# Patient Record
Sex: MALE | Race: ASIAN | Employment: FULL TIME | URBAN - METROPOLITAN AREA
[De-identification: names, ages, dates, MRNs, and addresses within clinical notes are randomized per-mention and may not be internally consistent; named-entity substitution may affect disease eponyms.]

---

## 2017-01-13 ENCOUNTER — OFFICE VISIT (OUTPATIENT)
Dept: FAMILY MEDICINE CLINIC | Facility: CLINIC | Age: 47
End: 2017-01-13

## 2017-01-13 VITALS
BODY MASS INDEX: 31.8 KG/M2 | HEART RATE: 52 BPM | HEIGHT: 74 IN | SYSTOLIC BLOOD PRESSURE: 147 MMHG | WEIGHT: 247.8 LBS | TEMPERATURE: 97.7 F | RESPIRATION RATE: 14 BRPM | OXYGEN SATURATION: 94 % | DIASTOLIC BLOOD PRESSURE: 87 MMHG

## 2017-01-13 DIAGNOSIS — Z12.5 PROSTATE CANCER SCREENING: ICD-10-CM

## 2017-01-13 DIAGNOSIS — Z76.89 ENCOUNTER TO ESTABLISH CARE: ICD-10-CM

## 2017-01-13 DIAGNOSIS — M10.00 IDIOPATHIC GOUT, UNSPECIFIED CHRONICITY, UNSPECIFIED SITE: ICD-10-CM

## 2017-01-13 DIAGNOSIS — I10 HTN, GOAL BELOW 130/80: Primary | ICD-10-CM

## 2017-01-13 RX ORDER — ATENOLOL 100 MG/1
100 TABLET ORAL DAILY
COMMUNITY
End: 2017-02-10 | Stop reason: ALTCHOICE

## 2017-01-13 RX ORDER — ALLOPURINOL 100 MG/1
TABLET ORAL DAILY
COMMUNITY
End: 2017-03-20 | Stop reason: ALTCHOICE

## 2017-01-13 RX ORDER — PREDNISONE 5 MG/1
TABLET ORAL AS NEEDED
COMMUNITY
End: 2017-06-16 | Stop reason: SDUPTHER

## 2017-01-13 RX ORDER — LISINOPRIL AND HYDROCHLOROTHIAZIDE 20; 25 MG/1; MG/1
TABLET ORAL DAILY
COMMUNITY
End: 2017-01-13 | Stop reason: SDUPTHER

## 2017-01-13 RX ORDER — LISINOPRIL AND HYDROCHLOROTHIAZIDE 20; 25 MG/1; MG/1
1 TABLET ORAL DAILY
Qty: 90 TAB | Refills: 1 | Status: SHIPPED | OUTPATIENT
Start: 2017-01-13 | End: 2017-02-10 | Stop reason: ALTCHOICE

## 2017-01-13 NOTE — PROGRESS NOTES
HISTORY OF PRESENT ILLNESS  Cornel Jack is a 48 y.o. male. HPI Comments: Presents as a new patient to establish care. H/o HTN, kidney disease and gout. Reports medication compliance. Unsure the extent of his kidney disease. Had been going to the [facility] for his care. His gout is managed with allopurinol, denies any pain today. Has never been screened for prostate cancer, will like to do that today. Establish Care   The history is provided by the patient. This is a new problem. Hypertension    The history is provided by the patient. This is a chronic problem. The current episode started more than 1 week ago. The problem has not changed since onset. Risk factors include male gender and family history. Gout   The history is provided by the patient. This is a chronic problem. The current episode started more than 1 week ago. The problem has been gradually improving. Treatments tried: allopurinol. Other   The history is provided by the patient. Review of Systems   Respiratory: Negative. Cardiovascular: Negative. Musculoskeletal: Positive for gout. Psychiatric/Behavioral: Negative. Past Medical History   Diagnosis Date    Hypertension     Joint pain     Kidney disease      History reviewed. No pertinent past surgical history. No current outpatient prescriptions on file prior to visit. No current facility-administered medications on file prior to visit. Allergies and Intolerances:   No Known Allergies    Family History:   Family History   Problem Relation Age of Onset    Hypertension Mother     Heart Disease Father     Hypertension Father     Hypertension Child        Social History:   He  reports that he has never smoked. He has never used smokeless tobacco. He  reports that he does not drink alcohol.   Vitals:   Visit Vitals    /87 (BP 1 Location: Right arm, BP Patient Position: Sitting)    Pulse (!) 52    Temp 97.7 °F (36.5 °C) (Oral)    Resp 14    Ht 6' 2\" (1.88 m)    Wt 247 lb 12.8 oz (112.4 kg)    SpO2 94%    BMI 31.82 kg/m2     Body surface area is 2.42 meters squared. Physical Exam   Constitutional: He is oriented to person, place, and time. He appears well-developed and well-nourished. HENT:   Head: Atraumatic. Neck: No JVD present. Carotid bruit is not present. Cardiovascular: Normal rate. Musculoskeletal: Normal range of motion. Neurological: He is alert and oriented to person, place, and time. Skin: Skin is warm. Psychiatric: He has a normal mood and affect. His behavior is normal.   Nursing note and vitals reviewed. ASSESSMENT and PLAN    ICD-10-CM ICD-9-CM    1. HTN, goal below 130/80 I10 401.9 CBC WITH AUTOMATED DIFF      METABOLIC PANEL, COMPREHENSIVE      TSH 3RD GENERATION      lisinopril-hydroCHLOROthiazide (PRINZIDE, ZESTORETIC) 20-25 mg per tablet   2. Idiopathic gout, unspecified chronicity, unspecified site M10.00 274.9 URIC ACID   3. Prostate cancer screening Z12.5 V76.44 PSA DIAGNOSTIC (PROSTATIC SPECIFIC AG)   4. Encounter to establish care Z76.89 V65.8      Baseline labs today. Will request records from [facility].   Follow-up Disposition:  Return in about 4 weeks (around 2/10/2017), or if symptoms worsen or fail to improve, for HTN.  lab results and schedule of future lab studies reviewed with patient  reviewed medications and side effects in detail    - Alarm signals discussed. ER precautions  - Plan of care reviewed with patient. Understanding verbalized and they are in agreement with plan of care.

## 2017-01-13 NOTE — MR AVS SNAPSHOT
Visit Information Date & Time Provider Department Dept. Phone Encounter #  
 1/13/2017  9:15 AM Kyle Chin NP [address] [phone] [id number] Follow-up Instructions Return in about 4 weeks (around 2/10/2017), or if symptoms worsen or fail to improve, for HTN. Allergies as of 1/13/2017  Review Complete On: 1/13/2017 By: Luke Corona No Known Allergies Current Immunizations  Never Reviewed No immunizations on file. Not reviewed this visit You Were Diagnosed With   
  
 Codes Comments HTN, goal below 130/80    -  Primary ICD-10-CM: I10 
ICD-9-CM: 401.9 Idiopathic gout, unspecified chronicity, unspecified site     ICD-10-CM: M10.00 ICD-9-CM: 274.9 Prostate cancer screening     ICD-10-CM: Z12.5 ICD-9-CM: V76.44 Vitals BP Pulse Temp Resp Height(growth percentile) Weight(growth percentile) 147/87 (BP 1 Location: Right arm, BP Patient Position: Sitting) (!) 52 97.7 °F (36.5 °C) (Oral) 14 6' 2\" (1.88 m) 247 lb 12.8 oz (112.4 kg) SpO2 BMI Smoking Status 94% 31.82 kg/m2 Never Smoker Vitals History BMI and BSA Data Body Mass Index Body Surface Area  
 31.82 kg/m 2 2.42 m 2 Preferred Pharmacy Pharmacy Name Phone [id number]- [address] [phone] Your Updated Medication List  
  
   
This list is accurate as of: 1/13/17 10:17 AM.  Always use your most recent med list.  
  
  
  
  
 allopurinol 100 mg tablet Commonly known as:  Mery Casa Take  by mouth daily. atenolol 100 mg tablet Commonly known as:  TENORMIN Take 100 mg by mouth daily. lisinopril-hydroCHLOROthiazide 20-25 mg per tablet Commonly known as:  Lisset Edinger Take 1 Tab by mouth daily. predniSONE 5 mg tablet Commonly known as:  Destiny Cherry Take  by mouth. Prescriptions Sent to Pharmacy Refills lisinopril-hydroCHLOROthiazide (PRINZIDE, ZESTORETIC) 20-25 mg per tablet 1 Sig: Take 1 Tab by mouth daily. Class: Normal  
 Pharmacy: [address] [place] [address] #: [phone] Route: Oral  
  
Follow-up Instructions Return in about 4 weeks (around 2/10/2017), or if symptoms worsen or fail to improve, for HTN. To-Do List   
 01/13/2017 Lab:  CBC WITH AUTOMATED DIFF   
  
 01/13/2017 Lab:  METABOLIC PANEL, COMPREHENSIVE   
  
 01/13/2017 Lab:  PSA DIAGNOSTIC (PROSTATIC SPECIFIC AG)   
  
 01/13/2017 Lab:  TSH 3RD GENERATION   
  
 01/13/2017 Lab:  URIC ACID Patient Instructions Kidney Disease and High Blood Pressure: Care Instructions Your Care Instructions Long-term (chronic) kidney disease happens when the kidneys cannot remove waste and keep your body's fluids and chemicals in balance. Usually, the kidneys remove waste from the blood through the urine. When the kidneys are not working well, waste can build up so much that it poisons the body. Kidney disease can make you very tired. It also can cause swelling, or edema, in your legs or other areas of your body. High blood pressure is one of the major causes of chronic kidney disease. And kidney disease can also cause high blood pressure. No matter which came first, having high blood pressure damages the tiny blood vessels in the kidneys. If you have high blood pressure, it is important to lower it. There are many things you can do to lower your blood pressure, which may help slow or stop the damage to your kidneys. Follow-up care is a key part of your treatment and safety. Be sure to make and go to all appointments, and call your doctor if you are having problems. It's also a good idea to know your test results and keep a list of the medicines you take. How can you care for yourself at home? · Be safe with medicines. Take your medicines exactly as prescribed.  Call your doctor if you have any problems with your medicine. You will probably need more than one medicine to lower your blood pressure. You will get more details on the specific medicines your doctor prescribes. · Work with your doctor and a dietitian to plan meals that have the right amount of nutrients for you. You will probably have to limit salt, fluids, and protein. · Stay at a healthy weight. This is very important if you put on weight around the waist. Losing even 10 pounds can help you lower your blood pressure. · Manage other health problems such as diabetes and high cholesterol. You can help lower your risk for heart disease and blood vessel problems with a healthy lifestyle along with medicines. · Do not take ibuprofen (Advil, Motrin) or naproxen (Aleve), or similar medicines, unless your doctor tells you to. They may make chronic kidney disease worse. It is okay to take acetaminophen (Tylenol). · If your doctor recommends it, get more exercise. Walking is a good choice. Bit by bit, increase the amount you walk every day. Try for at least 30 minutes on most days of the week. You also may want to swim, bike, or do other activities. · Limit or avoid alcohol. Talk to your doctor about whether you can drink any alcohol. · Do not smoke or allow others to smoke around you. If you need help quitting, talk to your doctor about stop-smoking programs and medicines. These can increase your chances of quitting for good. When should you call for help? Call 911 anytime you think you may need emergency care. For example, call if: 
· You passed out (lost consciousness). · You have symptoms of a heart attack, such as: ¨ Chest pain or pressure. ¨ Sweating. ¨ Shortness of breath. ¨ Nausea or vomiting. ¨ Pain that spreads from the chest to the neck, jaw, or one or both shoulders or arms. ¨ Dizziness or lightheadedness. ¨ A fast or uneven pulse. After calling 911, chew 1 adult-strength aspirin. Wait for an ambulance. Do not try to drive yourself. Call your doctor now or seek immediate medical care if: 
· You are confused or are more tired or weak than usual. 
· You bleed or have bruises. Watch closely for changes in your health, and be sure to contact your doctor if: 
· You do not want to eat. · You have new swelling of your arms or feet, or swelling that you already have gets worse. · You do not get better as expected. Where can you learn more? Go to http://nichole-shahnaz.info/. Enter N471 in the search box to learn more about \"Kidney Disease and High Blood Pressure: Care Instructions. \" Current as of: November 20, 2015 Content Version: 11.1 © 0985-0747 [employer]. Care instructions adapted under license by [employer] (which disclaims liability or warranty for this information). If you have questions about a medical condition or this instruction, always ask your healthcare professional. [place] [address] any warranty or liability for your use of this information. High Blood Pressure: Care Instructions Your Care Instructions If your blood pressure is usually above 140/90, you have high blood pressure, or hypertension. That means the top number is 140 or higher or the bottom number is 90 or higher, or both. Despite what a lot of people think, high blood pressure usually doesn't cause headaches or make you feel dizzy or lightheaded. It usually has no symptoms. But it does increase your risk for heart attack, stroke, and kidney or eye damage. The higher your blood pressure, the more your risk increases. Your doctor will give you a goal for your blood pressure. Your goal will be based on your health and your age. An example of a goal is to keep your blood pressure below 140/90.  
Lifestyle changes, such as eating healthy and being active, are always important to help lower blood pressure. You might also take medicine to reach your blood pressure goal. 
Follow-up care is a key part of your treatment and safety. Be sure to make and go to all appointments, and call your doctor if you are having problems. It's also a good idea to know your test results and keep a list of the medicines you take. How can you care for yourself at home? Medical treatment · If you stop taking your medicine, your blood pressure will go back up. You may take one or more types of medicine to lower your blood pressure. Be safe with medicines. Take your medicine exactly as prescribed. Call your doctor if you think you are having a problem with your medicine. · Talk to your doctor before you start taking aspirin every day. Aspirin can help certain people lower their risk of a heart attack or stroke. But taking aspirin isn't right for everyone, because it can cause serious bleeding. · See your doctor regularly. You may need to see the doctor more often at first or until your blood pressure comes down. · If you are taking blood pressure medicine, talk to your doctor before you take decongestants or anti-inflammatory medicine, such as ibuprofen. Some of these medicines can raise blood pressure. · Learn how to check your blood pressure at home. Lifestyle changes · Stay at a healthy weight. This is especially important if you put on weight around the waist. Losing even 10 pounds can help you lower your blood pressure. · If your doctor recommends it, get more exercise. Walking is a good choice. Bit by bit, increase the amount you walk every day. Try for at least 30 minutes on most days of the week. You also may want to swim, bike, or do other activities. · Avoid or limit alcohol. Talk to your doctor about whether you can drink any alcohol. · Try to limit how much sodium you eat to less than 2,300 milligrams (mg) a day. Your doctor may ask you to try to eat less than 1,500 mg a day. · Eat plenty of fruits (such as bananas and oranges), vegetables, legumes, whole grains, and low-fat dairy products. · Lower the amount of saturated fat in your diet. Saturated fat is found in animal products such as milk, cheese, and meat. Limiting these foods may help you lose weight and also lower your risk for heart disease. · Do not smoke. Smoking increases your risk for heart attack and stroke. If you need help quitting, talk to your doctor about stop-smoking programs and medicines. These can increase your chances of quitting for good. When should you call for help? Call 911 anytime you think you may need emergency care. This may mean having symptoms that suggest that your blood pressure is causing a serious heart or blood vessel problem. Your blood pressure may be over 180/110. For example, call 911 if: 
· You have symptoms of a heart attack. These may include: ¨ Chest pain or pressure, or a strange feeling in the chest. 
¨ Sweating. ¨ Shortness of breath. ¨ Nausea or vomiting. ¨ Pain, pressure, or a strange feeling in the back, neck, jaw, or upper belly or in one or both shoulders or arms. ¨ Lightheadedness or sudden weakness. ¨ A fast or irregular heartbeat. · You have symptoms of a stroke. These may include: 
¨ Sudden numbness, tingling, weakness, or loss of movement in your face, arm, or leg, especially on only one side of your body. ¨ Sudden vision changes. ¨ Sudden trouble speaking. ¨ Sudden confusion or trouble understanding simple statements. ¨ Sudden problems with walking or balance. ¨ A sudden, severe headache that is different from past headaches. · You have severe back or belly pain. Do not wait until your blood pressure comes down on its own. Get help right away. Call your doctor now or seek immediate care if: 
· Your blood pressure is much higher than normal (such as 180/110 or higher), but you don't have symptoms. · You think high blood pressure is causing symptoms, such as: ¨ Severe headache. ¨ Blurry vision. Watch closely for changes in your health, and be sure to contact your doctor if: 
· Your blood pressure measures 140/90 or higher at least 2 times. That means the top number is 140 or higher or the bottom number is 90 or higher, or both. · You think you may be having side effects from your blood pressure medicine. · Your blood pressure is usually normal, but it goes above normal at least 2 times. Where can you learn more? Go to http://nichole-shahnaz.info/. Enter X925 in the search box to learn more about \"High Blood Pressure: Care Instructions. \" Current as of: August 8, 2016 Content Version: 11.1 © 0418-6521 [email or web address]. Care instructions adapted under license by [employer] (which disclaims liability or warranty for this information). If you have questions about a medical condition or this instruction, always ask your healthcare professional. [place] [address] any warranty or liability for your use of this information. Introducing [facility]! Jeff Theodore introduces [employer] patient portal. Now you can access parts of your medical record, email your doctor's office, and request medication refills online. 1. In your internet browser, go to https://The Mark [email or web address]. "[employer]/The Mark News 2. Click on the First Time User? Click Here link in the Sign In box. You will see the New Member Sign Up page. 3. Enter your [employer] Access Code exactly as it appears below. You will not need to use this code after youve completed the sign-up process. If you do not sign up before the expiration date, you must request a new code. · [employer] Access Code: [email or web address] Expires: 4/13/2017  9:35 AM 
 
4. Enter the last four digits of your Social Security Number (xxxx) and Date of Birth (mm/dd/yyyy) as indicated and click Submit.  You will be taken to the next sign-up page. 5. Create a EntraTympanic ID. This will be your EntraT[employer] login ID and cannot be changed, so think of one that is secure and easy to remember. 6. Create a EntraTympanic password. You can change your password at any time. 7. Enter your Password Reset Question and Answer. This can be used at a later time if you forget your password. 8. Enter your e-mail address. You will receive e-mail notification when new information is available in [address]. 9. Click Sign Up. You can now view and download portions of your medical record. 10. Click the Download Summary menu link to download a portable copy of your medical information. If you have questions, please visit the Frequently Asked Questions section of the EntraTympanic website. Remember, [employer] is NOT to be used for urgent needs. For medical emergencies, dial 911. Now available from your iPhone and Android! Please provide this summary of care documentation to your next provider. Your primary care clinician is listed as Anny Rodríguez. If you have any questions after today's visit, please call [phone].

## 2017-01-13 NOTE — PATIENT INSTRUCTIONS
Kidney Disease and High Blood Pressure: Care Instructions  Your Care Instructions  Long-term (chronic) kidney disease happens when the kidneys cannot remove waste and keep your body's fluids and chemicals in balance. Usually, the kidneys remove waste from the blood through the urine. When the kidneys are not working well, waste can build up so much that it poisons the body. Kidney disease can make you very tired. It also can cause swelling, or edema, in your legs or other areas of your body. High blood pressure is one of the major causes of chronic kidney disease. And kidney disease can also cause high blood pressure. No matter which came first, having high blood pressure damages the tiny blood vessels in the kidneys. If you have high blood pressure, it is important to lower it. There are many things you can do to lower your blood pressure, which may help slow or stop the damage to your kidneys. Follow-up care is a key part of your treatment and safety. Be sure to make and go to all appointments, and call your doctor if you are having problems. It's also a good idea to know your test results and keep a list of the medicines you take. How can you care for yourself at home? · Be safe with medicines. Take your medicines exactly as prescribed. Call your doctor if you have any problems with your medicine. You will probably need more than one medicine to lower your blood pressure. You will get more details on the specific medicines your doctor prescribes. · Work with your doctor and a dietitian to plan meals that have the right amount of nutrients for you. You will probably have to limit salt, fluids, and protein. · Stay at a healthy weight. This is very important if you put on weight around the waist. Losing even 10 pounds can help you lower your blood pressure. · Manage other health problems such as diabetes and high cholesterol.  You can help lower your risk for heart disease and blood vessel problems with a healthy lifestyle along with medicines. · Do not take ibuprofen (Advil, Motrin) or naproxen (Aleve), or similar medicines, unless your doctor tells you to. They may make chronic kidney disease worse. It is okay to take acetaminophen (Tylenol). · If your doctor recommends it, get more exercise. Walking is a good choice. Bit by bit, increase the amount you walk every day. Try for at least 30 minutes on most days of the week. You also may want to swim, bike, or do other activities. · Limit or avoid alcohol. Talk to your doctor about whether you can drink any alcohol. · Do not smoke or allow others to smoke around you. If you need help quitting, talk to your doctor about stop-smoking programs and medicines. These can increase your chances of quitting for good. When should you call for help? Call 911 anytime you think you may need emergency care. For example, call if:  · You passed out (lost consciousness). · You have symptoms of a heart attack, such as:  ¨ Chest pain or pressure. ¨ Sweating. ¨ Shortness of breath. ¨ Nausea or vomiting. ¨ Pain that spreads from the chest to the neck, jaw, or one or both shoulders or arms. ¨ Dizziness or lightheadedness. ¨ A fast or uneven pulse. After calling 911, chew 1 adult-strength aspirin. Wait for an ambulance. Do not try to drive yourself. Call your doctor now or seek immediate medical care if:  · You are confused or are more tired or weak than usual.  · You bleed or have bruises. Watch closely for changes in your health, and be sure to contact your doctor if:  · You do not want to eat. · You have new swelling of your arms or feet, or swelling that you already have gets worse. · You do not get better as expected. Where can you learn more? Go to http://nichole-shahnaz.info/. Enter P017 in the search box to learn more about \"Kidney Disease and High Blood Pressure: Care Instructions. \"  Current as of: November 20, 2015  Content Version: 11.1  © 2024-8341 [employer], [employer]. Care instructions adapted under license by [employer] (which disclaims liability or warranty for this information). If you have questions about a medical condition or this instruction, always ask your healthcare professional. [address] any warranty or liability for your use of this information. High Blood Pressure: Care Instructions  Your Care Instructions  If your blood pressure is usually above 140/90, you have high blood pressure, or hypertension. That means the top number is 140 or higher or the bottom number is 90 or higher, or both. Despite what a lot of people think, high blood pressure usually doesn't cause headaches or make you feel dizzy or lightheaded. It usually has no symptoms. But it does increase your risk for heart attack, stroke, and kidney or eye damage. The higher your blood pressure, the more your risk increases. Your doctor will give you a goal for your blood pressure. Your goal will be based on your health and your age. An example of a goal is to keep your blood pressure below 140/90. Lifestyle changes, such as eating healthy and being active, are always important to help lower blood pressure. You might also take medicine to reach your blood pressure goal.  Follow-up care is a key part of your treatment and safety. Be sure to make and go to all appointments, and call your doctor if you are having problems. It's also a good idea to know your test results and keep a list of the medicines you take. How can you care for yourself at home? Medical treatment  · If you stop taking your medicine, your blood pressure will go back up. You may take one or more types of medicine to lower your blood pressure. Be safe with medicines. Take your medicine exactly as prescribed. Call your doctor if you think you are having a problem with your medicine. · Talk to your doctor before you start taking aspirin every day.  Aspirin can help certain people lower their risk of a heart attack or stroke. But taking aspirin isn't right for everyone, because it can cause serious bleeding. · See your doctor regularly. You may need to see the doctor more often at first or until your blood pressure comes down. · If you are taking blood pressure medicine, talk to your doctor before you take decongestants or anti-inflammatory medicine, such as ibuprofen. Some of these medicines can raise blood pressure. · Learn how to check your blood pressure at home. Lifestyle changes  · Stay at a healthy weight. This is especially important if you put on weight around the waist. Losing even 10 pounds can help you lower your blood pressure. · If your doctor recommends it, get more exercise. Walking is a good choice. Bit by bit, increase the amount you walk every day. Try for at least 30 minutes on most days of the week. You also may want to swim, bike, or do other activities. · Avoid or limit alcohol. Talk to your doctor about whether you can drink any alcohol. · Try to limit how much sodium you eat to less than 2,300 milligrams (mg) a day. Your doctor may ask you to try to eat less than 1,500 mg a day. · Eat plenty of fruits (such as bananas and oranges), vegetables, legumes, whole grains, and low-fat dairy products. · Lower the amount of saturated fat in your diet. Saturated fat is found in animal products such as milk, cheese, and meat. Limiting these foods may help you lose weight and also lower your risk for heart disease. · Do not smoke. Smoking increases your risk for heart attack and stroke. If you need help quitting, talk to your doctor about stop-smoking programs and medicines. These can increase your chances of quitting for good. When should you call for help? Call 911 anytime you think you may need emergency care. This may mean having symptoms that suggest that your blood pressure is causing a serious heart or blood vessel problem.  Your blood pressure may be over 180/110. For example, call 911 if:  · You have symptoms of a heart attack. These may include:  ¨ Chest pain or pressure, or a strange feeling in the chest.  ¨ Sweating. ¨ Shortness of breath. ¨ Nausea or vomiting. ¨ Pain, pressure, or a strange feeling in the back, neck, jaw, or upper belly or in one or both shoulders or arms. ¨ Lightheadedness or sudden weakness. ¨ A fast or irregular heartbeat. · You have symptoms of a stroke. These may include:  ¨ Sudden numbness, tingling, weakness, or loss of movement in your face, arm, or leg, especially on only one side of your body. ¨ Sudden vision changes. ¨ Sudden trouble speaking. ¨ Sudden confusion or trouble understanding simple statements. ¨ Sudden problems with walking or balance. ¨ A sudden, severe headache that is different from past headaches. · You have severe back or belly pain. Do not wait until your blood pressure comes down on its own. Get help right away. Call your doctor now or seek immediate care if:  · Your blood pressure is much higher than normal (such as 180/110 or higher), but you don't have symptoms. · You think high blood pressure is causing symptoms, such as:  ¨ Severe headache. ¨ Blurry vision. Watch closely for changes in your health, and be sure to contact your doctor if:  · Your blood pressure measures 140/90 or higher at least 2 times. That means the top number is 140 or higher or the bottom number is 90 or higher, or both. · You think you may be having side effects from your blood pressure medicine. · Your blood pressure is usually normal, but it goes above normal at least 2 times. Where can you learn more? Go to http://nichole-shahnaz.info/. Enter W958 in the search box to learn more about \"High Blood Pressure: Care Instructions. \"  Current as of: August 8, 2016  Content Version: 11.1  © 7520-4175 [employer], [employer].  Care instructions adapted under license by [employer] (which disclaims liability or warranty for this information). If you have questions about a medical condition or this instruction, always ask your healthcare professional. [address] any warranty or liability for your use of this information.

## 2017-01-17 ENCOUNTER — TELEPHONE (OUTPATIENT)
Dept: FAMILY MEDICINE CLINIC | Facility: CLINIC | Age: 47
End: 2017-01-17

## 2017-01-17 DIAGNOSIS — N18.4 CKD (CHRONIC KIDNEY DISEASE) STAGE 4, GFR 15-29 ML/MIN (HCC): Primary | ICD-10-CM

## 2017-01-17 NOTE — TELEPHONE ENCOUNTER
Discussed results of recent labs with patient. Given his eGFR of 19 and creatinine of 4, I will be referring him to a Nephrologist at this time. I communicated this with the patient and he is in agreement with the plan. All questions were answered.

## 2017-01-19 DIAGNOSIS — Z12.5 PROSTATE CANCER SCREENING: ICD-10-CM

## 2017-01-19 DIAGNOSIS — I10 HTN, GOAL BELOW 130/80: ICD-10-CM

## 2017-01-19 DIAGNOSIS — M10.00 IDIOPATHIC GOUT, UNSPECIFIED CHRONICITY, UNSPECIFIED SITE: ICD-10-CM

## 2017-02-10 ENCOUNTER — OFFICE VISIT (OUTPATIENT)
Dept: FAMILY MEDICINE CLINIC | Facility: CLINIC | Age: 47
End: 2017-02-10

## 2017-02-10 VITALS
OXYGEN SATURATION: 98 % | SYSTOLIC BLOOD PRESSURE: 140 MMHG | HEIGHT: 74 IN | TEMPERATURE: 97.8 F | BODY MASS INDEX: 31.7 KG/M2 | DIASTOLIC BLOOD PRESSURE: 94 MMHG | HEART RATE: 49 BPM | WEIGHT: 247 LBS | RESPIRATION RATE: 16 BRPM

## 2017-02-10 DIAGNOSIS — R00.1 BRADYCARDIA: ICD-10-CM

## 2017-02-10 DIAGNOSIS — I10 HTN, GOAL BELOW 130/80: Primary | ICD-10-CM

## 2017-02-10 DIAGNOSIS — N18.4 CKD (CHRONIC KIDNEY DISEASE) STAGE 4, GFR 15-29 ML/MIN (HCC): ICD-10-CM

## 2017-02-10 RX ORDER — LISINOPRIL 40 MG/1
40 TABLET ORAL DAILY
Qty: 30 TAB | Refills: 3 | Status: SHIPPED | OUTPATIENT
Start: 2017-02-10 | End: 2017-03-17 | Stop reason: SDUPTHER

## 2017-02-10 RX ORDER — AMLODIPINE BESYLATE 10 MG/1
10 TABLET ORAL DAILY
Qty: 30 TAB | Refills: 3 | Status: SHIPPED | OUTPATIENT
Start: 2017-02-10 | End: 2017-03-17 | Stop reason: SDUPTHER

## 2017-02-10 NOTE — PROGRESS NOTES
HISTORY OF PRESENT ILLNESS  Ethan Gutierrez is a 48 y.o. male. HPI Comments: HTN: BP remains elevated. BP is not measured at home. Denies any leg swelling or palpitations. Bradycardia noted today  CKD: eGFR at 19, referred to nephrology, Renal US scheduled for 2/13/17. Hypertension    The history is provided by the patient. This is a chronic problem. The current episode started more than 1 week ago. The problem has not changed since onset. Risk factors include family history, male gender and renal failure. Review of Systems   Constitutional: Negative. Respiratory: Negative. Genitourinary: Negative. Musculoskeletal: Negative. Past Medical History   Diagnosis Date    Hypertension     Joint pain     Kidney disease      No past surgical history on file. Current Outpatient Prescriptions on File Prior to Visit   Medication Sig Dispense Refill    allopurinol (ZYLOPRIM) 100 mg tablet Take  by mouth daily.  predniSONE (DELTASONE) 5 mg tablet Take  by mouth as needed. No current facility-administered medications on file prior to visit. Allergies and Intolerances:   No Known Allergies    Family History:   Family History   Problem Relation Age of Onset    Hypertension Mother     Heart Disease Father     Hypertension Father     Hypertension Child        Social History:   He  reports that he has never smoked. He has never used smokeless tobacco. He  reports that he does not drink alcohol. Vitals:   Visit Vitals    BP (!) 140/94 (BP 1 Location: Left arm, BP Patient Position: Sitting)  Comment: manual    Pulse (!) 49    Temp 97.8 °F (36.6 °C)    Resp 16    Ht 6' 2\" (1.88 m)    Wt 247 lb (112 kg)    SpO2 98%    BMI 31.71 kg/m2     Body surface area is 2.42 meters squared. Physical Exam   Constitutional: He is oriented to person, place, and time. He appears well-developed and well-nourished. HENT:   Head: Atraumatic. Cardiovascular: Normal rate.     Pulmonary/Chest: Effort normal and breath sounds normal.   Neurological: He is alert and oriented to person, place, and time. Skin: Skin is warm. Psychiatric: He has a normal mood and affect. His behavior is normal.   Nursing note and vitals reviewed. ASSESSMENT and PLAN    ICD-10-CM ICD-9-CM    1. HTN, goal below 130/80 I10 401.9 lisinopril (PRINIVIL, ZESTRIL) 40 mg tablet      amLODIPine (NORVASC) 10 mg tablet    d/c atenolol, Lisinopril/HCTZ   2. CKD (chronic kidney disease) stage 4, GFR 15-29 ml/min ([facility]) N18.4 585.4     referred to nephrology, Renal US pending. 3. Bradycardia R00.1 427.89      Follow-up Disposition:  Return in about 3 months (around 5/10/2017), or if symptoms worsen or fail to improve, for HTN. the following changes in treatment are made: given bradycardia, will d/c atenolol and start amlodipine. Also d/c lisnopril/hctz and start Lisinopril 40 mg daily given eGFR of 19. reviewed medications and side effects in detail    - Alarm signals discussed. ER precautions  - Plan of care reviewed with patient. Understanding verbalized and they are in agreement with plan of care.

## 2017-02-10 NOTE — PATIENT INSTRUCTIONS

## 2017-02-10 NOTE — MR AVS SNAPSHOT
Visit Information Date & Time Provider Department Dept. Phone Encounter #  
 2/10/2017 12:45 PM Adan ARLETT[facility]  Follow-up Instructions Return in about 3 months (around 5/10/2017), or if symptoms worsen or fail to improve, for HTN. Upcoming Health Maintenance Date Due Pneumococcal 19-64 Highest Risk (1 of 3 - PCV13) 6/11/1987 DTaP/Tdap/Td series (1 - Tdap) 6/11/1989 Allergies as of 2/10/2017  Review Complete On: 2/10/2017 By: Grazyna Saldivar No Known Allergies Current Immunizations  Never Reviewed No immunizations on file. Not reviewed this visit You Were Diagnosed With   
  
 Codes Comments HTN, goal below 130/80    -  Primary ICD-10-CM: I10 
ICD-9-CM: 401.9 CKD (chronic kidney disease) stage 4, GFR 15-29 ml/min ([facility])     ICD-10-CM: N18.4 ICD-9-CM: 944. 4 Vitals BP Pulse Temp Resp Height(growth percentile) Weight(growth percentile) (!) 140/94 (BP 1 Location: Left arm, BP Patient Position: Sitting) (!) 49 97.8 °F (36.6 °C) 16 6' 2\" (1.88 m) 247 lb (112 kg) SpO2 BMI Smoking Status 98% 31.71 kg/m2 Never Smoker Vitals History BMI and BSA Data Body Mass Index Body Surface Area 31.71 kg/m 2 2.42 m 2 Preferred Pharmacy Pharmacy Name Phone [id number]- [place]  [phone] Your Updated Medication List  
  
   
This list is accurate as of: 2/10/17  1:31 PM.  Always use your most recent med list.  
  
  
  
  
 allopurinol 100 mg tablet Commonly known as:  Regina Dakins Take  by mouth daily. amLODIPine 10 mg tablet Commonly known as:  Colby Mullet Take 1 Tab by mouth daily. lisinopril 40 mg tablet Commonly known as:  Lacy Ripa Take 1 Tab by mouth daily. predniSONE 5 mg tablet Commonly known as:  Sharlotte Corner Take  by mouth as needed. Prescriptions Sent to Pharmacy Refills  
 lisinopril (PRINIVIL, ZESTRIL) 40 mg tablet 3 Sig: Take 1 Tab by mouth daily. Class: Normal  
 Pharmacy: [address] [place] [address] #: [phone] Route: Oral  
 amLODIPine (NORVASC) 10 mg tablet 3 Sig: Take 1 Tab by mouth daily. Class: Normal  
 Pharmacy: [address] [place] [address] #: [phone] Route: Oral  
  
Follow-up Instructions Return in about 3 months (around 5/10/2017), or if symptoms worsen or fail to improve, for HTN. To-Do List   
 02/13/2017 1:45 PM  
  Appointment with [facility] 1 at [address] [place] [address] ([phone]) OUTSIDE FILMS  - Any outside films related to the study being scheduled should be brought with you on the day of the exam.  If this cannot be done there may be a delay in the reading of the study. MEDICATIONS  - Patient must bring a complete list of all medications currently taking to include prescriptions, over-the-counter meds, herbals, vitamins & any dietary supplements  GENERAL -Patient must drink 32 ounces of water 1 hour prior to the exam. -Patient must arrive with a full bladder. Patient Instructions DASH Diet: Care Instructions Your Care Instructions The DASH diet is an eating plan that can help lower your blood pressure. DASH stands for Dietary Approaches to Stop Hypertension. Hypertension is high blood pressure. The DASH diet focuses on eating foods that are high in calcium, potassium, and magnesium. These nutrients can lower blood pressure. The foods that are highest in these nutrients are fruits, vegetables, low-fat dairy products, nuts, seeds, and legumes. But taking calcium, potassium, and magnesium supplements instead of eating foods that are high in those nutrients does not have the same effect. The DASH diet also includes whole grains, fish, and poultry. The DASH diet is one of several lifestyle changes your doctor may recommend to lower your high blood pressure. Your doctor may also want you to decrease the amount of sodium in your diet. Lowering sodium while following the DASH diet can lower blood pressure even further than just the DASH diet alone. Follow-up care is a key part of your treatment and safety. Be sure to make and go to all appointments, and call your doctor if you are having problems. It's also a good idea to know your test results and keep a list of the medicines you take. How can you care for yourself at home? Following the DASH diet · Eat 4 to 5 servings of fruit each day. A serving is 1 medium-sized piece of fruit, ½ cup chopped or canned fruit, 1/4 cup dried fruit, or 4 ounces (½ cup) of fruit juice. Choose fruit more often than fruit juice. · Eat 4 to 5 servings of vegetables each day. A serving is 1 cup of lettuce or raw leafy vegetables, ½ cup of chopped or cooked vegetables, or 4 ounces (½ cup) of vegetable juice. Choose vegetables more often than vegetable juice. · Get 2 to 3 servings of low-fat and fat-free dairy each day. A serving is 8 ounces of milk, 1 cup of yogurt, or 1 ½ ounces of cheese. · Eat 6 to 8 servings of grains each day. A serving is 1 slice of bread, 1 ounce of dry cereal, or ½ cup of cooked rice, pasta, or cooked cereal. Try to choose whole-grain products as much as possible. · Limit lean meat, poultry, and fish to 2 servings each day. A serving is 3 ounces, about the size of a deck of cards. · Eat 4 to 5 servings of nuts, seeds, and legumes (cooked dried beans, lentils, and split peas) each week. A serving is 1/3 cup of nuts, 2 tablespoons of seeds, or ½ cup of cooked beans or peas. · Limit fats and oils to 2 to 3 servings each day. A serving is 1 teaspoon of vegetable oil or 2 tablespoons of salad dressing. · Limit sweets and added sugars to 5 servings or less a week.  A serving is 1 tablespoon jelly or jam, ½ cup sorbet, or 1 cup of lemonade. · Eat less than 2,300 milligrams (mg) of sodium a day. If you limit your sodium to 1,500 mg a day, you can lower your blood pressure even more. Tips for success · Start small. Do not try to make dramatic changes to your diet all at once. You might feel that you are missing out on your favorite foods and then be more likely to not follow the plan. Make small changes, and stick with them. Once those changes become habit, add a few more changes. · Try some of the following: ¨ Make it a goal to eat a fruit or vegetable at every meal and at snacks. This will make it easy to get the recommended amount of fruits and vegetables each day. ¨ Try yogurt topped with fruit and nuts for a snack or healthy dessert. ¨ Add lettuce, tomato, cucumber, and onion to sandwiches. ¨ Combine a ready-made pizza crust with low-fat mozzarella cheese and lots of vegetable toppings. Try using tomatoes, squash, spinach, broccoli, carrots, cauliflower, and onions. ¨ Have a variety of cut-up vegetables with a low-fat dip as an appetizer instead of chips and dip. ¨ Sprinkle sunflower seeds or chopped almonds over salads. Or try adding chopped walnuts or almonds to cooked vegetables. ¨ Try some vegetarian meals using beans and peas. Add garbanzo or kidney beans to salads. Make burritos and tacos with mashed andrade beans or black beans. Where can you learn more? Go to http://nichole-shahnaz.info/. Enter X875 in the search box to learn more about \"DASH Diet: Care Instructions. \" Current as of: March 23, 2016 Content Version: 11.1 © 9987-6983 [employer]. Care instructions adapted under license by [employer] (which disclaims liability or warranty for this information).  If you have questions about a medical condition or this instruction, always ask your healthcare professional. [employer] disclaims any warranty or liability for your use of this information. Introducing [facility]! Arnold Hui introduces TrustCloud patient portal. Now you can access parts of your medical record, email your doctor's office, and request medication refills online. 1. In your internet browser, go to [email or web address] 2. Click on the First Time User? Click Here link in the Sign In box. You will see the New Member Sign Up page. 3. Enter your TrustCloud Access Code exactly as it appears below. You will not need to use this code after youve completed the sign-up process. If you do not sign up before the expiration date, you must request a new code. · TrustCloud Access Code: [email or web address] Expires: 4/13/2017  9:35 AM 
 
4. Enter the last four digits of your Social Security Number (xxxx) and Date of Birth (mm/dd/yyyy) as indicated and click Submit. You will be taken to the next sign-up page. 5. Create a TrustCloud ID. This will be your TrustCloud login ID and cannot be changed, so think of one that is secure and easy to remember. 6. Create a TrustCloud password. You can change your password at any time. 7. Enter your Password Reset Question and Answer. This can be used at a later time if you forget your password. 8. Enter your e-mail address. You will receive e-mail notification when new information is available in [address] 9. Click Sign Up. You can now view and download portions of your medical record. 10. Click the Download Summary menu link to download a portable copy of your medical information. If you have questions, please visit the Frequently Asked Questions section of the TrustCloud website. Remember, TrustCloud is NOT to be used for urgent needs. For medical emergencies, dial 911. Now available from your iPhone and Android! Please provide this summary of care documentation to your next provider. Your primary care clinician is listed as Monroe Olivia. If you have any questions after today's visit, please call [phone].

## 2017-02-13 ENCOUNTER — HOSPITAL ENCOUNTER (OUTPATIENT)
Dept: ULTRASOUND IMAGING | Age: 47
Discharge: HOME | End: 2017-02-13
Attending: INTERNAL MEDICINE
Payer: OTHER GOVERNMENT

## 2017-02-13 DIAGNOSIS — N18.4 CHRONIC KIDNEY DISEASE, STAGE IV (SEVERE) (HCC): ICD-10-CM

## 2017-02-13 PROCEDURE — 76770 US EXAM ABDO BACK WALL COMP: CPT

## 2017-02-25 ENCOUNTER — HOSPITAL ENCOUNTER (OUTPATIENT)
Dept: LAB | Age: 47
Discharge: HOME | End: 2017-02-25
Payer: OTHER GOVERNMENT

## 2017-02-25 LAB
25(OH)D3 SERPL-MCNC: 22.2 NG/ML (ref 30–100)
ALBUMIN SERPL BCP-MCNC: 3.8 G/DL (ref 3.4–5)
ANION GAP BLD CALC-SCNC: 6 MMOL/L (ref 3–18)
APPEARANCE UR: CLEAR
BACTERIA URNS QL MICRO: ABNORMAL /HPF
BILIRUB UR QL: NEGATIVE
BUN SERPL-MCNC: 30 MG/DL (ref 7–18)
BUN/CREAT SERPL: 7 (ref 12–20)
CALCIUM SERPL-MCNC: 9 MG/DL (ref 8.5–10.1)
CALCIUM SERPL-MCNC: 9.5 MG/DL (ref 8.5–10.1)
CHLORIDE SERPL-SCNC: 102 MMOL/L (ref 100–108)
CO2 SERPL-SCNC: 32 MMOL/L (ref 21–32)
COLOR UR: YELLOW
CREAT SERPL-MCNC: 4.19 MG/DL (ref 0.6–1.3)
CREAT UR-MCNC: 243 MG/DL (ref 30–125)
EPITH CASTS URNS QL MICRO: ABNORMAL /LPF (ref 0–5)
GLUCOSE SERPL-MCNC: 100 MG/DL (ref 74–99)
GLUCOSE UR STRIP.AUTO-MCNC: NEGATIVE MG/DL
HGB UR QL STRIP: ABNORMAL
HYALINE CASTS URNS QL MICRO: ABNORMAL /LPF (ref 0–2)
KETONES UR QL STRIP.AUTO: NEGATIVE MG/DL
LEUKOCYTE ESTERASE UR QL STRIP.AUTO: NEGATIVE
MICROALBUMIN UR-MCNC: 218 MG/DL (ref 0–3)
MICROALBUMIN/CREAT UR-RTO: 897 MG/G (ref 0–30)
NITRITE UR QL STRIP.AUTO: NEGATIVE
PH UR STRIP: 5 [PH] (ref 5–8)
PHOSPHATE SERPL-MCNC: 3.5 MG/DL (ref 2.5–4.9)
POTASSIUM SERPL-SCNC: 3.9 MMOL/L (ref 3.5–5.5)
PROT UR STRIP-MCNC: 300 MG/DL
PTH-INTACT SERPL-MCNC: 280.2 PG/ML (ref 14–72)
RBC #/AREA URNS HPF: ABNORMAL /HPF (ref 0–5)
SODIUM SERPL-SCNC: 140 MMOL/L (ref 136–145)
SP GR UR REFRACTOMETRY: 1.02 (ref 1–1.03)
URATE SERPL-MCNC: 7.3 MG/DL (ref 2.6–7.2)
UROBILINOGEN UR QL STRIP.AUTO: 0.2 EU/DL (ref 0.2–1)
WBC URNS QL MICRO: ABNORMAL /HPF (ref 0–4)

## 2017-02-25 PROCEDURE — 84550 ASSAY OF BLOOD/URIC ACID: CPT | Performed by: INTERNAL MEDICINE

## 2017-02-25 PROCEDURE — 80069 RENAL FUNCTION PANEL: CPT | Performed by: INTERNAL MEDICINE

## 2017-02-25 PROCEDURE — 83970 ASSAY OF PARATHORMONE: CPT | Performed by: INTERNAL MEDICINE

## 2017-02-25 PROCEDURE — 36415 COLL VENOUS BLD VENIPUNCTURE: CPT | Performed by: INTERNAL MEDICINE

## 2017-02-25 PROCEDURE — 82570 ASSAY OF URINE CREATININE: CPT | Performed by: INTERNAL MEDICINE

## 2017-02-25 PROCEDURE — 82306 VITAMIN D 25 HYDROXY: CPT | Performed by: INTERNAL MEDICINE

## 2017-02-25 PROCEDURE — 86225 DNA ANTIBODY NATIVE: CPT | Performed by: INTERNAL MEDICINE

## 2017-02-25 PROCEDURE — 86038 ANTINUCLEAR ANTIBODIES: CPT | Performed by: INTERNAL MEDICINE

## 2017-02-25 PROCEDURE — 81001 URINALYSIS AUTO W/SCOPE: CPT | Performed by: INTERNAL MEDICINE

## 2017-02-27 LAB
ANA TITR SER IF: NEGATIVE {TITER}
DSDNA AB SER-ACNC: 2 IU/ML (ref 0–9)

## 2017-03-17 DIAGNOSIS — I10 HTN, GOAL BELOW 130/80: ICD-10-CM

## 2017-03-20 RX ORDER — ALLOPURINOL 100 MG/1
200 TABLET ORAL DAILY
Qty: 60 TAB | Refills: 3 | Status: SHIPPED | OUTPATIENT
Start: 2017-03-20

## 2017-03-22 RX ORDER — LISINOPRIL 40 MG/1
40 TABLET ORAL DAILY
Qty: 30 TAB | Refills: 3 | Status: SHIPPED | OUTPATIENT
Start: 2017-03-22 | End: 2018-02-28

## 2017-03-22 RX ORDER — ALLOPURINOL 100 MG/1
TABLET ORAL DAILY
OUTPATIENT
Start: 2017-03-22

## 2017-03-22 RX ORDER — AMLODIPINE BESYLATE 10 MG/1
10 TABLET ORAL DAILY
Qty: 30 TAB | Refills: 3 | Status: SHIPPED | OUTPATIENT
Start: 2017-03-22

## 2017-06-16 RX ORDER — PREDNISONE 5 MG/1
5 TABLET ORAL AS NEEDED
Qty: 30 TAB | Refills: 0 | Status: SHIPPED | OUTPATIENT
Start: 2017-06-16 | End: 2018-02-28 | Stop reason: SDUPTHER

## 2017-06-16 NOTE — TELEPHONE ENCOUNTER
Patient's last office visit on 02/10/2017  Medication(s) last filled on New request  Next Appointment: No appt scheduled  Rx Class Normal

## 2018-02-28 ENCOUNTER — OFFICE VISIT (OUTPATIENT)
Dept: FAMILY MEDICINE CLINIC | Facility: CLINIC | Age: 48
End: 2018-02-28

## 2018-02-28 VITALS
DIASTOLIC BLOOD PRESSURE: 90 MMHG | BODY MASS INDEX: 31.44 KG/M2 | OXYGEN SATURATION: 99 % | RESPIRATION RATE: 16 BRPM | SYSTOLIC BLOOD PRESSURE: 133 MMHG | WEIGHT: 245 LBS | TEMPERATURE: 96.7 F | HEART RATE: 80 BPM | HEIGHT: 74 IN

## 2018-02-28 DIAGNOSIS — N52.9 ERECTILE DYSFUNCTION, UNSPECIFIED ERECTILE DYSFUNCTION TYPE: ICD-10-CM

## 2018-02-28 DIAGNOSIS — E55.9 VITAMIN D DEFICIENCY: ICD-10-CM

## 2018-02-28 DIAGNOSIS — N18.4 CKD (CHRONIC KIDNEY DISEASE), STAGE IV (HCC): ICD-10-CM

## 2018-02-28 DIAGNOSIS — I10 HTN, GOAL BELOW 130/80: Primary | ICD-10-CM

## 2018-02-28 RX ORDER — CLONIDINE HYDROCHLORIDE 0.1 MG/1
0.1 TABLET ORAL 2 TIMES DAILY
COMMUNITY
Start: 2017-10-30

## 2018-02-28 RX ORDER — SILDENAFIL 50 MG/1
50 TABLET, FILM COATED ORAL AS NEEDED
Qty: 30 TAB | Refills: 1 | Status: SHIPPED | OUTPATIENT
Start: 2018-02-28 | End: 2018-07-03

## 2018-02-28 RX ORDER — HYDRALAZINE HYDROCHLORIDE 100 MG/1
100 TABLET, FILM COATED ORAL 2 TIMES DAILY
COMMUNITY
Start: 2017-10-30 | End: 2020-02-29

## 2018-02-28 RX ORDER — PREDNISONE 5 MG/1
1 TABLET ORAL
COMMUNITY
End: 2018-02-28 | Stop reason: SDUPTHER

## 2018-02-28 RX ORDER — AMLODIPINE BESYLATE 10 MG/1
TABLET ORAL
COMMUNITY
Start: 2017-10-16 | End: 2018-02-28 | Stop reason: SDUPTHER

## 2018-02-28 RX ORDER — PREDNISONE 5 MG/1
5 TABLET ORAL AS NEEDED
Qty: 30 TAB | Refills: 0 | Status: SHIPPED | OUTPATIENT
Start: 2018-02-28 | End: 2018-04-10 | Stop reason: SDUPTHER

## 2018-02-28 NOTE — MR AVS SNAPSHOT
[address] 1 [facility] [address] 
[phone] Patient: Diomedes Kim MRN: [id number] Visit Information Date & Time Provider Department Dept. Phone Encounter #  
 2/28/2018 11:15 AM Artemio Gonzalez  [facility] [id number] Follow-up Instructions Return in about 3 months (around 5/28/2018), or if symptoms worsen or fail to improve, for HTN, CKD. Your Appointments 3/22/2018  2:15 PM  
New Patient with Vikas Jennings MD  
[facility] Katlyn Urvashi) Appt Note: PSA 6.9/Mailed paperwork [address] [place] [id number] [phone] [address] [employer] [phone]  
  
    
  
 4/3/2018 10:00 AM  
Any with Maria Teresa Pearson MD  
[facility] (Katlyn Landin) Appt Note: DX: Elevated PSA 3.4  Ref MD: AIDEE Sheriff [phone] notes requested. 709 [facility] 1097 [facility]  
  
   
 [address] [place] [address] [address] [address] Upcoming Health Maintenance Date Due DTaP/Tdap/Td series (1 - Tdap) 6/11/1989 Influenza Age 5 to Adult 8/1/2017 Allergies as of 2/28/2018  Review Complete On: 2/28/2018 By: Zettie Nissen No Known Allergies Current Immunizations  Never Reviewed No immunizations on file. Not reviewed this visit You Were Diagnosed With   
  
 Codes Comments HTN, goal below 130/80    -  Primary ICD-10-CM: I10 
ICD-9-CM: 401.9 CKD (chronic kidney disease), stage IV ([facility] [address]     ICD-10-CM: N18.4 ICD-9-CM: 706. 4 Vitamin D deficiency     ICD-10-CM: E55.9 ICD-9-CM: 268.9 Vitals BP Pulse Temp Resp Height(growth percentile) Weight(growth percentile) 133/90 80 96.7 °F (35.9 °C) 16 6' 2\" (1.88 m) 245 lb (111.1 kg) SpO2 BMI Smoking Status 99% 31.46 kg/m2 Never Smoker Vitals History BMI and BSA Data Body Mass Index Body Surface Area  
 31.46 kg/m 2 2.41 m 2 Preferred Pharmacy Pharmacy Name Phone [id number] [place]  [phone] Your Updated Medication List  
  
   
This list is accurate as of 2/28/18 11:55 AM.  Always use your most recent med list.  
  
  
  
  
 allopurinol 100 mg tablet Commonly known as:  Merlene Granada Take 2 Tabs by mouth daily. amLODIPine 10 mg tablet Commonly known as:  Allena Svitlana Take 1 Tab by mouth daily. cloNIDine HCl 0.1 mg tablet Commonly known as:  CATAPRES Take  by mouth. hydrALAZINE 100 mg tablet Commonly known as:  APRESOLINE Take  by mouth.  
  
 predniSONE 5 mg tablet Commonly known as:  Warren Lies Take 1 Tab by mouth as needed. Prescriptions Sent to Pharmacy Refills  
 predniSONE (DELTASONE) 5 mg tablet 0 Sig: Take 1 Tab by mouth as needed. Class: Normal  
 Pharmacy: [address] [place] [address] #: [phone] Route: Oral  
  
Follow-up Instructions Return in about 3 months (around 5/28/2018), or if symptoms worsen or fail to improve, for HTN, CKD. Patient Instructions DASH Diet: Care Instructions Your Care Instructions The DASH diet is an eating plan that can help lower your blood pressure. DASH stands for Dietary Approaches to Stop Hypertension. Hypertension is high blood pressure. The DASH diet focuses on eating foods that are high in calcium, potassium, and magnesium. These nutrients can lower blood pressure. The foods that are highest in these nutrients are fruits, vegetables, low-fat dairy products, nuts, seeds, and legumes. But taking calcium, potassium, and magnesium supplements instead of eating foods that are high in those nutrients does not have the same effect.  The DASH diet also includes whole grains, fish, and poultry. The DASH diet is one of several lifestyle changes your doctor may recommend to lower your high blood pressure. Your doctor may also want you to decrease the amount of sodium in your diet. Lowering sodium while following the DASH diet can lower blood pressure even further than just the DASH diet alone. Follow-up care is a key part of your treatment and safety. Be sure to make and go to all appointments, and call your doctor if you are having problems. It's also a good idea to know your test results and keep a list of the medicines you take. How can you care for yourself at home? Following the DASH diet · Eat 4 to 5 servings of fruit each day. A serving is 1 medium-sized piece of fruit, ½ cup chopped or canned fruit, 1/4 cup dried fruit, or 4 ounces (½ cup) of fruit juice. Choose fruit more often than fruit juice. · Eat 4 to 5 servings of vegetables each day. A serving is 1 cup of lettuce or raw leafy vegetables, ½ cup of chopped or cooked vegetables, or 4 ounces (½ cup) of vegetable juice. Choose vegetables more often than vegetable juice. · Get 2 to 3 servings of low-fat and fat-free dairy each day. A serving is 8 ounces of milk, 1 cup of yogurt, or 1 ½ ounces of cheese. · Eat 6 to 8 servings of grains each day. A serving is 1 slice of bread, 1 ounce of dry cereal, or ½ cup of cooked rice, pasta, or cooked cereal. Try to choose whole-grain products as much as possible. · Limit lean meat, poultry, and fish to 2 servings each day. A serving is 3 ounces, about the size of a deck of cards. · Eat 4 to 5 servings of nuts, seeds, and legumes (cooked dried beans, lentils, and split peas) each week. A serving is 1/3 cup of nuts, 2 tablespoons of seeds, or ½ cup of cooked beans or peas. · Limit fats and oils to 2 to 3 servings each day. A serving is 1 teaspoon of vegetable oil or 2 tablespoons of salad dressing. · Limit sweets and added sugars to 5 servings or less a week.  A serving is 1 tablespoon jelly or jam, ½ cup sorbet, or 1 cup of lemonade. · Eat less than 2,300 milligrams (mg) of sodium a day. If you limit your sodium to 1,500 mg a day, you can lower your blood pressure even more. Tips for success · Start small. Do not try to make dramatic changes to your diet all at once. You might feel that you are missing out on your favorite foods and then be more likely to not follow the plan. Make small changes, and stick with them. Once those changes become habit, add a few more changes. · Try some of the following: ¨ Make it a goal to eat a fruit or vegetable at every meal and at snacks. This will make it easy to get the recommended amount of fruits and vegetables each day. ¨ Try yogurt topped with fruit and nuts for a snack or healthy dessert. ¨ Add lettuce, tomato, cucumber, and onion to sandwiches. ¨ Combine a ready-made pizza crust with low-fat mozzarella cheese and lots of vegetable toppings. Try using tomatoes, squash, spinach, broccoli, carrots, cauliflower, and onions. ¨ Have a variety of cut-up vegetables with a low-fat dip as an appetizer instead of chips and dip. ¨ Sprinkle sunflower seeds or chopped almonds over salads. Or try adding chopped walnuts or almonds to cooked vegetables. ¨ Try some vegetarian meals using beans and peas. Add garbanzo or kidney beans to salads. Make burritos and tacos with mashed andrade beans or black beans. Where can you learn more? Go to http://nichole-shahnaz.info/. Enter L816 in the search box to learn more about \"DASH Diet: Care Instructions. \" Current as of: September 21, 2016 Content Version: 11.4 © 8784-3642 Neema. Care instructions adapted under license by [employer] (which disclaims liability or warranty for this information).  If you have questions about a medical condition or this instruction, always ask your healthcare professional. [employer] disclaims any warranty or liability for your use of this information. Introducing [facility] & HEALTH SERVICES! [employer] introduces Belanit patient portal. Now you can access parts of your medical record, email your doctor's office, and request medication refills online. 1. In your internet browser, go to [email or web address] 2. Click on the First Time User? Click Here link in the Sign In box. You will see the New Member Sign Up page. 3. Enter your Belanit Access Code exactly as it appears below. You will not need to use this code after youve completed the sign-up process. If you do not sign up before the expiration date, you must request a new code. · Belanit Access Code: [email or web address] Expires: 5/29/2018 11:55 AM 
 
4. Enter the last four digits of your Social Security Number (xxxx) and Date of Birth (mm/dd/yyyy) as indicated and click Submit. You will be taken to the next sign-up page. 5. Create a Belanit ID. This will be your Belanit login ID and cannot be changed, so think of one that is secure and easy to remember. 6. Create a Belanit password. You can change your password at any time. 7. Enter your Password Reset Question and Answer. This can be used at a later time if you forget your password. 8. Enter your e-mail address. You will receive e-mail notification when new information is available in [address] 9. Click Sign Up. You can now view and download portions of your medical record. 10. Click the Download Summary menu link to download a portable copy of your medical information. If you have questions, please visit the Frequently Asked Questions section of the Belanit website. Remember, Belanit is NOT to be used for urgent needs. For medical emergencies, dial 911. Now available from your iPhone and Android! Please provide this summary of care documentation to your next provider. Your primary care clinician is listed as Augustine Nissen. If you have any questions after today's visit, please call [phone].

## 2018-02-28 NOTE — PATIENT INSTRUCTIONS

## 2018-03-22 ENCOUNTER — OFFICE VISIT (OUTPATIENT)
Dept: UROLOGY | Age: 48
End: 2018-03-22

## 2018-03-22 VITALS
WEIGHT: 247 LBS | DIASTOLIC BLOOD PRESSURE: 115 MMHG | SYSTOLIC BLOOD PRESSURE: 198 MMHG | BODY MASS INDEX: 31.7 KG/M2 | OXYGEN SATURATION: 97 % | TEMPERATURE: 98 F | HEART RATE: 87 BPM | HEIGHT: 74 IN

## 2018-03-22 DIAGNOSIS — R97.20 ELEVATED PSA: Primary | ICD-10-CM

## 2018-03-22 RX ORDER — CIPROFLOXACIN 500 MG/1
500 TABLET ORAL 2 TIMES DAILY
Qty: 20 TAB | Refills: 0 | Status: SHIPPED | OUTPATIENT
Start: 2018-03-22 | End: 2018-04-01

## 2018-03-22 RX ORDER — ATORVASTATIN CALCIUM 20 MG/1
20 TABLET, FILM COATED ORAL DAILY
COMMUNITY
End: 2018-07-03

## 2018-03-22 NOTE — PROGRESS NOTES
Jen Dickerson 50 y.o. male     Mr. Enedelia Engel seen today for evaluation of elevated PSA (6.9) on routine testing  Has no irritative or obstructive voiding symptoms  No family history of prostate malignancy    Review of Systems:   CNS: No seizure syncope headaches dizziness or visual changes  Respiratory: No wheezing shortness of breath chest pain or coughing  Cardiovascular: No palpitations no angina/hypertension  Intestinal: No dyspepsia diarrhea or constipation  Urinary: No urgency frequency dysuria or hematuria  Skeletal: Large joint arthritis  Endocrine: No diabetes or thyroid disease  Other:                                                          [employer]    Allergies: No Known Allergies   Medications:    Current Outpatient Prescriptions   Medication Sig Dispense Refill    ciprofloxacin HCl (CIPRO) 500 mg tablet Take 1 Tab by mouth two (2) times a day for 10 days. 20 Tab 0    cloNIDine HCl (CATAPRES) 0.1 mg tablet Take  by mouth.  hydrALAZINE (APRESOLINE) 100 mg tablet Take  by mouth.  predniSONE (DELTASONE) 5 mg tablet Take 1 Tab by mouth as needed. 30 Tab 0    amLODIPine (NORVASC) 10 mg tablet Take 1 Tab by mouth daily. 30 Tab 3    allopurinol (ZYLOPRIM) 100 mg tablet Take 2 Tabs by mouth daily. 60 Tab 3    atorvastatin (LIPITOR) 20 mg tablet Take  by mouth daily.  sildenafil citrate (VIAGRA) 50 mg tablet Take 1 Tab by mouth as needed. 30 Tab 1       Past Medical History:   Diagnosis Date    Hypertension     Joint pain     Kidney disease       History reviewed. No pertinent surgical history.   Family History   Problem Relation Age of Onset    Hypertension Mother     Heart Disease Father     Hypertension Father     Hypertension Child         Physical Examination: Well-nourished mature male in no apparent distress    Abdomen is nontender no palpable masses no organomegaly  Back-no percussion CVA tenderness on either side  No inguinal hernia or adenopathy  Penis is normal  Testes are normal in size shape and consistency bilaterally-no epididymal induration or tenderness on either side  Spermatic cords are palpably normal bilaterally  Scrotum is normal  Prostate by JANE is large, rounded, smooth, benign in consistency and nontender-no induration no nodularity  No rectal masses tenderness or induration    Urinalysis: Negative dipstick/nitrite negative    Impression: Elevated PSA with normal prostate by JANE        Plan: Prostate biopsy is indicated and recommended    Counseled today regarding technique a transperineal prostate biopsy including risk of bleeding, infection, and urinary retention  Rx Cipro 500 mg twice daily ×3 days prep prophylaxis/attendant required    RTC 4 weeks for prostate biopsy        China Wong MD  -electronically signed-    PLEASE NOTE:  This document has been produced using voice recognition software. Unrecognized errors in transcription may be present.

## 2018-03-22 NOTE — PATIENT INSTRUCTIONS
Prostate-Specific Antigen (PSA) Test: About This Test  What is it? A prostate-specific antigen (PSA) test measures the amount of PSA in your blood. PSA is released by a man's prostate gland into his blood. A high PSA level may mean that you have an enlargement, infection, or cancer of the prostate. Why is this test done? You may have this test to:  · Check for prostate cancer. · Watch prostate cancer and see if treatment is working. How can you prepare for the test?  Do not ejaculate during the 2 days before your PSA blood test, either during sex or masturbation. What happens before the test?  Tell your doctor if you have had a:  · Test to look at your bladder (cystoscopy) in the past several weeks. · Prostate biopsy in the past several weeks. · Prostate infection or urinary tract infection that has not gone away. · Tube (catheter) inserted into your bladder to drain urine recently. What happens during the test?  A health professional takes a sample of your blood. What happens after the test?  You can go back to your usual activities right away. When should you call for help? Watch closely for changes in your health, and be sure to contact your doctor if you have any questions about this test.  Follow-up care is a key part of your treatment and safety. Be sure to make and go to all appointments, and call your doctor if you are having problems. It's also a good idea to keep a list of the medicines you take. Ask your doctor when you can expect to have your test results. Where can you learn more? Go to http://nichole-shahnaz.info/. Enter J137 in the search box to learn more about \"Prostate-Specific Antigen (PSA) Test: About This Test.\"  Current as of: May 12, 2017  Content Version: 11.4  © 8366-3762 Enjoyor. Care instructions adapted under license by [employer] (which disclaims liability or warranty for this information).  If you have questions about a medical condition or this instruction, always ask your healthcare professional. [place] [address] any warranty or liability for your use of this information.

## 2018-03-22 NOTE — MR AVS SNAPSHOT
615 [facility] A [address] [address] 
[phone] Patient: Zander Damon MRN: [id number] Visit Information Date & Time Provider Department Dept. Phone Encounter #  
 3/22/2018  2:15 PM Bienvenido Rehman, [address] [place] [address] [facility] [phone] Your Appointments 5/29/2018 11:15 AM  
ROUTINE CARE with Antonia Driscoll NP [facility] Main [facility] ([facility]) Appt Note: 3 month f/u appt  for HTN, CKD  
 14 [facility] [address] [facility] [address]  
[phone]  
  
   
 14 [facility] [address]  
  
    
  
 4/3/2018 10:00 AM  
Any with Andre Aguila MD  
[facility] ([address] [place] [address]) Appt Note: DX: Elevated PSA 3.4  Ref MD: AIDEE Sheriff [phone] notes requested. 709 [facility] 1097 [facility]  
  
   
 709 [facility] [address] Upcoming Health Maintenance Date Due DTaP/Tdap/Td series (1 - Tdap) 6/11/1989 Influenza Age 5 to Adult 8/1/2017 Allergies as of 3/22/2018  Review Complete On: 3/22/2018 By: Bienvenido Rehman MD  
 No Known Allergies Current Immunizations  Never Reviewed No immunizations on file. Not reviewed this visit You Were Diagnosed With   
  
 Codes Comments Elevated PSA    -  Primary ICD-10-CM: R97.20 ICD-9-CM: 790.93 Vitals BP Pulse Temp Height(growth percentile) Weight(growth percentile) SpO2  
 (!) 198/115 (BP 1 Location: Left arm, BP Patient Position: Sitting) 87 98 °F (36.7 °C) 6' 2\" (1.88 m) 247 lb (112 kg) 97% BMI Smoking Status 31.71 kg/m2 Never Smoker Vitals History BMI and BSA Data Body Mass Index Body Surface Area 31.71 kg/m 2 2.42 m 2 Preferred Pharmacy Pharmacy Name Phone [id number]- [place] [address] [phone] Your Updated Medication List  
  
   
This list is accurate as of 3/22/18  3:13 PM.  Always use your most recent med list.  
  
  
  
  
 allopurinol 100 mg tablet Commonly known as:  [place] Martine Take 2 Tabs by mouth daily. amLODIPine 10 mg tablet Commonly known as:  Roseline Pique Take 1 Tab by mouth daily. atorvastatin 20 mg tablet Commonly known as:  LIPITOR Take  by mouth daily. ciprofloxacin HCl 500 mg tablet Commonly known as:  CIPRO Take 1 Tab by mouth two (2) times a day for 10 days. cloNIDine HCl 0.1 mg tablet Commonly known as:  CATAPRES Take  by mouth. hydrALAZINE 100 mg tablet Commonly known as:  APRESOLINE Take  by mouth.  
  
 predniSONE 5 mg tablet Commonly known as:  Donneta Slava Take 1 Tab by mouth as needed. sildenafil citrate 50 mg tablet Commonly known as:  VIAGRA Take 1 Tab by mouth as needed. Prescriptions Sent to Pharmacy Refills  
 ciprofloxacin HCl (CIPRO) 500 mg tablet 0 Sig: Take 1 Tab by mouth two (2) times a day for 10 days. Class: Normal  
 Pharmacy: [address] [place] [address] #: [phone] Route: Oral  
  
Patient Instructions Prostate-Specific Antigen (PSA) Test: About This Test 
What is it? A prostate-specific antigen (PSA) test measures the amount of PSA in your blood. PSA is released by a man's prostate gland into his blood. A high PSA level may mean that you have an enlargement, infection, or cancer of the prostate. Why is this test done? You may have this test to: · Check for prostate cancer. · Watch prostate cancer and see if treatment is working. How can you prepare for the test? 
Do not ejaculate during the 2 days before your PSA blood test, either during sex or masturbation.  
What happens before the test? 
Tell your doctor if you have had a: 
 · Test to look at your bladder (cystoscopy) in the past several weeks. · Prostate biopsy in the past several weeks. · Prostate infection or urinary tract infection that has not gone away. · Tube (catheter) inserted into your bladder to drain urine recently. What happens during the test? 
A health professional takes a sample of your blood. What happens after the test? 
You can go back to your usual activities right away. When should you call for help? Watch closely for changes in your health, and be sure to contact your doctor if you have any questions about this test. 
Follow-up care is a key part of your treatment and safety. Be sure to make and go to all appointments, and call your doctor if you are having problems. It's also a good idea to keep a list of the medicines you take. Ask your doctor when you can expect to have your test results. Where can you learn more? Go to http://nichole-shahnaz.info/. Enter T019 in the search box to learn more about \"Prostate-Specific Antigen (PSA) Test: About This Test.\" Current as of: May 12, 2017 Content Version: 11.4 © 1147-7934 [employer]. Care instructions adapted under license by [employer] (which disclaims liability or warranty for this information). If you have questions about a medical condition or this instruction, always ask your healthcare professional. [facility] [address] any warranty or liability for your use of this information. Introducing [facility] & HEALTH SERVICES! Shane Madden introduces [employer] patient portal. Now you can access parts of your medical record, email your doctor's office, and request medication refills online. 1. In your internet browser, go to [email or web address] 2. Click on the First Time User? Click Here link in the Sign In box. You will see the New Member Sign Up page. 3. Enter your [employer] Access Code exactly as it appears below.  You will not need to use this code after youve completed the sign-up process. If you do not sign up before the expiration date, you must request a new code. · [employer]Here [employer] Access Code: [email or web address] Expires: 5/29/2018 12:55 PM 
 
4. Enter the last four digits of your Social Security Number (xxxx) and Date of Birth (mm/dd/yyyy) as indicated and click Submit. You will be taken to the next sign-up page. 5. Create a [employer]Here [employer] ID. This will be your [employer]Here [employer] login ID and cannot be changed, so think of one that is secure and easy to remember. 6. Create a [employer] password. You can change your password at any time. 7. Enter your Password Reset Question and Answer. This can be used at a later time if you forget your password. 8. Enter your e-mail address. You will receive e-mail notification when new information is available in [address] 9. Click Sign Up. You can now view and download portions of your medical record. 10. Click the Download Summary menu link to download a portable copy of your medical information. If you have questions, please visit the Frequently Asked Questions section of the [employer]Here [employer] website. Remember, [employer]Here [employer] is NOT to be used for urgent needs. For medical emergencies, dial 911. Now available from your iPhone and Android! Please provide this summary of care documentation to your next provider. Your primary care clinician is listed as Donzetta Landau. If you have any questions after today's visit, please call [phone].

## 2018-03-22 NOTE — PROGRESS NOTES
Lynda Tiarra Sexton has a reminder for a \"due or due soon\" health maintenance. I have asked that he contact his primary care provider for follow-up on this health maintenance.

## 2018-04-10 RX ORDER — PREDNISONE 5 MG/1
5 TABLET ORAL AS NEEDED
Qty: 30 TAB | Refills: 0 | Status: SHIPPED | OUTPATIENT
Start: 2018-04-10 | End: 2018-05-07 | Stop reason: SDUPTHER

## 2018-04-10 NOTE — TELEPHONE ENCOUNTER
Patient's last office visit on 2/28/2018  Medication(s) last filled on 2/28/2018  Next Appointment: 5/29/2018  Rx Class Print

## 2018-04-11 ENCOUNTER — OFFICE VISIT (OUTPATIENT)
Dept: UROLOGY | Age: 48
End: 2018-04-11

## 2018-04-11 VITALS
HEART RATE: 100 BPM | OXYGEN SATURATION: 97 % | BODY MASS INDEX: 31.7 KG/M2 | HEIGHT: 74 IN | WEIGHT: 247 LBS | SYSTOLIC BLOOD PRESSURE: 143 MMHG | DIASTOLIC BLOOD PRESSURE: 87 MMHG

## 2018-04-11 DIAGNOSIS — R97.20 ELEVATED PSA: Primary | ICD-10-CM

## 2018-04-11 LAB
BILIRUB UR QL STRIP: NEGATIVE
GLUCOSE UR-MCNC: NEGATIVE MG/DL
KETONES P FAST UR STRIP-MCNC: NEGATIVE MG/DL
PH UR STRIP: 5.5 [PH] (ref 4.6–8)
PROT UR QL STRIP: NORMAL
SP GR UR STRIP: 1.02 (ref 1–1.03)
UA UROBILINOGEN AMB POC: NORMAL (ref 0.2–1)
URINALYSIS CLARITY POC: CLEAR
URINALYSIS COLOR POC: YELLOW
URINE BLOOD POC: NORMAL
URINE LEUKOCYTES POC: NORMAL
URINE NITRITES POC: NEGATIVE

## 2018-04-11 RX ORDER — CIPROFLOXACIN 500 MG/1
500 TABLET ORAL 2 TIMES DAILY
COMMUNITY
End: 2018-04-29

## 2018-04-11 NOTE — PATIENT INSTRUCTIONS
Prostate Biopsy and Ultrasound: About This Test  What is it? A prostate biopsy is a type of test. Your doctor takes small tissue samples from your prostate gland. Then another doctor looks at the tissue under a microscope to see if there are cancer cells. This test is done by a doctor who specializes in men's genital and urinary problems (urologist). It can be done in your doctor's office, a day surgery clinic, or a hospital operating room. To get the tissue samples from the prostate, the doctor inserts a thin needle through the rectum, the urethra, or the area between the anus and scrotum (perineum). The most common method is through the rectum. Your doctor may use ultrasound to help guide the needle. Why is this test done? You may need a prostate biopsy if your doctor found something of concern during a digital rectal exam. Or you may need it if a blood test showed a high level of prostate-specific antigen (PSA). A biopsy can help find out if you have prostate cancer. How can you prepare for this test?  Before you have a prostate biopsy, tell your doctor if:  · You are taking any medicines or using any herbal supplements. · You are allergic to any medicines. · You have had bleeding problems, or you take aspirin or some other blood thinner. What happens before the test?  · You may need to have an enema before the test.  · You will need to take off all or most of your clothes. You will be given a cloth or paper gown to use during the test.  · You may be given a sedative through a vein (IV) in your arm. The sedative will help you relax and stay still. What happens during the test?  Through the rectum  · You may be asked to kneel, lie on your side, or lie on your back. · Your doctor may inject an anesthetic around the prostate to numb the area before the sample is taken. · Ultrasound is often used to guide the needle to the correct spot. · Your doctor may choose to use a needle guide for the biopsy. He or she will attach the guide to a finger. Your doctor will insert the finger into your rectum. · The needle will enter the prostate and take 6 to 12 samples. Through the urethra  · You will lie on your back. Your feet will rest in stirrups. · You will get anesthesia. The anesthesia may make you sleep. Or it may just numb the area being worked on.  · Your doctor will insert a lighted scope (cystoscope) into your urethra. The scope allows your doctor to look directly at the prostate. Your doctor will pass a cutting loop through the scope to remove samples of prostate tissue. Through the perineum  · You will lie on an exam table either on your side or on your back with your knees bent. You will get anesthesia. The anesthesia may make you sleep. Or it may just numb the area being worked on.  · Your doctor will make a small cut in your perineum. Then he or she will insert a finger into the rectum to hold the prostate. · Your doctor will then insert the needle through the cut and into the prostate. · The needle collects samples of tissue and is then pulled out. What else should you know about this test?  · A prostate biopsy has a slight risk of causing problems such as infection or bleeding. · If the biopsy went through your rectum, you may have a small amount of bleeding from your rectum for 2 to 3 days after the biopsy. · You may have a little pain in your pelvic area. You may also have a little blood in your urine for 1 to 5 days. · You may have some blood in your semen for a week or longer. How long will the test take? · This test will take about 15 to 45 minutes. What happens after the test?  Your doctor will tell you what to expect and watch for after your test. In general:  · If you have anesthesia that makes you sleep, you will be in a recovery room for a few hours. You will need someone to drive you home. You may feel tired for the rest of the day.   · You will probably be able to go home right away.  · If your doctor had you stop taking any medicine for the biopsy, ask him or her when you can start taking it again. If you were given antibiotics, take them as directed. · Do not do heavy work or exercise for 4 hours after the test.  · Your doctor will tell you how long it may take to get your results back. Follow-up care is a key part of your treatment and safety. Be sure to make and go to all appointments, and call your doctor if you are having problems. It's also a good idea to keep a list of the medicines you take. Ask your doctor when you can expect to have your test results. Where can you learn more? Go to http://nichole-shahnaz.info/. Enter Y504 in the search box to learn more about \"Prostate Biopsy and Ultrasound: About This Test.\"  Current as of: May 12, 2017  Content Version: 11.4  © 4762-7252 Healthwise, Incorporated. Care instructions adapted under license by [employer] (which disclaims liability or warranty for this information). If you have questions about a medical condition or this instruction, always ask your healthcare professional. [address] any warranty or liability for your use of this information.

## 2018-04-11 NOTE — PROGRESS NOTES
Mr. Roque Daigle has a reminder for a \"due or due soon\" health maintenance. I have asked that he contact his primary care provider for follow-up on this health maintenance.

## 2018-04-11 NOTE — PROGRESS NOTES
[facility]  OFFICE PROCEDURE PROGRESS NOTE        Chart reviewed for the following:   Jazmin COOK LPN, have reviewed the History, Physical and updated the Allergic reactions for Nate De Santiago     TIME OUT performed immediately prior to start of procedure:   Jazmin COOK LPN, have performed the following reviews on Jessica Blanton prior to the start of the procedure:            * Patient was identified by name and date of birth   * Agreement on procedure being performed was verified  * Risks and Benefits explained to the patient  * Procedure site verified and marked as necessary  * Patient was positioned for comfort  * Consent was signed and verified     Time: 10:42AM      Date of procedure: 4/11/2018    Procedure performed by:  Aminta Huff MD    Provider assisted by: Minh Vaca LPN    Patient assisted by: self    How tolerated by patient: tolerated the procedure well with no complications    Post Procedural Pain Scale: 0 - No Hurt    Comments: none

## 2018-04-11 NOTE — MR AVS SNAPSHOT
[address] [facility] [address] [address] 
[phone] Patient: Robinson Alves MRN: [id number] ZWO:[id number] Visit Information Date & Time Provider Department Dept. Phone Encounter #  
 4/11/2018  9:45 AM Kiersten Richards [facility] [phone] [id number] Your Appointments 4/18/2018  3:30 PM  
Office Visit with Lisa Means MD  
[facility] Appt Note: BX results [address] [facility] [address] [address]  
[phone] [address] [employer] [employer] [phone]  
  
    
 5/29/2018 11:15 AM  
ROUTINE CARE with Shelia May NP [facility] Main Office ([facility]) Appt Note: 3 month f/u appt  for HTN, CKD  
 [address] [facility] [address] [facility] [address]  
[phone]  
  
   
 [address] Upcoming Health Maintenance Date Due DTaP/Tdap/Td series (1 - Tdap) 6/11/1989 Influenza Age 5 to Adult 8/1/2017 Allergies as of 4/11/2018  Review Complete On: 4/11/2018 By: Lin Haywood LPN No Known Allergies Current Immunizations  Never Reviewed No immunizations on file. Not reviewed this visit You Were Diagnosed With   
  
 Codes Comments Elevated PSA    -  Primary ICD-10-CM: R97.20 ICD-9-CM: 790.93 Vitals BP Pulse Height(growth percentile) Weight(growth percentile) SpO2 BMI  
 143/87 (BP 1 Location: Left arm, BP Patient Position: Sitting) 100 6' 2\" (1.88 m) 247 lb (112 kg) 97% 31.71 kg/m2 Smoking Status Never Smoker Vitals History BMI and BSA Data Body Mass Index Body Surface Area 31.71 kg/m 2 2.42 m 2 Preferred Pharmacy Pharmacy Name Phone [id number] [address] [phone] Your Updated Medication List  
  
   
 This list is accurate as of 4/11/18 10:33 AM.  Always use your most recent med list.  
  
  
  
  
 allopurinol 100 mg tablet Commonly known as:  Bonna Annie Take 2 Tabs by mouth daily. amLODIPine 10 mg tablet Commonly known as:  [place] Bars Take 1 Tab by mouth daily. atorvastatin 20 mg tablet Commonly known as:  LIPITOR Take  by mouth daily. CIPRO 500 mg tablet Generic drug:  ciprofloxacin HCl Take 500 mg by mouth two (2) times a day. Start taking 1 hour prior to procedure  
  
 cloNIDine HCl 0.1 mg tablet Commonly known as:  CATAPRES Take  by mouth. hydrALAZINE 100 mg tablet Commonly known as:  APRESOLINE Take  by mouth.  
  
 predniSONE 5 mg tablet Commonly known as:  April Ada Take 1 Tab by mouth as needed. sildenafil citrate 50 mg tablet Commonly known as:  VIAGRA Take 1 Tab by mouth as needed. We Performed the Following AMB POC URINALYSIS DIP STICK AUTO W/O MICRO [75984 CPT(R)] Patient Instructions Prostate Biopsy and Ultrasound: About This Test 
What is it? A prostate biopsy is a type of test. Your doctor takes small tissue samples from your prostate gland. Then another doctor looks at the tissue under a microscope to see if there are cancer cells. This test is done by a doctor who specializes in men's genital and urinary problems (urologist). It can be done in your doctor's office, a day surgery clinic, or a hospital operating room. To get the tissue samples from the prostate, the doctor inserts a thin needle through the rectum, the urethra, or the area between the anus and scrotum (perineum). The most common method is through the rectum. Your doctor may use ultrasound to help guide the needle. Why is this test done?  
You may need a prostate biopsy if your doctor found something of concern during a digital rectal exam. Or you may need it if a blood test showed a high level of prostate-specific antigen (PSA). A biopsy can help find out if you have prostate cancer. How can you prepare for this test? 
Before you have a prostate biopsy, tell your doctor if: 
· You are taking any medicines or using any herbal supplements. · You are allergic to any medicines. · You have had bleeding problems, or you take aspirin or some other blood thinner. What happens before the test? 
· You may need to have an enema before the test. 
· You will need to take off all or most of your clothes. You will be given a cloth or paper gown to use during the test. 
· You may be given a sedative through a vein (IV) in your arm. The sedative will help you relax and stay still. What happens during the test? 
Through the rectum · You may be asked to kneel, lie on your side, or lie on your back. · Your doctor may inject an anesthetic around the prostate to numb the area before the sample is taken. · Ultrasound is often used to guide the needle to the correct spot. · Your doctor may choose to use a needle guide for the biopsy. He or she will attach the guide to a finger. Your doctor will insert the finger into your rectum. · The needle will enter the prostate and take 6 to 12 samples. Through the urethra · You will lie on your back. Your feet will rest in stirrups. · You will get anesthesia. The anesthesia may make you sleep. Or it may just numb the area being worked on. 
· Your doctor will insert a lighted scope (cystoscope) into your urethra. The scope allows your doctor to look directly at the prostate. Your doctor will pass a cutting loop through the scope to remove samples of prostate tissue. Through the perineum · You will lie on an exam table either on your side or on your back with your knees bent. You will get anesthesia. The anesthesia may make you sleep. Or it may just numb the area being worked on. 
· Your doctor will make a small cut in your perineum.  Then he or she will insert a finger into the rectum to hold the prostate. · Your doctor will then insert the needle through the cut and into the prostate. · The needle collects samples of tissue and is then pulled out. What else should you know about this test? 
· A prostate biopsy has a slight risk of causing problems such as infection or bleeding. · If the biopsy went through your rectum, you may have a small amount of bleeding from your rectum for 2 to 3 days after the biopsy. · You may have a little pain in your pelvic area. You may also have a little blood in your urine for 1 to 5 days. · You may have some blood in your semen for a week or longer. How long will the test take? · This test will take about 15 to 45 minutes. What happens after the test? 
Your doctor will tell you what to expect and watch for after your test. In general: · If you have anesthesia that makes you sleep, you will be in a recovery room for a few hours. You will need someone to drive you home. You may feel tired for the rest of the day. · You will probably be able to go home right away. · If your doctor had you stop taking any medicine for the biopsy, ask him or her when you can start taking it again. If you were given antibiotics, take them as directed. · Do not do heavy work or exercise for 4 hours after the test. 
· Your doctor will tell you how long it may take to get your results back. Follow-up care is a key part of your treatment and safety. Be sure to make and go to all appointments, and call your doctor if you are having problems. It's also a good idea to keep a list of the medicines you take. Ask your doctor when you can expect to have your test results. Where can you learn more? Go to http://nichole-shahnaz.info/. Enter Y504 in the search box to learn more about \"Prostate Biopsy and Ultrasound: About This Test.\" Current as of: May 12, 2017 Content Version: 11.4 © 7650-6604 Healthwise, Incorporated. Care instructions adapted under license by [employer] (which disclaims liability or warranty for this information). If you have questions about a medical condition or this instruction, always ask your healthcare professional. [address] any warranty or liability for your use of this information. Introducing [facility]! [facility] introduces RedLasso patient portal. Now you can access parts of your medical record, email your doctor's office, and request medication refills online. 1. In your internet browser, go to [email or web address] 2. Click on the First Time User? Click Here link in the Sign In box. You will see the New Member Sign Up page. 3. Enter your RedLasso Access Code exactly as it appears below. You will not need to use this code after youve completed the sign-up process. If you do not sign up before the expiration date, you must request a new code. · RedLasso Access Code: [email or web address] Expires: 5/29/2018 12:55 PM 
 
4. Enter the last four digits of your Social Security Number (xxxx) and Date of Birth (mm/dd/yyyy) as indicated and click Submit. You will be taken to the next sign-up page. 5. Create a RedLasso ID. This will be your RedLasso login ID and cannot be changed, so think of one that is secure and easy to remember. 6. Create a RedLasso password. You can change your password at any time. 7. Enter your Password Reset Question and Answer. This can be used at a later time if you forget your password. 8. Enter your e-mail address. You will receive e-mail notification when new information is available in [address] 9. Click Sign Up. You can now view and download portions of your medical record. 10. Click the Download Summary menu link to download a portable copy of your medical information.  
 
If you have questions, please visit the Frequently Asked Questions section of the Monae[employer]. Remember, RocketOnhart is NOT to be used for urgent needs. For medical emergencies, dial 911. Now available from your iPhone and Android! Please provide this summary of care documentation to your next provider. Your primary care clinician is listed as Dawna Childress. If you have any questions after today's visit, please call [phone].

## 2018-04-12 NOTE — PROGRESS NOTES
Bill Aguirre 50 y.o. male     Mr. Beth Arroyo seen today for prostate biopsy assessing elevated PSA-PSA 6.9    PSA (6.9) on routine testing  Has no irritative or obstructive voiding symptoms  No family history of prostate malignancy     Review of Systems:   CNS: No seizure syncope headaches dizziness or visual changes  Respiratory: No wheezing shortness of breath chest pain or coughing  Cardiovascular: No palpitations no angina/hypertension  Intestinal: No dyspepsia diarrhea or constipation  Urinary: No urgency frequency dysuria or hematuria  Skeletal: Large joint arthritis  Endocrine: No diabetes or thyroid disease  Other:                                                           [employer]    PROSTATE BIOPSY REPORT    Patient ID confirmed prior to procedure: Yes    Indications for prostate biopsy: PSA 6.9    Pre biopsy preparations:         ______x__ Cipro 500 mg PO       ________ Levaquin 500 mg PO                                                   ________ Ancef 1000mg PO      _________Valium 10 mg PO      TRANSRECTAL ULTRASOUND IMAGING OF THE PROSTATE:    Multiple transverse and longitudinal images of the prostate, seminal vesicles, and bladder revealed: Homogeneous parenchymal echoes bilaterally with normal prostate outlines no stone densities, cyst, or hypodense segments evident on either side-     Prostate Dimensions: 4.5 cm x 4.4 cm x 4.4 cm    Prostate Volume = 0.5(H)(L)(W) =    43.5 cc    PSA Density: (PSA)/Prostate Volume =    6.9/43.5  =  PSAD  =    0.16                    Biopsy Procedure    Local anesthesia of the perineum was obtained by injecting 1% Lidocaine into the skin of the perineum 1 cm anterior to the anal verge and then into the deep perineum in the direction of the prostate. Guided by a gloved finger in the rectum, a 14 gauge needle was then passed into the anesthetized region and advanced in the direction of the prostate.  An 18 gauge spinal needle was then passed through the 14 gauge needle and 10 cc's of 1% Lidocaine was infiltrated periprostatically with needle placement guided by ultrasound imaging obtained by transrectal positioning of the ultrasound transducer. An 18 gauge Biopty needle was then passed through the 14 gauge conduit needle multiple times obtaining  0   cores of tissue from the left lobe and  0   cores of tissue from the right lobe, biopsy needle placement guided by real time transrectal ultrasound imaging of the needle along its path through the perineum into the prostate. The conduit needle was then removed and pressure was applied to the perineum for one minute. The patient was then given oral and written instructions regarding post biopsy precautions as well as instructions regarding activity, medication, and follow up. Comments: Transperineal prostate biopsy procedure was aborted with patient experiencing intolerable discomfort on passage of 16-gauge biopsy needle on initial attempt at left apical needle                      puncture of prostate parenchyma-procedure was therefore abandoned and pressure was made for me prostate biopsy under general anesthesia in OR                         EBL: Minimal  Specimen removed: None      Allergies: No Known Allergies   Medications:    Current Outpatient Prescriptions   Medication Sig Dispense Refill    ciprofloxacin HCl (CIPRO) 500 mg tablet Take 500 mg by mouth two (2) times a day. Start taking 1 hour prior to procedure      cloNIDine HCl (CATAPRES) 0.1 mg tablet Take  by mouth.  hydrALAZINE (APRESOLINE) 100 mg tablet Take  by mouth.  amLODIPine (NORVASC) 10 mg tablet Take 1 Tab by mouth daily. 30 Tab 3    allopurinol (ZYLOPRIM) 100 mg tablet Take 2 Tabs by mouth daily. 60 Tab 3    predniSONE (DELTASONE) 5 mg tablet Take 1 Tab by mouth as needed. 30 Tab 0    atorvastatin (LIPITOR) 20 mg tablet Take  by mouth daily.  sildenafil citrate (VIAGRA) 50 mg tablet Take 1 Tab by mouth as needed.  30 Tab 1       Past Medical History:   Diagnosis Date    Hypertension     Joint pain     Kidney disease       History reviewed. No pertinent surgical history. Family History   Problem Relation Age of Onset    Hypertension Mother     Heart Disease Father     Hypertension Father     Hypertension Child         Physical Examination: Well-nourished mature male in no apparent distress  Neck: No masses no nodes no bruits  Lungs: Clear breath sounds bilaterally no wheezes no rales no rhonchi  Heart: Regular sinus rhythm no murmurs no gallops no rubs  Abdomen: Nontender no palpable masses no organomegaly no bruits  Back: No percussion CVA tenderness on either side  Skin warm and dry no rash no lesions:  Neurologic: No motor or sensory deficits in arms or legs  Genitalia: Penis is normal, testes are normal bilaterally, prostate by JANE is large rounded smooth benign consistency and nontender no induration no nodularity  No rectal masses induration or tenderness    Urinalysis: +++heme/negative nitrite    Impression: Elevated PSA with failed attempt at prostate biopsy in office under local anesthesia        Plan: Transperineal prostate biopsy under general anesthesia at [facility]     Counseling Note:    Indications for an technique of transperineal prostate biopsy have been described discussed-patient understands and accepts the risk of bleeding, infection, and urinary retention as well as rationale for cystoscopy following biopsy under general anesthesia    RTC 1 week prostate biopsy pathology report      More than 1/2 of this 25 minute visit was spent in counselling and coordination of care, as described above. Aminta Huff MD  -electronically signed-    PLEASE NOTE:  This document has been produced using voice recognition software. Unrecognized errors in transcription may be present.

## 2018-04-16 RX ORDER — SODIUM CHLORIDE 9 MG/ML
100 INJECTION, SOLUTION INTRAVENOUS CONTINUOUS
Status: CANCELLED | OUTPATIENT
Start: 2018-04-16

## 2018-04-19 ENCOUNTER — ANESTHESIA EVENT (OUTPATIENT)
Dept: SURGERY | Age: 48
End: 2018-04-19
Payer: COMMERCIAL

## 2018-04-20 ENCOUNTER — HOSPITAL ENCOUNTER (OUTPATIENT)
Age: 48
Setting detail: OUTPATIENT SURGERY
Discharge: HOME | End: 2018-04-20
Attending: UROLOGY | Admitting: UROLOGY
Payer: COMMERCIAL

## 2018-04-20 ENCOUNTER — ANESTHESIA (OUTPATIENT)
Dept: SURGERY | Age: 48
End: 2018-04-20
Payer: COMMERCIAL

## 2018-04-20 VITALS
WEIGHT: 231 LBS | HEIGHT: 74 IN | HEART RATE: 68 BPM | SYSTOLIC BLOOD PRESSURE: 145 MMHG | RESPIRATION RATE: 20 BRPM | BODY MASS INDEX: 29.65 KG/M2 | DIASTOLIC BLOOD PRESSURE: 87 MMHG | TEMPERATURE: 97.4 F | OXYGEN SATURATION: 100 %

## 2018-04-20 DIAGNOSIS — R97.20 ELEVATED PSA: Primary | ICD-10-CM

## 2018-04-20 PROCEDURE — 77030032490 HC SLV COMPR SCD KNE COVD -B: Performed by: UROLOGY

## 2018-04-20 PROCEDURE — 77030018836 HC SOL IRR NACL ICUM -A: Performed by: UROLOGY

## 2018-04-20 PROCEDURE — 74011250636 HC RX REV CODE- 250/636: Performed by: NURSE ANESTHETIST, CERTIFIED REGISTERED

## 2018-04-20 PROCEDURE — 76010000161 HC OR TIME 1 TO 1.5 HR INTENSV-TIER 1: Performed by: UROLOGY

## 2018-04-20 PROCEDURE — 76210000026 HC REC RM PH II 1 TO 1.5 HR: Performed by: UROLOGY

## 2018-04-20 PROCEDURE — 74011250636 HC RX REV CODE- 250/636

## 2018-04-20 PROCEDURE — 77030020782 HC GWN BAIR PAWS FLX 3M -B: Performed by: UROLOGY

## 2018-04-20 PROCEDURE — 74011000250 HC RX REV CODE- 250

## 2018-04-20 PROCEDURE — 76060000033 HC ANESTHESIA 1 TO 1.5 HR: Performed by: UROLOGY

## 2018-04-20 PROCEDURE — G0416 PROSTATE BIOPSY, ANY MTHD: HCPCS | Performed by: UROLOGY

## 2018-04-20 PROCEDURE — 74011250636 HC RX REV CODE- 250/636: Performed by: UROLOGY

## 2018-04-20 PROCEDURE — 76210000016 HC OR PH I REC 1 TO 1.5 HR: Performed by: UROLOGY

## 2018-04-20 PROCEDURE — 74011250637 HC RX REV CODE- 250/637: Performed by: NURSE ANESTHETIST, CERTIFIED REGISTERED

## 2018-04-20 PROCEDURE — 77030034696 HC CATH URETH FOL 2W BARD -A: Performed by: UROLOGY

## 2018-04-20 PROCEDURE — 77030003481 HC NDL BIOP GUN BARD -B: Performed by: UROLOGY

## 2018-04-20 RX ORDER — FAMOTIDINE 20 MG/1
20 TABLET, FILM COATED ORAL ONCE
Status: COMPLETED | OUTPATIENT
Start: 2018-04-20 | End: 2018-04-20

## 2018-04-20 RX ORDER — SODIUM CHLORIDE, SODIUM LACTATE, POTASSIUM CHLORIDE, CALCIUM CHLORIDE 600; 310; 30; 20 MG/100ML; MG/100ML; MG/100ML; MG/100ML
50 INJECTION, SOLUTION INTRAVENOUS CONTINUOUS
Status: DISCONTINUED | OUTPATIENT
Start: 2018-04-20 | End: 2018-04-21 | Stop reason: HOSPADM

## 2018-04-20 RX ORDER — MIDAZOLAM HYDROCHLORIDE 1 MG/ML
INJECTION, SOLUTION INTRAMUSCULAR; INTRAVENOUS AS NEEDED
Status: DISCONTINUED | OUTPATIENT
Start: 2018-04-20 | End: 2018-04-20 | Stop reason: HOSPADM

## 2018-04-20 RX ORDER — DIPHENHYDRAMINE HYDROCHLORIDE 50 MG/ML
12.5 INJECTION, SOLUTION INTRAMUSCULAR; INTRAVENOUS
Status: DISCONTINUED | OUTPATIENT
Start: 2018-04-20 | End: 2018-04-21 | Stop reason: HOSPADM

## 2018-04-20 RX ORDER — SODIUM CHLORIDE 9 MG/ML
100 INJECTION, SOLUTION INTRAVENOUS CONTINUOUS
Status: DISCONTINUED | OUTPATIENT
Start: 2018-04-20 | End: 2018-04-20 | Stop reason: HOSPADM

## 2018-04-20 RX ORDER — LIDOCAINE HYDROCHLORIDE 20 MG/ML
INJECTION, SOLUTION EPIDURAL; INFILTRATION; INTRACAUDAL; PERINEURAL AS NEEDED
Status: DISCONTINUED | OUTPATIENT
Start: 2018-04-20 | End: 2018-04-20 | Stop reason: HOSPADM

## 2018-04-20 RX ORDER — MORPHINE SULFATE 10 MG/ML
INJECTION, SOLUTION INTRAMUSCULAR; INTRAVENOUS AS NEEDED
Status: DISCONTINUED | OUTPATIENT
Start: 2018-04-20 | End: 2018-04-20 | Stop reason: HOSPADM

## 2018-04-20 RX ORDER — HYDROCODONE BITARTRATE AND ACETAMINOPHEN 5; 325 MG/1; MG/1
2 TABLET ORAL
Status: DISCONTINUED | OUTPATIENT
Start: 2018-04-20 | End: 2018-04-21 | Stop reason: HOSPADM

## 2018-04-20 RX ORDER — HYDROCODONE BITARTRATE AND ACETAMINOPHEN 5; 300 MG/1; MG/1
1 TABLET ORAL
Qty: 12 TAB | Refills: 0 | Status: ON HOLD | OUTPATIENT
Start: 2018-04-20 | End: 2018-04-28

## 2018-04-20 RX ORDER — ALBUTEROL SULFATE 0.83 MG/ML
2.5 SOLUTION RESPIRATORY (INHALATION) AS NEEDED
Status: DISCONTINUED | OUTPATIENT
Start: 2018-04-20 | End: 2018-04-21 | Stop reason: HOSPADM

## 2018-04-20 RX ORDER — SODIUM CHLORIDE, SODIUM LACTATE, POTASSIUM CHLORIDE, CALCIUM CHLORIDE 600; 310; 30; 20 MG/100ML; MG/100ML; MG/100ML; MG/100ML
50 INJECTION, SOLUTION INTRAVENOUS CONTINUOUS
Status: DISCONTINUED | OUTPATIENT
Start: 2018-04-20 | End: 2018-04-20 | Stop reason: HOSPADM

## 2018-04-20 RX ORDER — DEXAMETHASONE SODIUM PHOSPHATE 4 MG/ML
INJECTION, SOLUTION INTRA-ARTICULAR; INTRALESIONAL; INTRAMUSCULAR; INTRAVENOUS; SOFT TISSUE AS NEEDED
Status: DISCONTINUED | OUTPATIENT
Start: 2018-04-20 | End: 2018-04-20 | Stop reason: HOSPADM

## 2018-04-20 RX ORDER — ONDANSETRON 2 MG/ML
INJECTION INTRAMUSCULAR; INTRAVENOUS AS NEEDED
Status: DISCONTINUED | OUTPATIENT
Start: 2018-04-20 | End: 2018-04-20 | Stop reason: HOSPADM

## 2018-04-20 RX ORDER — LIDOCAINE HYDROCHLORIDE 10 MG/ML
0.1 INJECTION, SOLUTION EPIDURAL; INFILTRATION; INTRACAUDAL; PERINEURAL AS NEEDED
Status: DISCONTINUED | OUTPATIENT
Start: 2018-04-20 | End: 2018-04-20 | Stop reason: HOSPADM

## 2018-04-20 RX ORDER — PROPOFOL 10 MG/ML
INJECTION, EMULSION INTRAVENOUS AS NEEDED
Status: DISCONTINUED | OUTPATIENT
Start: 2018-04-20 | End: 2018-04-20 | Stop reason: HOSPADM

## 2018-04-20 RX ORDER — GLYCOPYRROLATE 0.2 MG/ML
INJECTION INTRAMUSCULAR; INTRAVENOUS AS NEEDED
Status: DISCONTINUED | OUTPATIENT
Start: 2018-04-20 | End: 2018-04-20 | Stop reason: HOSPADM

## 2018-04-20 RX ORDER — FENTANYL CITRATE 50 UG/ML
INJECTION, SOLUTION INTRAMUSCULAR; INTRAVENOUS AS NEEDED
Status: DISCONTINUED | OUTPATIENT
Start: 2018-04-20 | End: 2018-04-20 | Stop reason: HOSPADM

## 2018-04-20 RX ORDER — CEFAZOLIN SODIUM 2 G/50ML
2 SOLUTION INTRAVENOUS ONCE
Status: COMPLETED | OUTPATIENT
Start: 2018-04-20 | End: 2018-04-20

## 2018-04-20 RX ORDER — CIPROFLOXACIN 500 MG/1
500 TABLET ORAL 2 TIMES DAILY
Qty: 10 TAB | Refills: 0 | Status: SHIPPED | OUTPATIENT
Start: 2018-04-20 | End: 2018-04-29

## 2018-04-20 RX ORDER — DOCUSATE SODIUM 100 MG/1
100 CAPSULE, LIQUID FILLED ORAL 2 TIMES DAILY
Qty: 10 CAP | Refills: 2 | Status: ON HOLD | OUTPATIENT
Start: 2018-04-20 | End: 2018-04-26

## 2018-04-20 RX ORDER — MORPHINE SULFATE 2 MG/ML
2 INJECTION, SOLUTION INTRAMUSCULAR; INTRAVENOUS
Status: DISCONTINUED | OUTPATIENT
Start: 2018-04-20 | End: 2018-04-21 | Stop reason: HOSPADM

## 2018-04-20 RX ORDER — NALOXONE HYDROCHLORIDE 0.4 MG/ML
0.04 INJECTION, SOLUTION INTRAMUSCULAR; INTRAVENOUS; SUBCUTANEOUS AS NEEDED
Status: DISCONTINUED | OUTPATIENT
Start: 2018-04-20 | End: 2018-04-21 | Stop reason: HOSPADM

## 2018-04-20 RX ORDER — ONDANSETRON 2 MG/ML
4 INJECTION INTRAMUSCULAR; INTRAVENOUS ONCE
Status: DISCONTINUED | OUTPATIENT
Start: 2018-04-20 | End: 2018-04-21 | Stop reason: HOSPADM

## 2018-04-20 RX ORDER — SODIUM CHLORIDE 0.9 % (FLUSH) 0.9 %
5-10 SYRINGE (ML) INJECTION AS NEEDED
Status: DISCONTINUED | OUTPATIENT
Start: 2018-04-20 | End: 2018-04-21 | Stop reason: HOSPADM

## 2018-04-20 RX ADMIN — ONDANSETRON 4 MG: 2 INJECTION INTRAMUSCULAR; INTRAVENOUS at 14:28

## 2018-04-20 RX ADMIN — PROPOFOL 200 MG: 10 INJECTION, EMULSION INTRAVENOUS at 14:32

## 2018-04-20 RX ADMIN — GLYCOPYRROLATE 0.2 MG: 0.2 INJECTION INTRAMUSCULAR; INTRAVENOUS at 14:28

## 2018-04-20 RX ADMIN — FENTANYL CITRATE 50 MCG: 50 INJECTION, SOLUTION INTRAMUSCULAR; INTRAVENOUS at 15:20

## 2018-04-20 RX ADMIN — SODIUM CHLORIDE, SODIUM LACTATE, POTASSIUM CHLORIDE, AND CALCIUM CHLORIDE: 600; 310; 30; 20 INJECTION, SOLUTION INTRAVENOUS at 14:28

## 2018-04-20 RX ADMIN — MIDAZOLAM HYDROCHLORIDE 2 MG: 1 INJECTION, SOLUTION INTRAMUSCULAR; INTRAVENOUS at 14:28

## 2018-04-20 RX ADMIN — FENTANYL CITRATE 100 MCG: 50 INJECTION, SOLUTION INTRAMUSCULAR; INTRAVENOUS at 14:32

## 2018-04-20 RX ADMIN — DEXAMETHASONE SODIUM PHOSPHATE 8 MG: 4 INJECTION, SOLUTION INTRA-ARTICULAR; INTRALESIONAL; INTRAMUSCULAR; INTRAVENOUS; SOFT TISSUE at 14:32

## 2018-04-20 RX ADMIN — Medication 2 MG: at 16:42

## 2018-04-20 RX ADMIN — FAMOTIDINE 20 MG: 20 TABLET, FILM COATED ORAL at 13:36

## 2018-04-20 RX ADMIN — SODIUM CHLORIDE, SODIUM LACTATE, POTASSIUM CHLORIDE, AND CALCIUM CHLORIDE 50 ML/HR: 600; 310; 30; 20 INJECTION, SOLUTION INTRAVENOUS at 13:35

## 2018-04-20 RX ADMIN — CEFAZOLIN SODIUM 2 G: 2 SOLUTION INTRAVENOUS at 14:28

## 2018-04-20 RX ADMIN — FENTANYL CITRATE 50 MCG: 50 INJECTION, SOLUTION INTRAMUSCULAR; INTRAVENOUS at 15:25

## 2018-04-20 RX ADMIN — LIDOCAINE HYDROCHLORIDE 100 MG: 20 INJECTION, SOLUTION EPIDURAL; INFILTRATION; INTRACAUDAL; PERINEURAL at 14:32

## 2018-04-20 RX ADMIN — MORPHINE SULFATE 5 MG: 10 INJECTION, SOLUTION INTRAMUSCULAR; INTRAVENOUS at 15:36

## 2018-04-20 RX ADMIN — Medication 2 MG: at 16:28

## 2018-04-20 NOTE — DISCHARGE INSTRUCTIONS
Light activity  Reg diet  Rx Cipro, Colace, Vicodin    rtc 1 week      Wei Fuller MD    DISCHARGE SUMMARY from Nurse    PATIENT INSTRUCTIONS:    After general anesthesia or intravenous sedation, for 24 hours or while taking prescription Narcotics:  · Limit your activities  · Do not drive and operate hazardous machinery  · Do not make important personal or business decisions  · Do  not drink alcoholic beverages  · If you have not urinated within 8 hours after discharge, please contact your surgeon on call. Report the following to your surgeon:  · Excessive pain, swelling, redness or odor of or around the surgical area  · Temperature over 100.5  · Nausea and vomiting lasting longer than 4 hours or if unable to take medications  · Any signs of decreased circulation or nerve impairment to extremity: change in color, persistent  numbness, tingling, coldness or increase pain  · Any questions    What to do at Home:    *  Please give a list of your current medications to your Primary Care Provider. *  Please update this list whenever your medications are discontinued, doses are      changed, or new medications (including over-the-counter products) are added. *  Please carry medication information at all times in case of emergency situations. These are general instructions for a healthy lifestyle:    No smoking/ No tobacco products/ Avoid exposure to second hand smoke  Surgeon General's Warning:  Quitting smoking now greatly reduces serious risk to your health.     Obesity, smoking, and sedentary lifestyle greatly increases your risk for illness    A healthy diet, regular physical exercise & weight monitoring are important for maintaining a healthy lifestyle    You may be retaining fluid if you have a history of heart failure or if you experience any of the following symptoms:  Weight gain of 3 pounds or more overnight or 5 pounds in a week, increased swelling in our hands or feet or shortness of breath while lying flat in bed. Please call your doctor as soon as you notice any of these symptoms; do not wait until your next office visit. Recognize signs and symptoms of STROKE:    F-face looks uneven    A-arms unable to move or move unevenly    S-speech slurred or non-existent    T-time-call 911 as soon as signs and symptoms begin-DO NOT go       Back to bed or wait to see if you get better-TIME IS BRAIN. Warning Signs of HEART ATTACK     Call 911 if you have these symptoms:   Chest discomfort. Most heart attacks involve discomfort in the center of the chest that lasts more than a few minutes, or that goes away and comes back. It can feel like uncomfortable pressure, squeezing, fullness, or pain.  Discomfort in other areas of the upper body. Symptoms can include pain or discomfort in one or both arms, the back, neck, jaw, or stomach.  Shortness of breath with or without chest discomfort.  Other signs may include breaking out in a cold sweat, nausea, or lightheadedness. Don't wait more than five minutes to call 911 - MINUTES MATTER! Fast action can save your life. Calling 911 is almost always the fastest way to get lifesaving treatment. Emergency Medical Services staff can begin treatment when they arrive -- up to an hour sooner than if someone gets to the hospital by car. The discharge information has been reviewed with the patient and spouse. The patient and spouse verbalized understanding. Discharge medications reviewed with the patient and spouse and appropriate educational materials and side effects teaching were provided. Cystoscopy: Care Instructions  Your Care Instructions  Cystoscopy is a test. It uses a thin, lighted tube called a cystoscope to see the inside of the bladder and the urethra. The urethra is the tube that carries urine out of the body. This test is helpful because it lets your doctor see areas of your bladder and urethra that are hard to see on X-rays.  It can help your doctor find bladder stones, tumors, bleeding, and infection. During this test, your doctor also can take tissue and urine samples. And if your doctor finds small stones or growths, he or she can remove them. In most cases the scope is in the bladder for less than 10 minutes. But the entire test may take 45 minutes or longer. You will probably get local anesthesia. This numbs a small part of your body. Or you may get spinal anesthesia, which numbs more of your body. Once in a while, doctors use general anesthesia. It makes you sleep during surgery. If you get a local anesthetic, you may be able to get up right after the test. But if you had spinal or general anesthesia, you will stay in the recovery room until you are able to walk or you have feeling again in your lower body. This usually takes about an hour. Your doctor may be able to tell you some of the results right after the test. But the complete results may take several days. Follow-up care is a key part of your treatment and safety. Be sure to make and go to all appointments, and call your doctor if you are having problems. It's also a good idea to know your test results and keep a list of the medicines you take. How can you care for yourself at home? Before the test  · If you are having a local anesthetic, you can eat and drink before the test.  · If you are having a spinal or general anesthetic, do not eat or drink anything for at least 8 hours before the test. Tell your doctor what medicines you take. · If you are not staying overnight in the hospital, make sure you have someone who can drive you home after the test.  After the test  · If your doctor prescribed antibiotics, take them as directed. Do not stop taking them just because you feel better. You need to take the full course of antibiotics. · You may have some burning when you urinate for a day or two after the test. You may feel better if you drink more fluids.  This may also help prevent an infection. · Your urine may have a pinkish color for a few days after the test.  When should you call for help? Call your doctor now or seek immediate medical care if:  ? · Your urine is still red or you see blood clots after you have urinated several times. ? · You cannot pass urine 8 hours after the test.   ? · You get a fever or chills. ? · You have pain in your belly or your back just below your rib cage. This is also called flank pain. ? Watch closely for changes in your health, and be sure to contact your doctor if:  ? · You have pain or burning when you urinate. A burning sensation is normal for a day or two after the test. But call if it does not get better. ? · You have a frequent urge to urinate but can pass only small amounts of urine. ? · Your urine is pink, red, or cloudy or smells bad. It is normal for the urine to have a pinkish color for a few days after the test. But call if it does not get better. ? · You do not get better as expected. Where can you learn more? Go to http://nichole-shahnaz.info/. Enter P063 in the search box to learn more about \"Cystoscopy: Care Instructions. \"  Current as of: May 12, 2017  Content Version: 11.4  © 8417-9718 [employer]. Care instructions adapted under license by [employer] (which disclaims liability or warranty for this information). If you have questions about a medical condition or this instruction, always ask your healthcare professional. [place] [address] any warranty or liability for your use of this inform     Prostate Biopsy and Ultrasound: About This Test  What is it? A prostate biopsy is a type of test. Your doctor takes small tissue samples from your prostate gland. Then another doctor looks at the tissue under a microscope to see if there are cancer cells. This test is done by a doctor who specializes in men's genital and urinary problems (urologist).  It can be done in your doctor's office, a day surgery clinic, or a hospital operating room. To get the tissue samples from the prostate, the doctor inserts a thin needle through the rectum, the urethra, or the area between the anus and scrotum (perineum). The most common method is through the rectum. Your doctor may use ultrasound to help guide the needle. Why is this test done? You may need a prostate biopsy if your doctor found something of concern during a digital rectal exam. Or you may need it if a blood test showed a high level of prostate-specific antigen (PSA). A biopsy can help find out if you have prostate cancer. How can you prepare for this test?  Before you have a prostate biopsy, tell your doctor if:  · You are taking any medicines or using any herbal supplements. · You are allergic to any medicines. · You have had bleeding problems, or you take aspirin or some other blood thinner. What happens before the test?  · You may need to have an enema before the test.  · You will need to take off all or most of your clothes. You will be given a cloth or paper gown to use during the test.  · You may be given a sedative through a vein (IV) in your arm. The sedative will help you relax and stay still. What happens during the test?  Through the rectum  · You may be asked to kneel, lie on your side, or lie on your back. · Your doctor may inject an anesthetic around the prostate to numb the area before the sample is taken. · Ultrasound is often used to guide the needle to the correct spot. · Your doctor may choose to use a needle guide for the biopsy. He or she will attach the guide to a finger. Your doctor will insert the finger into your rectum. · The needle will enter the prostate and take 6 to 12 samples. Through the urethra  · You will lie on your back. Your feet will rest in stirrups. · You will get anesthesia. The anesthesia may make you sleep.  Or it may just numb the area being worked on.  · Your doctor will insert a lighted scope (cystoscope) into your urethra. The scope allows your doctor to look directly at the prostate. Your doctor will pass a cutting loop through the scope to remove samples of prostate tissue. Through the perineum  · You will lie on an exam table either on your side or on your back with your knees bent. You will get anesthesia. The anesthesia may make you sleep. Or it may just numb the area being worked on.  · Your doctor will make a small cut in your perineum. Then he or she will insert a finger into the rectum to hold the prostate. · Your doctor will then insert the needle through the cut and into the prostate. · The needle collects samples of tissue and is then pulled out. What else should you know about this test?  · A prostate biopsy has a slight risk of causing problems such as infection or bleeding. · If the biopsy went through your rectum, you may have a small amount of bleeding from your rectum for 2 to 3 days after the biopsy. · You may have a little pain in your pelvic area. You may also have a little blood in your urine for 1 to 5 days. · You may have some blood in your semen for a week or longer. How long will the test take? · This test will take about 15 to 45 minutes. What happens after the test?  Your doctor will tell you what to expect and watch for after your test. In general:  · If you have anesthesia that makes you sleep, you will be in a recovery room for a few hours. You will need someone to drive you home. You may feel tired for the rest of the day. · You will probably be able to go home right away. · If your doctor had you stop taking any medicine for the biopsy, ask him or her when you can start taking it again. If you were given antibiotics, take them as directed. · Do not do heavy work or exercise for 4 hours after the test.  · Your doctor will tell you how long it may take to get your results back. Follow-up care is a key part of your treatment and safety.  Be sure to make and go to all appointments, and call your doctor if you are having problems. It's also a good idea to keep a list of the medicines you take. Ask your doctor when you can expect to have your test results. Where can you learn more? Go to http://nichole-shahnaz.info/. Enter Y504 in the search box to learn more about \"Prostate Biopsy and Ultrasound: About This Test.\"  Current as of: May 12, 2017  Content Version: 11.4  © 4033-6641 [employer]. Care instructions adapted under license by [employer] (which disclaims liability or warranty for this information). If you have questions about a medical condition or this instruction, always ask your healthcare professional. [address] any warranty or liability for your use of this information. ation.   ___________________________________________________________________________________________________________________________________

## 2018-04-20 NOTE — ANESTHESIA POSTPROCEDURE EVALUATION
Post-Anesthesia Evaluation and Assessment    Patient: Pablo Aragon MRN: [id number]  SSN: [id number]    YOB: 1970  Age: 50 y.o. Sex: male      Data from PACU flowsheet    Cardiovascular Function/Vital Signs  Visit Vitals    BP (!) 139/95    Pulse 73    Temp 36.4 °C (97.5 °F)    Resp 22    Ht 6' 2\" (1.88 m)    Wt 104.8 kg (231 lb)    SpO2 99%    BMI 29.66 kg/m2       Patient is status post general anesthesia for Procedure(s):  CYSTOSCOPY WITH PROSTATE BIOPSY/ULTRASOUND/FORTEC. Nausea/Vomiting: controlled    Postoperative hydration reviewed and adequate. Pain:  Pain Scale 1: Numeric (0 - 10) (04/20/18 4553)  Pain Intensity 1: 6 (04/20/18 1643)   Managed      Mental Status and Level of Consciousness: Alert and oriented     Pulmonary Status:   O2 Device: Oxygen mask (04/20/18 8836)   Adequate oxygenation and airway patent    Complications related to anesthesia: None    Post-anesthesia assessment completed.  No concerns    Signed By: Kimberlyn Cruz MD     April 20, 2018

## 2018-04-20 NOTE — H&P
Mr. Tonny Leyden seen today for prostate biopsy assessing elevated PSA-PSA 6.9     PSA (6.9) on routine testing  Has no irritative or obstructive voiding symptoms  No family history of prostate malignancy      Review of Systems:   CNS: No seizure syncope headaches dizziness or visual changes  Respiratory: No wheezing shortness of breath chest pain or coughing  Cardiovascular: No palpitations no angina/hypertension  Intestinal: No dyspepsia diarrhea or constipation  Urinary: No urgency frequency dysuria or hematuria  Skeletal: Large joint arthritis  Endocrine: No diabetes or thyroid disease  Other:                                                          [employer]     PROSTATE BIOPSY REPORT     Patient ID confirmed prior to procedure: Yes     Indications for prostate biopsy: PSA 6.9     Pre biopsy preparations:         ______x__ Cipro 500 mg PO                                                                                           ________ Levaquin 500 mg PO                                                   ________ Ancef 1000mg PO                                                                                          _________Valium 10 mg PO        TRANSRECTAL ULTRASOUND IMAGING OF THE PROSTATE:     Multiple transverse and longitudinal images of the prostate, seminal vesicles, and bladder revealed: Homogeneous parenchymal echoes bilaterally with normal prostate outlines no stone densities, cyst, or hypodense segments evident on either side-      Prostate Dimensions: 4.5 cm x 4.4 cm x 4.4 cm     Prostate Volume = 0.5(H)(L)(W) =    43.5 cc     PSA Density: (PSA)/Prostate Volume =    6.9/43.5  =  PSAD  =    0.16         Biopsy Procedure     Local anesthesia of the perineum was obtained by injecting 1% Lidocaine into the skin of the perineum 1 cm anterior to the anal verge and then into the deep perineum in the direction of the prostate.  Guided by a gloved finger in the rectum, a 14 gauge needle was then passed into the anesthetized region and advanced in the direction of the prostate. An 18 gauge spinal needle was then passed through the 14 gauge needle and 10 cc's of 1% Lidocaine was infiltrated periprostatically with needle placement guided by ultrasound imaging obtained by transrectal positioning of the ultrasound transducer.      An 18 gauge Biopty needle was then passed through the 14 gauge conduit needle multiple times obtaining  0   cores of tissue from the left lobe and  0   cores of tissue from the right lobe, biopsy needle placement guided by real time transrectal ultrasound imaging of the needle along its path through the perineum into the prostate. The conduit needle was then removed and pressure was applied to the perineum for one minute. The patient was then given oral and written instructions regarding post biopsy precautions as well as instructions regarding activity, medication, and follow up.     Comments: Transperineal prostate biopsy procedure was aborted with patient experiencing intolerable discomfort on passage of 16-gauge biopsy needle on initial attempt at left apical needle                      puncture of prostate parenchyma-procedure was therefore abandoned and pressure was made for me prostate biopsy under general anesthesia in OR      EBL: Minimal  Specimen removed: None        Allergies: No Known Allergies   Medications:           Current Outpatient Prescriptions   Medication Sig Dispense Refill    ciprofloxacin HCl (CIPRO) 500 mg tablet Take 500 mg by mouth two (2) times a day. Start taking 1 hour prior to procedure        cloNIDine HCl (CATAPRES) 0.1 mg tablet Take  by mouth.        hydrALAZINE (APRESOLINE) 100 mg tablet Take  by mouth.        amLODIPine (NORVASC) 10 mg tablet Take 1 Tab by mouth daily. 30 Tab 3    allopurinol (ZYLOPRIM) 100 mg tablet Take 2 Tabs by mouth daily. 60 Tab 3    predniSONE (DELTASONE) 5 mg tablet Take 1 Tab by mouth as needed.  30 Tab 0    atorvastatin (LIPITOR) 20 mg tablet Take  by mouth daily.        sildenafil citrate (VIAGRA) 50 mg tablet Take 1 Tab by mouth as needed. 30 Tab 1              Past Medical History:   Diagnosis Date    Hypertension      Joint pain      Kidney disease        History reviewed. No pertinent surgical history.         Family History   Problem Relation Age of Onset    Hypertension Mother      Heart Disease Father      Hypertension Father      Hypertension Child           Physical Examination: Well-nourished mature male in no apparent distress  Neck: No masses no nodes no bruits  Lungs: Clear breath sounds bilaterally no wheezes no rales no rhonchi  Heart: Regular sinus rhythm no murmurs no gallops no rubs  Abdomen: Nontender no palpable masses no organomegaly no bruits  Back: No percussion CVA tenderness on either side  Skin warm and dry no rash no lesions:  Neurologic: No motor or sensory deficits in arms or legs  Genitalia: Penis is normal, testes are normal bilaterally, prostate by JANE is large rounded smooth benign consistency and nontender no induration no nodularity  No rectal masses induration or tenderness     Urinalysis: +++heme/negative nitrite     Impression: Elevated PSA with failed attempt at prostate biopsy in office under local anesthesia           Plan: Transperineal prostate biopsy under general anesthesia at [facility]      Counseling Note:     Indications for an technique of transperineal prostate biopsy have been described discussed-patient understands and accepts the risk of bleeding, infection, and urinary retention as well as rationale for cystoscopy following biopsy under general anesthesia     RTC 1 week prostate biopsy pathology report      Isabel Frazier MD

## 2018-04-20 NOTE — PERIOP NOTES
Dr. Pablo Mcdonald informed of BP. Although elevated it is at baseline pre-op reading. No new orders. OK to move to phase 2 when ready.

## 2018-04-20 NOTE — ANESTHESIA PREPROCEDURE EVALUATION
Anesthetic History   No history of anesthetic complications            Review of Systems / Medical History  Patient summary reviewed and pertinent labs reviewed    Pulmonary        Sleep apnea: CPAP           Neuro/Psych   Within defined limits           Cardiovascular    Hypertension              Exercise tolerance: >4 METS     GI/Hepatic/Renal  Within defined limits              Endo/Other        Obesity     Other Findings   Comments: Documentation of current medication  Current medications obtained, documented and obtained? YES      Risk Factors for Postoperative nausea/vomiting:       History of postoperative nausea/vomiting? NO       Female? NO       Motion sickness? NO       Intended opioid administration for postoperative analgesia? YES      Smoking Abstinence:  Current Smoker? NO  Elective Surgery? YES  Seen preoperatively by anesthesiologist or proxy prior to day of surgery? YES  Pt abstained from smoking 24 hours prior to anesthesia?  N/A    Preventive care/screening for High Blood Pressure:  Aged 18 years and older: YES  Screened for high blood pressure: YES  Patients with high blood pressure referred to primary care provider   for BP management: YES                 Physical Exam    Airway  Mallampati: II  TM Distance: 4 - 6 cm  Neck ROM: normal range of motion   Mouth opening: Normal     Cardiovascular  Regular rate and rhythm,  S1 and S2 normal,  no murmur, click, rub, or gallop  Rhythm: regular  Rate: normal         Dental  No notable dental hx       Pulmonary  Breath sounds clear to auscultation               Abdominal  GI exam deferred       Other Findings            Anesthetic Plan    ASA: 2  Anesthesia type: general          Induction: Intravenous  Anesthetic plan and risks discussed with: Patient

## 2018-04-20 NOTE — IP AVS SNAPSHOT
[address] [address] 
[phone] Patient: Popeye Agosto MRN: [id number] About your hospitalization You were admitted on:  April 20, 2018 You last received care in the:  [facility] PACU You were discharged on:  April 20, 2018 Why you were hospitalized Your primary diagnosis was:  Not on File Follow-up Information Follow up With Details Comments Contact Info In 1 week Valery Brooke NP   [address] [place] [id number] 
[phone] Your Scheduled Appointments Wednesday April 25, 2018  2:45 PM EDT Office Visit with Blas Ramírez MD  
[facility] [address] [place] [address]) [address] [place] [id number]  
[phone] Tuesday May 29, 2018 11:15 AM EDT ROUTINE CARE with Valery Brooke NP [facility] Main Office ([facility]) [address] [address]  
[phone] Discharge Orders None A check gonzalez indicates which time of day the medication should be taken. My Medications START taking these medications Instructions Each Dose to Equal  
 Morning Noon Evening Bedtime  
 docusate sodium 100 mg capsule Commonly known as:  Raine Patricia Your last dose was: Your next dose is: Take 1 Cap by mouth two (2) times a day for 5 days. 100 mg HYDROcodone-acetaminophen 5-300 mg tablet Commonly known as:  Wayna Caba Your last dose was: Your next dose is: Take 1 Tab by mouth every six (6) hours as needed. Max Daily Amount: 4 Tabs. 1 Tab CHANGE how you take these medications Instructions Each Dose to Equal  
 Morning Noon Evening Bedtime * CIPRO 500 mg tablet Generic drug:  ciprofloxacin HCl What changed:  Another medication with the same name was added. Make sure you understand how and when to take each. Your last dose was: Your next dose is: Take 500 mg by mouth two (2) times a day. Start taking 1 hour prior to procedure 500 mg  
    
   
   
   
  
 * ciprofloxacin HCl 500 mg tablet Commonly known as:  CIPRO What changed: You were already taking a medication with the same name, and this prescription was added. Make sure you understand how and when to take each. Your last dose was: Your next dose is: Take 1 Tab by mouth two (2) times a day for 5 days. 500 mg * Notice: This list has 2 medication(s) that are the same as other medications prescribed for you. Read the directions carefully, and ask your doctor or other care provider to review them with you. CONTINUE taking these medications Instructions Each Dose to Equal  
 Morning Noon Evening Bedtime  
 allopurinol 100 mg tablet Commonly known as:  Versa [place] Your last dose was: Your next dose is: Take 2 Tabs by mouth daily. 200 mg  
    
   
   
   
  
 amLODIPine 10 mg tablet Commonly known as:  Minesh Ape Your last dose was: Your next dose is: Take 1 Tab by mouth daily. 10 mg  
    
   
   
   
  
 atorvastatin 20 mg tablet Commonly known as:  LIPITOR Your last dose was: Your next dose is: Take  by mouth daily. cloNIDine HCl 0.1 mg tablet Commonly known as:  CATAPRES Your last dose was: Your next dose is: Take  by mouth. hydrALAZINE 100 mg tablet Commonly known as:  APRESOLINE Your last dose was: Your next dose is: Take  by mouth.  
     
   
   
   
  
 predniSONE 5 mg tablet Commonly known as:  Faustino [place] Your last dose was: Your next dose is: Take 1 Tab by mouth as needed. 5 mg  
    
   
   
   
  
 sildenafil citrate 50 mg tablet Commonly known as:  VIAGRA Your last dose was: Your next dose is: Take 1 Tab by mouth as needed. 50 mg Where to Get Your Medications Information on where to get these meds will be given to you by the nurse or doctor. ! Ask your nurse or doctor about these medications  
  ciprofloxacin HCl 500 mg tablet  
 docusate sodium 100 mg capsule HYDROcodone-acetaminophen 5-300 mg tablet Opioid Education Prescription Opioids: What You Need to Know: 
 
 
rtc 1 week Karey Fry MD 
 
DISCHARGE SUMMARY from Nurse PATIENT INSTRUCTIONS: 
 
After general anesthesia or intravenous sedation, for 24 hours or while taking prescription Narcotics: · Limit your activities · Do not drive and operate hazardous machinery · Do not make important personal or business decisions · Do  not drink alcoholic beverages · If you have not urinated within 8 hours after discharge, please contact your surgeon on call. Report the following to your surgeon: 
· Excessive pain, swelling, redness or odor of or around the surgical area · Temperature over 100.5 · Nausea and vomiting lasting longer than 4 hours or if unable to take medications · Any signs of decreased circulation or nerve impairment to extremity: change in color, persistent  numbness, tingling, coldness or increase pain · Any questions What to do at Home: *  Please give a list of your current medications to your Primary Care Provider.  
 
*  Please update this list whenever your medications are discontinued, doses are 
    changed, or new medications (including over-the-counter products) are added. *  Please carry medication information at all times in case of emergency situations. These are general instructions for a healthy lifestyle: No smoking/ No tobacco products/ Avoid exposure to second hand smoke Surgeon General's Warning:  Quitting smoking now greatly reduces serious risk to your health. Obesity, smoking, and sedentary lifestyle greatly increases your risk for illness A healthy diet, regular physical exercise & weight monitoring are important for maintaining a healthy lifestyle You may be retaining fluid if you have a history of heart failure or if you experience any of the following symptoms:  Weight gain of 3 pounds or more overnight or 5 pounds in a week, increased swelling in our hands or feet or shortness of breath while lying flat in bed. Please call your doctor as soon as you notice any of these symptoms; do not wait until your next office visit. Recognize signs and symptoms of STROKE: 
 
F-face looks uneven A-arms unable to move or move unevenly S-speech slurred or non-existent T-time-call 911 as soon as signs and symptoms begin-DO NOT go Back to bed or wait to see if you get better-TIME IS BRAIN. Warning Signs of HEART ATTACK Call 911 if you have these symptoms: 
? Chest discomfort. Most heart attacks involve discomfort in the center of the chest that lasts more than a few minutes, or that goes away and comes back. It can feel like uncomfortable pressure, squeezing, fullness, or pain. ? Discomfort in other areas of the upper body. Symptoms can include pain or discomfort in one or both arms, the back, neck, jaw, or stomach. ? Shortness of breath with or without chest discomfort. ? Other signs may include breaking out in a cold sweat, nausea, or lightheadedness. Don't wait more than five minutes to call [address]!  Fast action can save your life. Calling 911 is almost always the fastest way to get lifesaving treatment. Emergency Medical Services staff can begin treatment when they arrive  up to an hour sooner than if someone gets to the hospital by car. The discharge information has been reviewed with the patient and spouse. The patient and spouse verbalized understanding. Discharge medications reviewed with the patient and spouse and appropriate educational materials and side effects teaching were provided. Cystoscopy: Care Instructions Your Care Instructions Cystoscopy is a test. It uses a thin, lighted tube called a cystoscope to see the inside of the bladder and the urethra. The urethra is the tube that carries urine out of the body. This test is helpful because it lets your doctor see areas of your bladder and urethra that are hard to see on X-rays. It can help your doctor find bladder stones, tumors, bleeding, and infection. During this test, your doctor also can take tissue and urine samples. And if your doctor finds small stones or growths, he or she can remove them. In most cases the scope is in the bladder for less than 10 minutes. But the entire test may take 45 minutes or longer. You will probably get local anesthesia. This numbs a small part of your body. Or you may get spinal anesthesia, which numbs more of your body. Once in a while, doctors use general anesthesia. It makes you sleep during surgery. If you get a local anesthetic, you may be able to get up right after the test. But if you had spinal or general anesthesia, you will stay in the recovery room until you are able to walk or you have feeling again in your lower body. This usually takes about an hour. Your doctor may be able to tell you some of the results right after the test. But the complete results may take several days. Follow-up care is a key part of your treatment and safety.  Be sure to make and go to all appointments, and call your doctor if you are having problems. It's also a good idea to know your test results and keep a list of the medicines you take. How can you care for yourself at home? Before the test 
· If you are having a local anesthetic, you can eat and drink before the test. 
· If you are having a spinal or general anesthetic, do not eat or drink anything for at least 8 hours before the test. Tell your doctor what medicines you take. · If you are not staying overnight in the hospital, make sure you have someone who can drive you home after the test. 
After the test 
· If your doctor prescribed antibiotics, take them as directed. Do not stop taking them just because you feel better. You need to take the full course of antibiotics. · You may have some burning when you urinate for a day or two after the test. You may feel better if you drink more fluids. This may also help prevent an infection. · Your urine may have a pinkish color for a few days after the test. 
When should you call for help? Call your doctor now or seek immediate medical care if: 
? · Your urine is still red or you see blood clots after you have urinated several times. ? · You cannot pass urine 8 hours after the test.  
? · You get a fever or chills. ? · You have pain in your belly or your back just below your rib cage. This is also called flank pain. ? Watch closely for changes in your health, and be sure to contact your doctor if: 
? · You have pain or burning when you urinate. A burning sensation is normal for a day or two after the test. But call if it does not get better. ? · You have a frequent urge to urinate but can pass only small amounts of urine. ? · Your urine is pink, red, or cloudy or smells bad. It is normal for the urine to have a pinkish color for a few days after the test. But call if it does not get better. ? · You do not get better as expected. Where can you learn more? Go to http://nichole-shahnaz.info/. Enter S920 in the search box to learn more about \"Cystoscopy: Care Instructions. \" Current as of: May 12, 2017 Content Version: 11.4 © 5691-9337 [employer]. Care instructions adapted under license by from[employer]toB (which disclaims liability or warranty for this information). If you have questions about a medical condition or this instruction, always ask your healthcare professional. [address] any warranty or liability for your use of this inform Prostate Biopsy and Ultrasound: About This Test 
What is it? A prostate biopsy is a type of test. Your doctor takes small tissue samples from your prostate gland. Then another doctor looks at the tissue under a microscope to see if there are cancer cells. This test is done by a doctor who specializes in men's genital and urinary problems (urologist). It can be done in your doctor's office, a day surgery clinic, or a hospital operating room. To get the tissue samples from the prostate, the doctor inserts a thin needle through the rectum, the urethra, or the area between the anus and scrotum (perineum). The most common method is through the rectum. Your doctor may use ultrasound to help guide the needle. Why is this test done? You may need a prostate biopsy if your doctor found something of concern during a digital rectal exam. Or you may need it if a blood test showed a high level of prostate-specific antigen (PSA). A biopsy can help find out if you have prostate cancer. How can you prepare for this test? 
Before you have a prostate biopsy, tell your doctor if: 
· You are taking any medicines or using any herbal supplements. · You are allergic to any medicines. · You have had bleeding problems, or you take aspirin or some other blood thinner.  
What happens before the test? 
· You may need to have an enema before the test. 
 · You will need to take off all or most of your clothes. You will be given a cloth or paper gown to use during the test. 
· You may be given a sedative through a vein (IV) in your arm. The sedative will help you relax and stay still. What happens during the test? 
Through the rectum · You may be asked to kneel, lie on your side, or lie on your back. · Your doctor may inject an anesthetic around the prostate to numb the area before the sample is taken. · Ultrasound is often used to guide the needle to the correct spot. · Your doctor may choose to use a needle guide for the biopsy. He or she will attach the guide to a finger. Your doctor will insert the finger into your rectum. · The needle will enter the prostate and take 6 to 12 samples. Through the urethra · You will lie on your back. Your feet will rest in stirrups. · You will get anesthesia. The anesthesia may make you sleep. Or it may just numb the area being worked on. 
· Your doctor will insert a lighted scope (cystoscope) into your urethra. The scope allows your doctor to look directly at the prostate. Your doctor will pass a cutting loop through the scope to remove samples of prostate tissue. Through the perineum · You will lie on an exam table either on your side or on your back with your knees bent. You will get anesthesia. The anesthesia may make you sleep. Or it may just numb the area being worked on. 
· Your doctor will make a small cut in your perineum. Then he or she will insert a finger into the rectum to hold the prostate. · Your doctor will then insert the needle through the cut and into the prostate. · The needle collects samples of tissue and is then pulled out. What else should you know about this test? 
· A prostate biopsy has a slight risk of causing problems such as infection or bleeding. · If the biopsy went through your rectum, you may have a small amount of bleeding from your rectum for 2 to 3 days after the biopsy. · You may have a little pain in your pelvic area. You may also have a little blood in your urine for 1 to 5 days. · You may have some blood in your semen for a week or longer. How long will the test take? · This test will take about 15 to 45 minutes. What happens after the test? 
Your doctor will tell you what to expect and watch for after your test. In general: · If you have anesthesia that makes you sleep, you will be in a recovery room for a few hours. You will need someone to drive you home. You may feel tired for the rest of the day. · You will probably be able to go home right away. · If your doctor had you stop taking any medicine for the biopsy, ask him or her when you can start taking it again. If you were given antibiotics, take them as directed. · Do not do heavy work or exercise for 4 hours after the test. 
· Your doctor will tell you how long it may take to get your results back. Follow-up care is a key part of your treatment and safety. Be sure to make and go to all appointments, and call your doctor if you are having problems. It's also a good idea to keep a list of the medicines you take. Ask your doctor when you can expect to have your test results. Where can you learn more? Go to http://nichole-shahnaz.info/. Enter Y504 in the search box to learn more about \"Prostate Biopsy and Ultrasound: About This Test.\" Current as of: May 12, 2017 Content Version: 11.4 © 3228-5532 Healthwise, Incorporated. Care instructions adapted under license by [employer] (which disclaims liability or warranty for this information). If you have questions about a medical condition or this instruction, always ask your healthcare professional. [address] any warranty or liability for your use of this information. ation. ___________________________________________________________________________________________________________________________________ Advaliant Announcement We are excited to announce that we are making your provider's discharge notes available to you in Advaliant. You will see these notes when they are completed and signed by the physician that discharged you from your recent hospital stay. If you have any questions or concerns about any information you see in Advaliant, please call the Health Information Department where you were seen or reach out to your Primary Care Provider for more information about your plan of care. Introducing [facility]! [employer] introduces Advaliant patient portal. Now you can access parts of your medical record, email your doctor's office, and request medication refills online. 1. In your internet browser, go to [email or web address] 2. Click on the First Time User? Click Here link in the Sign In box. You will see the New Member Sign Up page. 3. Enter your Advaliant Access Code exactly as it appears below. You will not need to use this code after youve completed the sign-up process. If you do not sign up before the expiration date, you must request a new code. · Advaliant Access Code: [email or web address] Expires: 5/29/2018 12:55 PM 
 
4. Enter the last four digits of your Social Security Number (xxxx) and Date of Birth (mm/dd/yyyy) as indicated and click Submit. You will be taken to the next sign-up page. 5. Create a Advaliant ID. This will be your Advaliant login ID and cannot be changed, so think of one that is secure and easy to remember. 6. Create a Advaliant password. You can change your password at any time. 7. Enter your Password Reset Question and Answer. This can be used at a later time if you forget your password. 8. Enter your e-mail address. You will receive e-mail notification when new information is available in [address] 9. Click Sign Up. You can now view and download portions of your medical record. 10. Click the Download Summary menu link to download a portable copy of your medical information. If you have questions, please visit the Frequently Asked Questions section of the [employer] website. Remember, Vir2us is NOT to be used for urgent needs. For medical emergencies, dial 911. Now available from your iPhone and Android! Introducing Sean Frey As a [facility] patient, I wanted to make you aware of our electronic visit tool called Sean Frey. [employer]/DocuTAP allows you to connect within minutes with a medical provider 24 hours a day, seven days a week via a mobile device or tablet or logging into a secure website from your computer. You can access Sean Frey from anywhere in the [place]. A virtual visit might be right for you when you have a simple condition and feel like you just dont want to get out of bed, or cant get away from work for an appointment, when your regular [facility] provider is not available (evenings, weekends or holidays), or when youre out of town and need minor care. Electronic visits cost only $49 and if the Ramsey Rony [employer]/DocuTAP provider determines a prescription is needed to treat your condition, one can be electronically transmitted to a nearby pharmacy*. Please take a moment to enroll today if you have not already done so. The enrollment process is free and takes just a few minutes. To enroll, please download the [employer]/DocuTAP vaishnavi to your tablet or phone, or visit [email or web address] to enroll on your computer. And, as an [address] [place] [address] patient with a [employer] account, the results of your visits will be scanned into your electronic medical record and your primary care provider will be able to view the scanned results. We urge you to continue to see your regular [facility] provider for your ongoing medical care.   And while your primary care provider may not be the one available when you seek a [facility] virtual visit, the peace of mind you get from getting a real diagnosis real time can be priceless. For more information on [facility], view our Frequently Asked Questions (FAQs) at [email or web address]. Sincerely, 
 
Steffany Banks MD 
Chief Medical Officer 50Joel Awan *:  certain medications cannot be prescribed via [facility] Providers Seen During Your Hospitalization Provider Specialty Primary office phone Alyssa Ramsey MD Urology [phone] Your Primary Care Physician (PCP) Primary Care Physician Office Phone Office Fax Marialuisa John [phone] You are allergic to the following No active allergies Recent Documentation Height Weight BMI Smoking Status 1.88 m 104.8 kg 29.66 kg/m2 Never Smoker Emergency Contacts Name Discharge Info Relation Home Work Mobile Audra Culp DISCHARGE CAREGIVER [3] Spouse [3]   [phone] RobbiJamieAudra  Spouse [3] [phone] Patient Belongings The following personal items are in your possession at time of discharge: 
  Dental Appliances: None  Visual Aid: None      Home Medications: None   Jewelry: None  Clothing: Undergarments, Socks, Footwear, Jacket/Coat    Other Valuables: Eyeglasses, [employer], Simon Sachs Discharge Instructions Attachments/References HYDROCODONE/ACETAMINOPHEN (BY MOUTH) (ENGLISH) LAXATIVE, STOOL SOFTENERS (BY MOUTH) (ENGLISH) CIPROFLOXACIN (BY MOUTH) (ENGLISH) Patient Handouts Hydrocodone/Acetaminophen (By mouth) Acetaminophen (k-jzmy-j-MIN-oh-fen), Hydrocodone Bitartrate (cny-jaop-IMM-done bye-TAR-trate) Treats pain. This medicine contains a narcotic pain reliever.   
Brand Name(s): Hycet, Lorcet, Lorcet HD, Lorcet Plus, Lortab 10/325, Lortab 5/325, Lortab 7.5/325, Lortab Elixir, Norco, Verdrocet, Vicodin, Vicodin ES, Vicodin HP, Tammy Mode, Xodol 5/300 There may be other brand names for this medicine. When This Medicine Should Not Be Used: This medicine is not right for everyone. Do not use it if you had an allergic reaction to acetaminophen, hydrocodone, or other narcotic medicines, or stomach or bowel blockage (including paralytic ileus). How to Use This Medicine:  
Capsule, Liquid, Tablet · Your doctor will tell you how much medicine to use. Do not use more than directed. · An overdose can be dangerous. Follow directions carefully so you do not get too much medicine at one time. · Oral liquid: Measure the oral liquid medicine with a marked measuring spoon, oral syringe, or medicine cup. · Drink plenty of liquids to help avoid constipation. · This medicine should come with a Medication Guide. Ask your pharmacist for a copy if you do not have one. · Missed dose: Take a dose as soon as you remember. If it is almost time for your next dose, wait until then and take a regular dose. Do not take extra medicine to make up for a missed dose. · Store the medicine in a closed container at room temperature, away from heat, moisture, and direct light. Flush any unused Norco® tablets down the toilet. Drugs and Foods to Avoid: Ask your doctor or pharmacist before using any other medicine, including over-the-counter medicines, vitamins, and herbal products. · Do not use this medicine if you are using or have used an MAO inhibitor within the past 14 days. · Some medicines can affect how hydrocodone/acetaminophen works. Tell your doctor if you are using any of the following: ¨ Carbamazepine, erythromycin, ketoconazole, mirtazapine, phenytoin, rifampin, ritonavir, tramadol, trazodone ¨ Diuretic (water pill) ¨ Medicine to treat depression or mental health problems ¨ Medicine to treat migraine headaches ¨ Phenothiazine medicine · Tell your doctor if you use anything else that makes you sleepy.  Some examples are allergy medicine, narcotic pain medicine, and alcohol. Tell your doctor if you are using buprenorphine, butorphanol, nalbuphine, pentazocine, or a muscle relaxer. · Do not drink alcohol while you are using this medicine. Acetaminophen can damage your liver, and your risk is higher if you also drink alcohol. Warnings While Using This Medicine: · Tell your doctor if you are pregnant or breastfeeding, or if you have kidney disease, liver disease, lung or breathing problems, gallbladder or pancreas problems, an underactive thyroid, [place] disease, prostate problems, trouble urinating, stomach problems, or a history of head injury or brain tumor, seizures, alcohol or drug addiction. · This medicine may cause the following problems:  
¨ High risk of overdose, which can lead to death ¨ Respiratory depression (serious breathing problem that can be life-threatening) ¨ Liver problems ¨ Serious skin reactions ¨ Serotonin syndrome (when used with certain medicines) · This medicine can be habit-forming. Do not use more than your prescribed dose. Call your doctor if you think your medicine is not working. · This medicine may make you dizzy or drowsy. Do not drive or doing anything else that could be dangerous until you know how this medicine affects you. · This medicine contains acetaminophen. Read the labels of all other medicines you are using to see if they also contain acetaminophen, or ask your doctor or pharmacist. Gia John not use more than 4 grams (4,000 milligrams) total of acetaminophen in one day. · Tell any doctor or dentist who treats you that you are using this medicine. This medicine may affect certain medical test results. · This medicine may cause constipation, especially with long-term use. Ask your doctor if you should use a laxative to prevent and treat constipation. · This medicine could cause infertility. Talk with your doctor before using this medicine if you plan to have children. · Keep all medicine out of the reach of children. Never share your medicine with anyone. Possible Side Effects While Using This Medicine:  
Call your doctor right away if you notice any of these side effects: · Allergic reaction: Itching or hives, swelling in your face or hands, swelling or tingling in your mouth or throat, chest tightness, trouble breathing · Anxiety, restlessness, fast heartbeat, fever, sweating, muscle spasms, twitching, diarrhea, seeing or hearing things that are not there · Blistering, peeling, red skin rash · Blue lips, fingernails, or skin · Dark urine or pale stools, loss of appetite, nausea or vomiting, stomach pain, yellow skin or eyes · Extreme weakness, shallow breathing, slow heartbeat, sweating, seizures, cold or clammy skin · Lightheadedness, dizziness, fainting If you notice these less serious side effects, talk with your doctor: · Constipation, nausea, vomiting · Tiredness or sleepiness If you notice other side effects that you think are caused by this medicine, tell your doctor. Call your doctor for medical advice about side effects. You may report side effects to FDA at 5-845-FDA-0915 © 2017 [address] Information is for End User's use only and may not be sold, redistributed or otherwise used for commercial purposes. The above information is an  only. It is not intended as medical advice for individual conditions or treatments. Talk to your doctor, nurse or pharmacist before following any medical regimen to see if it is safe and effective for you. Laxative, Stool Softeners (By mouth) Treats constipation by helping you have a bowel movement. Brand Name(s): Col-Rite, Colace, Colace Clear, DSS, Diocto, Diocto Liquid, Doc-Q-Lace, Docuprene, Docusil, Dok, Dulcolax, Fleet Sof-Lax, [facility] Docusate Calcium, [facility] Stool Softener, [facility] Stool Softener Extra Strength There may be other brand names for this medicine. When This Medicine Should Not Be Used: You should not use this medicine if you have severe stomach pain, nausea, or vomiting. Stool softeners should not be used if you have severe stomach pain and do not know the cause. How to Use This Medicine:  
Capsule, Tablet, Liquid, Liquid Filled Capsule · Your doctor will tell you how much medicine to use. Do not use more than directed. · Follow the instructions on the medicine label if you are using this medicine without a prescription. · Drink 6 to 8 glasses of water daily while using any laxative. · To make the oral liquid taste better, you may mix it with one-half glass of milk or fruit juice. · Measure the oral liquid medicine with a marked measuring spoon, oral syringe, medicine cup, or medicine dropper. If a dose is missed: · Use the missed dose as soon as possible. · If you do not remember the missed dose until the next day, skip the missed dose and go back to your regular dosing schedule. · You should not use two doses at the same time. How to Store and Dispose of This Medicine: · Store the medicine in a tightly closed container at room temperature, away from heat and moisture. Do not store liquid-filled capsules in the refrigerator. · Keep all medicine out of the reach of children. Drugs and Foods to Avoid: Ask your doctor or pharmacist before using any other medicine, including over-the-counter medicines, vitamins, and herbal products. · You should not use mineral oil while you are using a stool softener. · You should not use a stool softener within 2 hours before or after taking any other medicines. Laxatives can keep other medicines from working correctly. Warnings While Using This Medicine: · If you are pregnant or breastfeeding, talk to your doctor before taking this medicine. · Do not give laxatives to children under 10years old unless you talk to your doctor. · You should not use this laxative for longer than 1 week unless approved by your doctor. Laxatives may be habit-forming and can harm your bowels if you use them too long. · Stool softeners usually work in 1 to 2 days, but for some people, results can take as long as 3 to 5 days. Possible Side Effects While Using This Medicine: If you notice these less serious side effects, talk with your doctor: · Nausea · Sore throat · Skin rash If you notice other side effects that you think are caused by this medicine, tell your doctor. Call your doctor for medical advice about side effects. You may report side effects to FDA at 8-879-FDA-1833 © 2017 [address] Information is for End User's use only and may not be sold, redistributed or otherwise used for commercial purposes. The above information is an  only. It is not intended as medical advice for individual conditions or treatments. Talk to your doctor, nurse or pharmacist before following any medical regimen to see if it is safe and effective for you. Ciprofloxacin (By mouth) Ciprofloxacin ([id number]-kup-SNDH-a-sin) Treats infections and plague. This medicine is a quinolone antibiotic. Brand Name(s): Cipro There may be other brand names for this medicine. When This Medicine Should Not Be Used: This medicine is not right for everyone. Do not use it if you had an allergic reaction to ciprofloxacin or to similar medicines. How to Use This Medicine:  
Liquid, Tablet, Long Acting Tablet · Your doctor will tell you how much medicine to use. Do not use more than directed. Take this medicine at the same time each day. · You may take this medicine with or without food. Do not take this medicine with only a source of calcium, including milk, yogurt, or juice that contains added calcium.  You may have foods or drinks that contain calcium as part of a larger meal. 
 · Swallow the extended-release tablet whole. Do not crush, break, or chew it. · Oral liquid: Shake for at least 15 seconds just before each use. The liquid has small beads floating in it. Do not chew the beads when you drink the liquid. Measure the oral liquid medicine with a marked measuring spoon, oral syringe, or medicine cup. · Tablet: Swallow whole. Do not break, crush, or chew it. · Drink extra fluids so you will urinate more often and help prevent kidney problems. · Take all of the medicine in your prescription to clear up your infection, even if you feel better after the first few doses. · This medicine should come with a Medication Guide. Ask your pharmacist for a copy if you do not have one. · Missed dose: Take a dose as soon as you remember. If it is almost time for your next dose, wait until then and take a regular dose. Do not take extra medicine to make up for a missed dose. · Store the medicine in a closed container at room temperature, away from heat, moisture, and direct light. Throw away any leftover liquid medicine after 14 days. Drugs and Foods to Avoid: Ask your doctor or pharmacist before using any other medicine, including over-the-counter medicines, vitamins, and herbal products. · Do not use this medicine together with tizanidine. · Some foods and medicines can affect how ciprofloxacin works. Tell your doctor if you are using any of the following: ¨ Clozapine, cyclosporine, duloxetine, lidocaine, methotrexate, olanzapine, pentoxifylline, phenytoin, probenecid, ropinirole, sildenafil, theophylline, zolpidem ¨ Antibiotic (including azithromycin, clarithromycin, erythromycin) ¨ Blood thinner (including warfarin) ¨ Diabetes medicine (including glimepiride, glyburide) ¨ Medicine for depression or mental illness ¨ Medicine for heart rhythm problems (including amiodarone, procainamide, quinidine, sotalol) ¨ NSAID pain medicine (including aspirin, celecoxib, diclofenac, ibuprofen, naproxen) ¨ Steroid medicine (including hydrocortisone, methylprednisolone, prednisone) · Take ciprofloxacin at least 2 hours before or 6 hours after you take didanosine buffered tablets for oral suspension or the pediatric powder for oral suspension, sucralfate, or antacids, multivitamins, or other products containing aluminum, magnesium, lanthanum, sevelamer, iron, or zinc. 
· This medicine slows the digestion of caffeine, so it might affect you for longer than normal. 
Warnings While Using This Medicine: · Tell your doctor if you are pregnant or breastfeeding, or if you have kidney disease, liver disease, diabetes, heart disease, myasthenia gravis, or a history of heart rhythm problems (including prolonged QT interval), nerve problems, or seizures. Tell your doctor if you have ever had tendon or joint problems, including rheumatoid arthritis, or if you have received a transplant. · This medicine may cause the following problems: 
¨ Tendinitis and tendon rupture (which may happen after treatment ends) ¨ Liver damage ¨ Nerve damage in the arms or legs ¨ Heart rhythm changes ¨ Changes in blood sugar levels · This medicine may make you dizzy, drowsy, or lightheaded. Do not drive or do anything else that could be dangerous until you know how this medicine affects you. · This medicine can cause diarrhea. Call your doctor if the diarrhea becomes severe, does not stop, or is bloody. Do not take any medicine to stop diarrhea until you have talked to your doctor. Diarrhea can occur 2 months or more after you stop taking this medicine. · This medicine may make your skin more sensitive to sunlight. Wear sunscreen. Do not use sunlamps or tanning beds. · Call your doctor if your symptoms do not improve or if they get worse. · Keep all medicine out of the reach of children. Never share your medicine with anyone. Possible Side Effects While Using This Medicine: Call your doctor right away if you notice any of these side effects: · Allergic reaction: Itching or hives, swelling in your face or hands, swelling or tingling in your mouth or throat, chest tightness, trouble breathing · Blistering, peeling, red skin rash · Dark urine, pale stools, nausea, vomiting, loss of appetite, stomach pain, yellow skin or eyes · Diarrhea which may contain blood · Fainting, dizziness, or lightheadedness · Fast, slow, or uneven heartbeat · Numbness, tingling, weakness, or burning pain in your hands, arms, legs, or feet · Pain, stiffness, swelling, or bruises around your ankle, leg, shoulder, or other joints · Seizures, severe headache, unusual thoughts or behaviors, trouble sleeping, feeling anxious, confused, or depressed, seeing, hearing, or feeling things that are not there · Unusual bleeding, bruising, or weakness If you notice other side effects that you think are caused by this medicine, tell your doctor. Call your doctor for medical advice about side effects. You may report side effects to FDA at 2-911-FDA-8668 © 2017 [facility] Information is for End User's use only and may not be sold, redistributed or otherwise used for commercial purposes. The above information is an  only. It is not intended as medical advice for individual conditions or treatments. Talk to your doctor, nurse or pharmacist before following any medical regimen to see if it is safe and effective for you. Please provide this summary of care documentation to your next provider. Signatures-by signing, you are acknowledging that this After Visit Summary has been reviewed with you and you have received a copy. Patient Signature:  ____________________________________________________________ Date:  ____________________________________________________________  
  
Claudene Born  Provider Signature: ____________________________________________________________ Date:  ____________________________________________________________

## 2018-04-20 NOTE — BRIEF OP NOTE
BRIEF OPERATIVE NOTE    Date of Procedure: 4/20/2018   Preoperative Diagnosis: Elevated PSA [R97.20] PSA 6.9  Postoperative Diagnosis: teddy  Procedure(s):  Transperineal  PROSTATE BIOPSY/ULTRASOUND/FORTEC  Surgeon(s) and Role:      Mary Pittman MD - Primary         Surgical Assistant: none    Surgical Staff:  Circ-1: Haley Gaines RN  Scrub Tech-Relief: Selvin Hatch  Scrub RN-1: Yarely Hodgson  Event Time In   Incision Start 1515   Incision Close 1536     Anesthesia: General   Estimated Blood Loss: 25 cc  Specimens:   ID Type Source Tests Collected by Time Destination   1 : left prostate biospy Preservative Prostate  Mary Pittman MD 4/20/2018 1517 Pathology   2 : right prostate biopsy Preservative Prostate  Mary Pittman MD 4/20/2018 1518 Pathology      Findings: inhomogeneous parenchyma echoes with cystic spaces and hyperdense segments  Complications: none  Implants: none    15 cores left lobe  15 cores right lobe    Mary Pittman MD

## 2018-04-21 ENCOUNTER — APPOINTMENT (OUTPATIENT)
Dept: CT IMAGING | Age: 48
DRG: 907 | End: 2018-04-21
Attending: EMERGENCY MEDICINE
Payer: COMMERCIAL

## 2018-04-21 ENCOUNTER — HOSPITAL ENCOUNTER (INPATIENT)
Age: 48
LOS: 8 days | Discharge: HOME HEALTH | DRG: 907 | End: 2018-04-29
Attending: EMERGENCY MEDICINE | Admitting: HOSPITALIST
Payer: COMMERCIAL

## 2018-04-21 DIAGNOSIS — N17.9 ACUTE RENAL FAILURE SUPERIMPOSED ON CHRONIC KIDNEY DISEASE, UNSPECIFIED CKD STAGE, UNSPECIFIED ACUTE RENAL FAILURE TYPE (HCC): Primary | ICD-10-CM

## 2018-04-21 DIAGNOSIS — R33.9 URINARY RETENTION: ICD-10-CM

## 2018-04-21 DIAGNOSIS — R30.1 PAINFUL BLADDER SPASM: ICD-10-CM

## 2018-04-21 DIAGNOSIS — N18.9 ACUTE RENAL FAILURE SUPERIMPOSED ON CHRONIC KIDNEY DISEASE, UNSPECIFIED CKD STAGE, UNSPECIFIED ACUTE RENAL FAILURE TYPE (HCC): Primary | ICD-10-CM

## 2018-04-21 DIAGNOSIS — R97.20 ELEVATED PSA: ICD-10-CM

## 2018-04-21 DIAGNOSIS — N30.01 ACUTE CYSTITIS WITH HEMATURIA: ICD-10-CM

## 2018-04-21 DIAGNOSIS — N25.89 UREMIC ACIDOSIS: ICD-10-CM

## 2018-04-21 PROBLEM — R31.9 HEMATURIA: Status: ACTIVE | Noted: 2018-04-21

## 2018-04-21 PROBLEM — N39.0 UTI (URINARY TRACT INFECTION): Status: ACTIVE | Noted: 2018-04-21

## 2018-04-21 LAB
25(OH)D3 SERPL-MCNC: 13.5 NG/ML (ref 30–100)
ANION GAP SERPL CALC-SCNC: 15 MMOL/L (ref 3–18)
APPEARANCE UR: ABNORMAL
BACTERIA URNS QL MICRO: ABNORMAL /HPF
BASOPHILS # BLD: 0 K/UL (ref 0–0.1)
BASOPHILS NFR BLD: 0 % (ref 0–2)
BILIRUB UR QL: ABNORMAL
BUN SERPL-MCNC: 103 MG/DL (ref 7–18)
BUN/CREAT SERPL: 7 (ref 12–20)
CALCIUM SERPL-MCNC: 8.1 MG/DL (ref 8.5–10.1)
CALCIUM SERPL-MCNC: 8.1 MG/DL (ref 8.5–10.1)
CHLORIDE SERPL-SCNC: 105 MMOL/L (ref 100–108)
CO2 SERPL-SCNC: 19 MMOL/L (ref 21–32)
COLOR UR: ABNORMAL
CREAT SERPL-MCNC: 14 MG/DL (ref 0.6–1.3)
DIFFERENTIAL METHOD BLD: ABNORMAL
EOSINOPHIL # BLD: 0 K/UL (ref 0–0.4)
EOSINOPHIL NFR BLD: 0 % (ref 0–5)
EPITH CASTS URNS QL MICRO: NEGATIVE /LPF (ref 0–5)
ERYTHROCYTE [DISTWIDTH] IN BLOOD BY AUTOMATED COUNT: 14.5 % (ref 11.6–14.5)
FERRITIN SERPL-MCNC: 512 NG/ML (ref 8–388)
GLUCOSE SERPL-MCNC: 115 MG/DL (ref 74–99)
GLUCOSE UR STRIP.AUTO-MCNC: 100 MG/DL
HBV SURFACE AG SER QL: <0.1 INDEX
HBV SURFACE AG SER QL: NEGATIVE
HCT VFR BLD AUTO: 26.1 % (ref 36–48)
HCT VFR BLD AUTO: 26.8 % (ref 36–48)
HGB BLD-MCNC: 8.3 G/DL (ref 13–16)
HGB BLD-MCNC: 8.4 G/DL (ref 13–16)
HGB UR QL STRIP: ABNORMAL
KETONES UR QL STRIP.AUTO: NEGATIVE MG/DL
LEUKOCYTE ESTERASE UR QL STRIP.AUTO: NEGATIVE
LYMPHOCYTES # BLD: 0.5 K/UL (ref 0.9–3.6)
LYMPHOCYTES NFR BLD: 4 % (ref 21–52)
MCH RBC QN AUTO: 25.8 PG (ref 24–34)
MCHC RBC AUTO-ENTMCNC: 31.3 G/DL (ref 31–37)
MCV RBC AUTO: 82.2 FL (ref 74–97)
MONOCYTES # BLD: 1.2 K/UL (ref 0.05–1.2)
MONOCYTES NFR BLD: 10 % (ref 3–10)
NEUTS SEG # BLD: 9.8 K/UL (ref 1.8–8)
NEUTS SEG NFR BLD: 86 % (ref 40–73)
NITRITE UR QL STRIP.AUTO: POSITIVE
PH UR STRIP: 6 [PH] (ref 5–8)
PHOSPHATE SERPL-MCNC: 8.2 MG/DL (ref 2.5–4.9)
PLATELET # BLD AUTO: 202 K/UL (ref 135–420)
PMV BLD AUTO: 9.8 FL (ref 9.2–11.8)
POTASSIUM SERPL-SCNC: 4.4 MMOL/L (ref 3.5–5.5)
PROT UR STRIP-MCNC: >300 MG/DL
PTH-INTACT SERPL-MCNC: 1726.5 PG/ML (ref 18.4–88)
RBC # BLD AUTO: 3.26 M/UL (ref 4.7–5.5)
RBC #/AREA URNS HPF: ABNORMAL /HPF (ref 0–5)
SODIUM SERPL-SCNC: 139 MMOL/L (ref 136–145)
SP GR UR REFRACTOMETRY: 1.02 (ref 1–1.03)
URATE SERPL-MCNC: 10.9 MG/DL (ref 2.6–7.2)
UROBILINOGEN UR QL STRIP.AUTO: 0.2 EU/DL (ref 0.2–1)
WBC # BLD AUTO: 11.4 K/UL (ref 4.6–13.2)
WBC URNS QL MICRO: ABNORMAL /HPF (ref 0–4)

## 2018-04-21 PROCEDURE — 85025 COMPLETE CBC W/AUTO DIFF WBC: CPT | Performed by: EMERGENCY MEDICINE

## 2018-04-21 PROCEDURE — 81001 URINALYSIS AUTO W/SCOPE: CPT | Performed by: EMERGENCY MEDICINE

## 2018-04-21 PROCEDURE — 74011250636 HC RX REV CODE- 250/636: Performed by: EMERGENCY MEDICINE

## 2018-04-21 PROCEDURE — 77030018836 HC SOL IRR NACL ICUM -A

## 2018-04-21 PROCEDURE — 51798 US URINE CAPACITY MEASURE: CPT

## 2018-04-21 PROCEDURE — 74011000250 HC RX REV CODE- 250: Performed by: EMERGENCY MEDICINE

## 2018-04-21 PROCEDURE — 83970 ASSAY OF PARATHORMONE: CPT | Performed by: INTERNAL MEDICINE

## 2018-04-21 PROCEDURE — 83540 ASSAY OF IRON: CPT | Performed by: INTERNAL MEDICINE

## 2018-04-21 PROCEDURE — 82728 ASSAY OF FERRITIN: CPT | Performed by: INTERNAL MEDICINE

## 2018-04-21 PROCEDURE — 87340 HEPATITIS B SURFACE AG IA: CPT | Performed by: INTERNAL MEDICINE

## 2018-04-21 PROCEDURE — 80048 BASIC METABOLIC PNL TOTAL CA: CPT | Performed by: EMERGENCY MEDICINE

## 2018-04-21 PROCEDURE — 87086 URINE CULTURE/COLONY COUNT: CPT | Performed by: EMERGENCY MEDICINE

## 2018-04-21 PROCEDURE — 51702 INSERT TEMP BLADDER CATH: CPT

## 2018-04-21 PROCEDURE — 74011250637 HC RX REV CODE- 250/637: Performed by: HOSPITALIST

## 2018-04-21 PROCEDURE — 82306 VITAMIN D 25 HYDROXY: CPT | Performed by: INTERNAL MEDICINE

## 2018-04-21 PROCEDURE — 84100 ASSAY OF PHOSPHORUS: CPT | Performed by: INTERNAL MEDICINE

## 2018-04-21 PROCEDURE — 77030005538 HC CATH URETH FOL44 BARD -B

## 2018-04-21 PROCEDURE — 74176 CT ABD & PELVIS W/O CONTRAST: CPT

## 2018-04-21 PROCEDURE — 96374 THER/PROPH/DIAG INJ IV PUSH: CPT

## 2018-04-21 PROCEDURE — 74011250636 HC RX REV CODE- 250/636: Performed by: HOSPITALIST

## 2018-04-21 PROCEDURE — 99284 EMERGENCY DEPT VISIT MOD MDM: CPT

## 2018-04-21 PROCEDURE — 74011000250 HC RX REV CODE- 250: Performed by: HOSPITALIST

## 2018-04-21 PROCEDURE — 36415 COLL VENOUS BLD VENIPUNCTURE: CPT | Performed by: STUDENT IN AN ORGANIZED HEALTH CARE EDUCATION/TRAINING PROGRAM

## 2018-04-21 PROCEDURE — 65270000029 HC RM PRIVATE

## 2018-04-21 PROCEDURE — 84550 ASSAY OF BLOOD/URIC ACID: CPT | Performed by: INTERNAL MEDICINE

## 2018-04-21 PROCEDURE — 85018 HEMOGLOBIN: CPT | Performed by: STUDENT IN AN ORGANIZED HEALTH CARE EDUCATION/TRAINING PROGRAM

## 2018-04-21 RX ORDER — SODIUM CHLORIDE 9 MG/ML
150 INJECTION, SOLUTION INTRAVENOUS CONTINUOUS
Status: DISCONTINUED | OUTPATIENT
Start: 2018-04-21 | End: 2018-04-21

## 2018-04-21 RX ORDER — MORPHINE SULFATE 2 MG/ML
2 INJECTION, SOLUTION INTRAMUSCULAR; INTRAVENOUS
Status: DISCONTINUED | OUTPATIENT
Start: 2018-04-21 | End: 2018-04-24 | Stop reason: SDUPTHER

## 2018-04-21 RX ORDER — HYDROCODONE BITARTRATE AND ACETAMINOPHEN 5; 325 MG/1; MG/1
1 TABLET ORAL
Status: DISCONTINUED | OUTPATIENT
Start: 2018-04-21 | End: 2018-04-24 | Stop reason: SDUPTHER

## 2018-04-21 RX ORDER — SODIUM CHLORIDE 9 MG/ML
150 INJECTION, SOLUTION INTRAVENOUS CONTINUOUS
Status: DISPENSED | OUTPATIENT
Start: 2018-04-21 | End: 2018-04-22

## 2018-04-21 RX ORDER — PHENAZOPYRIDINE HYDROCHLORIDE 100 MG/1
100 TABLET, FILM COATED ORAL 3 TIMES DAILY
Status: DISPENSED | OUTPATIENT
Start: 2018-04-22 | End: 2018-04-24

## 2018-04-21 RX ORDER — NALOXONE HYDROCHLORIDE 0.4 MG/ML
0.4 INJECTION, SOLUTION INTRAMUSCULAR; INTRAVENOUS; SUBCUTANEOUS
Status: DISCONTINUED | OUTPATIENT
Start: 2018-04-21 | End: 2018-04-24 | Stop reason: SDUPTHER

## 2018-04-21 RX ORDER — HYDRALAZINE HYDROCHLORIDE 50 MG/1
100 TABLET, FILM COATED ORAL EVERY 8 HOURS
Status: DISCONTINUED | OUTPATIENT
Start: 2018-04-21 | End: 2018-04-22

## 2018-04-21 RX ORDER — DOCUSATE SODIUM 100 MG/1
100 CAPSULE, LIQUID FILLED ORAL 2 TIMES DAILY
Status: DISCONTINUED | OUTPATIENT
Start: 2018-04-21 | End: 2018-04-29 | Stop reason: HOSPADM

## 2018-04-21 RX ORDER — CYCLOBENZAPRINE HCL 10 MG
10 TABLET ORAL
Status: DISCONTINUED | OUTPATIENT
Start: 2018-04-21 | End: 2018-04-29 | Stop reason: HOSPADM

## 2018-04-21 RX ORDER — ATORVASTATIN CALCIUM 20 MG/1
20 TABLET, FILM COATED ORAL DAILY
Status: DISCONTINUED | OUTPATIENT
Start: 2018-04-22 | End: 2018-04-29 | Stop reason: HOSPADM

## 2018-04-21 RX ORDER — MORPHINE SULFATE 2 MG/ML
2 INJECTION, SOLUTION INTRAMUSCULAR; INTRAVENOUS
Status: DISCONTINUED | OUTPATIENT
Start: 2018-04-21 | End: 2018-04-21

## 2018-04-21 RX ORDER — ALLOPURINOL 100 MG/1
200 TABLET ORAL DAILY
Status: DISCONTINUED | OUTPATIENT
Start: 2018-04-22 | End: 2018-04-22

## 2018-04-21 RX ORDER — AMLODIPINE BESYLATE 10 MG/1
10 TABLET ORAL DAILY
Status: DISCONTINUED | OUTPATIENT
Start: 2018-04-22 | End: 2018-04-22

## 2018-04-21 RX ORDER — CLONIDINE HYDROCHLORIDE 0.1 MG/1
0.1 TABLET ORAL EVERY 8 HOURS
Status: DISCONTINUED | OUTPATIENT
Start: 2018-04-21 | End: 2018-04-29 | Stop reason: HOSPADM

## 2018-04-21 RX ADMIN — HYDRALAZINE HYDROCHLORIDE 100 MG: 50 TABLET, FILM COATED ORAL at 17:30

## 2018-04-21 RX ADMIN — Medication 2 MG: at 22:19

## 2018-04-21 RX ADMIN — SODIUM CHLORIDE 250 ML: 9 INJECTION, SOLUTION INTRAVENOUS at 14:24

## 2018-04-21 RX ADMIN — SODIUM CHLORIDE 150 ML/HR: 900 INJECTION, SOLUTION INTRAVENOUS at 21:57

## 2018-04-21 RX ADMIN — CLONIDINE HYDROCHLORIDE 0.1 MG: 0.1 TABLET ORAL at 17:14

## 2018-04-21 RX ADMIN — CYCLOBENZAPRINE HYDROCHLORIDE 10 MG: 10 TABLET, FILM COATED ORAL at 21:51

## 2018-04-21 RX ADMIN — SODIUM CHLORIDE 1000 ML: 900 INJECTION, SOLUTION INTRAVENOUS at 14:23

## 2018-04-21 RX ADMIN — SODIUM CHLORIDE 150 ML/HR: 900 INJECTION, SOLUTION INTRAVENOUS at 14:28

## 2018-04-21 RX ADMIN — DOCUSATE SODIUM 100 MG: 100 CAPSULE, LIQUID FILLED ORAL at 21:51

## 2018-04-21 RX ADMIN — HYDRALAZINE HYDROCHLORIDE 100 MG: 50 TABLET, FILM COATED ORAL at 21:51

## 2018-04-21 RX ADMIN — CLONIDINE HYDROCHLORIDE 0.1 MG: 0.1 TABLET ORAL at 21:51

## 2018-04-21 RX ADMIN — Medication 1 G: at 23:55

## 2018-04-21 RX ADMIN — WATER 1 G: 1 INJECTION INTRAMUSCULAR; INTRAVENOUS; SUBCUTANEOUS at 14:28

## 2018-04-21 NOTE — Clinical Note
Status[de-identified] Inpatient [101] Type of Bed: Medical [8] Inpatient Hospitalization Certified Necessary for the Following Reasons: 3. Patient receiving treatment that can only be provided in an inpatient setting (further clarification in H&P documentation) Admitting Diagnosis: Acute on chronic renal failure ([facility] [address] [[id number]] Admitting Diagnosis: Urinary retention [952236] Admitting Diagnosis: Hematuria [[id number]] Admitting Diagnosis: UTI (urinary tract infection) [788090] Admitting Physician: Johan Brown [[id number]] Attending Physician: Johan Brown [[id number]] Estimated Length of Stay: 2 Midnights Discharge Plan[de-identified] Home with Office Follow-up

## 2018-04-21 NOTE — ROUTINE PROCESS
Received report from [facility]. Pt AAOx3, NAD, breathing non labored, on room air, HOB up. IV site clean, dry and intact w/ IVF going per order. Briones cath in place w/ bloody urine noted. Bed at the lowest level on lock position, call bell w/i reach. Pt c/o unable to void and it hurts. Flushed briones w/ 5 10cc NS flushes; w/ no relief. Gave pt Flexeril for spasm; no relief either. Bladder scan done w/ 275cc result. Pt screaming for pain and anxious at this time. Called RRT: briones flushed again, some blood clots noted on return. Morphin given. After a few minutes, pt c/o of pain again. Another Morphin given, Dr Nate Trevino flushed briones again, no return noted. Briones worked when Dr Nate Trevino repositioned briones, noted w/ bloody urine and pt got relief as claimed. CBI was ordered and Dr Nate Trevino talked to the pt about it. Pt declined it for now claiming that he'll wait if he would have a hard time urinating or same problem comes back. Pt had  another episode of pain on the epigastric area. Bladder scan don w/ 375ml. Notified Dr Nate Trevino who changed briones to 3way briones cath and CBI started. Emptied briones bag 3 times since CBI started. Bedside and Verbal shift change report given to BOOGIE Montejo (oncoming nurse) by me (offgoing nurse). Report included the following information SBAR, Kardex, Procedure Summary, Intake/Output, MAR and Recent Results.

## 2018-04-21 NOTE — H&P
History and Physical    Subjective: Dari Gutierrez is a 50 y.o. AA male with hx of Stage 5 CKD due top uncontrolled  Hypertension. He has been trying to avoid dialysis. His last creatinine was 8 in November 2017 and his nephrologist is Dr. Reinier Fry. He underwent biopsy of his prostate yesterday bu urology and developed acute urinary retention overnight and came to the ED because he was unable to pass his urine. In the ED he was noted to have significantly creatinine, urinary obstruction with greater than 700ml in the bladder on presentation and gross blood with clots in his urine after catheterization. He has complaints of moderate to severe suprapubic pain, denies fevers or chills. Urology and renal has been consulted. I am admitting him for further management of these issues. Past Medical History:   Diagnosis Date    Hypertension     Joint pain     Kidney disease     Sleep apnea     on cpap      Past Surgical History:   Procedure Laterality Date    HX ORTHOPAEDIC Left     left knee sx     HX WISDOM TEETH EXTRACTION       Family History   Problem Relation Age of Onset    Hypertension Mother     Heart Disease Father     Hypertension Father     Hypertension Child       Social History   Substance Use Topics    Smoking status: Never Smoker    Smokeless tobacco: Never Used    Alcohol use No       Prior to Admission medications    Medication Sig Start Date End Date Taking? Authorizing Provider   docusate sodium (COLACE) 100 mg capsule Take 1 Cap by mouth two (2) times a day for 5 days. 4/20/18 4/25/18  Debbie Wise MD   ciprofloxacin HCl (CIPRO) 500 mg tablet Take 1 Tab by mouth two (2) times a day for 5 days. 4/20/18 4/25/18  Debbie Wise MD   HYDROcodone-acetaminophen (XODOL) 5-300 mg tablet Take 1 Tab by mouth every six (6) hours as needed. Max Daily Amount: 4 Tabs.  4/20/18   Debbie Wise MD   ciprofloxacin HCl (CIPRO) 500 mg tablet Take 500 mg by mouth two (2) times a day. Start taking 1 hour prior to procedure    Historical Provider   predniSONE (DELTASONE) 5 mg tablet Take 1 Tab by mouth as needed. 4/10/18   Pricilla Sevilla NP   atorvastatin (LIPITOR) 20 mg tablet Take  by mouth daily. Historical Provider   cloNIDine HCl (CATAPRES) 0.1 mg tablet Take  by mouth. 10/30/17   Historical Provider   hydrALAZINE (APRESOLINE) 100 mg tablet Take  by mouth. 10/30/17   Historical Provider   sildenafil citrate (VIAGRA) 50 mg tablet Take 1 Tab by mouth as needed. 2/28/18   Pricilla Sevilla NP   amLODIPine (NORVASC) 10 mg tablet Take 1 Tab by mouth daily. 3/22/17   Pricilla Sevilla NP   allopurinol (ZYLOPRIM) 100 mg tablet Take 2 Tabs by mouth daily. 3/20/17   Pricilla Sevilla NP     No Known Allergies     Review of Systems:  A comprehensive review of systems was negative except for that written in the History of Present Illness. Objective:        Physical Exam:   General appearance: alert, cooperative, no distress, appears stated age  Neck: supple, symmetrical, trachea midline, no adenopathy, thyroid: not enlarged, symmetric, no tenderness/mass/nodules, no carotid bruit and no JVD  Lungs: clear to auscultation bilaterally  Chest wall: no tenderness  Heart: regular rate and rhythm, S1, S2 normal, no murmur, click, rub or gallop  Abdomen: soft, non-tender. Bowel sounds normal. No masses,  no organomegaly  Male genitalia: normal, briones catheter present.    Skin: Skin color, texture, turgor normal. No rashes or lesions  Neurologic: Grossly normal    ECG:  normal EKG, normal sinus rhythm, unchanged from previous tracings     Data Review:   Recent Results (from the past 24 hour(s))   URINALYSIS W/ RFLX MICROSCOPIC    Collection Time: 04/21/18 11:00 AM   Result Value Ref Range    Color RED      Appearance BLOODY      Specific gravity 1.020 1.005 - 1.030      pH (UA) 6.0 5.0 - 8.0      Protein >300 (A) NEG mg/dL    Glucose 100 (A) NEG mg/dL    Ketone NEGATIVE  NEG mg/dL    Bilirubin SMALL (A) NEG      Blood LARGE (A) NEG      Urobilinogen 0.2 0.2 - 1.0 EU/dL    Nitrites POSITIVE (A) NEG      Leukocyte Esterase NEGATIVE  NEG     METABOLIC PANEL, BASIC    Collection Time: 04/21/18 11:00 AM   Result Value Ref Range    Sodium 139 136 - 145 mmol/L    Potassium 4.4 3.5 - 5.5 mmol/L    Chloride 105 100 - 108 mmol/L    CO2 19 (L) 21 - 32 mmol/L    Anion gap 15 3.0 - 18 mmol/L    Glucose 115 (H) 74 - 99 mg/dL     (H) 7.0 - 18 MG/DL    Creatinine 14.00 (H) 0.6 - 1.3 MG/DL    BUN/Creatinine ratio 7 (L) 12 - 20      GFR est AA 5 (L) >60 ml/min/1.73m2    GFR est non-AA 4 (L) >60 ml/min/1.73m2    Calcium 8.1 (L) 8.5 - 10.1 MG/DL   URINE MICROSCOPIC ONLY    Collection Time: 04/21/18 11:00 AM   Result Value Ref Range    WBC 4 to 5 0 - 4 /hpf    RBC TOO NUMEROUS TO COUNT 0 - 5 /hpf    Epithelial cells NEGATIVE  0 - 5 /lpf    Bacteria FEW (A) NEG /hpf   CBC WITH AUTOMATED DIFF    Collection Time: 04/21/18 11:00 AM   Result Value Ref Range    WBC 11.4 4.6 - 13.2 K/uL    RBC 3.26 (L) 4.70 - 5.50 M/uL    HGB 8.4 (L) 13.0 - 16.0 g/dL    HCT 26.8 (L) 36.0 - 48.0 %    MCV 82.2 74.0 - 97.0 FL    MCH 25.8 24.0 - 34.0 PG    MCHC 31.3 31.0 - 37.0 g/dL    RDW 14.5 11.6 - 14.5 %    PLATELET 498 219 - 798 K/uL    MPV 9.8 9.2 - 11.8 FL    NEUTROPHILS 86 (H) 40 - 73 %    LYMPHOCYTES 4 (L) 21 - 52 %    MONOCYTES 10 3 - 10 %    EOSINOPHILS 0 0 - 5 %    BASOPHILS 0 0 - 2 %    ABS. NEUTROPHILS 9.8 (H) 1.8 - 8.0 K/UL    ABS. LYMPHOCYTES 0.5 (L) 0.9 - 3.6 K/UL    ABS. MONOCYTES 1.2 0.05 - 1.2 K/UL    ABS. EOSINOPHILS 0.0 0.0 - 0.4 K/UL    ABS. BASOPHILS 0.0 0.0 - 0.1 K/UL    DF AUTOMATED         Chest x-ray was negative for infiltrate, effusion, pneumothorax, or wide mediastinum. Assessment:     Urinary retention  -Indwelling briones catheter with periodic irrigation to break up clots. -consult Urology       HTN, goal below 130/80  -continue Amlodipine, clonidine, hydralazine.      UTI (urinary tract infection)  -Rocephin q 24 hrs, follow urine cultures. Hematuria  -Related to yesterdays prostate biopsy, will follow for resolution of bleeding over next 1-2 days. Acute kidney injury ([facility] [address]  -Has baseline creatinine of 8 with chronic stage V renal insufficiency.  -Consult nephrology  -I anticipate improvement toward baseline with briones and resolution of obstruction. Will follow creatinine. Bilateral SCDs for DVT prophylaxis, patient is a full code.      Signed By: Sanju Hutson MD     April 21, 2018

## 2018-04-21 NOTE — IP AVS SNAPSHOT
[address] [address] [place] [facility] Patient: Robinson Alves MRN: [id number] A check gonzalez indicates which time of day the medication should be taken. My Medications START taking these medications Instructions Each Dose to Equal  
 Morning Noon Evening Bedtime  
 calcitRIOL 0.25 mcg capsule Commonly known as:  ROCALTROL Your next dose is:  4/30 Take 1 Cap by mouth daily. 0.25 mcg  
    
  
   
   
   
  
 calcium acetate 667 mg Cap Commonly known as:  PHOSLO Your next dose is:  4/29 with dinner Take 2 Caps by mouth three (3) times daily (with meals). 2 Cap  
    
  
   
  
   
  
   
  
 opium-belladonna 16.2-30 mg suppository Commonly known as:  B&O 15-A Your last dose was:  10 am 4/29 Notes to Patient:  Every 8 hours per rectum as needed for bladder spasms Insert 1 Suppository into rectum every eight (8) hours as needed. Max Daily Amount: 3 Suppositories. 1 Suppository OTHER Notes to Patient:  LAB WORK. CBC in 1 week Dx: Hematuria  Fax results to Dr. Edis Engel OTHER This is to certify that Robinson Alves was admitted to [facility] from 04/21/2018 to 04/29/2018. He may may return to work on 05/07/2018. Please feel free to contact my office if you have any questions or concerns. Thank you. Saccharomyces boulardii 250 mg capsule Commonly known as:  Martin Controls Your next dose is:  4/29 at bedtime Take 2 Caps by mouth two (2) times a day for 7 days. 500 mg  
    
  
   
   
   
  
  
 sodium bicarbonate 650 mg tablet Your next dose is:  4/29 at bedtime Take 1 Tab by mouth two (2) times a day. 650 mg  
    
  
   
   
   
  
  
 tamsulosin 0.4 mg capsule Commonly known as:  FLOMAX Your next dose is:  4/30 Take 1 Cap by mouth daily.   
 0.4 mg  
 CHANGE how you take these medications Instructions Each Dose to Equal  
 Morning Noon Evening Bedtime  
 docusate sodium 100 mg capsule Commonly known as:  Naga Montejo What changed:   
- when to take this 
- reasons to take this Notes to Patient:  Take as needed for constipation Take 1 Cap by mouth two (2) times daily as needed for Constipation for up to 5 days. 100 mg CONTINUE taking these medications Instructions Each Dose to Equal  
 Morning Noon Evening Bedtime  
 allopurinol 100 mg tablet Commonly known as:  Kenny Merles Your next dose is:  4/30 Take 2 Tabs by mouth daily. 200 mg  
    
   
   
   
  
 amLODIPine 10 mg tablet Commonly known as:  Leward Hernandez Your next dose is:  4/30 Take 1 Tab by mouth daily. 10 mg  
    
  
   
   
   
  
 atorvastatin 20 mg tablet Commonly known as:  LIPITOR Your next dose is:  4/30 Take  by mouth daily. cloNIDine HCl 0.1 mg tablet Commonly known as:  CATAPRES Notes to Patient:  Take as directed Take  by mouth. hydrALAZINE 100 mg tablet Commonly known as:  APRESOLINE Notes to Patient:  Take as directed Take  by mouth. HYDROcodone-acetaminophen 5-300 mg tablet Commonly known as:  Darwin Jacobo Notes to Patient:  As needed for pain q 6 hrs. Take 1 Tab by mouth every six (6) hours as needed. Max Daily Amount: 4 Tabs. 1 Tab  
    
   
   
   
  
 predniSONE 5 mg tablet Commonly known as:  Natalie Bell Notes to Patient:  Take as directed Take 1 Tab by mouth as needed. 5 mg  
    
   
   
   
  
 sildenafil citrate 50 mg tablet Commonly known as:  VIAGRA Notes to Patient:  Take as needed Take 1 Tab by mouth as needed. 50 mg  
    
   
   
   
  
  
STOP taking these medications CIPRO 500 mg tablet Generic drug:  ciprofloxacin HCl ciprofloxacin HCl 500 mg tablet Commonly known as:  CIPRO Where to Get Your Medications These medications were sent to [facility]/pharmacy #[id number]- [facility], [address] ([address] [facility] [address]), [address] [address] Phone:  [phone]  
  calcitRIOL 0.25 mcg capsule  
 calcium acetate 667 mg Cap  
 docusate sodium 100 mg capsule Saccharomyces boulardii 250 mg capsule  
 sodium bicarbonate 650 mg tablet  
 tamsulosin 0.4 mg capsule Information on where to get these meds will be given to you by the nurse or doctor. ! Ask your nurse or doctor about these medications HYDROcodone-acetaminophen 5-300 mg tablet  
 opium-belladonna 16.2-30 mg suppository  OTHER  
 OTHER

## 2018-04-21 NOTE — H&P
[address] [place] [address]   HISTORY AND PHYSICAL      Kary Smallwood.  MR#: [id number]  : 1970  ACCOUNT #: [de-identified]   ADMIT DATE: 2018    CHIEF COMPLAINT:  Abdominal pain. HISTORY OF PRESENT ILLNESS:  The patient is a pleasant 49-year-old  gentleman with a known history of chronic alcohol abuse and diabetes mellitus, insulin dependent, presenting to [facility] Department secondary to worsening abdominal pain and abdominal bloating with distention. The patient indicates that he has had ongoing abdominal pain for approximately a year. Over the last 24 hours, he indicates this pain has become intractable. It is associated with some nausea, but no vomiting. He also endorses some fevers and chills over the last 24 hours subjectively. He states that he has noted that his abdomen has become more distended over the last week; however, over the last 24 hours, he notes that the abdomen is very tight. He comes to the emergency department for further evaluation. In the ER, he was seen by Dr. Yaneth Amador, who ordered a CT scan. This shows fairly significant ascites of the abdomen as well as a possible abscess within the abdomen. I am asked to admit the patient for further evaluation of these findings. PAST MEDICAL HISTORY:  Diabetes mellitus type 2, insulin dependent. SURGICAL HISTORY:  None. FAMILY HISTORY:  Mother, father, sister and brother are all . Mother  secondary to leukemia. Father with a history of liver cancer. His sister also had cancer; however, he does not know what type. He indicates that his brother was murdered back in the 76s. SOCIAL HISTORY:  The patient is . He does admit to chronic alcohol use and admits to drinking approximately 6 beers daily. He also is a heavy smoker. He smokes a pack of cigarettes a day and has done so for approximately 42 years. He works as a .     ALLERGIES:  NO KNOWN DRUG ALLERGIES. HOME MEDICATIONS:  Insulin subcu daily. REVIEW OF SYSTEMS:  Complete 12-point review of systems is negative other than that noted in the HPI above. PHYSICAL EXAMINATION:  VITAL SIGNS:  Temperature 98.6, pulse 103, respiration 18, blood pressure 140/66, pulse oximetry 94% on room air. GENERAL:  Patient is awake, alert and oriented. No acute distress. HEENT:  Eyes:  PERRLA. Ears clear. Nose is clear, no rhinorrhea or sinusitis. Throat is clear, with no pharyngitis or oral lesions. Head normocephalic, atraumatic. NECK:  Supple. No masses or tenderness. No thyromegaly or lymphadenopathy. LUNGS:  Clear bilaterally, with no wheezes, rales or rhonchi. HEART:  Regular. He is mildly tachycardic. No murmurs or gallops are appreciated. ABDOMEN:  Tender to palpation globally. He has abdominal distention with marked ascites and fluid wave present. No suprapubic tenderness is present. SKIN:  No lesions or rashes. NEUROLOGIC:  Cranial nerves II-XII are grossly intact. LABORATORY DATA:  Lactic acid is 3.2. Urinalysis is negative other than large amount of glucose. He has a fairly high specific gravity at 1.030, likely related to dehydration. WBC is 11.7, hemoglobin 15.5, hematocrit 41.5, platelets 92. Sodium 134, potassium 4.0, chloride 98, bicarbonate 25, BUN 9, creatinine 0.57, , , alkaline phosphatase 283, lipase 127. CT scan of the abdomen is reviewed. This shows ascites within the abdomen and pelvis. There appears to be a possible phlegmonous change or possible developing abscess along the right psoas muscle. Small amount of fluid adjacent to the thoracic spine also appears to be organizing. He also has noted cirrhosis and varices with splenomegaly. ASSESSMENT:  1. Abdominal ascites with possible phlegmon or psoas abscess present on CT scan. 2.  Transaminitis. 3.  Chronic alcohol abuse. 4.  Diabetes mellitus.     5.  Sepsis present on admission. PLAN:  The patient is admitted to the telemetry unit. With regard to his sepsis, he has received vancomycin and Zosyn in the emergency department and this will be continued upon admission. Dr. Norbert Torres has been consulted with general surgery for assistance in management of this case, and he has requested a tap of the abdominal ascites for further evaluation for possible SBP. With regard to his transaminitis, I anticipate that these will improve with IV hydration and alcohol cessation. For his diabetes mellitus, I will continue the patient on sliding scale insulin as well as long-acting Lantus for glycemic control. The patient is a FULL CODE. For DVT prophylaxis, I have ordered bilateral SCDs.       MD ROSA Manley/RUSLAN  D: 04/21/2018 16:11     T: 04/21/2018 17:14  JOB #: [id number]

## 2018-04-21 NOTE — ED PROVIDER NOTES
EMERGENCY DEPARTMENT HISTORY AND PHYSICAL EXAM    10:30 AM      Date: 4/21/2018  Patient Name: Irma Hinds    History of Presenting Illness     Chief Complaint   Patient presents with    Blood in Urine    Urinary Frequency    Urinary Pain         History Provided By: Patient and Patient's Wife    Chief Complaint: Hematuria, urinary frequency  Duration: 1 Days  Timing:  Acute  Location:   Quality: Aching  Severity: 10 out of 10  Modifying Factors: Nothing improves or exacerbates  Associated Symptoms: Urinary retention, lower abdominal pain, dysuria      Additional History (Context): Irma Hinds is a 50 y.o. male with hx of Kidney Disease and HTN who presents for evaluation of urinary frequency and hematuria with acute onset post prostate biopsy yesterday. Per patient's wife, he was constantly trying to urinate throughout the night. He notes that sometimes urine comes out, but it is mostly blood. He rates the pain a 10/10. He notes urinary retention, lower abdominal pain, and dysuria. PCP: Pablo Barrera NP      Past History     Past Medical History:  Past Medical History:   Diagnosis Date    Hypertension     Joint pain     Kidney disease     Sleep apnea     on cpap       Past Surgical History:  Past Surgical History:   Procedure Laterality Date    HX ORTHOPAEDIC Left     left knee sx     HX WISDOM TEETH EXTRACTION         Family History:  Family History   Problem Relation Age of Onset    Hypertension Mother     Heart Disease Father     Hypertension Father     Hypertension Child        Social History:  Social History   Substance Use Topics    Smoking status: Never Smoker    Smokeless tobacco: Never Used    Alcohol use No       Allergies:  No Known Allergies      Review of Systems       Review of Systems   Constitutional: Negative for fever. HENT: Negative for sore throat. Eyes: Negative for redness and visual disturbance. Respiratory: Negative for shortness of breath and wheezing. Cardiovascular: Negative for chest pain. Gastrointestinal: Positive for abdominal pain (Lower). Negative for nausea. Endocrine: Negative for polyuria. Genitourinary: Positive for dysuria, frequency and urgency. Musculoskeletal: Negative for arthralgias and neck stiffness. Skin: Negative for rash. Neurological: Negative for headaches. All other systems reviewed and are negative. Physical Exam     Visit Vitals    /90    Pulse (!) 101    Temp 98.2 °F (36.8 °C)    Resp 20    Ht 6' 2\" (1.88 m)    Wt 104.3 kg (230 lb)    SpO2 95%    BMI 29.53 kg/m2         Physical Exam   Constitutional: He is oriented to person, place, and time. He appears well-developed and well-nourished. No distress. Appears obviously in pain   HENT:   Head: Normocephalic and atraumatic. Mouth/Throat: Oropharynx is clear and moist.   Eyes: Conjunctivae are normal. Pupils are equal, round, and reactive to light. No scleral icterus. Neck: Normal range of motion. Neck supple. Cardiovascular: Intact distal pulses. Capillary refill < 3 seconds   Pulmonary/Chest: Effort normal and breath sounds normal. No respiratory distress. He has no wheezes. Abdominal: Soft. Bowel sounds are normal. He exhibits no distension. There is tenderness (Suprapubic). Otherwise, soft and non tender   Genitourinary:   Genitourinary Comments: Bright blood urinal at bedside but no urine     Musculoskeletal: Normal range of motion. He exhibits no edema. Lymphadenopathy:     He has no cervical adenopathy. Neurological: He is alert and oriented to person, place, and time. No cranial nerve deficit. Skin: Skin is warm and dry. He is not diaphoretic. Nursing note and vitals reviewed.         Diagnostic Study Results     Labs -  Recent Results (from the past 12 hour(s))   URINALYSIS W/ RFLX MICROSCOPIC    Collection Time: 04/21/18 11:00 AM   Result Value Ref Range    Color RED      Appearance BLOODY      Specific gravity 1.020 1.005 - 1.030      pH (UA) 6.0 5.0 - 8.0      Protein >300 (A) NEG mg/dL    Glucose 100 (A) NEG mg/dL    Ketone NEGATIVE  NEG mg/dL    Bilirubin SMALL (A) NEG      Blood LARGE (A) NEG      Urobilinogen 0.2 0.2 - 1.0 EU/dL    Nitrites POSITIVE (A) NEG      Leukocyte Esterase NEGATIVE  NEG     METABOLIC PANEL, BASIC    Collection Time: 04/21/18 11:00 AM   Result Value Ref Range    Sodium 139 136 - 145 mmol/L    Potassium 4.4 3.5 - 5.5 mmol/L    Chloride 105 100 - 108 mmol/L    CO2 19 (L) 21 - 32 mmol/L    Anion gap 15 3.0 - 18 mmol/L    Glucose 115 (H) 74 - 99 mg/dL     (H) 7.0 - 18 MG/DL    Creatinine 14.00 (H) 0.6 - 1.3 MG/DL    BUN/Creatinine ratio 7 (L) 12 - 20      GFR est AA 5 (L) >60 ml/min/1.73m2    GFR est non-AA 4 (L) >60 ml/min/1.73m2    Calcium 8.1 (L) 8.5 - 10.1 MG/DL   URINE MICROSCOPIC ONLY    Collection Time: 04/21/18 11:00 AM   Result Value Ref Range    WBC 4 to 5 0 - 4 /hpf    RBC TOO NUMEROUS TO COUNT 0 - 5 /hpf    Epithelial cells NEGATIVE  0 - 5 /lpf    Bacteria FEW (A) NEG /hpf   CBC WITH AUTOMATED DIFF    Collection Time: 04/21/18 11:00 AM   Result Value Ref Range    WBC 11.4 4.6 - 13.2 K/uL    RBC 3.26 (L) 4.70 - 5.50 M/uL    HGB 8.4 (L) 13.0 - 16.0 g/dL    HCT 26.8 (L) 36.0 - 48.0 %    MCV 82.2 74.0 - 97.0 FL    MCH 25.8 24.0 - 34.0 PG    MCHC 31.3 31.0 - 37.0 g/dL    RDW 14.5 11.6 - 14.5 %    PLATELET 430 615 - 051 K/uL    MPV 9.8 9.2 - 11.8 FL    NEUTROPHILS 86 (H) 40 - 73 %    LYMPHOCYTES 4 (L) 21 - 52 %    MONOCYTES 10 3 - 10 %    EOSINOPHILS 0 0 - 5 %    BASOPHILS 0 0 - 2 %    ABS. NEUTROPHILS 9.8 (H) 1.8 - 8.0 K/UL    ABS. LYMPHOCYTES 0.5 (L) 0.9 - 3.6 K/UL    ABS. MONOCYTES 1.2 0.05 - 1.2 K/UL    ABS. EOSINOPHILS 0.0 0.0 - 0.4 K/UL    ABS.  BASOPHILS 0.0 0.0 - 0.1 K/UL    DF AUTOMATED         Radiologic Studies -   CT ABD PELV WO CONT      IMPRESSION  IMPRESSION:      1. Urinary bladder is essentially filled with amorphous dense material, likely  blood products.  -Murillo catheter present. Small amount of air within the bladder may be from  Briones instrumentation.  -Enlarged prostate gland.     2. Mild periureteral stranding at the distal left ureter     3. Left renal cyst. Subcentimeter hypodense lesion in the right kidney is too  small to characterize, statistically likely a cyst.     4. Gallbladder sludge.     5. Colonic diverticulosis.     6. Nonspecific mildly prominent distal periesophageal lymph node, of uncertain  significance or etiology. Medical Decision Making   I am the first provider for this patient. I reviewed the vital signs, available nursing notes, past medical history, past surgical history, family history and social history. Vital Signs-Reviewed the patient's vital signs. Records Reviewed: Nursing Notes (Time of Review: 10:30 AM)    Provider Notes (Medical Decision Making): acute urinary retention s/p cystoscopy and prostate biopsy    Afebrile, RR 20    Pt in pain at suprapubic area    Stat bladder scan and briones    >700 in bladder    Pt obtained immediate relief after briones, pain free    I will check bmp and ua   MDM     Acute URI retention post urology biopsy. Will check urine BNP, place briones, consult Dr. Bessy Daly. Medications   sodium chloride 0.9 % bolus infusion 250 mL (250 mL IntraVENous New Bag 4/21/18 1424)   0.9% sodium chloride infusion (150 mL/hr IntraVENous New Bag 4/21/18 1428)   sodium chloride 0.9 % bolus infusion 1,000 mL (0 mL IntraVENous Stopped 4/21/18 1424)   cefTRIAXone (ROCEPHIN) 1 g in sterile water (preservative free) 10 mL IV syringe (1 g IntraVENous Given 4/21/18 1428)       ED Course: Progress Notes, Reevaluation, and Consults:    Bedside bladder scan shows over 700 ml. Will place emergency briones. 11:07 AM After briones placed, a little over 800 ml out. Patient pain is resolved. Awaiting Dr. Cindy Sanders call. Dr. Cristhian Gold (Urology) agrees with current plan to call Dr. Cindy Sanders office on Monday morning for follow-up.     After review of labs, will admit, consult nephrology, will call Dr Jayce Goldsmith back for consult. Bun 103, Cr 14  CO2 19  UA: +nitrite, +rbcs, few bacteria  Started rocephin, urine cultured    I discussed results and plan to admit with pt and wife, they agree    2:24 PM Discussed case and results with Dr. Regina Aviles who agrees that patient needs to be admitted to hospitalist. She accepts consult. Dr. Regina Aviles okay with 250 bolus but will give 150 CC's an hour IV fluid, no other bolus fluid. She states pt's Cr in office 1/2018 was 8. She is consulted. Consult:  Discussed care with Dr. Jaya Mccauley, Specialty: hospitalist  Standard discussion; including history of patients chief complaint, available diagnostic results, and treatment course. He accepts admit  2:37 PM, 4/21/2018     Consult:  Discussed care with Dr Jayce Goldsmith, Specialty: Urologist  Standard discussion; including history of patients chief complaint, available diagnostic results, and treatment course. He accepts consult, requests CT abd pelvis and states will see pt in am.         For Hospitalized Patients:    1. Hospitalization Decision Time: 2:37PM  The decision to hospitalize the patient was made by Dr. Lopez Staff on 4/21/2018    2. Aspirin: Aspirin was not given because the patient did not present with a stroke at the time of their Emergency Department evaluation    Diagnosis     Clinical Impression:   1. Acute renal failure superimposed on chronic kidney disease, unspecified CKD stage, unspecified acute renal failure type ([address]    2. Urinary retention    3. Uremic acidosis    4.  Acute cystitis with hematuria        Disposition: Admit        Attestations:  Heidi Segura [address] acting as a scribe for and in the presence of Chantelle SullivanDO      April 21, 2018 at 10:52 AM       Provider Attestation:      I personally performed the services described in the documentation, reviewed the documentation, as recorded by the scribe in my presence, and it accurately and completely records my words and actions.  April 21, 2018 at 10:52 AYESHA - Lola Pérez DO      _______________________________

## 2018-04-21 NOTE — IP AVS SNAPSHOT
[id number] [address] [place] Patient: Honey Gilford MRN: [id number] WPC:[id number] About your hospitalization You were admitted on:  April 21, 2018 You last received care in the:  [address] [facility] You were discharged on:  April 29, 2018 Why you were hospitalized Your primary diagnosis was:  Acute Kidney Injury (Hcc) Your diagnoses also included:  Urinary Retention, Uti (Urinary Tract Infection), Hematuria, Acute On Chronic Renal Failure (Hcc), Htn, Goal Below 130/80 Follow-up Information Follow up With Details Comments Contact Info Racquel Villagomez MD On 5/16/2018 at 3:30pm [facility] [address] [address] [phone] Tsering Rios NP On 4/30/2018 at 8:30am [address] [place] [address] [address] [facility] [address] 
[phone] Lucie Stephens MD On 5/9/2018 at 2:20PM [facility] [address] [place] [address] 
[phone] Your Scheduled Appointments Monday April 30, 2018 To Be Determined START OF CARE with BOOGIE Chappell [facility] HOME CARE SCHEDULING/INTAKE ([facility]) [address] CARE SCHEDULING/INTAKE ([facility]) Monday April 30, 2018 To Be Determined PT EVALUATION with Faustino Eubanks, ENMA GOYAL [facility] HOME CARE SCHEDULING/INTAKE ([facility]) [address] CARE SCHEDULING/INTAKE ([facility]) Monday April 30, 2018  8:30 AM EDT TRANSITIONAL CARE MANAGEMENT with Tsering Rios NP [facility] Main Office ([address]) [address] [facility] [address] [facility] [address]  
[phone] Wednesday May 16, 2018  3:30 PM EDT Office Visit with Racquel Villagomez MD  
[facility] [address] [place] [address]) 420 [facility] [address] [address] [phone] Tuesday May 29, 2018 11:15 AM EDT ROUTINE CARE with Morales Steward NP [facility] Main Office ([place] ) [address] [address]  
[phone] Discharge Orders None A check gonzalez indicates which time of day the medication should be taken. My Medications START taking these medications Instructions Each Dose to Equal  
 Morning Noon Evening Bedtime  
 calcitRIOL 0.25 mcg capsule Commonly known as:  ROCALTROL Your next dose is:  4/30 Take 1 Cap by mouth daily. 0.25 mcg  
    
  
   
   
   
  
 calcium acetate 667 mg Cap Commonly known as:  PHOSLO Your next dose is:  4/29 with dinner Take 2 Caps by mouth three (3) times daily (with meals). 2 Cap  
    
  
   
  
   
  
   
  
 opium-belladonna 16.2-30 mg suppository Commonly known as:  B&O 15-A Your last dose was:  10 am 4/29 Notes to Patient:  Every 8 hours per rectum as needed for bladder spasms Insert 1 Suppository into rectum every eight (8) hours as needed. Max Daily Amount: 3 Suppositories. 1 Suppository OTHER Notes to Patient:  LAB WORK. CBC in 1 week Dx: Hematuria  Fax results to Dr. Joel Neal OTHER This is to certify that Mi Butler was admitted to [facility] from 04/21/2018 to 04/29/2018. He may may return to work on 05/07/2018. Please feel free to contact my office if you have any questions or concerns. Thank you. Saccharomyces boulardii 250 mg capsule Commonly known as:  Martin Controls Your next dose is:  4/29 at bedtime Take 2 Caps by mouth two (2) times a day for 7 days. 500 mg  
    
  
   
   
   
  
  
 sodium bicarbonate 650 mg tablet Your next dose is:  4/29 at bedtime Take 1 Tab by mouth two (2) times a day.   
 650 mg  
    
  
   
   
   
  
  
 tamsulosin 0.4 mg capsule Commonly known as:  FLOMAX Your next dose is:  4/30 Take 1 Cap by mouth daily. 0.4 mg  
    
  
   
   
   
  
  
CHANGE how you take these medications Instructions Each Dose to Equal  
 Morning Noon Evening Bedtime  
 docusate sodium 100 mg capsule Commonly known as:  Raine Patricia What changed:   
- when to take this 
- reasons to take this Notes to Patient:  Take as needed for constipation Take 1 Cap by mouth two (2) times daily as needed for Constipation for up to 5 days. 100 mg CONTINUE taking these medications Instructions Each Dose to Equal  
 Morning Noon Evening Bedtime  
 allopurinol 100 mg tablet Commonly known as:  Sheryl Fulling Your next dose is:  4/30 Take 2 Tabs by mouth daily. 200 mg  
    
   
   
   
  
 amLODIPine 10 mg tablet Commonly known as:  Brenna Pall Your next dose is:  4/30 Take 1 Tab by mouth daily. 10 mg  
    
  
   
   
   
  
 atorvastatin 20 mg tablet Commonly known as:  LIPITOR Your next dose is:  4/30 Take  by mouth daily. cloNIDine HCl 0.1 mg tablet Commonly known as:  CATAPRES Notes to Patient:  Take as directed Take  by mouth. hydrALAZINE 100 mg tablet Commonly known as:  APRESOLINE Notes to Patient:  Take as directed Take  by mouth. HYDROcodone-acetaminophen 5-300 mg tablet Commonly known as:  Wayna Caba Notes to Patient:  As needed for pain q 6 hrs. Take 1 Tab by mouth every six (6) hours as needed. Max Daily Amount: 4 Tabs. 1 Tab  
    
   
   
   
  
 predniSONE 5 mg tablet Commonly known as:  Enrike Johnson Notes to Patient:  Take as directed Take 1 Tab by mouth as needed. 5 mg  
    
   
   
   
  
 sildenafil citrate 50 mg tablet Commonly known as:  VIAGRA Notes to Patient:  Take as needed Take 1 Tab by mouth as needed. 50 mg STOP taking these medications CIPRO 500 mg tablet Generic drug:  ciprofloxacin HCl  
   
  
 ciprofloxacin HCl 500 mg tablet Commonly known as:  CIPRO Where to Get Your Medications These medications were sent to [facility]/pharmacy #[id number] ([address]), [address]  [address] Phone:  [phone]  
  calcitRIOL 0.25 mcg capsule  
 calcium acetate 667 mg Cap  
 docusate sodium 100 mg capsule Saccharomyces boulardii 250 mg capsule  
 sodium bicarbonate 650 mg tablet  
 tamsulosin 0.4 mg capsule Information on where to get these meds will be given to you by the nurse or doctor. ! Ask your nurse or doctor about these medications HYDROcodone-acetaminophen 5-300 mg tablet  
 opium-belladonna 16.2-30 mg suppository OTHER  
 OTHER Opioid Education Prescription Opioids: What You Need to Know: 
 
 
After general anesthesia or intravenous sedation, for 24 hours or while taking prescription Narcotics: · Limit your activities · Do not drive and operate hazardous machinery · Do not make important personal or business decisions · Do  not drink alcoholic beverages · If you have not urinated within 8 hours after discharge, please contact your surgeon on call. Report the following to your surgeon: 
· Excessive pain, swelling, redness or odor of or around the surgical area · Temperature over 100.5 · Nausea and vomiting lasting longer than 4 hours or if unable to take medications · Any signs of decreased circulation or nerve impairment to extremity: change in color, persistent  numbness, tingling, coldness or increase pain · Any questions What to do at Home: 
Recommended activity: Activity as tolerated, ambulation, chair  and light activity until seen by MD 
 
If you experience any of the following symptoms unrelieved pain, shortness of breath, increased pain in bladder, bloody urine, decreased urine output,fevers, please follow up with MD or call 911 if an emergency. *  Please give a list of your current medications to your Primary Care Provider. *  Please update this list whenever your medications are discontinued, doses are 
    changed, or new medications (including over-the-counter products) are added. *  Please carry medication information at all times in case of emergency situations. These are general instructions for a healthy lifestyle: No smoking/ No tobacco products/ Avoid exposure to second hand smoke Surgeon General's Warning:  Quitting smoking now greatly reduces serious risk to your health. Obesity, smoking, and sedentary lifestyle greatly increases your risk for illness A healthy diet, regular physical exercise & weight monitoring are important for maintaining a healthy lifestyle You may be retaining fluid if you have a history of heart failure or if you experience any of the following symptoms:  Weight gain of 3 pounds or more overnight or 5 pounds in a week, increased swelling in our hands or feet or shortness of breath while lying flat in bed. Please call your doctor as soon as you notice any of these symptoms; do not wait until your next office visit. Recognize signs and symptoms of STROKE: 
 
F-face looks uneven A-arms unable to move or move unevenly S-speech slurred or non-existent T-time-call 911 as soon as signs and symptoms begin-DO NOT go Back to bed or wait to see if you get better-TIME IS BRAIN. Warning Signs of HEART ATTACK Call 911 if you have these symptoms: ? Chest discomfort. Most heart attacks involve discomfort in the center of the chest that lasts more than a few minutes, or that goes away and comes back. It can feel like uncomfortable pressure, squeezing, fullness, or pain. ? Discomfort in other areas of the upper body. Symptoms can include pain or discomfort in one or both arms, the back, neck, jaw, or stomach. ? Shortness of breath with or without chest discomfort. ? Other signs may include breaking out in a cold sweat, nausea, or lightheadedness. Don't wait more than five minutes to call [address]! Fast action can save your life. Calling 911 is almost always the fastest way to get lifesaving treatment. Emergency Medical Services staff can begin treatment when they arrive  up to an hour sooner than if someone gets to the hospital by car. The discharge information has been reviewed with the patient. The patient verbalized understanding. Discharge medications reviewed with the patient and appropriate educational materials and side effects teaching were provided. ___________________________________________________________________________________________________________________________________Discharge Instructions Patient: Verner Distad MRN: [id number]  [facility]: [id number] YOB: 1970  Age: 50 y.o. Sex: male DOA: 4/21/2018 LOS:  LOS: 5 days   Discharge Date: DIET:  Cardiac Diet ACTIVITY: Activity as tolerated Home health care for Skilled care for Hypertension and medication management MyChart Activation Thank you for requesting access to [employer]. Please follow the instructions below to securely access and download your online medical record. Sh[employer]umi allows you to send messages to your doctor, view your test results, renew your prescriptions, schedule appointments, and more. How Do I Sign Up? 1. In your internet browser, go to [email or web address]. 2. Click on the First Time User? Click Here link in the Sign In box. You will see the New Member Sign Up page. 3. Enter your [employer] Access Code exactly as it appears below. You will not need to use this code after youve completed the sign-up process. If you do not sign up before the expiration date, you must request a new code. [employer] Access Code: [email or web address] Expires: 2018 12:55 PM (This is the date your [employer] access code will ) 4. Enter the last four digits of your Social Security Number (xxxx) and Date of Birth (mm/dd/yyyy) as indicated and click Submit. You will be taken to the next sign-up page. 5. Create a [employer] ID. This will be your [employer] login ID and cannot be changed, so think of one that is secure and easy to remember. 6. Create a [employer] password. You can change your password at any time. 7. Enter your Password Reset Question and Answer. This can be used at a later time if you forget your password. 8. Enter your e-mail address. You will receive e-mail notification when new information is available in [address] 9. Click Sign Up. You can now view and download portions of your medical record. 10. Click the Download Summary menu link to download a portable copy of your medical information. Additional Information If you have questions, please visit the Frequently Asked Questions section of the [employer] website at [email or web address] Remember, [employer] is NOT to be used for urgent needs. For medical emergencies, dial 911. Patient armband removed and shredded 
 
 
·    PT/OT consult ·             [facility] care ADDITIONAL INFORMATION: If you experience any of the following symptoms but not limited to Fever, chills, nausea, vomiting, diarrhea, change in mentation, falling, bleeding, shortness of breath, chest pain, please call your primary care physician or return to the emergency room if you cannot get hold of your doctor:  
 
FOLLOW UP CARE: 
 Dr. Karla Borjas, NP on 4/30/18. Dr. Fraser Bowels in 1 week Dr. James Juárez in 2 weeks Bailey Biggs MD 
4/26/2018 10:55 AM 
 
 
 
 
 
  
  
  
[employer] Announcement We are excited to announce that we are making your provider's discharge notes available to you in [employer]. You will see these notes when they are completed and signed by the physician that discharged you from your recent hospital stay. If you have any questions or concerns about any information you see in [employer], please call the Health Information Department where you were seen or reach out to your Primary Care Provider for more information about your plan of care. Introducing [facility] HE[facility]! [place] introduces [employer] patient portal. Now you can access parts of your medical record, email your doctor's office, and request medication refills online. 1. In your internet browser, go to [email or web address] 2. Click on the First Time User? Click Here link in the Sign In box. You will see the New Member Sign Up page. 3. Enter your [employer] Access Code exactly as it appears below. You will not need to use this code after youve completed the sign-up process. If you do not sign up before the expiration date, you must request a new code. · [employer] Access Code: [email or web address] Expires: 5/29/2018 12:55 PM 
 
4. Enter the last four digits of your Social Security Number (xxxx) and Date of Birth (mm/dd/yyyy) as indicated and click Submit. You will be taken to the next sign-up page. 5. Create a [employer] ID. This will be your [employer] login ID and cannot be changed, so think of one that is secure and easy to remember. 6. Create a [employer] password. You can change your password at any time. 7. Enter your Password Reset Question and Answer. This can be used at a later time if you forget your password. 8. Enter your e-mail address.  You will receive e-mail notification when new information is available in [employer]. 9. Click Sign Up. You can now view and download portions of your medical record. 10. Click the Download Summary menu link to download a portable copy of your medical information. If you have questions, please visit the Frequently Asked Questions section of the [employer] website. Remember, [employer] is NOT to be used for urgent needs. For medical emergencies, dial 911. Now available from your iPhone and Android! Introducing Sean Frey As a [facility] [facility] patient, I wanted to make you aware of our electronic visit tool called Sean Frey. [employer] [facility] 24/7 allows you to connect within minutes with a medical provider 24 hours a day, seven days a week via a mobile device or tablet or logging into a secure website from your computer. You can access Sean Frey from anywhere in the [place]. A virtual visit might be right for you when you have a simple condition and feel like you just dont want to get out of bed, or cant get away from work for an appointment, when your regular [employer] [facility] provider is not available (evenings, weekends or holidays), or when youre out of town and need minor care. Electronic visits cost only $49 and if the [employer] [facility] 24/7 provider determines a prescription is needed to treat your condition, one can be electronically transmitted to a nearby pharmacy*. Please take a moment to enroll today if you have not already done so. The enrollment process is free and takes just a few minutes. To enroll, please download the [employer] 24/7 vaishnavi to your tablet or phone, or visit [email or web address] to enroll on your computer. And, as an [address] [place] [address] patient with a Maktoob account, the results of your visits will be scanned into your electronic medical record and your primary care provider will be able to view the scanned results. We urge you to continue to see your regular [facility] provider for your ongoing medical care. And while your primary care provider may not be the one available when you seek a Endomedixquinfin virtual visit, the peace of mind you get from getting a real diagnosis real time can be priceless. For more information on fg microtec, view our Frequently Asked Questions (FAQs) at [email or web address]. Sincerely, 
 
Isha Renae MD 
Chief Medical Officer [address] *:  certain medications cannot be prescribed via fg microtec Providers Seen During Your Hospitalization Provider Specialty Primary office phone Ozzie Levy DO Emergency Medicine [phone] Agustín Novoa MD Family Practice [phone] Noe Kapadia MD [facility] [phone] Manjit Beaver MD Internal Medicine [phone] Your Primary Care Physician (PCP) Primary Care Physician Office Phone Office Fax Jeanmarie Rings [phone] You are allergic to the following No active allergies Recent Documentation Height Weight BMI Smoking Status 1.88 m 106.6 kg 30.17 kg/m2 Never Smoker Emergency Contacts Name Discharge Info Relation Home Work Mobile Audra Culp DISCHARGE CAREGIVER [3] Spouse [3]   [phone] RobbiAudra lyons  Spouse [3] [phone] Patient Belongings The following personal items are in your possession at time of discharge: 
  Dental Appliances: None  Visual Aid: None      Home Medications: None   Jewelry: None  Clothing: None    Other Valuables: None Please provide this summary of care documentation to your next provider. Signatures-by signing, you are acknowledging that this After Visit Summary has been reviewed with you and you have received a copy.   
  
 
  
    
    
 Patient Signature: ____________________________________________________________ Date:  ____________________________________________________________  
  
Bernardo Dunn Provider Signature:  ____________________________________________________________ Date:  ____________________________________________________________

## 2018-04-21 NOTE — ROUTINE PROCESS
TRANSFER - OUT REPORT:    Verbal report given to Eri Gonzales RN(name) on [employer]  being transferred to [address] [place] [address](unit) for routine progression of care       Report consisted of patients Situation, Background, Assessment and   Recommendations(SBAR). Information from the following report(s) SBAR, ED Summary, [facility] and Recent Results was reviewed with the receiving nurse. Lines:   Peripheral IV 04/21/18 Right Hand (Active)   Site Assessment Clean, dry, & intact 4/21/2018  2:29 PM   Phlebitis Assessment 0 4/21/2018  2:29 PM   Infiltration Assessment 0 4/21/2018  2:29 PM   Dressing Status Clean, dry, & intact 4/21/2018  2:29 PM   Dressing Type Transparent 4/21/2018  2:29 PM   Hub Color/Line Status Patent; Flushed 4/21/2018  2:29 PM        Opportunity for questions and clarification was provided.       Patient transported with:   Monitor  Registered Nurse  Tech   Patient belongings

## 2018-04-21 NOTE — OP NOTES
[facility]  OPERATIVE REPORT    Marco Rico  MR#: [id number]  : 1970  ACCOUNT #: [de-identified]   DATE OF SERVICE: 2018    PREOPERATIVE DIAGNOSIS:  Elevated PSA. POSTOPERATIVE DIAGNOSIS:  Elevated PSA. PROCEDURE PERFORMED:  Transperineal saturation prostate biopsy. SURGEON:  Markus Garcia MD     ANESTHESIA:  General by LMA. ESTIMATED BLOOD LOSS:  25 mL. SPECIMENS REMOVED:  Fifteen cores of tissue from the left lobe of the prostate, 15 cores of tissue from the right lobe of the prostate. COMPLICATIONS:  None. TUBES AND DRAINS:  None. IMPLANTS:  none    INDICATIONS FOR OPERATION:  A 77-year-old gentleman found to have a PSA level of 6.9 with normal prostate by digital examination. Attempt was made to perform prostate biopsy in the office under local anesthesia, but the patient was intolerant of discomfort caused by the initiation of that procedure, prompting cancellation of the attempt at office biopsy. He is therefore admitted to the operating room for a transperineal prostate biopsy under general anesthesia. DESCRIPTION OF THE OPERATION:  After establishing adequate anesthesia by LMA, the patient was placed supine on the fluoroscopy table, legs suspended in Ramses stirrups, cushioned with soft foam rubber padding. The external genitalia and perineum were then prepped with antibiotic scrub and solution and draped in a sterile manner. The scrotum was drawn away from the perineum by suspending it in a sling composed of a rolled surgical towel. A Murillo catheter was passed in the bladder, balloon inflated to 5 mL volume to serve as an anatomic marker for the location of the urethra and bladder neck. A gloved finger was inserted into the rectum and a 14-gauge needle was passed into the perineum 1 cm anterior to the anal verge directed towards the prostate.   A transrectal ultrasound probe was then placed into the rectum and the prostate was assessed ultrasonically finding inhomogeneous echo pattern bilaterally with multiple cystic and hyperdense segments evident on both sides. Transrectal Ultrasound Imaging of Prostate:      Prostate dimensions are 4.9 x 3.4 x 5.3 cm. The calculated prostatic volume is 47.1 mL. The calculated prostatic specific antigen density (PSAD) is 0.14. Prostate Biopsy:      Prostate biopsy was then performed passing a 16 gauge Biopty spring loaded needle device through the 14 gauge conduit needle in the perineum with real time ultrasound imaging directing placement of the needle into the apex, the mid and the basal portion of the gland medially and laterally. Fifteen cores of tissue were obtained in this fashion. These were put into a container labeled left lobe of the prostate and submitted for histologic study. Fifteen cores of tissue were then removed from the right lobe passing the needle through the 14 gauge conduit access needle and sampling the apex, the base, the middle, medial and lateral portions, sampling the entire right lobe of the prostate. These were placed into a single container labeled left lobe and submitted for histologic study. The bladder was then flushed through the Murillo catheter obtaining clear efflux indicating absence of bladder or urethral perforation by the Biopty device. Overall, the procedure was uncomplicated, well tolerated. The patient was brought from the operating room to the recovery room in good condition.       Cordell Claude, MD DEB / MN  D: 04/20/2018 15:49     T: 04/20/2018 23:06  JOB #: [id number]

## 2018-04-21 NOTE — PROGRESS NOTES
Patient arrived to unit at 1845 via stretcher. Patient now on bed, vs obtained and documented.  Patient alert and oriented x4

## 2018-04-21 NOTE — ASSESSMENT & PLAN NOTE
-Has baseline creatinine of 8 with chronic stage V renal insufficiency.  -Consult nephrology  -I anticipate improvement toward baseline with briones and resolution of obstruction. Will follow creatinine.

## 2018-04-21 NOTE — ED TRIAGE NOTES
Pt had a prostate biopsy yesterday and has had urinary frequency with hematuria since. Pt also c/o urinary pain.

## 2018-04-22 ENCOUNTER — ANESTHESIA EVENT (OUTPATIENT)
Dept: SURGERY | Age: 48
DRG: 907 | End: 2018-04-22
Payer: COMMERCIAL

## 2018-04-22 ENCOUNTER — ANESTHESIA (OUTPATIENT)
Dept: SURGERY | Age: 48
DRG: 907 | End: 2018-04-22
Payer: COMMERCIAL

## 2018-04-22 ENCOUNTER — APPOINTMENT (OUTPATIENT)
Dept: GENERAL RADIOLOGY | Age: 48
DRG: 907 | End: 2018-04-22
Attending: FAMILY MEDICINE
Payer: COMMERCIAL

## 2018-04-22 LAB
ALBUMIN SERPL-MCNC: 3 G/DL (ref 3.4–5)
ALBUMIN/GLOB SERPL: 0.9 {RATIO} (ref 0.8–1.7)
ALP SERPL-CCNC: 60 U/L (ref 45–117)
ALT SERPL-CCNC: 12 U/L (ref 16–61)
ANION GAP SERPL CALC-SCNC: 13 MMOL/L (ref 3–18)
AST SERPL-CCNC: 14 U/L (ref 15–37)
BILIRUB SERPL-MCNC: 0.3 MG/DL (ref 0.2–1)
BUN SERPL-MCNC: 107 MG/DL (ref 7–18)
BUN/CREAT SERPL: 8 (ref 12–20)
CALCIUM SERPL-MCNC: 7.7 MG/DL (ref 8.5–10.1)
CHLORIDE SERPL-SCNC: 110 MMOL/L (ref 100–108)
CO2 SERPL-SCNC: 20 MMOL/L (ref 21–32)
CREAT SERPL-MCNC: 14.1 MG/DL (ref 0.6–1.3)
ERYTHROCYTE [DISTWIDTH] IN BLOOD BY AUTOMATED COUNT: 14.6 % (ref 11.6–14.5)
GLOBULIN SER CALC-MCNC: 3.2 G/DL (ref 2–4)
GLUCOSE BLD STRIP.AUTO-MCNC: 114 MG/DL (ref 70–110)
GLUCOSE SERPL-MCNC: 96 MG/DL (ref 74–99)
HCT VFR BLD AUTO: 23.3 % (ref 36–48)
HCT VFR BLD AUTO: 23.9 % (ref 36–48)
HGB BLD-MCNC: 7.4 G/DL (ref 13–16)
HGB BLD-MCNC: 7.4 G/DL (ref 13–16)
MAGNESIUM SERPL-MCNC: 2.4 MG/DL (ref 1.6–2.6)
MCH RBC QN AUTO: 25.8 PG (ref 24–34)
MCHC RBC AUTO-ENTMCNC: 31 G/DL (ref 31–37)
MCV RBC AUTO: 83.3 FL (ref 74–97)
PHOSPHATE SERPL-MCNC: 8.2 MG/DL (ref 2.5–4.9)
PLATELET # BLD AUTO: 152 K/UL (ref 135–420)
PMV BLD AUTO: 10 FL (ref 9.2–11.8)
POTASSIUM SERPL-SCNC: 5.1 MMOL/L (ref 3.5–5.5)
PROT SERPL-MCNC: 6.2 G/DL (ref 6.4–8.2)
RBC # BLD AUTO: 2.87 M/UL (ref 4.7–5.5)
SODIUM SERPL-SCNC: 143 MMOL/L (ref 136–145)
WBC # BLD AUTO: 7.6 K/UL (ref 4.6–13.2)

## 2018-04-22 PROCEDURE — 74011250636 HC RX REV CODE- 250/636: Performed by: UROLOGY

## 2018-04-22 PROCEDURE — 74011250637 HC RX REV CODE- 250/637: Performed by: INTERNAL MEDICINE

## 2018-04-22 PROCEDURE — 76060000032 HC ANESTHESIA 0.5 TO 1 HR: Performed by: UROLOGY

## 2018-04-22 PROCEDURE — 82962 GLUCOSE BLOOD TEST: CPT

## 2018-04-22 PROCEDURE — 86920 COMPATIBILITY TEST SPIN: CPT | Performed by: INTERNAL MEDICINE

## 2018-04-22 PROCEDURE — 86900 BLOOD TYPING SEROLOGIC ABO: CPT | Performed by: INTERNAL MEDICINE

## 2018-04-22 PROCEDURE — 80053 COMPREHEN METABOLIC PANEL: CPT | Performed by: HOSPITALIST

## 2018-04-22 PROCEDURE — 0TCB8ZZ EXTIRPATION OF MATTER FROM BLADDER, VIA NATURAL OR ARTIFICIAL OPENING ENDOSCOPIC: ICD-10-PCS | Performed by: UROLOGY

## 2018-04-22 PROCEDURE — 74011250637 HC RX REV CODE- 250/637: Performed by: HOSPITALIST

## 2018-04-22 PROCEDURE — 76210000006 HC OR PH I REC 0.5 TO 1 HR: Performed by: UROLOGY

## 2018-04-22 PROCEDURE — P9016 RBC LEUKOCYTES REDUCED: HCPCS | Performed by: INTERNAL MEDICINE

## 2018-04-22 PROCEDURE — 0W3R8ZZ CONTROL BLEEDING IN GENITOURINARY TRACT, VIA NATURAL OR ARTIFICIAL OPENING ENDOSCOPIC: ICD-10-PCS | Performed by: UROLOGY

## 2018-04-22 PROCEDURE — 77030018836 HC SOL IRR NACL ICUM -A: Performed by: UROLOGY

## 2018-04-22 PROCEDURE — 76010000138 HC OR TIME 0.5 TO 1 HR: Performed by: UROLOGY

## 2018-04-22 PROCEDURE — 74011000250 HC RX REV CODE- 250: Performed by: HOSPITALIST

## 2018-04-22 PROCEDURE — 84100 ASSAY OF PHOSPHORUS: CPT | Performed by: HOSPITALIST

## 2018-04-22 PROCEDURE — 74011250637 HC RX REV CODE- 250/637: Performed by: STUDENT IN AN ORGANIZED HEALTH CARE EDUCATION/TRAINING PROGRAM

## 2018-04-22 PROCEDURE — 30233N1 TRANSFUSION OF NONAUTOLOGOUS RED BLOOD CELLS INTO PERIPHERAL VEIN, PERCUTANEOUS APPROACH: ICD-10-PCS | Performed by: INTERNAL MEDICINE

## 2018-04-22 PROCEDURE — 77030020782 HC GWN BAIR PAWS FLX 3M -B: Performed by: UROLOGY

## 2018-04-22 PROCEDURE — 36415 COLL VENOUS BLD VENIPUNCTURE: CPT | Performed by: HOSPITALIST

## 2018-04-22 PROCEDURE — 83735 ASSAY OF MAGNESIUM: CPT | Performed by: HOSPITALIST

## 2018-04-22 PROCEDURE — 77030010509 HC AIRWY LMA MSK TELE -A: Performed by: ANESTHESIOLOGY

## 2018-04-22 PROCEDURE — 85027 COMPLETE CBC AUTOMATED: CPT | Performed by: HOSPITALIST

## 2018-04-22 PROCEDURE — 74011250636 HC RX REV CODE- 250/636

## 2018-04-22 PROCEDURE — 85018 HEMOGLOBIN: CPT | Performed by: NURSE PRACTITIONER

## 2018-04-22 PROCEDURE — 74011250636 HC RX REV CODE- 250/636: Performed by: HOSPITALIST

## 2018-04-22 PROCEDURE — 77030012961 HC IRR KT CYSTO/TUR ICUM -A: Performed by: UROLOGY

## 2018-04-22 PROCEDURE — 65270000029 HC RM PRIVATE

## 2018-04-22 RX ORDER — NALBUPHINE HYDROCHLORIDE 10 MG/ML
5 INJECTION, SOLUTION INTRAMUSCULAR; INTRAVENOUS; SUBCUTANEOUS
Status: DISCONTINUED | OUTPATIENT
Start: 2018-04-22 | End: 2018-04-22 | Stop reason: HOSPADM

## 2018-04-22 RX ORDER — CALCIUM ACETATE 667 MG/1
2 CAPSULE ORAL
Status: DISCONTINUED | OUTPATIENT
Start: 2018-04-22 | End: 2018-04-29 | Stop reason: HOSPADM

## 2018-04-22 RX ORDER — ATROPA BELLADONNA AND OPIUM 16.2; 3 MG/1; MG/1
1 SUPPOSITORY RECTAL
Status: DISCONTINUED | OUTPATIENT
Start: 2018-04-22 | End: 2018-04-29 | Stop reason: HOSPADM

## 2018-04-22 RX ORDER — MORPHINE SULFATE 2 MG/ML
2 INJECTION, SOLUTION INTRAMUSCULAR; INTRAVENOUS ONCE
Status: COMPLETED | OUTPATIENT
Start: 2018-04-22 | End: 2018-04-22

## 2018-04-22 RX ORDER — CALCITRIOL 0.25 UG/1
0.25 CAPSULE ORAL DAILY
Status: DISCONTINUED | OUTPATIENT
Start: 2018-04-23 | End: 2018-04-29 | Stop reason: HOSPADM

## 2018-04-22 RX ORDER — SODIUM CHLORIDE 9 MG/ML
75 INJECTION, SOLUTION INTRAVENOUS CONTINUOUS
Status: DISCONTINUED | OUTPATIENT
Start: 2018-04-22 | End: 2018-04-22 | Stop reason: HOSPADM

## 2018-04-22 RX ORDER — ALLOPURINOL 100 MG/1
100 TABLET ORAL
Status: DISCONTINUED | OUTPATIENT
Start: 2018-04-23 | End: 2018-04-29 | Stop reason: HOSPADM

## 2018-04-22 RX ORDER — ONDANSETRON 2 MG/ML
4 INJECTION INTRAMUSCULAR; INTRAVENOUS ONCE
Status: DISCONTINUED | OUTPATIENT
Start: 2018-04-22 | End: 2018-04-22 | Stop reason: HOSPADM

## 2018-04-22 RX ORDER — SODIUM CHLORIDE 0.9 % (FLUSH) 0.9 %
5-10 SYRINGE (ML) INJECTION AS NEEDED
Status: DISCONTINUED | OUTPATIENT
Start: 2018-04-22 | End: 2018-04-22 | Stop reason: HOSPADM

## 2018-04-22 RX ORDER — SODIUM BICARBONATE 650 MG/1
650 TABLET ORAL 2 TIMES DAILY
Status: DISCONTINUED | OUTPATIENT
Start: 2018-04-22 | End: 2018-04-29 | Stop reason: HOSPADM

## 2018-04-22 RX ORDER — FENTANYL CITRATE 50 UG/ML
INJECTION, SOLUTION INTRAMUSCULAR; INTRAVENOUS AS NEEDED
Status: DISCONTINUED | OUTPATIENT
Start: 2018-04-22 | End: 2018-04-22 | Stop reason: HOSPADM

## 2018-04-22 RX ORDER — SODIUM CHLORIDE 9 MG/ML
250 INJECTION, SOLUTION INTRAVENOUS AS NEEDED
Status: DISCONTINUED | OUTPATIENT
Start: 2018-04-22 | End: 2018-04-25 | Stop reason: SDUPTHER

## 2018-04-22 RX ORDER — FENTANYL CITRATE 50 UG/ML
50 INJECTION, SOLUTION INTRAMUSCULAR; INTRAVENOUS AS NEEDED
Status: DISCONTINUED | OUTPATIENT
Start: 2018-04-22 | End: 2018-04-22 | Stop reason: HOSPADM

## 2018-04-22 RX ORDER — PHENAZOPYRIDINE HYDROCHLORIDE 100 MG/1
200 TABLET, FILM COATED ORAL ONCE
Status: COMPLETED | OUTPATIENT
Start: 2018-04-22 | End: 2018-04-22

## 2018-04-22 RX ORDER — DIPHENHYDRAMINE HYDROCHLORIDE 50 MG/ML
12.5 INJECTION, SOLUTION INTRAMUSCULAR; INTRAVENOUS
Status: DISCONTINUED | OUTPATIENT
Start: 2018-04-22 | End: 2018-04-22 | Stop reason: HOSPADM

## 2018-04-22 RX ORDER — HYDRALAZINE HYDROCHLORIDE 20 MG/ML
10 INJECTION INTRAMUSCULAR; INTRAVENOUS
Status: DISCONTINUED | OUTPATIENT
Start: 2018-04-22 | End: 2018-04-29 | Stop reason: HOSPADM

## 2018-04-22 RX ORDER — PROPOFOL 10 MG/ML
INJECTION, EMULSION INTRAVENOUS AS NEEDED
Status: DISCONTINUED | OUTPATIENT
Start: 2018-04-22 | End: 2018-04-22 | Stop reason: HOSPADM

## 2018-04-22 RX ADMIN — CALCIUM ACETATE 1334 MG: 667 CAPSULE ORAL at 16:31

## 2018-04-22 RX ADMIN — FENTANYL CITRATE 50 MCG: 50 INJECTION, SOLUTION INTRAMUSCULAR; INTRAVENOUS at 10:29

## 2018-04-22 RX ADMIN — CLONIDINE HYDROCHLORIDE 0.1 MG: 0.1 TABLET ORAL at 14:32

## 2018-04-22 RX ADMIN — Medication 2 MG: at 08:19

## 2018-04-22 RX ADMIN — Medication 1 G: at 22:14

## 2018-04-22 RX ADMIN — PROPOFOL 150 MG: 10 INJECTION, EMULSION INTRAVENOUS at 10:20

## 2018-04-22 RX ADMIN — PHENAZOPYRIDINE HYDROCHLORIDE 100 MG: 100 TABLET ORAL at 22:12

## 2018-04-22 RX ADMIN — CLONIDINE HYDROCHLORIDE 0.1 MG: 0.1 TABLET ORAL at 05:36

## 2018-04-22 RX ADMIN — SODIUM BICARBONATE 650 MG TABLET 650 MG: at 17:16

## 2018-04-22 RX ADMIN — FENTANYL CITRATE 50 MCG: 50 INJECTION, SOLUTION INTRAMUSCULAR; INTRAVENOUS at 10:24

## 2018-04-22 RX ADMIN — PHENAZOPYRIDINE HYDROCHLORIDE 100 MG: 100 TABLET ORAL at 16:30

## 2018-04-22 RX ADMIN — HYDRALAZINE HYDROCHLORIDE 100 MG: 50 TABLET, FILM COATED ORAL at 05:35

## 2018-04-22 RX ADMIN — Medication 1 MG: at 03:44

## 2018-04-22 RX ADMIN — Medication 2 MG: at 09:26

## 2018-04-22 RX ADMIN — SODIUM CHLORIDE 150 ML/HR: 900 INJECTION, SOLUTION INTRAVENOUS at 04:59

## 2018-04-22 RX ADMIN — PHENAZOPYRIDINE HYDROCHLORIDE 200 MG: 100 TABLET ORAL at 04:42

## 2018-04-22 RX ADMIN — ATORVASTATIN CALCIUM 20 MG: 20 TABLET, FILM COATED ORAL at 14:31

## 2018-04-22 RX ADMIN — DOCUSATE SODIUM 100 MG: 100 CAPSULE, LIQUID FILLED ORAL at 17:17

## 2018-04-22 NOTE — PROGRESS NOTES
Bedside shift change report given to this RN (oncoming nurse) by Isidro Lala (offgoing nurse). Report included the following information SBAR and Kardex.

## 2018-04-22 NOTE — ANESTHESIA PREPROCEDURE EVALUATION
Anesthetic History   No history of anesthetic complications            Review of Systems / Medical History  Patient summary reviewed and pertinent labs reviewed    Pulmonary        Sleep apnea: CPAP           Neuro/Psych   Within defined limits           Cardiovascular    Hypertension              Exercise tolerance: >4 METS     GI/Hepatic/Renal  Within defined limits              Endo/Other        Obesity and anemia (2 units PRBC ordered)     Other Findings   Comments: Documentation of current medication  Current medications obtained, documented and obtained? YES      Risk Factors for Postoperative nausea/vomiting:       History of postoperative nausea/vomiting? NO       Female? NO       Motion sickness? NO       Intended opioid administration for postoperative analgesia? YES      Smoking Abstinence:  Current Smoker? NO  Elective Surgery? YES  Seen preoperatively by anesthesiologist or proxy prior to day of surgery? YES  Pt abstained from smoking 24 hours prior to anesthesia? N/A    Preventive care/screening for High Blood Pressure:  Aged 18 years and older: YES  Screened for high blood pressure: YES  Patients with high blood pressure referred to primary care provider   for BP management: YES           Anesthetic History   No history of anesthetic complications            Review of Systems / Medical History  Patient summary reviewed and pertinent labs reviewed    Pulmonary        Sleep apnea: CPAP           Neuro/Psych   Within defined limits           Cardiovascular    Hypertension              Exercise tolerance: >4 METS     GI/Hepatic/Renal  Within defined limits              Endo/Other        Obesity     Other Findings   Comments: Documentation of current medication  Current medications obtained, documented and obtained? YES      Risk Factors for Postoperative nausea/vomiting:       History of postoperative nausea/vomiting? NO       Female? NO       Motion sickness?   NO       Intended opioid administration for postoperative analgesia? YES      Smoking Abstinence:  Current Smoker? NO  Elective Surgery? YES  Seen preoperatively by anesthesiologist or proxy prior to day of surgery? YES  Pt abstained from smoking 24 hours prior to anesthesia?  N/A    Preventive care/screening for High Blood Pressure:  Aged 18 years and older: YES  Screened for high blood pressure: YES  Patients with high blood pressure referred to primary care provider   for BP management: YES                 Physical Exam    Airway  Mallampati: II  TM Distance: 4 - 6 cm  Neck ROM: normal range of motion   Mouth opening: Normal     Cardiovascular  Regular rate and rhythm,  S1 and S2 normal,  no murmur, click, rub, or gallop  Rhythm: regular  Rate: normal         Dental  No notable dental hx       Pulmonary  Breath sounds clear to auscultation               Abdominal  GI exam deferred       Other Findings            Anesthetic Plan    ASA: 2  Anesthesia type: general          Induction: Intravenous  Anesthetic plan and risks discussed with: Patient              Physical Exam    Airway  Mallampati: III  TM Distance: 4 - 6 cm  Neck ROM: normal range of motion   Mouth opening: Normal     Cardiovascular  Regular rate and rhythm,  S1 and S2 normal,  no murmur, click, rub, or gallop  Rhythm: regular  Rate: normal         Dental  No notable dental hx       Pulmonary  Breath sounds clear to auscultation               Abdominal  GI exam deferred       Other Findings            Anesthetic Plan    ASA: 3  Anesthesia type: general          Induction: Intravenous  Anesthetic plan and risks discussed with: Patient

## 2018-04-22 NOTE — PROGRESS NOTES
Rapid Response Note  [facility]    Patient: Jamilah Kingsley 50 y.o. male  [id number]  1970      Admit Date: 4/21/2018   Admission Diagnosis: Acute on chronic renal failure [facility])  Urinary retention  Hematuria  UTI (urinary tract infection)  Acute kidney injury (HCC)    RAPID RESPONSE     Rapid response called for severe pain. Pt is a 51 yo with prostate biopsy yesterday due to elevated PSA. Pt reported to ED today for pain and gross hematuria. Briones 16 [place] placed. Pt with sudden onset crampy suprapubic pain. Pt reports similar to pain he had a presentation which was relieved in Ed when ED physician placed briones and drained blood clots. Per nursing bladder scan 275. Medications Reviewed    OBJECTIVE     Visit Vitals    BP (!) 204/117    Pulse (!) 102    Temp 98.4 °F (36.9 °C)    Resp 18    Ht 6' 2\" (1.88 m)    Wt 104.3 kg (230 lb)    SpO2 97%    BMI 29.53 kg/m2       PHYSICAL:  General:  Alert and Responsive and in acute distress. CV:  RRR, no murmurs, rubs, or gallops. PMI not displaced. No visible pulsations or thrills. No carotid bruits. RESP:  Unlabored breathing. Lungs clear to auscultation. no wheeze, rales, or rhonchi. Equal expansion bilaterally. ABD:  Soft, nontender, nondistended. Normoactive bowel sounds. No hepatosplenomegaly. : gross blood and clots in briones line and on meatus    Medications administered: morphine 1mg x3    Labs: stat H and H    ASSESSMENT, PLAN & DISPOSITION   Jamilah Kingsley is a 50y.o. year old male admitted for Acute on chronic renal failure (HCC)  Urinary retention  Hematuria  UTI (urinary tract infection)  Acute kidney injury ([facility] [address] Rapid response called for severe suprapubic pain. Initial attempt to flush briones line resulted in saline only going into bag. Examined briones more closely briones not secured. Advanced briones slightly which resulted in sauginouse drainage with clots coming from line.  Pt now with relief from pain and cramping. Line resecured in a better position pt given 1mg morphine 3 during rapid. Order stat H and H due to what appears to be on going bleeding, pt is not on blood thinners. Post rapid bladder scan with 83 ml left in bladder. Discussed with primary team with additional plan below. Discusssed with Urology Dr Donnie Duckworth who agrees with plan. Considering 3-way briones is larger than current [facility] and likely on going prostate inflammation we may be unable to insert 3-way. As current 2-way briones is free flowing now and pt is pain free will attempt exchange if/when pt briones becomes obstructed again considering the alternative is that pt may end up with no briones overnight if exchange is not successful. Nursing is instructed to page me Dr Shanell Tello directly if pt develps pain and we will attempt exchange. Patient condition currently: stable    Plan  --continue current 2 way briones  --insert 3 way briones catheter and continous bladder irrigation if current briones becomes clogged again  --urology is consulted and will see pt in the morning  --monitor H and H  --pyridium 100 mg TID for 2 days    Disposition: returned to primary team    Attending Dr. Tom Ramirez notified of rapid response. In agreement with plan. Primary team resuming care.        Felice Pichardo MD

## 2018-04-22 NOTE — BRIEF OP NOTE
BRIEF OPERATIVE NOTE    Date of Procedure: 4/22/2018   Preoperative Diagnosis: dx  Postoperative Diagnosis: * No post-op diagnosis entered *    Procedure(s):  CYSTOSCOPY and clot removal  Surgeon(s) and Role:     * Nathalie Lipscomb MD - Primary         Surgical Assistant: none    Surgical Staff:  Circ-1: Venkatesh Rose RN  Scrub Tech-1: Mendy Clemens  Event Time In   Incision Start 1035   Incision Close 1051     Anesthesia: General   Estimated Blood Loss: 200  Specimens: * No specimens in log *   Findings: clot retention  Complications: none  Implants: * No implants in log *

## 2018-04-22 NOTE — ROUTINE PROCESS
1205: TRANSFER - IN REPORT:    Verbal report received from OR(name) on Berkley Riddle  being received from OR(unit) for routine post - op      Report consisted of patients Situation, Background, Assessment and   Recommendations(SBAR). Information from the following report(s) OR Summary was reviewed with the receiving nurse. Opportunity for questions and clarification was provided. Assessment completed upon patients arrival to unit and care assumed. 1 unit of PRBCs hanging at this time. 1255: I unit of PRBCs completed. No record in flow sheet to complete transfusion. Clear yellow urine noted in briones bag.     1400:  in room to draw ordered labs.

## 2018-04-22 NOTE — OP NOTES
[address] [place] [address]   OPERATIVE REPORT    Arturo Loo  MR#: [id number]  : 1970  ACCOUNT #: [de-identified]   DATE OF SERVICE: 2018    CONSULTATION NOTE:    I am called in to see this 49-year-old -American male who was admitted to the emergency room with urinary retention and clots. PERTINENT HISTORY:  Reveals that on the , the patient underwent a transperineal ultrasound-guided prostate biopsy for a PSA of 6.9. This was done under anesthesia. The patient went home and developed difficulty with voiding and by the time I received a telephone call, the patient had gone over 10 hours without voiding and was having some clots. The patient was brought to the Emergency Room where a catheter was placed and over 1000 mL was removed. At that time, the patient's CT scan showed amorphous debris in the bladder consistent with clot, but no upper tract ectasia. The patient has a history of chronic renal insufficiency and has been somewhat spotty in his followup. The most recent outpatient creatinine that I could identify in the chart was elsewhere and it was 8.0. On admission, the patient's creatinine was over 14 and the patient had a hemoglobin level of about, which has subsequently come down. PAST MEDICAL HISTORY:  Hypertension, arthritis, stage V chronic renal insufficiency and sleep apnea. PAST SURGICAL HISTORY:  [place] tooth extraction and left knee arthroscopy. FAMILY HISTORY:  Positive for hypertension and heart disease. SOCIAL HISTORY:  The patient is a never smoker and a current non-consumer of alcohol. MEDICATIONS:  On admission include Lipitor, clonidine, Cipro, hydralazine, Viagra, Norvasc and allopurinol. REVIEW OF SYSTEMS:  Refer to the form in the chart. PHYSICAL EXAMINATION:  Reveals sinus tachycardia and clear lungs. The abdomen is normal except for suspected bladder distention.   External genitalia revealed a circumcised phallus with normal scrotal contents and a Murillo catheter in place. Extremities generally normal.      After the physical exam, I noticed that there is a 3-way Murillo catheter in place, but the irrigation has been cut off and there appears to be nothing coming out of the bladder and the catheter bag tubing has got clots in it. A gentle irrigation with 30 mL of saline does not produce any result and the patient developed bladder spasms. I administered 2 mg of morphine and after a period of time, I administer 2 more mg of morphine, but it is apparent that the patient will not tolerate removal of this catheter and placement of a hematuria catheter with the attendant irrigation unless I do this under anesthesia. The patient has signed a consent prior to the administration of the morphine and I had discussed with him my plans and the preparation and technique. The patient will be brought down to the operating room where under anesthesia, we will remove that catheter, do a cystoscopy, irrigate clots, cauterize any visible bleeding and then place a hematuria catheter. The patient wishes to proceed. I have discussed the case with the patient and with the hospitalist and I have reviewed all available information in the chart, both his current information and the information from outside facilities. This consultation including preoperative discussion exceeded 45 minutes and greater than 50% was counseling.       MD JENN Valles / Indira Green  D: 04/22/2018 11:25     T: 04/22/2018 15:31  JOB #: [id number]

## 2018-04-22 NOTE — PROGRESS NOTES
Problem: Falls - Risk of  Goal: *Absence of Falls  Document Jaspal Fall Risk and appropriate interventions in the flowsheet.    Outcome: Progressing Towards Goal  Fall Risk Interventions:            Medication Interventions: Assess postural VS orthostatic hypotension, Evaluate medications/consider consulting pharmacy, Patient to call before getting OOB, Teach patient to arise slowly

## 2018-04-22 NOTE — PROGRESS NOTES
[facility] Hospitalist Group  Progress Note    Patient: Siri Butler Age: 50 y.o. : 1970 MR#: [id number] SSN: [id number]  Date: 2018     Subjective:    I have examined the patient at bedside. Pt is alert, NAD. Wife at bedside. Has no complaints, denies chest pain, abd david, n/v/d,     Assessment/Plan:   1. SIRS: present on admission, likely non infectious as no fevers, leukocytosis and urine culture with no growth. Improved. Will continue to trend CBC and vitals. 2. Urinary retention: patient underwent prostate biopsy 2018 outpatient and developed urinary retentiont. In ED found with +700ml on bladder scan, gross blood with clots in urine after catheterization. Urology consulted. Patient underwent cystoscopy and clot removal this morning. Indwelling cath without hematuria. Will continue continuous bladder irrigation. Pain management. 3. Acute on Chronic Anemia: likely exacerbated by prostate biopsy, gross hematuria present on admission. Pt received 1 unit PRBC. Repeat Hgb 7.4. Nephrology following. Patient to receive Epogen with dialysis. Will continue to trend H/H.     4. Acute kidney injury superimposed on CKD stage 5. Nephrology consulting. Per nephrology note, will tentatively plan for HD cath placement tomorrow or Tuesday. Initiated on Bicarb, calcitriol, phos binders. Renal/ Low purine diet. Will trend CBC, BMP, mg,     5. HTN. Chronic. Uncontrolled. Continue current regimen. 6. UTI. Present on admission. Continue Rocephin. Preliminary urine culture with no growth after 24 hours. Awaiting final urine culture.      Additional Notes:       Case discussed with:  [x]Patient  [x]Family  []Nursing  []Case Management  DVT Prophylaxis:  []Lovenox  []Hep SQ  []SCDs  []Coumadin   []On Heparin gtt    Objective:   VS:   Visit Vitals    BP (!) 143/91 (BP 1 Location: Right arm, BP Patient Position: At rest)    Pulse 91    Temp 98 °F (36.7 °C)    Resp 20    Ht 6' 2\" (1.88 m)    Wt 109.9 kg (242 lb 4.8 oz)    SpO2 98%    BMI 31.11 kg/m2      Tmax/24hrs: Temp (24hrs), Av.1 °F (36.7 °C), Min:97.8 °F (36.6 °C), Max:98.5 °F (36.9 °C)    Intake/Output Summary (Last 24 hours) at 18 1412  Last data filed at 18 1311   Gross per 24 hour   Intake           4062.5 ml   Output             4100 ml   Net            -37.5 ml       General: alert, NAD. Cardiovascular:  RRR  Pulmonary:  CTA bilateral. No work of breathing. GI:  Soft, non tender. +BS  Extremities: no edema. +2 pedal pulses. Labs:    Recent Results (from the past 24 hour(s))   HGB & HCT    Collection Time: 18 10:34 PM   Result Value Ref Range    HGB 8.3 (L) 13.0 - 16.0 g/dL    HCT 26.1 (L) 36.0 - 48.0 %   CBC W/O DIFF    Collection Time: 18  2:33 AM   Result Value Ref Range    WBC 7.6 4.6 - 13.2 K/uL    RBC 2.87 (L) 4.70 - 5.50 M/uL    HGB 7.4 (L) 13.0 - 16.0 g/dL    HCT 23.9 (L) 36.0 - 48.0 %    MCV 83.3 74.0 - 97.0 FL    MCH 25.8 24.0 - 34.0 PG    MCHC 31.0 31.0 - 37.0 g/dL    RDW 14.6 (H) 11.6 - 14.5 %    PLATELET 871 087 - 078 K/uL    MPV 10.0 9.2 - 54.2 FL   METABOLIC PANEL, COMPREHENSIVE    Collection Time: 18  2:33 AM   Result Value Ref Range    Sodium 143 136 - 145 mmol/L    Potassium 5.1 3.5 - 5.5 mmol/L    Chloride 110 (H) 100 - 108 mmol/L    CO2 20 (L) 21 - 32 mmol/L    Anion gap 13 3.0 - 18 mmol/L    Glucose 96 74 - 99 mg/dL     (H) 7.0 - 18 MG/DL    Creatinine 14.10 (H) 0.6 - 1.3 MG/DL    BUN/Creatinine ratio 8 (L) 12 - 20      GFR est AA 5 (L) >60 ml/min/1.73m2    GFR est non-AA 4 (L) >60 ml/min/1.73m2    Calcium 7.7 (L) 8.5 - 10.1 MG/DL    Bilirubin, total 0.3 0.2 - 1.0 MG/DL    ALT (SGPT) 12 (L) 16 - 61 U/L    AST (SGOT) 14 (L) 15 - 37 U/L    Alk.  phosphatase 60 45 - 117 U/L    Protein, total 6.2 (L) 6.4 - 8.2 g/dL    Albumin 3.0 (L) 3.4 - 5.0 g/dL    Globulin 3.2 2.0 - 4.0 g/dL    A-G Ratio 0.9 0.8 - 1.7     PHOSPHORUS    Collection Time: 18  2:33 AM Result Value Ref Range    Phosphorus 8.2 (H) 2.5 - 4.9 MG/DL   MAGNESIUM    Collection Time: 04/22/18  2:33 AM   Result Value Ref Range    Magnesium 2.4 1.6 - 2.6 mg/dL   TYPE + CROSSMATCH    Collection Time: 04/22/18  8:57 AM   Result Value Ref Range    Crossmatch Expiration 04/25/2018     ABO/Rh(D) B POSITIVE     Antibody screen NEG     Unit number [id number]     Blood component type [facility]     Unit division 00     Status of unit ISSUED     Crossmatch result Compatible    GLUCOSE, POC    Collection Time: 04/22/18  9:06 AM   Result Value Ref Range    Glucose (POC) 114 (H) 70 - 110 mg/dL       Signed By: Farzana Cook NP     April 22, 2018 2:12 PM

## 2018-04-22 NOTE — ANESTHESIA POSTPROCEDURE EVALUATION
Post-Anesthesia Evaluation and Assessment    Patient: Merlene Funez MRN: [id number]  SSN: [id number]    YOB: 1970  Age: 50 y.o. Sex: male      Data from PACU flowsheet    Cardiovascular Function/Vital Signs  Visit Vitals    BP (!) 143/91 (BP 1 Location: Right arm, BP Patient Position: At rest)    Pulse 91    Temp 36.7 °C (98 °F)    Resp 20    Ht 6' 2\" (1.88 m)    Wt 109.9 kg (242 lb 4.8 oz)    SpO2 98%    BMI 31.11 kg/m2       Patient is status post general anesthesia for Procedure(s):  CYSTOSCOPY and clot removal.    Nausea/Vomiting: controlled    Postoperative hydration reviewed and adequate. Pain:  Pain Scale 1: Numeric (0 - 10) (04/22/18 1251)  Pain Intensity 1: 0 (04/22/18 1251)   Managed      Mental Status and Level of Consciousness: Alert and oriented     Pulmonary Status:   O2 Device: Nasal cannula (04/22/18 1110)   Adequate oxygenation and airway patent    Complications related to anesthesia: None    Post-anesthesia assessment completed.  No concerns    Signed By: Jose Bravo MD     April 22, 2018

## 2018-04-22 NOTE — PROGRESS NOTES
Pt is having a drop in H/H   Holding BP meds except clonidine   Will send for type and screen  Hydralazine PRN    Worsening Renal function   Will call Nephrology and transfuse 2 units prbcs   D/W Urology

## 2018-04-22 NOTE — PROGRESS NOTES
TRANSFER - OUT REPORT:    Verbal report given to [facility]) on [employer]  being transferred to OR(unit) for ordered procedure       Report consisted of patients Situation, Background, Assessment and   Recommendations(SBAR). Information from the following report(s) SBAR and Kardex was reviewed with the receiving nurse. Lines:   Peripheral IV 04/21/18 Right Hand (Active)   Site Assessment Clean, dry, & intact 4/21/2018  2:29 PM   Phlebitis Assessment 0 4/21/2018  2:29 PM   Infiltration Assessment 0 4/21/2018  2:29 PM   Dressing Status Clean, dry, & intact 4/21/2018  2:29 PM   Dressing Type Transparent 4/21/2018  2:29 PM   Hub Color/Line Status Patent; Flushed 4/21/2018  2:29 PM        Opportunity for questions and clarification was provided.       Patient transported with:   Registered Nurse  Tech

## 2018-04-22 NOTE — PROGRESS NOTES
04/22/18 0902   Vital Signs   Temp 97.9 °F (36.6 °C)   Temp Source Axillary   Pulse (Heart Rate) (!) 121   Heart Rate Source Monitor   Resp Rate 20   O2 Sat (%) 98 %   Level of Consciousness Alert   BP (!) 193/111   MAP (Calculated) 138   BP 1 Method Automatic   BP 1 Location Left leg   BP Patient Position At rest   MEWS Score 3   Pain 1   Pain Scale 1 Numeric (0 - 10)   Pain Intensity 1 10   Patient Stated Pain Goal 0   Pain Location 1 Abdomen   Pain Orientation 1 Lower  (bladder area)   Pain Intervention(s) 1 Medication (see MAR);MD notified (comment)   MD notified

## 2018-04-22 NOTE — ROUTINE PROCESS
TRANSFER - OUT REPORT:    Verbal report given to Anaya Feldman on Lucía Rossi  being transferred to room 366 for routine progression of care       Report consisted of patients Situation, Background, Assessment and   Recommendations(SBAR). Information from the following report(s) SBAR, OR Summary, Intake/Output and MAR was reviewed with the receiving nurse. Opportunity for questions and clarification was provided.       Patient transported with:   O2 @ 2 liters  Tech

## 2018-04-22 NOTE — PROGRESS NOTES
RENAL DAILY PROGRESS NOTE    Subjective:   Admitted for urinary retention seen for BLAINE/CKD stage 5    Complaint: post cysto and clot removal sleepy, wife at bedside    Current Facility-Administered Medications   Medication Dose Route Frequency    0.9% sodium chloride infusion 250 mL  250 mL IntraVENous PRN    hydrALAZINE (APRESOLINE) 20 mg/mL injection 10 mg  10 mg IntraVENous Q6H PRN    opium-belladonna (B&O 15-A) 16.2-30 mg suppository 1 Suppository  1 Suppository Rectal Q8H PRN    cefTRIAXone (ROCEPHIN) 1 g in sterile water (preservative free) 10 mL IV syringe  1 g IntraVENous Q24H    cloNIDine HCl (CATAPRES) tablet 0.1 mg  0.1 mg Oral Q8H    docusate sodium (COLACE) capsule 100 mg  100 mg Oral BID    allopurinol (ZYLOPRIM) tablet 200 mg  200 mg Oral DAILY    atorvastatin (LIPITOR) tablet 20 mg  20 mg Oral DAILY    HYDROcodone-acetaminophen (NORCO) 5-325 mg per tablet 1 Tab  1 Tab Oral Q6H PRN    cyclobenzaprine (FLEXERIL) tablet 10 mg  10 mg Oral TID PRN    morphine injection 2 mg  2 mg IntraVENous Q3H PRN    naloxone (NARCAN) injection 0.4 mg  0.4 mg IntraVENous EVERY 2 MINUTES AS NEEDED    phenazopyridine (PYRIDIUM) tablet 100 mg  100 mg Oral TID           Objective:   Patient Vitals for the past 24 hrs:   Temp Pulse Resp BP SpO2   04/22/18 1251 98 °F (36.7 °C) 91 20 (!) 143/91 98 %   04/22/18 1150 98.5 °F (36.9 °C) 91 23 116/74 96 %   04/22/18 1140 - 96 26 128/77 95 %   04/22/18 1130 - 100 28 142/78 98 %   04/22/18 1120 - (!) 110 25 156/81 97 %   04/22/18 1110 - (!) 117 (!) 40 136/71 100 %   04/22/18 1105 97.8 °F (36.6 °C) (!) 111 19 142/75 99 %   04/22/18 1100 97.8 °F (36.6 °C) (!) 118 27 142/65 95 %   04/22/18 1000 - 100 - 140/76 -   04/22/18 0902 97.9 °F (36.6 °C) (!) 121 20 (!) 193/111 98 %   04/22/18 0730 - - - - 98 %   04/22/18 0435 97.8 °F (36.6 °C) 85 20 142/83 97 %   04/22/18 0058 98.2 °F (36.8 °C) 84 18 147/85 98 %   04/21/18 2258 - - - (!) 165/99 -   04/21/18 2234 - (!) 102 - (!) 204/117 97 %   04/21/18 2117 98.4 °F (36.9 °C) 81 18 146/87 98 %   04/21/18 1859 98.3 °F (36.8 °C) 90 20 (!) 160/98 97 %   04/21/18 1714 - - - (!) 162/106 -   04/21/18 1515 - - 20 (!) 181/109 93 %        Weight change:      04/20 1901 - 04/22 0700  In: 1702.5 [P.O.:350; I.V.:1352.5]  Out: 1800 [Urine:1800]    Intake/Output Summary (Last 24 hours) at 04/22/18 1341  Last data filed at 04/22/18 1311   Gross per 24 hour   Intake           4062.5 ml   Output             4100 ml   Net            -37.5 ml     Physical Exam: sleepy, not in any resp distress    HEENT: non icteric  Neck: No JVD  Cardiovascular: regular no rub  C/L: clear ant/lat  Abdomen: soft + BS  Ext: No LE edema    Data Review:     LABS:   Hematology: Recent Labs      04/22/18   0233  04/21/18   2234  04/21/18   1100   WBC  7.6   --   11.4   HGB  7.4*  8.3*  8.4*   HCT  23.9*  26.1*  26.8*   Pl 152K  Chemistry: Recent Labs      04/22/18   0233  04/21/18   1100   BUN  107*  103*   CREA  14.10*  14.00*   CA  7.7*  8.1*  8.1*   ALB  3.0*   --    K  5.1  4.4   NA  143  139   CL  110*  105   CO2  20*  19*   PHOS  8.2*  8.2*   GLU  96  115*   IPTH 1726 Vit D 13.5  UA 10.9  Fe 32  Sat 50% sandra 512    IMAGES: CT abd pelvis noted    CULTURE: urine c/s prelim neg    IMPRESSION AND PLAN:   BLAINE/CKD5 discussed with wife and pt. No evidence of hydronephrosis and he is now post decompression. No further improvement in renal function. This is most likely continued worsening of renal function ( crea was already 8 in 11/2017). He has been non compliant with office follow up and has not wanted to prepare for dialysis but wife says they had talk about it last night and she thinks he is willing to do it this time (pt still to sleepy to confirm). Will tentatively plan for HD cath placement tomorrow or Tuesday. For now trend lytes, start bicarb, repeat labs  Anemia decent fe stores, post 1 unit PRBC. Will start epo with dialysis if he agrees to start.   SHPT from CKD and Vit D def, start calcitriol po or IV hectorol if he starts dialysis.  Start phos binder  HTN cont meds   Hyperuricemia dec allopurinol dose to adjust to renal function, adjust to  Low purine diet           Gwen Renner MD  4/22/2018

## 2018-04-22 NOTE — PROGRESS NOTES
Brief Progress Notes    Successfully placed 20fr 3-way briones catheter and started continuous bladder irrigation. Nursing instructed to increase irrigation rate until fluid from briones is running clear. 1mg morphine given prior to start of catheter exchange. Pyridium for mucosa irritation ordered now (200mg) and TID (100mg).       Ryan Mckee MD-PGY2  [facility]

## 2018-04-23 ENCOUNTER — ANESTHESIA (OUTPATIENT)
Dept: SURGERY | Age: 48
DRG: 907 | End: 2018-04-23
Payer: COMMERCIAL

## 2018-04-23 ENCOUNTER — ANESTHESIA EVENT (OUTPATIENT)
Dept: SURGERY | Age: 48
DRG: 907 | End: 2018-04-23
Payer: COMMERCIAL

## 2018-04-23 LAB
ANION GAP SERPL CALC-SCNC: 10 MMOL/L (ref 3–18)
ANION GAP SERPL CALC-SCNC: 9 MMOL/L (ref 3–18)
BACTERIA SPEC CULT: NORMAL
BASOPHILS # BLD: 0 K/UL (ref 0–0.1)
BASOPHILS NFR BLD: 0 % (ref 0–2)
BUN SERPL-MCNC: 108 MG/DL (ref 7–18)
BUN SERPL-MCNC: 111 MG/DL (ref 7–18)
BUN/CREAT SERPL: 8 (ref 12–20)
BUN/CREAT SERPL: 8 (ref 12–20)
CALCIUM SERPL-MCNC: 7.7 MG/DL (ref 8.5–10.1)
CALCIUM SERPL-MCNC: 8 MG/DL (ref 8.5–10.1)
CHLORIDE SERPL-SCNC: 114 MMOL/L (ref 100–108)
CHLORIDE SERPL-SCNC: 116 MMOL/L (ref 100–108)
CO2 SERPL-SCNC: 20 MMOL/L (ref 21–32)
CO2 SERPL-SCNC: 20 MMOL/L (ref 21–32)
CREAT SERPL-MCNC: 14.3 MG/DL (ref 0.6–1.3)
CREAT SERPL-MCNC: 14.3 MG/DL (ref 0.6–1.3)
DIFFERENTIAL METHOD BLD: ABNORMAL
EOSINOPHIL # BLD: 0.1 K/UL (ref 0–0.4)
EOSINOPHIL NFR BLD: 1 % (ref 0–5)
ERYTHROCYTE [DISTWIDTH] IN BLOOD BY AUTOMATED COUNT: 14.9 % (ref 11.6–14.5)
ERYTHROCYTE [DISTWIDTH] IN BLOOD BY AUTOMATED COUNT: 15.2 % (ref 11.6–14.5)
GLUCOSE SERPL-MCNC: 116 MG/DL (ref 74–99)
GLUCOSE SERPL-MCNC: 93 MG/DL (ref 74–99)
HCT VFR BLD AUTO: 22.3 % (ref 36–48)
HCT VFR BLD AUTO: 22.5 % (ref 36–48)
HGB BLD-MCNC: 7 G/DL (ref 13–16)
HGB BLD-MCNC: 7.3 G/DL (ref 13–16)
LYMPHOCYTES # BLD: 0.6 K/UL (ref 0.9–3.6)
LYMPHOCYTES NFR BLD: 10 % (ref 21–52)
MCH RBC QN AUTO: 26.4 PG (ref 24–34)
MCH RBC QN AUTO: 27.4 PG (ref 24–34)
MCHC RBC AUTO-ENTMCNC: 31.1 G/DL (ref 31–37)
MCHC RBC AUTO-ENTMCNC: 32.7 G/DL (ref 31–37)
MCV RBC AUTO: 83.8 FL (ref 74–97)
MCV RBC AUTO: 84.9 FL (ref 74–97)
MONOCYTES # BLD: 0.7 K/UL (ref 0.05–1.2)
MONOCYTES NFR BLD: 11 % (ref 3–10)
NEUTS SEG # BLD: 5.1 K/UL (ref 1.8–8)
NEUTS SEG NFR BLD: 78 % (ref 40–73)
PLATELET # BLD AUTO: 136 K/UL (ref 135–420)
PLATELET # BLD AUTO: 141 K/UL (ref 135–420)
PMV BLD AUTO: 9.8 FL (ref 9.2–11.8)
PMV BLD AUTO: 9.8 FL (ref 9.2–11.8)
POTASSIUM SERPL-SCNC: 4.9 MMOL/L (ref 3.5–5.5)
POTASSIUM SERPL-SCNC: 5.2 MMOL/L (ref 3.5–5.5)
RBC # BLD AUTO: 2.65 M/UL (ref 4.7–5.5)
RBC # BLD AUTO: 2.66 M/UL (ref 4.7–5.5)
SERVICE CMNT-IMP: NORMAL
SODIUM SERPL-SCNC: 144 MMOL/L (ref 136–145)
SODIUM SERPL-SCNC: 145 MMOL/L (ref 136–145)
WBC # BLD AUTO: 6.5 K/UL (ref 4.6–13.2)
WBC # BLD AUTO: 8.9 K/UL (ref 4.6–13.2)

## 2018-04-23 PROCEDURE — 77030018836 HC SOL IRR NACL ICUM -A: Performed by: UROLOGY

## 2018-04-23 PROCEDURE — 77030011256 HC DRSG MEPILEX <16IN NO BORD MOLN -A

## 2018-04-23 PROCEDURE — 76210000016 HC OR PH I REC 1 TO 1.5 HR: Performed by: UROLOGY

## 2018-04-23 PROCEDURE — 80048 BASIC METABOLIC PNL TOTAL CA: CPT | Performed by: UROLOGY

## 2018-04-23 PROCEDURE — 0V508ZZ DESTRUCTION OF PROSTATE, VIA NATURAL OR ARTIFICIAL OPENING ENDOSCOPIC: ICD-10-PCS | Performed by: UROLOGY

## 2018-04-23 PROCEDURE — 85027 COMPLETE CBC AUTOMATED: CPT | Performed by: UROLOGY

## 2018-04-23 PROCEDURE — 77030010545: Performed by: UROLOGY

## 2018-04-23 PROCEDURE — 74011250637 HC RX REV CODE- 250/637: Performed by: INTERNAL MEDICINE

## 2018-04-23 PROCEDURE — 74011250637 HC RX REV CODE- 250/637: Performed by: STUDENT IN AN ORGANIZED HEALTH CARE EDUCATION/TRAINING PROGRAM

## 2018-04-23 PROCEDURE — 74011250636 HC RX REV CODE- 250/636

## 2018-04-23 PROCEDURE — 74011000250 HC RX REV CODE- 250

## 2018-04-23 PROCEDURE — 65270000029 HC RM PRIVATE

## 2018-04-23 PROCEDURE — 80048 BASIC METABOLIC PNL TOTAL CA: CPT | Performed by: NURSE PRACTITIONER

## 2018-04-23 PROCEDURE — 74011250636 HC RX REV CODE- 250/636: Performed by: HOSPITALIST

## 2018-04-23 PROCEDURE — 74011250637 HC RX REV CODE- 250/637: Performed by: HOSPITALIST

## 2018-04-23 PROCEDURE — 85025 COMPLETE CBC W/AUTO DIFF WBC: CPT | Performed by: NURSE PRACTITIONER

## 2018-04-23 PROCEDURE — 77030020268 HC MISC GENERAL SUPPLY: Performed by: UROLOGY

## 2018-04-23 PROCEDURE — 77030010509 HC AIRWY LMA MSK TELE -A: Performed by: ANESTHESIOLOGY

## 2018-04-23 PROCEDURE — 77030012961 HC IRR KT CYSTO/TUR ICUM -A: Performed by: UROLOGY

## 2018-04-23 PROCEDURE — 74011250637 HC RX REV CODE- 250/637: Performed by: UROLOGY

## 2018-04-23 PROCEDURE — 77030020782 HC GWN BAIR PAWS FLX 3M -B: Performed by: UROLOGY

## 2018-04-23 PROCEDURE — 76010000138 HC OR TIME 0.5 TO 1 HR: Performed by: UROLOGY

## 2018-04-23 PROCEDURE — 76060000032 HC ANESTHESIA 0.5 TO 1 HR: Performed by: UROLOGY

## 2018-04-23 PROCEDURE — 0W3R8ZZ CONTROL BLEEDING IN GENITOURINARY TRACT, VIA NATURAL OR ARTIFICIAL OPENING ENDOSCOPIC: ICD-10-PCS | Performed by: UROLOGY

## 2018-04-23 PROCEDURE — 0TCB8ZZ EXTIRPATION OF MATTER FROM BLADDER, VIA NATURAL OR ARTIFICIAL OPENING ENDOSCOPIC: ICD-10-PCS | Performed by: UROLOGY

## 2018-04-23 PROCEDURE — 74011250636 HC RX REV CODE- 250/636: Performed by: FAMILY MEDICINE

## 2018-04-23 PROCEDURE — 77030010545

## 2018-04-23 PROCEDURE — 74011000250 HC RX REV CODE- 250: Performed by: HOSPITALIST

## 2018-04-23 PROCEDURE — 36415 COLL VENOUS BLD VENIPUNCTURE: CPT | Performed by: NURSE PRACTITIONER

## 2018-04-23 RX ORDER — OXYCODONE AND ACETAMINOPHEN 5; 325 MG/1; MG/1
1 TABLET ORAL AS NEEDED
Status: DISCONTINUED | OUTPATIENT
Start: 2018-04-23 | End: 2018-04-23 | Stop reason: HOSPADM

## 2018-04-23 RX ORDER — MORPHINE SULFATE 4 MG/ML
2 INJECTION, SOLUTION INTRAMUSCULAR; INTRAVENOUS ONCE
Status: COMPLETED | OUTPATIENT
Start: 2018-04-23 | End: 2018-04-23

## 2018-04-23 RX ORDER — MORPHINE SULFATE 10 MG/ML
5 INJECTION, SOLUTION INTRAMUSCULAR; INTRAVENOUS
Status: DISCONTINUED | OUTPATIENT
Start: 2018-04-23 | End: 2018-04-26

## 2018-04-23 RX ORDER — ONDANSETRON 2 MG/ML
INJECTION INTRAMUSCULAR; INTRAVENOUS AS NEEDED
Status: DISCONTINUED | OUTPATIENT
Start: 2018-04-23 | End: 2018-04-23 | Stop reason: HOSPADM

## 2018-04-23 RX ORDER — LIDOCAINE HYDROCHLORIDE 20 MG/ML
INJECTION, SOLUTION EPIDURAL; INFILTRATION; INTRACAUDAL; PERINEURAL AS NEEDED
Status: DISCONTINUED | OUTPATIENT
Start: 2018-04-23 | End: 2018-04-23 | Stop reason: HOSPADM

## 2018-04-23 RX ORDER — ONDANSETRON 2 MG/ML
4 INJECTION INTRAMUSCULAR; INTRAVENOUS
Status: DISCONTINUED | OUTPATIENT
Start: 2018-04-23 | End: 2018-04-29 | Stop reason: HOSPADM

## 2018-04-23 RX ORDER — MORPHINE SULFATE 2 MG/ML
2 INJECTION, SOLUTION INTRAMUSCULAR; INTRAVENOUS ONCE
Status: DISCONTINUED | OUTPATIENT
Start: 2018-04-23 | End: 2018-04-23

## 2018-04-23 RX ORDER — SODIUM CHLORIDE, SODIUM LACTATE, POTASSIUM CHLORIDE, CALCIUM CHLORIDE 600; 310; 30; 20 MG/100ML; MG/100ML; MG/100ML; MG/100ML
INJECTION, SOLUTION INTRAVENOUS
Status: DISCONTINUED | OUTPATIENT
Start: 2018-04-23 | End: 2018-04-23 | Stop reason: HOSPADM

## 2018-04-23 RX ORDER — NALOXONE HYDROCHLORIDE 0.4 MG/ML
0.4 INJECTION, SOLUTION INTRAMUSCULAR; INTRAVENOUS; SUBCUTANEOUS AS NEEDED
Status: DISCONTINUED | OUTPATIENT
Start: 2018-04-23 | End: 2018-04-29 | Stop reason: HOSPADM

## 2018-04-23 RX ORDER — ACETAMINOPHEN 325 MG/1
650 TABLET ORAL
Status: DISCONTINUED | OUTPATIENT
Start: 2018-04-23 | End: 2018-04-29 | Stop reason: HOSPADM

## 2018-04-23 RX ORDER — CIPROFLOXACIN 500 MG/1
500 TABLET ORAL EVERY 24 HOURS
Status: DISCONTINUED | OUTPATIENT
Start: 2018-04-23 | End: 2018-04-24

## 2018-04-23 RX ORDER — SODIUM CHLORIDE 0.9 % (FLUSH) 0.9 %
5-10 SYRINGE (ML) INJECTION AS NEEDED
Status: DISCONTINUED | OUTPATIENT
Start: 2018-04-23 | End: 2018-04-29 | Stop reason: HOSPADM

## 2018-04-23 RX ORDER — SODIUM CHLORIDE 0.9 % (FLUSH) 0.9 %
5-10 SYRINGE (ML) INJECTION EVERY 8 HOURS
Status: DISCONTINUED | OUTPATIENT
Start: 2018-04-23 | End: 2018-04-29 | Stop reason: HOSPADM

## 2018-04-23 RX ORDER — SODIUM CHLORIDE 0.9 % (FLUSH) 0.9 %
5-10 SYRINGE (ML) INJECTION AS NEEDED
Status: DISCONTINUED | OUTPATIENT
Start: 2018-04-23 | End: 2018-04-23 | Stop reason: HOSPADM

## 2018-04-23 RX ORDER — FENTANYL CITRATE 50 UG/ML
25 INJECTION, SOLUTION INTRAMUSCULAR; INTRAVENOUS AS NEEDED
Status: DISCONTINUED | OUTPATIENT
Start: 2018-04-23 | End: 2018-04-23 | Stop reason: HOSPADM

## 2018-04-23 RX ORDER — HYDROMORPHONE HCL IN 0.9% NACL 50 MG/50ML
1 PLASTIC BAG, INJECTION (ML) INJECTION ONCE
Status: DISCONTINUED | OUTPATIENT
Start: 2018-04-23 | End: 2018-04-23

## 2018-04-23 RX ORDER — PROPOFOL 10 MG/ML
INJECTION, EMULSION INTRAVENOUS AS NEEDED
Status: DISCONTINUED | OUTPATIENT
Start: 2018-04-23 | End: 2018-04-23 | Stop reason: HOSPADM

## 2018-04-23 RX ORDER — ZOLPIDEM TARTRATE 5 MG/1
5 TABLET ORAL
Status: DISCONTINUED | OUTPATIENT
Start: 2018-04-23 | End: 2018-04-29 | Stop reason: HOSPADM

## 2018-04-23 RX ORDER — MORPHINE SULFATE 2 MG/ML
2 INJECTION, SOLUTION INTRAMUSCULAR; INTRAVENOUS
Status: DISCONTINUED | OUTPATIENT
Start: 2018-04-23 | End: 2018-04-23 | Stop reason: HOSPADM

## 2018-04-23 RX ORDER — LABETALOL HCL 20 MG/4 ML
SYRINGE (ML) INTRAVENOUS
Status: COMPLETED
Start: 2018-04-23 | End: 2018-04-23

## 2018-04-23 RX ORDER — CLONIDINE HYDROCHLORIDE 0.1 MG/1
0.1 TABLET ORAL 3 TIMES DAILY
Status: DISCONTINUED | OUTPATIENT
Start: 2018-04-23 | End: 2018-04-23 | Stop reason: SDUPTHER

## 2018-04-23 RX ORDER — DEXAMETHASONE SODIUM PHOSPHATE 4 MG/ML
INJECTION, SOLUTION INTRA-ARTICULAR; INTRALESIONAL; INTRAMUSCULAR; INTRAVENOUS; SOFT TISSUE AS NEEDED
Status: DISCONTINUED | OUTPATIENT
Start: 2018-04-23 | End: 2018-04-23 | Stop reason: HOSPADM

## 2018-04-23 RX ORDER — HYDROCODONE BITARTRATE AND ACETAMINOPHEN 5; 325 MG/1; MG/1
1 TABLET ORAL
Status: DISCONTINUED | OUTPATIENT
Start: 2018-04-23 | End: 2018-04-29 | Stop reason: HOSPADM

## 2018-04-23 RX ORDER — MIDAZOLAM HYDROCHLORIDE 1 MG/ML
INJECTION, SOLUTION INTRAMUSCULAR; INTRAVENOUS AS NEEDED
Status: DISCONTINUED | OUTPATIENT
Start: 2018-04-23 | End: 2018-04-23 | Stop reason: HOSPADM

## 2018-04-23 RX ORDER — ATROPA BELLADONNA AND OPIUM 16.2; 3 MG/1; MG/1
1 SUPPOSITORY RECTAL
Status: DISCONTINUED | OUTPATIENT
Start: 2018-04-23 | End: 2018-04-23 | Stop reason: SDUPTHER

## 2018-04-23 RX ORDER — FENTANYL CITRATE 50 UG/ML
INJECTION, SOLUTION INTRAMUSCULAR; INTRAVENOUS AS NEEDED
Status: DISCONTINUED | OUTPATIENT
Start: 2018-04-23 | End: 2018-04-23 | Stop reason: HOSPADM

## 2018-04-23 RX ORDER — LABETALOL HCL 20 MG/4 ML
5 SYRINGE (ML) INTRAVENOUS ONCE
Status: COMPLETED | OUTPATIENT
Start: 2018-04-23 | End: 2018-04-23

## 2018-04-23 RX ORDER — DOCUSATE SODIUM 100 MG/1
100 CAPSULE, LIQUID FILLED ORAL 2 TIMES DAILY
Status: DISCONTINUED | OUTPATIENT
Start: 2018-04-23 | End: 2018-04-23 | Stop reason: SDUPTHER

## 2018-04-23 RX ADMIN — PROPOFOL 200 MG: 10 INJECTION, EMULSION INTRAVENOUS at 17:32

## 2018-04-23 RX ADMIN — PHENAZOPYRIDINE HYDROCHLORIDE 100 MG: 100 TABLET ORAL at 10:10

## 2018-04-23 RX ADMIN — Medication 2 MG: at 05:15

## 2018-04-23 RX ADMIN — LIDOCAINE HYDROCHLORIDE 40 MG: 20 INJECTION, SOLUTION EPIDURAL; INFILTRATION; INTRACAUDAL; PERINEURAL at 17:32

## 2018-04-23 RX ADMIN — ATORVASTATIN CALCIUM 20 MG: 20 TABLET, FILM COATED ORAL at 10:11

## 2018-04-23 RX ADMIN — PHENAZOPYRIDINE HYDROCHLORIDE 100 MG: 100 TABLET ORAL at 21:58

## 2018-04-23 RX ADMIN — CALCIUM ACETATE 1334 MG: 667 CAPSULE ORAL at 09:30

## 2018-04-23 RX ADMIN — LABETALOL 20 MG/4 ML (5 MG/ML) INTRAVENOUS SYRINGE 5 MG: at 18:37

## 2018-04-23 RX ADMIN — DEXAMETHASONE SODIUM PHOSPHATE 8 MG: 4 INJECTION, SOLUTION INTRA-ARTICULAR; INTRALESIONAL; INTRAMUSCULAR; INTRAVENOUS; SOFT TISSUE at 17:41

## 2018-04-23 RX ADMIN — FENTANYL CITRATE 50 MCG: 50 INJECTION, SOLUTION INTRAMUSCULAR; INTRAVENOUS at 17:42

## 2018-04-23 RX ADMIN — SODIUM CHLORIDE, SODIUM LACTATE, POTASSIUM CHLORIDE, CALCIUM CHLORIDE: 600; 310; 30; 20 INJECTION, SOLUTION INTRAVENOUS at 17:23

## 2018-04-23 RX ADMIN — Medication 5 MG: at 18:37

## 2018-04-23 RX ADMIN — Medication 10 ML: at 22:00

## 2018-04-23 RX ADMIN — CIPROFLOXACIN HYDROCHLORIDE 500 MG: 500 TABLET, FILM COATED ORAL at 21:58

## 2018-04-23 RX ADMIN — Medication 2 MG: at 10:16

## 2018-04-23 RX ADMIN — MORPHINE SULFATE 2 MG: 4 INJECTION, SOLUTION INTRAMUSCULAR; INTRAVENOUS at 13:37

## 2018-04-23 RX ADMIN — ONDANSETRON 4 MG: 2 INJECTION INTRAMUSCULAR; INTRAVENOUS at 18:01

## 2018-04-23 RX ADMIN — Medication 1 G: at 21:58

## 2018-04-23 RX ADMIN — CLONIDINE HYDROCHLORIDE 0.1 MG: 0.1 TABLET ORAL at 21:58

## 2018-04-23 RX ADMIN — CLONIDINE HYDROCHLORIDE 0.1 MG: 0.1 TABLET ORAL at 13:44

## 2018-04-23 RX ADMIN — MIDAZOLAM HYDROCHLORIDE 2 MG: 1 INJECTION, SOLUTION INTRAMUSCULAR; INTRAVENOUS at 17:42

## 2018-04-23 RX ADMIN — ATROPA BELLADONNA AND OPIUM 1 SUPPOSITORY: 16.2; 3 SUPPOSITORY RECTAL at 12:21

## 2018-04-23 RX ADMIN — MORPHINE SULFATE 2 MG: 4 INJECTION, SOLUTION INTRAMUSCULAR; INTRAVENOUS at 14:04

## 2018-04-23 RX ADMIN — FENTANYL CITRATE 50 MCG: 50 INJECTION, SOLUTION INTRAMUSCULAR; INTRAVENOUS at 17:58

## 2018-04-23 RX ADMIN — CALCITRIOL 0.25 MCG: 0.25 CAPSULE, LIQUID FILLED ORAL at 10:11

## 2018-04-23 RX ADMIN — MORPHINE SULFATE 2 MG: 4 INJECTION, SOLUTION INTRAMUSCULAR; INTRAVENOUS at 14:53

## 2018-04-23 RX ADMIN — DOCUSATE SODIUM 100 MG: 100 CAPSULE, LIQUID FILLED ORAL at 10:10

## 2018-04-23 RX ADMIN — SODIUM BICARBONATE 650 MG TABLET 650 MG: at 10:11

## 2018-04-23 RX ADMIN — CLONIDINE HYDROCHLORIDE 0.1 MG: 0.1 TABLET ORAL at 06:21

## 2018-04-23 NOTE — PERIOP NOTES
TRANSFER - OUT REPORT:    Verbal report given to Davonte Ward RN on [employer]  being transferred to [facility] for routine post - op       Report consisted of patients Situation, Background, Assessment and   Recommendations(SBAR). Information from the following report(s) SBAR, OR Summary, Procedure Summary, Intake/Output, MAR, Recent Results and Cardiac Rhythm NSR was reviewed with the receiving nurse. Lines:   Peripheral IV 04/21/18 Right Hand (Active)   Site Assessment Clean, dry, & intact 4/23/2018  6:41 PM   Phlebitis Assessment 0 4/23/2018  6:41 PM   Infiltration Assessment 0 4/23/2018  6:41 PM   Dressing Status Clean, dry, & intact 4/23/2018  6:41 PM   Dressing Type Transparent 4/23/2018  6:41 PM   Hub Color/Line Status Pink; Infusing;Patent 4/23/2018  6:41 PM        Opportunity for questions and clarification was provided.       Patient transported with:   O2 @ 2 liters  Registered Nurse  [facility]

## 2018-04-23 NOTE — ANESTHESIA PREPROCEDURE EVALUATION
Anesthetic History   No history of anesthetic complications            Review of Systems / Medical History  Patient summary reviewed and pertinent labs reviewed    Pulmonary        Sleep apnea: CPAP           Neuro/Psych   Within defined limits           Cardiovascular    Hypertension: well controlled              Exercise tolerance: >4 METS     GI/Hepatic/Renal  Within defined limits              Endo/Other        Obesity and anemia (2 units PRBC ordered)     Other Findings   Comments: Documentation of current medication  Current medications obtained, documented and obtained? YES      Risk Factors for Postoperative nausea/vomiting:       History of postoperative nausea/vomiting? NO       Female? NO       Motion sickness? NO       Intended opioid administration for postoperative analgesia? YES      Smoking Abstinence:  Current Smoker? NO  Elective Surgery? YES  Seen preoperatively by anesthesiologist or proxy prior to day of surgery? YES  Pt abstained from smoking 24 hours prior to anesthesia? N/A    Preventive care/screening for High Blood Pressure:  Aged 18 years and older: YES  Screened for high blood pressure: YES  Patients with high blood pressure referred to primary care provider   for BP management: YES             Anesthetic History   No history of anesthetic complications            Review of Systems / Medical History  Patient summary reviewed and pertinent labs reviewed    Pulmonary        Sleep apnea: CPAP           Neuro/Psych   Within defined limits           Cardiovascular    Hypertension              Exercise tolerance: >4 METS     GI/Hepatic/Renal  Within defined limits              Endo/Other        Obesity     Other Findings   Comments: Documentation of current medication  Current medications obtained, documented and obtained? YES      Risk Factors for Postoperative nausea/vomiting:       History of postoperative nausea/vomiting? NO       Female? NO       Motion sickness?   NO Intended opioid administration for postoperative analgesia? YES      Smoking Abstinence:  Current Smoker? NO  Elective Surgery? YES  Seen preoperatively by anesthesiologist or proxy prior to day of surgery? YES  Pt abstained from smoking 24 hours prior to anesthesia?  N/A    Preventive care/screening for High Blood Pressure:  Aged 18 years and older: YES  Screened for high blood pressure: YES  Patients with high blood pressure referred to primary care provider   for BP management: YES                 Physical Exam    Airway  Mallampati: II  TM Distance: 4 - 6 cm  Neck ROM: normal range of motion   Mouth opening: Normal     Cardiovascular  Regular rate and rhythm,  S1 and S2 normal,  no murmur, click, rub, or gallop  Rhythm: regular  Rate: normal         Dental  No notable dental hx       Pulmonary  Breath sounds clear to auscultation               Abdominal  GI exam deferred       Other Findings            Anesthetic Plan    ASA: 2  Anesthesia type: general          Induction: Intravenous  Anesthetic plan and risks discussed with: Patient              Physical Exam    Airway  Mallampati: III  TM Distance: 4 - 6 cm  Neck ROM: normal range of motion   Mouth opening: Normal     Cardiovascular  Regular rate and rhythm,  S1 and S2 normal,  no murmur, click, rub, or gallop  Rhythm: regular  Rate: normal         Dental  No notable dental hx       Pulmonary  Breath sounds clear to auscultation               Abdominal  GI exam deferred       Other Findings            Anesthetic Plan    ASA: 3  Anesthesia type: general          Induction: Intravenous  Anesthetic plan and risks discussed with: Patient

## 2018-04-23 NOTE — PROGRESS NOTES
Reason for Admission:   Pain and bleeding following biopsy                    RRAT Score:     5                Plan for utilizing home health:      Not at this time. They have never used HH services                    Likelihood of Readmission:  Low/green                         Transition of Care Plan: This writer has confirmed that patient is still working and driving. Wife is Carey Pop and can be reached at [phone]  The plan is for patient to return to OR per the wife    Care Management Interventions  PCP Verified by CM:  Yes  Palliative Care Criteria Met (RRAT>21 & CHF Dx)?: No  Mode of Transport at Discharge:  (wife)  Transition of Care Consult (CM Consult): Discharge Planning  Current Support Network: Lives with Spouse  Confirm Follow Up Transport: Family  Plan discussed with Pt/Family/Caregiver: Yes  Discharge Location  Discharge Placement: Home

## 2018-04-23 NOTE — BRIEF OP NOTE
BRIEF OPERATIVE NOTE    Date of Procedure: 4/23/2018   Preoperative Diagnosis: clot retention/ elevated PSA  Postoperative Diagnosis:     Procedure(s):  CYSTOSCOPY/WITH button electrode  FULGURATION  Surgeon(s) and Role:      Isabel Frazier MD - Primary         Surgical Assistant: none    Surgical Staff:  Circ-1: Asad Garcia RN  Scrub Tech-1: Germain Gustafson  Event Time In   Incision Start 1741   Incision Close 1802     Anesthesia: General   Estimated Blood Loss: none  Specimens: none  Findings:50 cc old clot in bladder/ active bleeding from friable BV's in prostatic uret  Complications: none  Implants: none    24 Fr Hematuria catheter/ NSS CBI    Isabel Frazier MD

## 2018-04-23 NOTE — ROUTINE PROCESS
Received report from [facility]. Pt AAOx3, NAD, breathing non labored, on O2 NC at 2L, HOB up. IV site clean, dry and intact. CBI going w/ clear urine w/ some small clots. Bed at the lowest level on lock position, call bell w/i reach. CBI on; noted w/ on and off clear and red urine. Pt w/ episode of bladder spasm; gave Morphin per PRN order. Had to manipulate briones tubing at one time to clear the flow. A small clot was also removed from the opening of the catheter. Briones bag was emptied frequently and CBI bag monitored for follow up. .    Bedside and Verbal shift change report given to Laura Bailey (oncoming nurse) by me (offgoing nurse).  Report included the following information SBAR, Kardex, Procedure Summary, Intake/Output, MAR, Recent Results and Cardiac Rhythm SR.

## 2018-04-23 NOTE — ROUTINE PROCESS
Received report on pt.from off going RN. Resting in bed on rounds. CBI continuous with urine. Urine cherry colored with very small clots noted in tubing. NS irrigation with continuous open flow. Will cont to monitor. 0830 briones catheter Being emptied every 15 mins with continuous irrigation. Urine fluctuates between rosea to cherry colored. 0930 decreased urine flow . Small clots noted in tubing. Irrigated gently with Normal saline and few small clots removed. Urine flow rosea colored resumed well with bladder irrigation. Cont to empty briones every 15 to 20 mind from continuously open irrigation flow. 1016 pt c/o increased pain with some bladder spasms. Morphine 2 mgm given. 1030 urine cont to fluctuate from  light  Rosea to cherry red with small clots noted. 1145 urine cherry red with small clots noted and diminished flow. CBI clamped. Briones irrigated with normal saline. saline instilled with no resistance but unable to establish a urine flow. Pt having increased pain and bladder spasms. Med red clot dripped from briones when disconnected from tubing to irrigate but irrigation still unsuccessful. Dr Jeni Castañeda called and notified of above. Order received to d/c briones and make pt NPO for OR this evening. 1200 briones catheter d/c'd. Small blood clot expelled from meatus when briones d/c'd. Pt voided 25 cc of bloody urine. 1220 pt having increased bladder spasms and pain. B&O suppository given. Noted to have old bloody drainage on sheets but pt refused to allow linens be changed at this time. [address] notified of increasing pain and bladder spasms and of briones being d/c'd and Dr Cole Falling plans to take pt to OR this evening. morphine 2 mgm given IV for pain R/T severe spasms. [address] with severe spasms and pain. DR Dickson Sarkar notified. Morphine 2 mgm given IV for continued severe spasms with pain at a 10.        1425 DR Dickson Sarkar in to see pt. And spoke with patient and his wife. 1430 Dilaudid 1 mgm ordered for pain. Received call from pharmacy that no dilaudid available due to nationwide shortage. DR Wil Jefferson paged. 1039 [facility]  Dr Wil Jefferson  returned call and changed order to 2 mgm Morphine IV .      1450 Morphine 2 mgm given IV.     1500 Dr Wil Jefferson called to check on pt and notified that pt was still having severe pain and spasms. sts he will call DR Mane Mejia and talk with him. 1635 To OR per stretcher. Wife with pt. Still having intermittent severe pain associated with frequent bladder spasm     1939 Report given to Tanisha Thomson RN. Pt remains in OR at this time.

## 2018-04-23 NOTE — ROUTINE PROCESS
TRANSFER - OUT REPORT:    Verbal report given to Faby Mckeon RN(name) on [employer]  being transferred to OR for procedure.(unit) for  OR procedure. Report consisted of patients Situation, Background, Assessment and   Recommendations(SBAR). Information from the following report(s) SBAR and Kardex was reviewed with the receiving nurse. Lines:   Peripheral IV 04/21/18 Right Hand (Active)   Site Assessment Clean, dry, & intact 4/21/2018  2:29 PM   Phlebitis Assessment 0 4/21/2018  2:29 PM   Infiltration Assessment 0 4/21/2018  2:29 PM   Dressing Status Clean, dry, & intact 4/21/2018  2:29 PM   Dressing Type Transparent 4/21/2018  2:29 PM   Hub Color/Line Status Patent; Flushed 4/21/2018  2:29 PM        Opportunity for questions and clarification was provided.       Patient transported with:   [email or web address]

## 2018-04-23 NOTE — ROUTINE PROCESS
Recieved off floor. 2009  TRANSFER - IN REPORT:    Verbal report received from BOOGIE Manuel(name) on Read   being received from PACU(unit) for routine post - op      Report consisted of patients Situation, Background, Assessment and   Recommendations(SBAR). Information from the following report(s) SBAR, Kardex, Procedure Summary, Intake/Output, MAR, Recent Results and Cardiac Rhythm SR was reviewed with the receiving nurse. Opportunity for questions and clarification was provided. Assessment completed upon patients arrival to unit and care assumed. Pt drowsy w/ CBI going. Noted briones tubing w/ clear yellow urine going. Pt's drowsy w/ his wife at the bedside. Pt ate saved, warmed-up dinner tray. Urine in the briones remained yellow w/ tiny sand like clots. CBI bag changed 3 times. Bedside and Verbal shift change report given to BOOGIE Carpio (oncoming nurse) by me (offgoing nurse).  Report included the following information SBAR, Kardex, Procedure Summary, Intake/Output, MAR, Recent Results and Cardiac Rhythm SR.

## 2018-04-23 NOTE — ROUTINE PROCESS
TRANSFER - IN REPORT:    Verbal report received from [facility] RN(name) on [employer]  being received from [facility](unit) for ordered procedure      Report consisted of patients Situation, Background, Assessment and   Recommendations(SBAR). Information from the following report(s) SBAR, Kardex, [facility] and Recent Results was reviewed with the receiving nurse. Opportunity for questions and clarification was provided. Assessment completed upon patients arrival to unit and care assumed.

## 2018-04-23 NOTE — PROGRESS NOTES
RENAL DAILY PROGRESS NOTE    Subjective:   Admitted for urinary retention, seen for BLAINE/CKD Stage 5    Complaint: cont with UB irrigation.  Pt denies any CP or SOB    Current Facility-Administered Medications   Medication Dose Route Frequency    0.9% sodium chloride infusion 250 mL  250 mL IntraVENous PRN    hydrALAZINE (APRESOLINE) 20 mg/mL injection 10 mg  10 mg IntraVENous Q6H PRN    opium-belladonna (B&O 15-A) 16.2-30 mg suppository 1 Suppository  1 Suppository Rectal Q8H PRN    calcitRIOL (ROCALTROL) capsule 0.25 mcg  0.25 mcg Oral DAILY    allopurinol (ZYLOPRIM) tablet 100 mg  100 mg Oral Once per day on Mon Wed Fri    calcium acetate (PHOSLO) capsule 1,334 mg  2 Cap Oral TID WITH MEALS    sodium bicarbonate tablet 650 mg  650 mg Oral BID    cefTRIAXone (ROCEPHIN) 1 g in sterile water (preservative free) 10 mL IV syringe  1 g IntraVENous Q24H    cloNIDine HCl (CATAPRES) tablet 0.1 mg  0.1 mg Oral Q8H    docusate sodium (COLACE) capsule 100 mg  100 mg Oral BID    atorvastatin (LIPITOR) tablet 20 mg  20 mg Oral DAILY    HYDROcodone-acetaminophen (NORCO) 5-325 mg per tablet 1 Tab  1 Tab Oral Q6H PRN    cyclobenzaprine (FLEXERIL) tablet 10 mg  10 mg Oral TID PRN    morphine injection 2 mg  2 mg IntraVENous Q3H PRN    naloxone (NARCAN) injection 0.4 mg  0.4 mg IntraVENous EVERY 2 MINUTES AS NEEDED    phenazopyridine (PYRIDIUM) tablet 100 mg  100 mg Oral TID           Objective:   Patient Vitals for the past 24 hrs:   Temp Pulse Resp BP SpO2   04/23/18 0806 98.3 °F (36.8 °C) 88 22 (!) 147/92 100 %   04/23/18 0406 98 °F (36.7 °C) 72 18 144/84 98 %   04/22/18 2326 98.4 °F (36.9 °C) 88 18 139/87 98 %   04/22/18 1929 98.5 °F (36.9 °C) 86 18 134/86 99 %   04/22/18 1616 97.5 °F (36.4 °C) 87 18 (!) 152/92 100 %   04/22/18 1251 98 °F (36.7 °C) 91 20 (!) 143/91 98 %   04/22/18 1150 98.5 °F (36.9 °C) 91 23 116/74 96 %   04/22/18 1140 - 96 26 128/77 95 %   04/22/18 1130 - 100 28 142/78 98 %   04/22/18 1120 - (!) 110 25 156/81 97 %   04/22/18 1110 - (!) 117 (!) 40 136/71 100 %   04/22/18 1105 97.8 °F (36.6 °C) (!) 111 19 142/75 99 %   04/22/18 1100 97.8 °F (36.6 °C) (!) 118 27 142/65 95 %        Weight change: 2.359 kg (5 lb 3.2 oz)     04/21 1901 - 04/23 0700  In: 32792.5 [P.O.:890; I.V.:1852.5]  Out: 03947 [Urine:68196]    Intake/Output Summary (Last 24 hours) at 04/23/18 1042  Last data filed at 04/23/18 0921   Gross per 24 hour   Intake            61535 ml   Output            76789 ml   Net            -6850 ml     Physical Exam: alert, oriented x 3 afebrile    HEENT: non icteric  Neck: No JVD  Cardiovascular: regular no rub  C/L: clear ant/lat  : Murillo in place, pink tinge urine/fuid  Ext: no LE edema    Data Review:     LABS:   Hematology: Recent Labs      04/23/18   0332  04/22/18   1401  04/22/18   0233  04/21/18   2234  04/21/18   1100   WBC  6.5   --   7.6   --   11.4   HGB  7.3*  7.4*  7.4*  8.3*  8.4*   HCT  22.3*  23.3*  23.9*  26.1*  26.8*     Chemistry: Recent Labs      04/23/18   0332  04/22/18   0233  04/21/18   1100   BUN  111*  107*  103*   CREA  14.30*  14.10*  14.00*   CA  7.7*  7.7*  8.1*  8.1*   ALB   --   3.0*   --    K  4.9  5.1  4.4   NA  144  143  139   CL  114*  110*  105   CO2  20*  20*  19*   PHOS   --   8.2*  8.2*   GLU  93  96  115*   Hep Bs Ag neg    CULTURE: Urine neg      IMPRESSION AND PLAN:   CKD stage 5. Discuss with pt, no improvement in renal function even after decompression. Suspect this is just continued worsening of his CKD stage 5 and will benefit from starting HD but he is not ready to make any decision. Cont with current conservative management. Refer to IR for HD cath when he is ready to start HD. Anemia post BT stable. Epo when he starts HD otherwise will need to check with his insurance if they will pay for outpt SQ epo.   HTN better cont meds  SHPT cont binders and calcitriol         Mary Jane Carpio MD  4/23/2018

## 2018-04-23 NOTE — OP NOTES
[facility]  OPERATIVE REPORT    Barbara Schuler  MR#: [id number]  : 1970  ACCOUNT #: [de-identified]   DATE OF SERVICE: 2018    PREOPERATIVE DIAGNOSIS:  Clot retention. POSTOPERATIVE DIAGNOSIS:  Clot retention. PROCEDURE PERFORMED:  Cystoscopy and clot irrigation with cautery of bleeders. INDICATIONS:  A 66-year-old -American male who underwent a transperineal prostate biopsy on the  of this month, and by that evening, I was contacted because the patient was unable to void and was passing blood clots. The patient was brought to the emergency room and a Murillo catheter was placed. However, the patient was seen to have a creatinine of 14 and a urine that was nitrite positive. The patient was admitted and has been undergoing observation. The patient's hemoglobin has dropped, and the concern is whether or not the patient has a significant ongoing blood loss in his bladder. His admission CT scan did show debris in the bladder consistent with clot. The patient does have chronic renal failure and his latest pre-biopsy creatinine obtained elsewhere was 8. On admission it was above 14. Electrolytes were normal.  The admission CT scan showed no evidence of obstruction, but attempts to hand irrigate on the floor have been too painful for the patient. The patient was having bladder spasms and is brought now to the operating room for a cystoscopy, clot irrigation and cautery of any visible bleeders. The patient has been counseled regarding the technique and the reasons behind it and the convalescence. He is aware that he will awaken with a catheter still in place. He wishes to proceed and gives his informed consent. SURGEON:  Martha Peters MD     ASSISTANT:  None. BLOOD LOSS:  200 mL. ANESTHESIA:  General.    SPECIMENS:  None. COMPLICATIONS:  None. IMPLANTS:  None. FINDINGS:  Clot retention. The patient is anesthetized via the general LMA route. Timeout is accomplished and verified. The patient is placed in the dorsal lithotomy position, where he is prepped and draped in sterile fashion after removal of the existing Murillo catheter. A rigid cystoscope is inserted and retrograde evaluation carried out, disclosing a normal pendulous and bulbar urethra with acute changes consistent with catheterization. The prostatic fossa is entered and the prostatic fossa is somewhat elongated, and there are some mild inflammatory changes. At the bladder neck at the 6 o'clock position, there is some oozing. As I enter the bladder there is a clot which obscures the visual field. Using an [facility] evacuator, I am able to clear the amount of clot and this perhaps is about 200 mL of volume. Following this, careful examination of the bladder epithelium shows evidence of trabeculation and cellule formation but no intrinsic epithelial lesions and no active bleeding. The trigone was well developed with ureteral orifices bilaterally of normal size, position and contour. Using a Bugbee electrode, I cauterized all of the bladder neck bleeding at the 6 o'clock position, after which within a period of observation, there is no active bleeding either within the bladder or within the prostatic fossa. The bladder was filled. The scope was removed and replaced by a 22-Kinyarwanda 3-way hematuria catheter, which drains clear urine. The procedure is terminated. The patient tolerated the procedure well.       MD JENN Lazo / RUDY  D: 04/22/2018 11:00     T: 04/22/2018 18:51  JOB #: [id number]

## 2018-04-23 NOTE — PROGRESS NOTES
[facility] Hospitalist Group  Progress Note    Patient: Gurinder Zuleta Age: 50 y.o. : 1970 MR#: [id number] SSN: [id number]  Date: 2018     Subjective:     Case d/w nursing. Pt began developing bladder spasms and pain, nurse has had difficulty with irrigation, catheter out. Has had severe pain with spasms. Given multiple rounds of morphine with some relief, but still having spasms. Case d/w urology. Plan for pt to go down to OR @ 5pm today. Recommended placement of 22F hematuria catheter, but pt declines placement of catheter while he is awake. Wife present @ bedside during visits. Update on plan of care. She has great concerns about the pain that pt is going through. Assessment/Plan:   1. Sepsis POA due to UTI: POA. Remains afebrile, WBC remains wnl. Urine cx negative. Positive U/A. Will monitor off abx, has rec'd 3 doses of Rocephin. 2. Urinary retention: patient underwent prostate biopsy 2018 outpatient and developed urinary retentiont. In ED found with +700ml on bladder scan, gross blood with clots in urine after catheterization. Urology consulted. Patient underwent cystoscopy and clot removal this morning. Indwelling cath without hematuria. See above. Plan for return to OR today @ 5pm.     3. Acute blood loss anemia in context of Chronic Anemia: likely exacerbated by prostate biopsy, gross hematuria present on admission. Pt received 1 unit PRBC. Repeat Hgb 7.4. Nephrology following. Patient to receive Epogen with dialysis. H/H sable. 4. Acute kidney injury superimposed on CKD stage 5. Nephrology consulting. Plan for HD cath placement and HD initiation. 5. HTN. Chronic. Uncontrolled. Continue current regimen. BP elevations likely with significant pain component.      Additional Notes:       Case discussed with:  [x]Patient  [x]Family  [x]Nursing  []Case Management  DVT Prophylaxis:  []Lovenox  []Hep SQ  [x]SCDs  []Coumadin   []On Heparin gtt    Objective:   VS:   Visit Vitals    BP (!) 189/93 (BP 1 Location: Right arm, BP Patient Position: At rest)    Pulse 89    Temp 99.5 °F (37.5 °C)    Resp 19    Ht 6' 2\" (1.88 m)    Wt 106.7 kg (235 lb 3.2 oz)    SpO2 98%    BMI 30.2 kg/m2      Tmax/24hrs: Temp (24hrs), Av.3 °F (36.8 °C), Min:97.5 °F (36.4 °C), Max:99.5 °F (37.5 °C)      Intake/Output Summary (Last 24 hours) at 18 1530  Last data filed at 18 4796   Gross per 24 hour   Intake            16920 ml   Output            65389 ml   Net            -6600 ml       General: Awake, alert, slightly uncomfortable until bladder spasm occurs, when pt appears to be in severe pain. Cardiovascular:  RRR  Pulmonary:  CTA B.   GI:  Soft, ND. Some mild TTP suprapubically. NABS. Extremities:  No CT or edema. Labs:    Recent Results (from the past 24 hour(s))   CBC WITH AUTOMATED DIFF    Collection Time: 18  3:32 AM   Result Value Ref Range    WBC 6.5 4.6 - 13.2 K/uL    RBC 2.66 (L) 4.70 - 5.50 M/uL    HGB 7.3 (L) 13.0 - 16.0 g/dL    HCT 22.3 (L) 36.0 - 48.0 %    MCV 83.8 74.0 - 97.0 FL    MCH 27.4 24.0 - 34.0 PG    MCHC 32.7 31.0 - 37.0 g/dL    RDW 15.2 (H) 11.6 - 14.5 %    PLATELET 715 331 - 331 K/uL    MPV 9.8 9.2 - 11.8 FL    NEUTROPHILS 78 (H) 40 - 73 %    LYMPHOCYTES 10 (L) 21 - 52 %    MONOCYTES 11 (H) 3 - 10 %    EOSINOPHILS 1 0 - 5 %    BASOPHILS 0 0 - 2 %    ABS. NEUTROPHILS 5.1 1.8 - 8.0 K/UL    ABS. LYMPHOCYTES 0.6 (L) 0.9 - 3.6 K/UL    ABS. MONOCYTES 0.7 0.05 - 1.2 K/UL    ABS. EOSINOPHILS 0.1 0.0 - 0.4 K/UL    ABS.  BASOPHILS 0.0 0.0 - 0.1 K/UL    DF AUTOMATED     METABOLIC PANEL, BASIC    Collection Time: 18  3:32 AM   Result Value Ref Range    Sodium 144 136 - 145 mmol/L    Potassium 4.9 3.5 - 5.5 mmol/L    Chloride 114 (H) 100 - 108 mmol/L    CO2 20 (L) 21 - 32 mmol/L    Anion gap 10 3.0 - 18 mmol/L    Glucose 93 74 - 99 mg/dL     (H) 7.0 - 18 MG/DL    Creatinine 14.30 (H) 0.6 - 1.3 MG/DL BUN/Creatinine ratio 8 (L) 12 - 20      GFR est AA 4 (L) >60 ml/min/1.73m2    GFR est non-AA 4 (L) >60 ml/min/1.73m2    Calcium 7.7 (L) 8.5 - 10.1 MG/DL       Signed By: Zeina Hendricks MD     April 23, 2018 2:12 PM

## 2018-04-23 NOTE — PROGRESS NOTES
Problem: Falls - Risk of  Goal: *Absence of Falls  Document Jaspal Fall Risk and appropriate interventions in the flowsheet.    Outcome: Progressing Towards Goal  Fall Risk Interventions:  Mobility Interventions: Assess mobility with egress test, Bed/chair exit alarm, Communicate number of staff needed for ambulation/transfer, Patient to call before getting OOB, Strengthening exercises (ROM-active/passive)         Medication Interventions: Assess postural VS orthostatic hypotension, Bed/chair exit alarm, Evaluate medications/consider consulting pharmacy, Teach patient to arise slowly, Patient to call before getting OOB

## 2018-04-23 NOTE — PROGRESS NOTES
By to see  uo still pink/transparent, one clot last night, but didn't require hand irrigation    Hgb still in mid 7 range p transfusion  Creatinine still 14 with no obstructive component    Dr Janene Hand to take over    This visit 15 minutes

## 2018-04-23 NOTE — PROGRESS NOTES
Urology Note    Cystoscopy under gen anes by LMA with fulguration  of bleeding from ectatic and friable prostate channel BV's    24 Fr 3 way Murillo with NSS CBI producing clear drainage from bladder.     Gonzalo Lipscomb MD

## 2018-04-23 NOTE — ANESTHESIA POSTPROCEDURE EVALUATION
Post-Anesthesia Evaluation and Assessment    Patient: Pietro Bryan MRN: [id number]  SSN: [id number]    YOB: 1970  Age: 50 y.o. Sex: male       Cardiovascular Function/Vital Signs  Visit Vitals    /84    Pulse 99    Temp 37.3 °C (99.1 °F)    Resp 26    Ht 6' 2\" (1.88 m)    Wt 106.7 kg (235 lb 3.2 oz)    SpO2 100%    BMI 30.2 kg/m2       Patient is status post general anesthesia for Procedure(s):  CYSTOSCOPY/WITH FULGURATION. Nausea/Vomiting: None    Postoperative hydration reviewed and adequate. Pain:  Pain Scale 1: Numeric (0 - 10) (04/23/18 1405)  Pain Intensity 1: 8 (04/23/18 1405)   Managed    Neurological Status:   Neuro (WDL): Within Defined Limits (04/23/18 1644)   At baseline    Mental Status and Level of Consciousness: Arousable    Pulmonary Status:   O2 Device: Nasal cannula (04/23/18 9493)   Adequate oxygenation and airway patent    Complications related to anesthesia: None    Post-anesthesia assessment completed.  No concerns    Signed By: Ramonita Shone, CRNA     April 23, 2018

## 2018-04-23 NOTE — CDMP QUERY
\"Anemia, from CKD, blood loss, ? fe def\" documented on renal consult. Please clarify the \"blood loss anemia\". Please clarify if this patient is being treated/managed for:    => Acute blood loss anemia  => Other Explanation of clinical findings  => Unable to Determine (no explanation of clinical findings)    The medical record reflects the following:  Risk:  -- prostate biopsy 1 day prior to admit    Clinical Indicators:  -- admitted with hematuria  -- 4/22 progress note by hospitalist: Pt is having a drop in H/H   -- 4/22 op note: At the bladder neck at the 6 o'clock position, there is some oozing  I am able to clear the amount of clot and this perhaps is about 200 mL of volume  -- 4/21/2018 11:00: HGB: 8.4 (L)  -- 4/21/2018 22:34: HGB: 8.3 (L)  -- 4/22/2018 02:33: HGB: 7.4 (L)  -- 4/22/2018 14:01: HGB: 7.4 (L)  -- 4/23/2018 03:32: HGB: 7.3 (L)    Treatment:   -- transfused  -- receiving serial CBC  -- cauterized via cystoscopy    Please clarify and document your clinical opinion in the progress notes and discharge summary including the definitive and/or presumptive diagnosis, (suspected or probable), related to the above clinical findings. Please include clinical findings supporting your diagnosis. If you DECLINE this query or would like to communicate with [facility], please utilize the \"[employer] message box\" at the TOP of the Progress Note on the right.       Thank you,  Andriy Sadler [address] [facility], [address] [place] [address]

## 2018-04-24 LAB
ANION GAP SERPL CALC-SCNC: 10 MMOL/L (ref 3–18)
BASOPHILS # BLD: 0 K/UL (ref 0–0.1)
BASOPHILS NFR BLD: 0 % (ref 0–2)
BUN SERPL-MCNC: 109 MG/DL (ref 7–18)
BUN/CREAT SERPL: 8 (ref 12–20)
CALCIUM SERPL-MCNC: 8 MG/DL (ref 8.5–10.1)
CHLORIDE SERPL-SCNC: 113 MMOL/L (ref 100–108)
CO2 SERPL-SCNC: 21 MMOL/L (ref 21–32)
CREAT SERPL-MCNC: 14.2 MG/DL (ref 0.6–1.3)
DIFFERENTIAL METHOD BLD: ABNORMAL
EOSINOPHIL # BLD: 0 K/UL (ref 0–0.4)
EOSINOPHIL NFR BLD: 0 % (ref 0–5)
ERYTHROCYTE [DISTWIDTH] IN BLOOD BY AUTOMATED COUNT: 14.8 % (ref 11.6–14.5)
GLUCOSE SERPL-MCNC: 150 MG/DL (ref 74–99)
HCT VFR BLD AUTO: 21.6 % (ref 36–48)
HGB BLD-MCNC: 6.7 G/DL (ref 13–16)
LYMPHOCYTES # BLD: 0.2 K/UL (ref 0.9–3.6)
LYMPHOCYTES NFR BLD: 3 % (ref 21–52)
MCH RBC QN AUTO: 26.2 PG (ref 24–34)
MCHC RBC AUTO-ENTMCNC: 31 G/DL (ref 31–37)
MCV RBC AUTO: 84.4 FL (ref 74–97)
MONOCYTES # BLD: 0.1 K/UL (ref 0.05–1.2)
MONOCYTES NFR BLD: 2 % (ref 3–10)
NEUTS SEG # BLD: 6.9 K/UL (ref 1.8–8)
NEUTS SEG NFR BLD: 95 % (ref 40–73)
PLATELET # BLD AUTO: 133 K/UL (ref 135–420)
PMV BLD AUTO: 9.9 FL (ref 9.2–11.8)
POTASSIUM SERPL-SCNC: 5.5 MMOL/L (ref 3.5–5.5)
RBC # BLD AUTO: 2.56 M/UL (ref 4.7–5.5)
SODIUM SERPL-SCNC: 144 MMOL/L (ref 136–145)
WBC # BLD AUTO: 7.3 K/UL (ref 4.6–13.2)

## 2018-04-24 PROCEDURE — 36430 TRANSFUSION BLD/BLD COMPNT: CPT

## 2018-04-24 PROCEDURE — 74011250637 HC RX REV CODE- 250/637: Performed by: HOSPITALIST

## 2018-04-24 PROCEDURE — 74011250637 HC RX REV CODE- 250/637: Performed by: UROLOGY

## 2018-04-24 PROCEDURE — 85025 COMPLETE CBC W/AUTO DIFF WBC: CPT | Performed by: UROLOGY

## 2018-04-24 PROCEDURE — 65270000029 HC RM PRIVATE

## 2018-04-24 PROCEDURE — 36415 COLL VENOUS BLD VENIPUNCTURE: CPT | Performed by: UROLOGY

## 2018-04-24 PROCEDURE — 74011250637 HC RX REV CODE- 250/637: Performed by: INTERNAL MEDICINE

## 2018-04-24 PROCEDURE — P9016 RBC LEUKOCYTES REDUCED: HCPCS | Performed by: INTERNAL MEDICINE

## 2018-04-24 PROCEDURE — 74011250636 HC RX REV CODE- 250/636: Performed by: INTERNAL MEDICINE

## 2018-04-24 PROCEDURE — 80048 BASIC METABOLIC PNL TOTAL CA: CPT | Performed by: UROLOGY

## 2018-04-24 RX ORDER — CIPROFLOXACIN 500 MG/1
500 TABLET ORAL
Status: DISCONTINUED | OUTPATIENT
Start: 2018-04-24 | End: 2018-04-29

## 2018-04-24 RX ORDER — SODIUM CHLORIDE 9 MG/ML
250 INJECTION, SOLUTION INTRAVENOUS AS NEEDED
Status: DISCONTINUED | OUTPATIENT
Start: 2018-04-24 | End: 2018-04-29 | Stop reason: HOSPADM

## 2018-04-24 RX ADMIN — Medication 10 ML: at 05:35

## 2018-04-24 RX ADMIN — ATORVASTATIN CALCIUM 20 MG: 20 TABLET, FILM COATED ORAL at 09:59

## 2018-04-24 RX ADMIN — CALCIUM ACETATE 1334 MG: 667 CAPSULE ORAL at 13:35

## 2018-04-24 RX ADMIN — ERYTHROPOIETIN 20000 UNITS: 20000 INJECTION, SOLUTION INTRAVENOUS; SUBCUTANEOUS at 19:07

## 2018-04-24 RX ADMIN — CALCITRIOL 0.25 MCG: 0.25 CAPSULE, LIQUID FILLED ORAL at 10:01

## 2018-04-24 RX ADMIN — CALCIUM ACETATE 1334 MG: 667 CAPSULE ORAL at 10:00

## 2018-04-24 RX ADMIN — CIPROFLOXACIN HYDROCHLORIDE 500 MG: 500 TABLET, FILM COATED ORAL at 18:59

## 2018-04-24 RX ADMIN — SODIUM BICARBONATE 650 MG TABLET 650 MG: at 18:59

## 2018-04-24 RX ADMIN — CLONIDINE HYDROCHLORIDE 0.1 MG: 0.1 TABLET ORAL at 20:32

## 2018-04-24 RX ADMIN — SODIUM BICARBONATE 650 MG TABLET 650 MG: at 09:57

## 2018-04-24 RX ADMIN — CALCIUM ACETATE 1334 MG: 667 CAPSULE ORAL at 18:59

## 2018-04-24 RX ADMIN — CLONIDINE HYDROCHLORIDE 0.1 MG: 0.1 TABLET ORAL at 13:35

## 2018-04-24 RX ADMIN — Medication 10 ML: at 19:00

## 2018-04-24 RX ADMIN — DOCUSATE SODIUM 100 MG: 100 CAPSULE, LIQUID FILLED ORAL at 18:59

## 2018-04-24 RX ADMIN — Medication 10 ML: at 21:37

## 2018-04-24 RX ADMIN — DOCUSATE SODIUM 100 MG: 100 CAPSULE, LIQUID FILLED ORAL at 10:00

## 2018-04-24 NOTE — PROGRESS NOTES
Received report on pt.from off going RN. Resting quietly in bed on rounds. Denies c/o pain or SOB at this time. 3 way briones patent with continuous CBI flow. Urine light blood tinged color. No acute distress noted. Call bell at side. Will cont to monitor for any changes in status. 1000 CBI maintained and urine remains with slight blood tinge colored. No clots noted. Pt denies c/o pain. Will cont to monitor closely    1200 urine remains light blood tinged. No clots. CBI maintained with continuous flow. 1305 first unit of blood hung. remained at pts bedside for 15 mins. S/S of poss reaction explained to pt and he was inst to call immediately for any problems. 1320 pt denies any s/s of reaction. Will monitor closely for any problems. 1400 tolerating transfusion well. No S/S of reaction. 1645 first unit of blood completed. 1650 second unit hung to infuse. Urine remains light blood tinged and CBI with continuous irrigation     1800 no s/s of transfusion reaction. Wife at bedside. n o change in assessment. 1925 Bedside and Verbal shift change report given to [facility] (oncoming nurse) by Noah Bañuelos, RN (offgoing nurse). Report given with ABHISHEK, Taisha and MAR.

## 2018-04-24 NOTE — PROGRESS NOTES
Urology Note    VS's wnl/ afebrile       Murillo output slight blood tinge- no clots      HCT 21.6  K 4.5  Cr 14.2    Prostate Biopsy path report- pending        Imp: stable     Plan: d/c CBI            D/c Murillo tomorrow if urine remains clot free off CBI    Jorge A Deleon MD

## 2018-04-24 NOTE — OP NOTES
[facility]  OPERATIVE REPORT    Pankaj Dobbins  MR#: [id number]  : 1970  ACCOUNT #: [de-identified]   DATE OF SERVICE: 2018    PREOPERATIVE DIAGNOSIS:  Clot retention. POSTOPERATIVE DIAGNOSIS:  Clot retention. PROCEDURE PERFORMED:  Cystoscopy under anesthesia with clot evacuation and endoscopic fulguration of active bleeding sites. SURGEON:  Millie Corrigan MD    ASSISTANT:  none    ANESTHESIA:  none    ESTIMATED BLOOD LOSS:  Minimal.    SPECIMENS:  None. TUBES AND DRAINS:  A 24-Slovak hematuria 3-way Murillo catheter with saline solution continuous bladder irrigation. COMPLICATIONS:  None. IMPLANTS:  none    INDICATIONS FOR OPERATION:  A 42-year-old gentleman admitted two days earlier in clot retention following uneventful prostate biopsy on 2018 assessing elevated PSA level. Repeated episodes of clot retention and clot obstruction of Murillo catheter prompted endoscopic clot evacuation on 2018 with placement of a three-way continuous bladder irrigation catheter at that time. The patient has clotted off the catheter several times and has developed retention with catheter channels occluded by a blood clot. He is now brought back to the operating room for endoscopic assessment of the bladder and stanching of active bleeding from the bladder sites. DESCRIPTION OF OPERATION:  After establishing adequate general anesthesia by LMA, the patient was placed in the dorsal lithotomy position with legs suspended in [address] [place] [address], which was cushioned with soft rubber padding. The external genitalia were prepped with antibiotic scrub and solution and draped in sterile manner. A 27-Slovak continuous flow resectoscope was passed under direct vision. Inspection showed a bladder markedly trabeculated with multiple cellules and several small diverticula laterally. There were no stones or tumors evident. There were no active bleeding sites from the bladder walls.   Both ureteral orifices were identified visually, both slit-like in appearance and clear urine jets were observed from each orifice. The prostatic channel was elongated and injected with ectatic and friable blood vessels actively bleeding at the time of cystoscopy. The prostatic channel was then fulgurated using a button electrode with energy passed through the continuous flow, bipolar resectoscope cauterizing the entire prostatic channel obtaining strict hemostasis. The enlarged median lobe was reduced using the vapor energy of the electrode, enlarging the bladder outlet and eliminating ectatic blood vessels in the posterior bladder lip. Fifty mL of clotted blood were evacuated from the bladder at the beginning of the procedure using a piston syringe. With saline solution irrigating through the 24-Japanese three-way hematuria catheter, a clear efflux from the bladder was obtained at the conclusion of the procedure. He was brought from the operating room to the recovery room in good condition.       MD CHRISS Leon / Luther Santos  D: 04/23/2018 18:36     T: 04/24/2018 11:48  JOB #: [id number]

## 2018-04-24 NOTE — CDMP QUERY
UTI has been documented. Please clarify:   Please clarify if this patient is being treated/managed for:    => UTI related to procedure   => UTI not related to procedure  => Other Explanation of clinical findings  => Unable to Determine (no explanation of clinical findings)    The medical record reflects the following:  Risk:  -- prostate biopsy 1 day prior to admit    Clinical Indicators:  -- 4/21/2018 11:00  Color: RED  Appearance: BLOODY  Specific gravity: 1.020  pH (UA): 6.0  Protein: >300 (A)  Glucose: 100 (A)  Ketone: NEGATIVE  Blood: LARGE (A)  Bilirubin: SMALL (A)  Urobilinogen: 0.2  Nitrites: POSITIVE (A)  Leukocyte Esterase: NEGATIVE  Epithelial cells: NEGATIVE  WBC: 4 to 5  RBC: TOO NUMEROUS TO C... Bacteria: FEW (A)    -- 4/21 urine culture: NO GROWTH 2 DAYS    Treatment:   -- \"Will monitor off abx, has rec'd 3 doses of Rocephin\" documented by attending on 4/23 pn    Please clarify and document your clinical opinion in the progress notes and discharge summary including the definitive and/or presumptive diagnosis, (suspected or probable), related to the above clinical findings. Please include clinical findings supporting your diagnosis. If you DECLINE this query or would like to communicate with [facility], please utilize the \"[facility] message box\" at the TOP of the Progress Note on the right.       Thank you,  Bienvenido Jackson [facility], [address] [place] [address]

## 2018-04-24 NOTE — PROGRESS NOTES
Problem: Falls - Risk of  Goal: *Absence of Falls  Document Jaspal Fall Risk and appropriate interventions in the flowsheet.    Outcome: Progressing Towards Goal  Fall Risk Interventions:  Mobility Interventions: Assess mobility with egress test, Bed/chair exit alarm, Communicate number of staff needed for ambulation/transfer, Patient to call before getting OOB, PT Consult for mobility concerns, PT Consult for assist device competence, Strengthening exercises (ROM-active/passive)         Medication Interventions: Assess postural VS orthostatic hypotension, Bed/chair exit alarm, Evaluate medications/consider consulting pharmacy, Patient to call before getting OOB, Teach patient to arise slowly    Elimination Interventions: Bed/chair exit alarm, Call light in reach, Elevated toilet seat, Patient to call for help with toileting needs, Toilet paper/wipes in reach, Toileting schedule/hourly rounds

## 2018-04-24 NOTE — CDMP QUERY
There is conflicting documentation. \"SIRS: present on admission, likely non infectious as no fevers, leukocytosis and urine culture with no growth. Improved. Will continue to trend CBC and vitals. \" documented by attending on 4/22 progress notes. \"Sepsis POA due to UTI : POA. Remains afebrile, WBC remains wnl. Urine cx negative. Positive U/A. Will monitor off abx, has rec'd 3 doses of Rocephin\" documented by attending on 4/23 progress notes. Based on the noted clinical findings, the diagnosis of Sepsis may be challenged by an external reviewer. Please document in the progress notes the Clinical Indicators and any associated Acute Organ Dysfunction that supports this diagnosis or state that the diagnosis has been ruled out. The medical record reflects the following:  Risk:  -- admitted with UTI, postprocedural hemorrhage, BLAINE on CKD    Clinical Indicators:  -- WBC WNL  -- afebrile  -- periodic tachycardia  -- hypertensive    Treatment:   -- \"Will monitor off abx, has rec'd 3 doses of Rocephin\" documented by attending on 4/23 pn    Please clarify and document your clinical opinion in the progress notes and discharge summary including the definitive and/or presumptive diagnosis, (suspected or probable), related to the above clinical findings. Please include clinical findings supporting your diagnosis. If you DECLINE this query or would like to communicate with [facility], please utilize the \"[facility] message box\" at the TOP of the Progress Note on the right.       Thank you,  Francesco Stout [address]

## 2018-04-24 NOTE — PROGRESS NOTES
[facility] Hospitalist Group  Progress Note    Patient: Bety Rosales Age: 50 y.o. : 1970 MR#: [id number] SSN: [id number]  Date: 2018     Subjective:     Feels well. No pain c/o. Denies F/C, N/V, CP, SOB, dizziness/lightheadedness. Urology notes reviewed and appreciated. Case d/w nephrology. Pt amenable to discussion re: hemodialysis. Assessment/Plan:   1. Sepsis POA due to UTI: POA. Remains afebrile, WBC remains wnl. Urine cx negative. Positive U/A. Completed 3 days of Rocephin, WBC wnl, no fever. After repeat cysto/fulguration yesterday, placed on empiric Cipro, to continue for 7 days. Organ dysfxn on admission: BLAINE. Tachypnea and tachycardia noted on date of admission. 2. Urinary retention: patient underwent prostate biopsy 2018 outpatient and developed urinary retentiont. In ED found with +700ml on bladder scan, gross blood with clots in urine after catheterization. Urology consulted. Patient underwent cystoscopy and clot removal, developed clotting and severe pain yesterday, and underwent repeat cysto and fulguration of bleeding. Continue to follow UOP. 3. Acute blood loss anemia in context of Chronic Anemia: likely exacerbated by prostate biopsy, gross hematuria present on admission. Pt received 1 unit PRBC. Repeat Hgb 7.4. Nephrology following. Patient to receive Epogen with dialysis. Will xfuse another 2u PRBC from drop in H/H again, as d/w nephrology. 4. Acute kidney injury superimposed on CKD stage 5. Nephrology consulting. Plan for HD cath placement and HD initiation. 5. HTN. Chronic. Uncontrolled. Continue current regimen. If BPs remain elevated throughout today, will increase clonidine dose.      Additional Notes:       Case discussed with:  [x]Patient  []Family  [x]Nursing  []Case Management  DVT Prophylaxis:  []Lovenox  []Hep SQ  [x]SCDs  []Coumadin   []On Heparin gtt    Objective:   VS:   Visit Vitals    BP (!) 142/97 (BP 1 Location: Left arm, BP Patient Position: At rest)    Pulse 71    Temp 98.2 °F (36.8 °C)    Resp 18    Ht 6' 2\" (1.88 m)    Wt 108.8 kg (239 lb 13.8 oz)    SpO2 99%    BMI 30.8 kg/m2      Tmax/24hrs: Temp (24hrs), Av.6 °F (37 °C), Min:97.8 °F (36.6 °C), Max:99.5 °F (37.5 °C)      Intake/Output Summary (Last 24 hours) at 18 1119  Last data filed at 18 0544   Gross per 24 hour   Intake             9700 ml   Output            08384 ml   Net            -2650 ml       General: Awake, alert, NAD. Cardiovascular:  RRR  Pulmonary:  CTA B.   GI:  Soft, NT/ND, NABS. Extremities:  No CT or edema.      Labs:    Recent Results (from the past 24 hour(s))   METABOLIC PANEL, BASIC    Collection Time: 18  9:59 PM   Result Value Ref Range    Sodium 145 136 - 145 mmol/L    Potassium 5.2 3.5 - 5.5 mmol/L    Chloride 116 (H) 100 - 108 mmol/L    CO2 20 (L) 21 - 32 mmol/L    Anion gap 9 3.0 - 18 mmol/L    Glucose 116 (H) 74 - 99 mg/dL     (H) 7.0 - 18 MG/DL    Creatinine 14.30 (H) 0.6 - 1.3 MG/DL    BUN/Creatinine ratio 8 (L) 12 - 20      GFR est AA 4 (L) >60 ml/min/1.73m2    GFR est non-AA 4 (L) >60 ml/min/1.73m2    Calcium 8.0 (L) 8.5 - 10.1 MG/DL   CBC W/O DIFF    Collection Time: 18  9:59 PM   Result Value Ref Range    WBC 8.9 4.6 - 13.2 K/uL    RBC 2.65 (L) 4.70 - 5.50 M/uL    HGB 7.0 (L) 13.0 - 16.0 g/dL    HCT 22.5 (L) 36.0 - 48.0 %    MCV 84.9 74.0 - 97.0 FL    MCH 26.4 24.0 - 34.0 PG    MCHC 31.1 31.0 - 37.0 g/dL    RDW 14.9 (H) 11.6 - 14.5 %    PLATELET 001 292 - 144 K/uL    MPV 9.8 9.2 - 53.0 FL   METABOLIC PANEL, BASIC    Collection Time: 18  2:28 AM   Result Value Ref Range    Sodium 144 136 - 145 mmol/L    Potassium 5.5 3.5 - 5.5 mmol/L    Chloride 113 (H) 100 - 108 mmol/L    CO2 21 21 - 32 mmol/L    Anion gap 10 3.0 - 18 mmol/L    Glucose 150 (H) 74 - 99 mg/dL     (H) 7.0 - 18 MG/DL    Creatinine 14.20 (H) 0.6 - 1.3 MG/DL    BUN/Creatinine ratio 8 (L) 12 - 20      GFR est AA 5 (L) >60 ml/min/1.73m2    GFR est non-AA 4 (L) >60 ml/min/1.73m2    Calcium 8.0 (L) 8.5 - 10.1 MG/DL   CBC WITH AUTOMATED DIFF    Collection Time: 04/24/18  2:28 AM   Result Value Ref Range    WBC 7.3 4.6 - 13.2 K/uL    RBC 2.56 (L) 4.70 - 5.50 M/uL    HGB 6.7 (L) 13.0 - 16.0 g/dL    HCT 21.6 (L) 36.0 - 48.0 %    MCV 84.4 74.0 - 97.0 FL    MCH 26.2 24.0 - 34.0 PG    MCHC 31.0 31.0 - 37.0 g/dL    RDW 14.8 (H) 11.6 - 14.5 %    PLATELET 550 (L) 452 - 420 K/uL    MPV 9.9 9.2 - 11.8 FL    NEUTROPHILS 95 (H) 40 - 73 %    LYMPHOCYTES 3 (L) 21 - 52 %    MONOCYTES 2 (L) 3 - 10 %    EOSINOPHILS 0 0 - 5 %    BASOPHILS 0 0 - 2 %    ABS. NEUTROPHILS 6.9 1.8 - 8.0 K/UL    ABS. LYMPHOCYTES 0.2 (L) 0.9 - 3.6 K/UL    ABS. MONOCYTES 0.1 0.05 - 1.2 K/UL    ABS. EOSINOPHILS 0.0 0.0 - 0.4 K/UL    ABS.  BASOPHILS 0.0 0.0 - 0.1 K/UL    DF AUTOMATED         Signed By: Christine Mercado MD     April 24, 2018 2:12 PM

## 2018-04-24 NOTE — PROGRESS NOTES
RENAL DAILY PROGRESS NOTE    Subjective:   Admitted for urinary retention, seen for BLAINE/CKD Stage 5    Complaint: Events of yesterday noted, more comfortable, cont with UB irrigation.  Pt denies any CP or SOB    Current Facility-Administered Medications   Medication Dose Route Frequency    0.9% sodium chloride infusion 250 mL  250 mL IntraVENous PRN    sodium chloride (NS) flush 5-10 mL  5-10 mL IntraVENous Q8H    sodium chloride (NS) flush 5-10 mL  5-10 mL IntraVENous PRN    ciprofloxacin HCl (CIPRO) tablet 500 mg  500 mg Oral Q24H    acetaminophen (TYLENOL) tablet 650 mg  650 mg Oral Q4H PRN    HYDROcodone-acetaminophen (NORCO) 5-325 mg per tablet 1 Tab  1 Tab Oral Q4H PRN    morphine injection 5 mg  5 mg IntraVENous Q6H PRN    naloxone (NARCAN) injection 0.4 mg  0.4 mg IntraVENous PRN    ondansetron (ZOFRAN) injection 4 mg  4 mg IntraVENous Q4H PRN    zolpidem (AMBIEN) tablet 5 mg  5 mg Oral QHS PRN    0.9% sodium chloride infusion 250 mL  250 mL IntraVENous PRN    hydrALAZINE (APRESOLINE) 20 mg/mL injection 10 mg  10 mg IntraVENous Q6H PRN    opium-belladonna (B&O 15-A) 16.2-30 mg suppository 1 Suppository  1 Suppository Rectal Q8H PRN    calcitRIOL (ROCALTROL) capsule 0.25 mcg  0.25 mcg Oral DAILY    allopurinol (ZYLOPRIM) tablet 100 mg  100 mg Oral Once per day on Mon Wed Fri    calcium acetate (PHOSLO) capsule 1,334 mg  2 Cap Oral TID WITH MEALS    sodium bicarbonate tablet 650 mg  650 mg Oral BID    cloNIDine HCl (CATAPRES) tablet 0.1 mg  0.1 mg Oral Q8H    docusate sodium (COLACE) capsule 100 mg  100 mg Oral BID    atorvastatin (LIPITOR) tablet 20 mg  20 mg Oral DAILY    HYDROcodone-acetaminophen (NORCO) 5-325 mg per tablet 1 Tab  1 Tab Oral Q6H PRN    cyclobenzaprine (FLEXERIL) tablet 10 mg  10 mg Oral TID PRN    morphine injection 2 mg  2 mg IntraVENous Q3H PRN    naloxone (NARCAN) injection 0.4 mg  0.4 mg IntraVENous EVERY 2 MINUTES AS NEEDED           Objective:     Patient Vitals for the past 24 hrs:   Temp Pulse Resp BP SpO2   04/24/18 0758 98.2 °F (36.8 °C) 71 18 (!) 142/97 99 %   04/24/18 0445 97.8 °F (36.6 °C) 73 18 132/85 99 %   04/24/18 0000 98 °F (36.7 °C) 76 18 132/84 99 %   04/23/18 2009 99.1 °F (37.3 °C) 78 20 (!) 153/94 97 %   04/23/18 1907 - 86 26 147/87 98 %   04/23/18 1857 - 84 24 (!) 143/91 98 %   04/23/18 1847 - 83 22 143/89 98 %   04/23/18 1837 - 93 23 (!) 152/99 100 %   04/23/18 1827 - 97 24 (!) 153/100 99 %   04/23/18 1820 - 100 21 - 100 %   04/23/18 1817 - - - (!) 149/91 100 %   04/23/18 1813 99.1 °F (37.3 °C) 99 26 139/84 100 %   04/23/18 1807 99.1 °F (37.3 °C) (!) 104 21 139/84 91 %   04/23/18 1552 98.2 °F (36.8 °C) (!) 102 20 159/89 97 %   04/23/18 1450 - - 19 (!) 189/93 98 %   04/23/18 1410 99.5 °F (37.5 °C) 89 20 (!) 161/91 97 %   04/23/18 1132 98.2 °F (36.8 °C) 87 20 (!) 148/95 98 %        Weight change: 2.114 kg (4 lb 10.6 oz)     04/22 1901 - 04/24 0700  In: 94141 [P.O.:700;  I.V.:300]  Out: 71842 [Urine:76096]    Intake/Output Summary (Last 24 hours) at 04/24/18 1126  Last data filed at 04/24/18 0544   Gross per 24 hour   Intake             9700 ml   Output            83870 ml   Net            -2650 ml     Physical Exam: alert, oriented x 3 afebrile, not in any resp distress    HEENT: non icteric  Neck: No JVD  Cardiovascular: regular no rub  C/L: clear ant/lat  : Murillo in place, pink tinge urine/fuid  Ext: no LE edema    Data Review:     LABS:   Hematology:   Recent Labs      04/24/18 0228 04/23/18 2159 04/23/18   0332  04/22/18   1401  04/22/18   0233  04/21/18   2234   WBC  7.3  8.9  6.5   --   7.6   --    HGB  6.7*  7.0*  7.3*  7.4*  7.4*  8.3*   HCT  21.6*  22.5*  22.3*  23.3*  23.9*  26.1*   Pl 133K  Chemistry:   Recent Labs      04/24/18 0228 04/23/18 2159 04/23/18 0332 04/22/18   0233   BUN  109*  108*  111*  107*   CREA  14.20*  14.30*  14.30*  14.10*   CA  8.0*  8.0*  7.7*  7.7*   ALB   --    --    --   3.0*   K  5.5  5.2  4.9 5.1   NA  144  145  144  143   CL  113*  116*  114*  110*   CO2  21  20*  20*  20*   PHOS   --    --    --   8.2*   GLU  150*  116*  93  96   Hep Bs Ag neg    CULTURE: Urine neg      IMPRESSION AND PLAN:   CKD stage 5. Discuss with pt at length re benefit of starting dialysis, options for dialysis. Answered all his questions and concerns. Willing to start dialysis if needed but still undecided about when to start and choice of modality. I told him best option is to have a [facility] and start HD while he decides on modality. Cont with current conservative management. Anemia sched for 2 units BT today.  Give SQ Epo today until he starts HD  HTN better cont meds  SHPT cont binders and calcitriol         Adina Patton MD  4/24/2018

## 2018-04-24 NOTE — PROGRESS NOTES
conducted an initial consultation and Spiritual Assessment for Mali eRese, who is a 50 y.o.,male. Patients Primary Language is: [place]. According to the patients EMR Caodaism Affiliation is: No preference. The reason the Patient came to the hospital is:   Patient Active Problem List    Diagnosis Date Noted    Urinary retention 04/21/2018    UTI (urinary tract infection) 04/21/2018    Hematuria 04/21/2018    Acute kidney injury ([facility] [address] 04/21/2018    HTN, goal below 130/80 02/10/2017        The  provided the following Interventions:  Initiated a relationship of care and support. Explored issues of vik, belief, spirituality and Adventist/ritual needs while hospitalized. Listened empathically. Provided information about Spiritual Care Services. Offered prayer and assurance of continued prayers on patient's behalf. Chart reviewed. The following outcomes where achieved:   confirmed Patient's Caodaism Affiliation. Patient expressed gratitude for 's visit. Assessment:  Patient's vik in God is very important for him to cope. He has strong support from his spouse, Audra. Plan:  Chaplains will continue to follow and will provide pastoral care on an as needed/requested basis.       [address]  ([phone])

## 2018-04-25 LAB
ABO + RH BLD: NORMAL
ANION GAP SERPL CALC-SCNC: 11 MMOL/L (ref 3–18)
BLD PROD TYP BPU: NORMAL
BLOOD GROUP ANTIBODIES SERPL: NORMAL
BPU ID: NORMAL
BUN SERPL-MCNC: 102 MG/DL (ref 7–18)
BUN/CREAT SERPL: 8 (ref 12–20)
CALCIUM SERPL-MCNC: 7.7 MG/DL (ref 8.5–10.1)
CALLED TO:,BCALL1: NORMAL
CHLORIDE SERPL-SCNC: 111 MMOL/L (ref 100–108)
CO2 SERPL-SCNC: 20 MMOL/L (ref 21–32)
CREAT SERPL-MCNC: 13.5 MG/DL (ref 0.6–1.3)
CROSSMATCH RESULT,%XM: NORMAL
ERYTHROCYTE [DISTWIDTH] IN BLOOD BY AUTOMATED COUNT: 15.6 % (ref 11.6–14.5)
GLUCOSE SERPL-MCNC: 95 MG/DL (ref 74–99)
HCT VFR BLD AUTO: 25.7 % (ref 36–48)
HGB BLD-MCNC: 8.3 G/DL (ref 13–16)
MCH RBC QN AUTO: 26.2 PG (ref 24–34)
MCHC RBC AUTO-ENTMCNC: 32.3 G/DL (ref 31–37)
MCV RBC AUTO: 81.1 FL (ref 74–97)
PLATELET # BLD AUTO: 127 K/UL (ref 135–420)
PMV BLD AUTO: 10.2 FL (ref 9.2–11.8)
POTASSIUM SERPL-SCNC: 4.6 MMOL/L (ref 3.5–5.5)
RBC # BLD AUTO: 3.17 M/UL (ref 4.7–5.5)
SODIUM SERPL-SCNC: 142 MMOL/L (ref 136–145)
SPECIMEN EXP DATE BLD: NORMAL
STATUS OF UNIT,%ST: NORMAL
UNIT DIVISION, %UDIV: 0
WBC # BLD AUTO: 8.4 K/UL (ref 4.6–13.2)

## 2018-04-25 PROCEDURE — 36415 COLL VENOUS BLD VENIPUNCTURE: CPT | Performed by: UROLOGY

## 2018-04-25 PROCEDURE — 74011250637 HC RX REV CODE- 250/637: Performed by: HOSPITALIST

## 2018-04-25 PROCEDURE — 74011250637 HC RX REV CODE- 250/637: Performed by: INTERNAL MEDICINE

## 2018-04-25 PROCEDURE — 74011250636 HC RX REV CODE- 250/636: Performed by: UROLOGY

## 2018-04-25 PROCEDURE — 80048 BASIC METABOLIC PNL TOTAL CA: CPT | Performed by: UROLOGY

## 2018-04-25 PROCEDURE — 85027 COMPLETE CBC AUTOMATED: CPT | Performed by: UROLOGY

## 2018-04-25 PROCEDURE — 74011250636 HC RX REV CODE- 250/636: Performed by: INTERNAL MEDICINE

## 2018-04-25 PROCEDURE — 65270000029 HC RM PRIVATE

## 2018-04-25 PROCEDURE — 74011250637 HC RX REV CODE- 250/637: Performed by: UROLOGY

## 2018-04-25 RX ORDER — HYDRALAZINE HYDROCHLORIDE 50 MG/1
50 TABLET, FILM COATED ORAL EVERY 8 HOURS
Status: DISCONTINUED | OUTPATIENT
Start: 2018-04-25 | End: 2018-04-26

## 2018-04-25 RX ORDER — AMLODIPINE BESYLATE 5 MG/1
5 TABLET ORAL DAILY
Status: DISCONTINUED | OUTPATIENT
Start: 2018-04-25 | End: 2018-04-26

## 2018-04-25 RX ADMIN — CIPROFLOXACIN HYDROCHLORIDE 500 MG: 500 TABLET, FILM COATED ORAL at 09:56

## 2018-04-25 RX ADMIN — HYDROCODONE BITARTRATE AND ACETAMINOPHEN 1 TABLET: 5; 325 TABLET ORAL at 18:39

## 2018-04-25 RX ADMIN — SODIUM BICARBONATE 650 MG TABLET 650 MG: at 18:39

## 2018-04-25 RX ADMIN — CALCIUM ACETATE 1334 MG: 667 CAPSULE ORAL at 13:02

## 2018-04-25 RX ADMIN — DOCUSATE SODIUM 100 MG: 100 CAPSULE, LIQUID FILLED ORAL at 09:56

## 2018-04-25 RX ADMIN — Medication 10 ML: at 06:10

## 2018-04-25 RX ADMIN — AMLODIPINE BESYLATE 5 MG: 5 TABLET ORAL at 15:49

## 2018-04-25 RX ADMIN — CALCIUM ACETATE 1334 MG: 667 CAPSULE ORAL at 18:38

## 2018-04-25 RX ADMIN — Medication 10 ML: at 15:51

## 2018-04-25 RX ADMIN — HYDRALAZINE HYDROCHLORIDE 50 MG: 50 TABLET, FILM COATED ORAL at 15:49

## 2018-04-25 RX ADMIN — MORPHINE SULFATE 5 MG: 10 INJECTION, SOLUTION INTRAMUSCULAR; INTRAVENOUS at 04:09

## 2018-04-25 RX ADMIN — CALCIUM ACETATE 1334 MG: 667 CAPSULE ORAL at 09:56

## 2018-04-25 RX ADMIN — CLONIDINE HYDROCHLORIDE 0.1 MG: 0.1 TABLET ORAL at 22:11

## 2018-04-25 RX ADMIN — SODIUM BICARBONATE 650 MG TABLET 650 MG: at 09:56

## 2018-04-25 RX ADMIN — CALCITRIOL 0.25 MCG: 0.25 CAPSULE, LIQUID FILLED ORAL at 09:56

## 2018-04-25 RX ADMIN — CLONIDINE HYDROCHLORIDE 0.1 MG: 0.1 TABLET ORAL at 15:48

## 2018-04-25 RX ADMIN — ATORVASTATIN CALCIUM 20 MG: 20 TABLET, FILM COATED ORAL at 09:56

## 2018-04-25 RX ADMIN — HYDRALAZINE HYDROCHLORIDE 50 MG: 50 TABLET, FILM COATED ORAL at 22:11

## 2018-04-25 RX ADMIN — Medication 10 ML: at 22:30

## 2018-04-25 RX ADMIN — HYDRALAZINE HYDROCHLORIDE 10 MG: 20 INJECTION INTRAMUSCULAR; INTRAVENOUS at 03:48

## 2018-04-25 RX ADMIN — CLONIDINE HYDROCHLORIDE 0.1 MG: 0.1 TABLET ORAL at 06:10

## 2018-04-25 RX ADMIN — MORPHINE SULFATE 5 MG: 10 INJECTION, SOLUTION INTRAMUSCULAR; INTRAVENOUS at 22:12

## 2018-04-25 RX ADMIN — ALLOPURINOL 100 MG: 100 TABLET ORAL at 19:12

## 2018-04-25 NOTE — PROGRESS NOTES
Urology Note    VS's wnl/ afebrile    Murillo output blood tinged- no clots    Prostate Biopsy Pathology Report: benign histology- no malignancy identified in 30 cores    Imp: stable s/p prostate biopsy/endoscoic fulguration    Plan:  Murillo d/c'ed today           OK from urology viewpoint to discharge to home      rtc 4 weeks- needs repeat PSA testing/surveillance    Dasia Randolph MD

## 2018-04-25 NOTE — ADT AUTH CERT NOTES
Patient Demographics        Patient Name [address] Sex  Address Phone       Keim Gill [phone] Male 1970 [address] [phone] (Home)  [phone] (Mobile)           CSN:       [id number]           Admit Date: Admit Time Room Bed       2018  9:59  [19773] 01 [77450]           Attending Providers        Provider Pager From To       Leonardo Mahmood DO  18       Yahaira Llanes MD  18       Kitty Ortiz MD  18       Tramaine Miller MD  18            Emergency Contact(s)        Name Relation Home Work Mobile     Rey Gee Spouse   [phone]       Vicky Vasquez [phone]           Utilization Review           Zander Bucio Adult-Anemia/Bleeding (2018) by [employer]        Review Entered Review Status       2018 In Primary       Details         REVIEW SUMMARY     Patient: Luis Bar  Review Number: [id number]  Review Status:  In Primary     Condition Specific: Yes     Condition Level Of Care Code: ACUTE  Condition Level Of Care Description: Acute        OUTCOMES  Outcome Type: Primary           REVIEW DETAILS     Service Date: 2018  Admit Date: 2018  Product: Zander Bucio Adult  Subset: Anemia/Bleeding      (Symptom or finding within 24h)         (Excludes PO medications unless noted)          [X] Select Day, One:              [X] Episode Day 1, One:                  [X] ACUTE, >= One:                      [X] Anemia, unknown etiology, Both:                          [X] Finding, >= One:                              [X] Hct < 25%(0.25) or Hb < 8.3 g/dL(83 g/L) and age >= 65 yrs (excludes chronic anemia)                              ~--Admin, IQ Admin Admin on 2018 03:05 PM--~                              hgb 6.7, hct 21.6                                      [X] Intervention, Both:                              [X] Blood product transfusion ~--Admin,  Pin Doyle Lv on 04- 03:05 PM--~                              Anemia sched for 2 units BT today                                          [X] Hct or Hb monitoring at least 2x/24h                              ~--Admin, IQ Admin Admin on 04- 03:07 PM--~                              hgb 6.7, hct 21.6                              hgb 7.0, hct 22.5                     Version: InterQual® 2017.2  Chris Randolph  © 2017 [employer] and/or one of its subsidiaries. All Rights Reserved. CPT only © 2016 American Medical Association. All Rights Reserved.              Additional Notes       Clinical Date Reviewed: 4/24/2018                LOS; Status; Review Type:  Medical/Inpatient/CS              Vital Signs: 145/89, 98.3, 80, 18, 98% RA               Abn. Findings LABS/RADIOLOGY:       Rbc 2.56, rdw 14.8, platelet 679, neut 95, lymph 3, mono 2, abs lymph 0.2, chloride 113, glucose 150, bun 109, creatinine 14.20, bun/creat ratio 8, calcium 8.0       hgb 6.7, hct 21.6       hgb 7.0, hct 22.5              Meds:       Lipitor 20mg qd po       Calcitriol 0.25mcg qd po       Phoslo 1334mg tid po       Cipro 500mg q18hrs po       Clonidine 0.1mg q8hrs po       Sodium bicarbonate 650mg bid po       Pyridum 100mg tid po              [facility]ILY [facility]ICE Progress Notes Date of Service: 04/24/18 1119              Assessment/Plan:       1. Sepsis POA due to UTI: POA. Remains afebrile, WBC remains wnl. Urine cx negative. Positive U/A. Completed 3 days of Rocephin, WBC wnl, no fever. After repeat cysto/fulguration yesterday, placed on empiric Cipro, to continue for 7 days. Organ dysfxn on admission: BLAINE. Tachypnea and tachycardia noted on date of admission.                  2. Urinary retention: patient underwent prostate biopsy 4/20/2018 outpatient and developed urinary retentiont.  In ED found with +700ml on bladder scan, gross blood with clots in urine after catheterization. Urology consulted. Patient underwent cystoscopy and clot removal, developed clotting and severe pain yesterday, and underwent repeat cysto and fulguration of bleeding. Continue to follow UOP.               3. Acute blood loss anemia in context of Chronic Anemia: likely exacerbated by prostate biopsy, gross hematuria present on admission.  Pt received 1 unit PRBC. Repeat Hgb 7.4. Nephrology following. Patient to receive Epogen with dialysis. Will xfuse another 2u PRBC from drop in H/H again, as d/w nephrology.                4. Acute kidney injury superimposed on CKD stage 5. Nephrology consulting. Plan for HD cath placement and HD initiation.                5. HTN. Chronic. Uncontrolled. Continue current regimen. If BPs remain elevated throughout today, will increase clonidine dose.               Nephrology Progress Notes Date of Service: 04/24/18 1126              Admitted for urinary retention, seen for BLAINE/CKD Stage 5              IMPRESSION AND PLAN:        \" CKD stage 5. Discuss with pt at length re benefit of starting dialysis, options for dialysis. Answered all his questions and concerns. Willing to start dialysis if needed but still undecided about when to start and choice of modality. I told him best option is to have a [facility] place and start HD while he decides on modality. Cont with current conservative management.       \" Anemia sched for 2 units BT today.  Give SQ Epo today until he starts HD       \" HTN better cont meds       \" SHPT cont binders and calcitriol              Op Notes Date of Service: 04/24/18 1148              DATE OF SERVICE: 04/23/2018               PREOPERATIVE DIAGNOSIS:  Clot retention.               POSTOPERATIVE DIAGNOSIS:  Clot retention.               PROCEDURE PERFORMED:  Cystoscopy under anesthesia with clot evacuation and endoscopic fulguration of active bleeding sites.               SURGEON: Hailee Hector MD.               TUBES AND DRAINS:  A 24-Jamaican hematuria 3-way Murillo catheter with  saline solution and continuous bladder irrigation.               COMPLICATIONS:  None.           LOC:Acute Adult-General Medical (4/23/2018) by Deja Dang RN        Review Entered Review Status       4/23/2018 In Primary       Details         REVIEW SUMMARY     Patient: Luis Bar  Review Number: [id number]  Review Status: In Primary     Condition Specific: Yes     Condition Level Of Care Code: ACUTE  Condition Level Of Care Description: Acute        OUTCOMES  Outcome Type: Primary           REVIEW DETAILS     Service Date: 04/23/2018  Admit Date: 04/21/2018  Product: Zander Bucio Adult  Subset: General Medical      (Symptom or finding within 24h)         (Excludes PO medications unless noted)          Select Day, One:              [ ] Episode Day 3-X, One:                  [ ] ACUTE, >= One:                      [ ] Genitourinary, One:                          [ ] Partial responder, not clinically stable for discharge and requires continued stay, >= One:                              [ ] Acute kidney injury, Both:                                  [X] Finding, >= One:                                      [X] Creatinine, One:                                          [X] >= 1.5x baseline and > ULN                                          ~--Admin, IQ Admin Admin on 04- 01:31 PM--~                                          Creat 14.30                                              [ ] Intervention, >= One:                                      [ ] Dialysis (initial) and <= 7d since initiation, One:                                      ~--Admin, IQ Admin Admin on 04- 01:32 PM--~                                      will benefit from starting HD but he is not ready to make any decision. Cont with current conservative management.                 Version: InterQual® 2017.2  [facility]  © 2017 [employer] and/or one of its subsidiaries. All Rights Reserved. CPT only © 2016 American Medical Association. All Rights Reserved.              Additional Notes       Clinical Date Reviewed:   4/23/18              LOS; Status; Review Type:  Medical/Inpt/CS              Vital Signs:  148/95, 98.2, 87, 20, 98%RA              Abn. Findings LABS/RADIOLOGY:       H&H 7.3/22.3, Neut 78, Lymph 10, Mono 11, ABS Lymph 0.6, Chl 114, CO2 20, , Creat 14.30, Suman 7.7, GFR 4              Meds:       Lipitor 20mg PO Daily       Rocaltrol 0.25mcg PO Daily       Phoslo 1334mg PO TID       Rocephin 1g IV Q24hrs       Catapres 0.1mg PO Q8hrs       Colace 100mg PO BID       Morphine 2mg IV Q3hrs PRN X2        Opium-belladonna 16.2-30mg RE Q8hrs PRN X1       Pyridium 100mg PO TID       Sodium Bicarb 650mg PO BID       Allopurinol 100mg PO Mon, Wed, Fri              Urology Progress Notes Date of Service: 04/23/18 5952              uo still pink/transparent, one clot last night, but didn't require hand irrigation               Hgb still in mid 7 range p transfusion       Creatinine still 14 with no obstructive component.       Nephrology Progress Notes Date of Service: 04/23/18 1042              Subjective:       Admitted for urinary retention, seen for BLAINE/CKD Stage 5                       IMPRESSION AND PLAN:        CKD stage 5. Discuss with pt, no improvement in renal function even after decompression. Suspect this is just continued worsening of his CKD stage 5 and will benefit from starting HD but he is not ready to make any decision. Cont with current conservative management. Refer to IR for HD cath when he is ready to start HD.       Anemia post BT stable.  Epo when he starts HD otherwise will need to check with his insurance if they will pay for outpt SQ epo.       HTN better cont meds       SHPT cont binders and calcitriol           LOC:Acute Adult-General Medical (4/22/2018) by Portia Templeton RN        Review Entered Review Status       4/23/2018 In Primary       Details         REVIEW SUMMARY     Patient: Elana Martinez  Review Number: [id number]  Review Status: In Primary     Condition Specific: Yes     Condition Level Of Care Code: ACUTE  Condition Level Of Care Description: Acute        OUTCOMES  Outcome Type: Primary           REVIEW DETAILS     Service Date: 04/22/2018  Admit Date: 04/21/2018  Product: Sarah Benton Adult  Subset: General Medical      (Symptom or finding within 24h)         (Excludes PO medications unless noted)          [X] Select Day, One:              [X] Episode Day 2,  One:                  [X] ACUTE, >= One:                      [X] Genitourinary, One:                          [X] Partial responder, not clinically stable for discharge and requires continued stay, >= One:                              [X] Acute kidney injury, Both:                                  [X] Finding, >= One:                                      [X] Creatinine, One:                                          [X] >= 1.5x baseline and > ULN                                          ~--Admin, IQ Admin Admin on 04- 01:28 PM--~                                          Creat 14.10                                              [X] Intervention, >= One:                                      [X] Volume expander <= 2d                                      ~--Admin, IQ Admin Admin on 04- 01:28 PM--~                                      NS IV 150mL hr                 Version: CondomaniQual® 2017.2  Kathy Guido  © 2017 [employer] and/or one of its [place]. All Rights Reserved. CPT only © 2016 American Medical Association. All Rights Reserved.              Additional Notes       Clinical Date Reviewed:   4/22/18              LOS; Status; Review Type:  Med/Inpt/CS              Vital Signs:  143/91, 98.5, 86, 18, 98%RA              Abn. Findings LABS/RADIOLOGY:       H&H 7.4/23.9, Chl 110, CO2 20, , Creat 14.10, Suman 7.7, Phos 8.2, GFR 4, Prot 6.2, Alb 3.0, ALT 12, AST 14       POC        Repeat H&H 7.4/23. 3              Meds:       1u PRBC's       Lipitor 20mg PO Daily       Phoslo 1334mg PO TID       Rocephin 1g IV Q24hrs       Catapres 0.1mg PO Q8hrs       Colace 100mg PO BID       Moprhine 2mg IV Q3hrs PRN X2 (3X 24hrs)       Pyridium 100mg PO TID       Sodium Bicarb 650mg PO BID       Morphine 2mg IV        Pyridium 200mg PO       NS IV 150mL hr       Apresoline 100mg PO Q8hrs              [facility] Progress Notes Date of Service: 04/22/18 0426              Brief Progress Notes               Successfully placed 20fr 3-way briones catheter and started continuous bladder irrigation. Nursing instructed to increase irrigation rate until fluid from briones is running clear. 1mg morphine given prior to start of catheter exchange.  Pyridium for mucosa irritation ordered now (200mg) and TID (100mg).               Hospitalist Progress Notes Date of Service: 04/22/18 0729              Pt is having a drop in H/H        Holding BP meds except clonidine        Will send for type and screen       Hydralazine PRN               Worsening Renal function        Will call Nephrology and transfuse 2 units prbcs        D/W Urology       Urology Brief Op Note Date of Service: 04/22/18 1053              Procedure(s):       CYSTOSCOPY and clot removal       Findings: clot retention       Nephrology Progress Notes Date of Service: 04/22/18 1341                      Subjective:       Admitted for urinary retention seen for BLAIEN/CKD stage 5              IMPRESSION AND PLAN:        BLAINE/CKD5 discussed with wife and pt. No evidence of hydronephrosis and he is now post decompression. No further improvement in renal function. This is most likely continued worsening of renal function ( crea was already 8 in 11/2017).  He has been non compliant with office follow up and has not wanted to prepare for dialysis but wife says they had talk about it last night and she thinks he is willing to do it this time (pt still to sleepy to confirm). Will tentatively plan for HD cath placement tomorrow or Tuesday. For now trend lytes, start bicarb, repeat labs       Anemia decent fe stores, post 1 unit PRBC. Will start epo with dialysis if he agrees to start.       SHPT from CKD and Vit D def, start calcitriol po or IV hectorol if he starts dialysis. Start phos binder       HTN cont meds        Hyperuricemia dec allopurinol dose to adjust to renal function, adjust to  Low purine diet       Nurse Practitioner Progress Notes Date of Service: 04/22/18 1411              Subjective:        I have examined the patient at bedside. Pt is alert, NAD. Wife at bedside. Has no complaints, denies chest pain, abd david, n/v/d,                Assessment/Plan:       1. SIRS: present on admission, likely non infectious as no fevers, leukocytosis and urine culture with no growth. Improved. Will continue to trend CBC and vitals.                 2. Urinary retention: patient underwent prostate biopsy 4/20/2018 outpatient and developed urinary retentiont. In ED found with +700ml on bladder scan, gross blood with clots in urine after catheterization. Urology consulted. Patient underwent cystoscopy and clot removal this morning. Indwelling cath without hematuria. Will continue continuous bladder irrigation. Pain management.                3. Acute on Chronic Anemia: likely exacerbated by prostate biopsy, gross hematuria present on admission.  Pt received 1 unit PRBC. Repeat Hgb 7.4. Nephrology following. Patient to receive Epogen with dialysis. Will continue to trend H/H.                4. Acute kidney injury superimposed on CKD stage 5. Nephrology consulting. Per nephrology note, will tentatively plan for HD cath placement tomorrow or Tuesday. Initiated on Bicarb, calcitriol, phos binders. Renal/ Low purine diet. Will trend CBC, BMP, mg,                5. HTN. Chronic. Uncontrolled.  Continue current regimen.             6. UTI. Present on admission. Continue Rocephin. Preliminary urine culture with no growth after 24 hours.  Awaiting final urine culture.               Urology Op Notes Date of Service: 04/22/18 5303              I am called in to see this 58-year-old -American male who was admitted to the emergency room with urinary retention and clots.               PERTINENT HISTORY:  Reveals that on the 20th, the patient underwent a transperineal ultrasound-guided prostate biopsy for a PSA of 6.9.  This was done under anesthesia.  The patient went home and developed difficulty with voiding and by the time I received a telephone call, the patient had gone over 10 hours without voiding and was having some clots.  The patient was brought to the Emergency Room where a catheter was placed and over 1000 mL was removed.  At that time, the patient's CT scan showed amorphous debris in the bladder consistent with clot, but no upper tract ectasia.               The patient has a history of chronic renal insufficiency and has been somewhat spotty in his followup.  The most recent outpatient creatinine that I could identify in the chart was elsewhere and it was 8.0.  On admission, the patient's creatinine was over 14 and the patient had a hemoglobin level of about, which has subsequently come down.       PE:       PHYSICAL EXAMINATION:  Reveals sinus tachycardia and clear lungs.  The abdomen is normal except for suspected bladder distention.  External genitalia revealed a circumcised phallus with normal scrotal contents and a Murillo catheter in place.  Extremities generally normal.                 After the physical exam, I noticed that there is a 3-way Murillo catheter in place, but the irrigation has been cut off and there appears to be nothing coming out of the bladder and the catheter bag tubing has got clots in it.  A gentle irrigation with 30 mL of saline does not produce any result and the patient developed bladder spasms.  I administered 2 mg of morphine and after a period of time, I administer 2 more mg of morphine, but it is apparent that the patient will not tolerate removal of this catheter and placement of a hematuria catheter with the attendant irrigation unless I do this under anesthesia.  The patient has signed a consent prior to the administration of the morphine and I had discussed with him my plans and the preparation and technique.                The patient will be brought down to the operating room where under anesthesia, we will remove that catheter, do a cystoscopy, irrigate clots, cauterize any visible bleeding and then place a hematuria catheter.  The patient wishes to proceed.  I have discussed the case with the patient and with the hospitalist and I have reviewed all available information in the chart, both his current information and the information from outside facilities.                Urology Op Notes Date of Service: 04/22/18 1851              PREOPERATIVE DIAGNOSIS:  Clot retention.               POSTOPERATIVE DIAGNOSIS:  Clot retention.               PROCEDURE PERFORMED:  Cystoscopy and clot irrigation with cautery of bleeders.                 INDICATIONS:  A 73-year-old -American male who underwent a transperineal prostate biopsy on the 20th of this month, and by that evening, I was contacted because the patient was unable to void and was passing blood clots.  The patient was brought to the emergency room and a Murillo catheter was placed. Jone Sera, the patient was seen to have a creatinine of 14 and a urine that was nitrite positive.  The patient was admitted and has been undergoing observation. [facility], and the concern is whether or not the patient has a significant ongoing blood loss in his bladder.  His admission CT scan did show debris in the bladder consistent with clot.  The patient does have chronic renal failure and his latest pre-biopsy creatinine obtained elsewhere was 8.  On admission it was above 14.  Electrolytes were normal.  The admission CT scan showed no evidence of obstruction, but attempts to hand irrigate on the floor have been too painful for the patient.  The patient was having bladder spasms and is brought now to the operating room for a cystoscopy, clot irrigation and cautery of any visible bleeders.  The patient has been counseled regarding the technique and the reasons behind it and the convalescence. Lucero Thompson is aware that he will awaken with a catheter still in place. Lucero Thompson wishes to proceed and gives his informed consent.       FINDINGS:  Clot retention.  The patient is anesthetized via the general LMA route.  Timeout is accomplished and verified.  The patient is placed in the dorsal lithotomy position, where he is prepped and draped in sterile fashion after removal of the existing Murillo catheter.  A rigid cystoscope is inserted and retrograde evaluation carried out, disclosing a normal pendulous and bulbar urethra with acute changes consistent with catheterization.  The prostatic fossa is entered and the prostatic fossa is somewhat elongated, and there are some mild inflammatory changes.  At the bladder neck at the 6 o'clock position, there is some oozing.  As I enter the bladder there is a clot which obscures the visual field.  Using an Ellik evacuator, I am able to clear the amount of clot and this perhaps is about 200 mL of volume.  Following this, careful examination of the bladder epithelium shows evidence of trabeculation and cellule formation but no intrinsic epithelial lesions and no active bleeding.  The trigone was well developed with ureteral orifices bilaterally of normal size, position and contour.  Using a Bugbee electrode, I cauterized all of the bladder neck bleeding at the 6 o'clock position, after which within a period of observation, there is no active bleeding either within the bladder or within the prostatic fossa.  The bladder was filled.  The scope was removed and replaced by a 22-Iraqi 3-way hematuria catheter, which drains clear urine.  The procedure is terminated.  The patient tolerated the procedure well.

## 2018-04-25 NOTE — PROGRESS NOTES
[facility] Hospitalist Group  Progress Note    Patient: Calli Gutiérrez Age: 50 y.o. : 1970 MR#: [id number] SSN: [id number]  Date: 2018     Subjective:     Feels well. No pain. Denies F/C, N/V, CP, SOB, dizziness/lightheadedness. Urology notes reviewed and appreciated. Pt still not decided about hemodialysis. Assessment/Plan:   1. Sepsis POA due to UTI: POA. Remains afebrile, WBC remains wnl. Urine cx negative. Positive U/A. Completed 3 days of Rocephin, WBC wnl, no fever. After repeat cysto/fulguration yesterday, placed on empiric Cipro, to continue for 7 days. Organ dysfxn on admission: BLAINE. Tachypnea and tachycardia noted on date of admission. 2. Urinary retention: patient underwent prostate biopsy 2018 outpatient and developed urinary retentiont. In ED found with +700ml on bladder scan, gross blood with clots in urine after catheterization. Urology consulted. Patient underwent cystoscopy and clot removal, developed clotting and severe pain yesterday, and underwent repeat cysto and fulguration of bleeding. CBI stopped by urology, if remain stable then d/c briones in am.     3. Acute blood loss anemia in context of Chronic Anemia: likely exacerbated by prostate biopsy, gross hematuria present on admission. Pt received 3 unit PRBC. Repeat Hgb stable. 4. Acute kidney injury superimposed on CKD stage 5. Nephrology on case. Pt still not decided of HD.     5. HTN. BP Uncontrolled. Will resume home med's Norvasc and hydralazine, cont clonidine    Dispo: home once cleared by urology        Addendum:  RN says pt c/o left leg pain, saw pt again, he says some pain in calf area and behind knee pain. No swelling, no calf tenderness. Will get PVL's.      Case discussed with:  [x]Patient  []Family  [x]Nursing  []Case Management  DVT Prophylaxis:  []Lovenox  []Hep SQ  [x]SCDs  []Coumadin   []On Heparin gtt    Objective:   VS:   Visit Vitals    BP (!) 164/98 (BP 1 Location: Left arm, BP Patient Position: At rest)  Comment: Nurse will be notified    Pulse 85    Temp 97.6 °F (36.4 °C)    Resp 19    Ht 6' 2\" (1.88 m)    Wt 109.9 kg (242 lb 3.2 oz)    SpO2 100%    BMI 31.1 kg/m2      Tmax/24hrs: Temp (24hrs), Av °F (36.7 °C), Min:97.5 °F (36.4 °C), Max:98.5 °F (36.9 °C)      Intake/Output Summary (Last 24 hours) at 18 1451  Last data filed at 18 0415   Gross per 24 hour   Intake             5320 ml   Output             5900 ml   Net             -580 ml       General: Awake, alert, NAD. Cardiovascular:  RRR  Pulmonary:  CTA B.   GI:  Soft, NT/ND, NABS. Extremities:  No CT or edema.    Murillo blood tinged urine, off CBI     Labs:    Recent Results (from the past 24 hour(s))   METABOLIC PANEL, BASIC    Collection Time: 18  2:52 AM   Result Value Ref Range    Sodium 142 136 - 145 mmol/L    Potassium 4.6 3.5 - 5.5 mmol/L    Chloride 111 (H) 100 - 108 mmol/L    CO2 20 (L) 21 - 32 mmol/L    Anion gap 11 3.0 - 18 mmol/L    Glucose 95 74 - 99 mg/dL     (H) 7.0 - 18 MG/DL    Creatinine 13.50 (H) 0.6 - 1.3 MG/DL    BUN/Creatinine ratio 8 (L) 12 - 20      GFR est AA 5 (L) >60 ml/min/1.73m2    GFR est non-AA 4 (L) >60 ml/min/1.73m2    Calcium 7.7 (L) 8.5 - 10.1 MG/DL   CBC W/O DIFF    Collection Time: 18  2:52 AM   Result Value Ref Range    WBC 8.4 4.6 - 13.2 K/uL    RBC 3.17 (L) 4.70 - 5.50 M/uL    HGB 8.3 (L) 13.0 - 16.0 g/dL    HCT 25.7 (L) 36.0 - 48.0 %    MCV 81.1 74.0 - 97.0 FL    MCH 26.2 24.0 - 34.0 PG    MCHC 32.3 31.0 - 37.0 g/dL    RDW 15.6 (H) 11.6 - 14.5 %    PLATELET 776 (L) 573 - 420 K/uL    MPV 10.2 9.2 - 11.8 FL       Signed By: Terrance Gonzalez MD     2018

## 2018-04-25 NOTE — PROGRESS NOTES
RENAL DAILY PROGRESS NOTE    Subjective:   Admitted for urinary retention, seen for BLAINE/CKD Stage 5    Complaint: more comfortable today, cont with UB irrigation. Pt denies any CP or SOB.  Tolerated BT yesterday    Current Facility-Administered Medications   Medication Dose Route Frequency    0.9% sodium chloride infusion 250 mL  250 mL IntraVENous PRN    ciprofloxacin HCl (CIPRO) tablet 500 mg  500 mg Oral Q18H    sodium chloride (NS) flush 5-10 mL  5-10 mL IntraVENous Q8H    sodium chloride (NS) flush 5-10 mL  5-10 mL IntraVENous PRN    acetaminophen (TYLENOL) tablet 650 mg  650 mg Oral Q4H PRN    HYDROcodone-acetaminophen (NORCO) 5-325 mg per tablet 1 Tab  1 Tab Oral Q4H PRN    morphine injection 5 mg  5 mg IntraVENous Q6H PRN    naloxone (NARCAN) injection 0.4 mg  0.4 mg IntraVENous PRN    ondansetron (ZOFRAN) injection 4 mg  4 mg IntraVENous Q4H PRN    zolpidem (AMBIEN) tablet 5 mg  5 mg Oral QHS PRN    0.9% sodium chloride infusion 250 mL  250 mL IntraVENous PRN    hydrALAZINE (APRESOLINE) 20 mg/mL injection 10 mg  10 mg IntraVENous Q6H PRN    opium-belladonna (B&O 15-A) 16.2-30 mg suppository 1 Suppository  1 Suppository Rectal Q8H PRN    calcitRIOL (ROCALTROL) capsule 0.25 mcg  0.25 mcg Oral DAILY    allopurinol (ZYLOPRIM) tablet 100 mg  100 mg Oral Once per day on Mon Wed Fri    calcium acetate (PHOSLO) capsule 1,334 mg  2 Cap Oral TID WITH MEALS    sodium bicarbonate tablet 650 mg  650 mg Oral BID    cloNIDine HCl (CATAPRES) tablet 0.1 mg  0.1 mg Oral Q8H    docusate sodium (COLACE) capsule 100 mg  100 mg Oral BID    atorvastatin (LIPITOR) tablet 20 mg  20 mg Oral DAILY    cyclobenzaprine (FLEXERIL) tablet 10 mg  10 mg Oral TID PRN           Objective:     Patient Vitals for the past 24 hrs:   Temp Pulse Resp BP SpO2   04/25/18 0728 97.9 °F (36.6 °C) 98 22 (!) 154/98 -   04/25/18 0435 - - - (!) 146/96 -   04/25/18 0350 98 °F (36.7 °C) 78 20 (!) 172/111 100 %   04/25/18 0348 - - - (!) 181/126 -   04/24/18 2309 98.5 °F (36.9 °C) 79 18 (!) 159/108 99 %   04/24/18 2030 97.8 °F (36.6 °C) 78 17 (!) 154/100 100 %   04/24/18 1958 98 °F (36.7 °C) 80 18 (!) 171/105 100 %   04/24/18 1744 97.5 °F (36.4 °C) 87 18 145/84 -   04/24/18 1705 97.9 °F (36.6 °C) 85 18 146/85 -   04/24/18 1645 98.3 °F (36.8 °C) 81 18 (!) 148/94 -   04/24/18 1517 98.4 °F (36.9 °C) 77 18 (!) 149/97 100 %   04/24/18 1420 97.9 °F (36.6 °C) 78 20 147/89 100 %   04/24/18 1320 98 °F (36.7 °C) 80 18 145/89 98 %   04/24/18 1301 98.5 °F (36.9 °C) 79 18 (!) 143/92 99 %   04/24/18 1153 97.9 °F (36.6 °C) 77 18 157/90 99 %        Weight change: 1.061 kg (2 lb 5.4 oz)     04/23 1901 - 04/25 0700  In: 29859 [P.O.:2120]  Out: 42394 [Urine:01299]    Intake/Output Summary (Last 24 hours) at 04/25/18 1029  Last data filed at 04/25/18 0415   Gross per 24 hour   Intake             5660 ml   Output             5900 ml   Net             -240 ml     Physical Exam: alert, oriented x 3 afebrile, not in any resp distress    HEENT: non icteric  Neck: No JVD  Cardiovascular: regular no rub  C/L: clear ant/lat  : Murillo in place, pink tinge urine/fuid  Ext: no LE edema    Data Review:     LABS:   Hematology:   Recent Labs      04/25/18   0252  04/24/18   0228  04/23/18   2159  04/23/18   0332  04/22/18   1401   WBC  8.4  7.3  8.9  6.5   --    HGB  8.3*  6.7*  7.0*  7.3*  7.4*   HCT  25.7*  21.6*  22.5*  22.3*  23.3*   Pl 127K  Chemistry:   Recent Labs      04/25/18   0252  04/24/18   0228  04/23/18 2159  04/23/18   0332   BUN  102*  109*  108*  111*   CREA  13.50*  14.20*  14.30*  14.30*   CA  7.7*  8.0*  8.0*  7.7*   K  4.6  5.5  5.2  4.9   NA  142  144  145  144   CL  111*  113*  116*  114*   CO2  20*  21  20*  20*   GLU  95  150*  116*  93   Hep Bs Ag neg    CULTURE: Urine neg      IMPRESSION AND PLAN:   CKD stage 5. Discuss with pt at length again. No change in renal function, unable to determine how much urine he still makes because of the CBI.   He is relatively asymp and still undecided to start dialysis or choose a modality. Cont with current conservative management. Follow up with Dr. Dandre Alexandre when d/c  Anemia sched for 2 units BT today.  Will look into Epo as outpt if  He does not want to start HD  HTN better cont meds  SHPT cont binders and calcitriol         Mikki Jaimes MD  4/25/2018

## 2018-04-25 NOTE — PROGRESS NOTES
1926 Assumed care of patient from off going nurse. Patient resting in bed. No distress noted. Call bell within reach, siderails up x 3, bed in lowest position, and patient instructed to use call bell for assistance. CBI infusing in 3 way catheter at this time. Pink tinged urine noted in briones bag. Will continue to monitor. 0120 CBI discontinued per Dr. Jana Aguilera (urologist) notes. Will monitor urinary output for clots and/or gross hematuria.    0500 No clots noted since discontinuation of CBI.    0715 Bedside and Verbal shift change report given to [address] [place] [address] (oncoming nurse) by Bebeto Linder RN (offgoing nurse). Report included the following information Kardex, Intake/Output, MAR, Recent Results and Cardiac Rhythm NSR.

## 2018-04-26 LAB
ANION GAP SERPL CALC-SCNC: 10 MMOL/L (ref 3–18)
BASOPHILS # BLD: 0 K/UL (ref 0–0.1)
BASOPHILS NFR BLD: 0 % (ref 0–2)
BUN SERPL-MCNC: 110 MG/DL (ref 7–18)
BUN/CREAT SERPL: 8 (ref 12–20)
CALCIUM SERPL-MCNC: 8 MG/DL (ref 8.5–10.1)
CHLORIDE SERPL-SCNC: 114 MMOL/L (ref 100–108)
CO2 SERPL-SCNC: 21 MMOL/L (ref 21–32)
CREAT SERPL-MCNC: 13.5 MG/DL (ref 0.6–1.3)
DIFFERENTIAL METHOD BLD: ABNORMAL
EOSINOPHIL # BLD: 0.1 K/UL (ref 0–0.4)
EOSINOPHIL NFR BLD: 1 % (ref 0–5)
ERYTHROCYTE [DISTWIDTH] IN BLOOD BY AUTOMATED COUNT: 15.7 % (ref 11.6–14.5)
GLUCOSE SERPL-MCNC: 96 MG/DL (ref 74–99)
HCT VFR BLD AUTO: 27 % (ref 36–48)
HGB BLD-MCNC: 8.5 G/DL (ref 13–16)
LYMPHOCYTES # BLD: 0.6 K/UL (ref 0.9–3.6)
LYMPHOCYTES NFR BLD: 6 % (ref 21–52)
MCH RBC QN AUTO: 25.8 PG (ref 24–34)
MCHC RBC AUTO-ENTMCNC: 31.5 G/DL (ref 31–37)
MCV RBC AUTO: 82.1 FL (ref 74–97)
MONOCYTES # BLD: 1.1 K/UL (ref 0.05–1.2)
MONOCYTES NFR BLD: 12 % (ref 3–10)
NEUTS SEG # BLD: 7.4 K/UL (ref 1.8–8)
NEUTS SEG NFR BLD: 81 % (ref 40–73)
PLATELET # BLD AUTO: 141 K/UL (ref 135–420)
PMV BLD AUTO: 10.2 FL (ref 9.2–11.8)
POTASSIUM SERPL-SCNC: 5.1 MMOL/L (ref 3.5–5.5)
RBC # BLD AUTO: 3.29 M/UL (ref 4.7–5.5)
SODIUM SERPL-SCNC: 145 MMOL/L (ref 136–145)
WBC # BLD AUTO: 9.1 K/UL (ref 4.6–13.2)

## 2018-04-26 PROCEDURE — 85025 COMPLETE CBC W/AUTO DIFF WBC: CPT | Performed by: HOSPITALIST

## 2018-04-26 PROCEDURE — 74011250637 HC RX REV CODE- 250/637: Performed by: HOSPITALIST

## 2018-04-26 PROCEDURE — 74011250637 HC RX REV CODE- 250/637: Performed by: UROLOGY

## 2018-04-26 PROCEDURE — 74011250636 HC RX REV CODE- 250/636: Performed by: UROLOGY

## 2018-04-26 PROCEDURE — 74011250637 HC RX REV CODE- 250/637: Performed by: INTERNAL MEDICINE

## 2018-04-26 PROCEDURE — 93971 EXTREMITY STUDY: CPT

## 2018-04-26 PROCEDURE — 80048 BASIC METABOLIC PNL TOTAL CA: CPT | Performed by: HOSPITALIST

## 2018-04-26 PROCEDURE — 65270000029 HC RM PRIVATE

## 2018-04-26 PROCEDURE — 97162 PT EVAL MOD COMPLEX 30 MIN: CPT

## 2018-04-26 PROCEDURE — 36415 COLL VENOUS BLD VENIPUNCTURE: CPT | Performed by: HOSPITALIST

## 2018-04-26 PROCEDURE — 97530 THERAPEUTIC ACTIVITIES: CPT

## 2018-04-26 RX ORDER — AMLODIPINE BESYLATE 10 MG/1
10 TABLET ORAL DAILY
Status: DISCONTINUED | OUTPATIENT
Start: 2018-04-27 | End: 2018-04-29 | Stop reason: HOSPADM

## 2018-04-26 RX ORDER — SODIUM BICARBONATE 650 MG/1
650 TABLET ORAL 2 TIMES DAILY
Qty: 60 TAB | Refills: 0 | Status: SHIPPED | OUTPATIENT
Start: 2018-04-26 | End: 2020-02-29

## 2018-04-26 RX ORDER — HYDRALAZINE HYDROCHLORIDE 50 MG/1
100 TABLET, FILM COATED ORAL EVERY 8 HOURS
Status: DISCONTINUED | OUTPATIENT
Start: 2018-04-26 | End: 2018-04-29 | Stop reason: HOSPADM

## 2018-04-26 RX ORDER — CALCIUM ACETATE 667 MG/1
2 CAPSULE ORAL
Qty: 180 CAP | Refills: 0 | Status: SHIPPED | OUTPATIENT
Start: 2018-04-26 | End: 2020-06-30

## 2018-04-26 RX ORDER — CIPROFLOXACIN 250 MG/1
250 TABLET, FILM COATED ORAL 2 TIMES DAILY
Qty: 8 TAB | Refills: 0 | Status: SHIPPED | OUTPATIENT
Start: 2018-04-26 | End: 2018-04-29

## 2018-04-26 RX ORDER — TOLTERODINE TARTRATE 2 MG/1
2 TABLET, EXTENDED RELEASE ORAL 2 TIMES DAILY
Status: DISCONTINUED | OUTPATIENT
Start: 2018-04-26 | End: 2018-04-27

## 2018-04-26 RX ORDER — DOCUSATE SODIUM 100 MG/1
100 CAPSULE, LIQUID FILLED ORAL
Qty: 10 CAP | Refills: 2 | Status: SHIPPED | OUTPATIENT
Start: 2018-04-26 | End: 2018-05-01

## 2018-04-26 RX ORDER — CALCITRIOL 0.25 UG/1
0.25 CAPSULE ORAL DAILY
Qty: 30 CAP | Refills: 0 | Status: ON HOLD | OUTPATIENT
Start: 2018-04-27 | End: 2019-10-23 | Stop reason: DRUGHIGH

## 2018-04-26 RX ADMIN — HYDRALAZINE HYDROCHLORIDE 100 MG: 50 TABLET, FILM COATED ORAL at 22:17

## 2018-04-26 RX ADMIN — MORPHINE SULFATE 5 MG: 10 INJECTION, SOLUTION INTRAMUSCULAR; INTRAVENOUS at 05:30

## 2018-04-26 RX ADMIN — CLONIDINE HYDROCHLORIDE 0.1 MG: 0.1 TABLET ORAL at 22:17

## 2018-04-26 RX ADMIN — CALCIUM ACETATE 1334 MG: 667 CAPSULE ORAL at 18:43

## 2018-04-26 RX ADMIN — CLONIDINE HYDROCHLORIDE 0.1 MG: 0.1 TABLET ORAL at 05:29

## 2018-04-26 RX ADMIN — ATORVASTATIN CALCIUM 20 MG: 20 TABLET, FILM COATED ORAL at 09:23

## 2018-04-26 RX ADMIN — AMLODIPINE BESYLATE 5 MG: 5 TABLET ORAL at 09:25

## 2018-04-26 RX ADMIN — CIPROFLOXACIN HYDROCHLORIDE 500 MG: 500 TABLET, FILM COATED ORAL at 05:29

## 2018-04-26 RX ADMIN — HYDRALAZINE HYDROCHLORIDE 100 MG: 50 TABLET, FILM COATED ORAL at 13:24

## 2018-04-26 RX ADMIN — CLONIDINE HYDROCHLORIDE 0.1 MG: 0.1 TABLET ORAL at 13:24

## 2018-04-26 RX ADMIN — Medication 10 ML: at 05:34

## 2018-04-26 RX ADMIN — CIPROFLOXACIN HYDROCHLORIDE 500 MG: 500 TABLET, FILM COATED ORAL at 22:17

## 2018-04-26 RX ADMIN — HYDROCODONE BITARTRATE AND ACETAMINOPHEN 1 TABLET: 5; 325 TABLET ORAL at 20:12

## 2018-04-26 RX ADMIN — Medication 10 ML: at 22:18

## 2018-04-26 RX ADMIN — TOLTERODINE TARTRATE 2 MG: 2 TABLET, FILM COATED ORAL at 20:12

## 2018-04-26 RX ADMIN — CALCITRIOL 0.25 MCG: 0.25 CAPSULE, LIQUID FILLED ORAL at 09:24

## 2018-04-26 RX ADMIN — SODIUM BICARBONATE 650 MG TABLET 650 MG: at 09:23

## 2018-04-26 RX ADMIN — Medication 10 ML: at 15:38

## 2018-04-26 RX ADMIN — SODIUM BICARBONATE 650 MG TABLET 650 MG: at 18:43

## 2018-04-26 RX ADMIN — DOCUSATE SODIUM 100 MG: 100 CAPSULE, LIQUID FILLED ORAL at 20:12

## 2018-04-26 RX ADMIN — HYDRALAZINE HYDROCHLORIDE 50 MG: 50 TABLET, FILM COATED ORAL at 05:29

## 2018-04-26 RX ADMIN — CALCIUM ACETATE 1334 MG: 667 CAPSULE ORAL at 09:24

## 2018-04-26 RX ADMIN — HYDROCODONE BITARTRATE AND ACETAMINOPHEN 1 TABLET: 5; 325 TABLET ORAL at 15:37

## 2018-04-26 RX ADMIN — CALCIUM ACETATE 1334 MG: 667 CAPSULE ORAL at 13:24

## 2018-04-26 NOTE — DISCHARGE INSTRUCTIONS
DISCHARGE SUMMARY from Nurse    PATIENT INSTRUCTIONS:    After general anesthesia or intravenous sedation, for 24 hours or while taking prescription Narcotics:  · Limit your activities  · Do not drive and operate hazardous machinery  · Do not make important personal or business decisions  · Do  not drink alcoholic beverages  · If you have not urinated within 8 hours after discharge, please contact your surgeon on call. Report the following to your surgeon:  · Excessive pain, swelling, redness or odor of or around the surgical area  · Temperature over 100.5  · Nausea and vomiting lasting longer than 4 hours or if unable to take medications  · Any signs of decreased circulation or nerve impairment to extremity: change in color, persistent  numbness, tingling, coldness or increase pain  · Any questions    What to do at Home:  Recommended activity: Activity as tolerated, ambulation, chair  and light activity until seen by MD    If you experience any of the following symptoms unrelieved pain, shortness of breath, increased pain in bladder, bloody urine, decreased urine output,fevers, please follow up with MD or call 911 if an emergency. *  Please give a list of your current medications to your Primary Care Provider. *  Please update this list whenever your medications are discontinued, doses are      changed, or new medications (including over-the-counter products) are added. *  Please carry medication information at all times in case of emergency situations. These are general instructions for a healthy lifestyle:    No smoking/ No tobacco products/ Avoid exposure to second hand smoke  Surgeon General's Warning:  Quitting smoking now greatly reduces serious risk to your health.     Obesity, smoking, and sedentary lifestyle greatly increases your risk for illness    A healthy diet, regular physical exercise & weight monitoring are important for maintaining a healthy lifestyle    You may be retaining fluid if you have a history of heart failure or if you experience any of the following symptoms:  Weight gain of 3 pounds or more overnight or 5 pounds in a week, increased swelling in our hands or feet or shortness of breath while lying flat in bed. Please call your doctor as soon as you notice any of these symptoms; do not wait until your next office visit. Recognize signs and symptoms of STROKE:    F-face looks uneven    A-arms unable to move or move unevenly    S-speech slurred or non-existent    T-time-call 911 as soon as signs and symptoms begin-DO NOT go       Back to bed or wait to see if you get better-TIME IS BRAIN. Warning Signs of HEART ATTACK     Call 911 if you have these symptoms:   Chest discomfort. Most heart attacks involve discomfort in the center of the chest that lasts more than a few minutes, or that goes away and comes back. It can feel like uncomfortable pressure, squeezing, fullness, or pain.  Discomfort in other areas of the upper body. Symptoms can include pain or discomfort in one or both arms, the back, neck, jaw, or stomach.  Shortness of breath with or without chest discomfort.  Other signs may include breaking out in a cold sweat, nausea, or lightheadedness. Don't wait more than five minutes to call 911 - MINUTES MATTER! Fast action can save your life. Calling 911 is almost always the fastest way to get lifesaving treatment. Emergency Medical Services staff can begin treatment when they arrive -- up to an hour sooner than if someone gets to the hospital by car. The discharge information has been reviewed with the patient. The patient verbalized understanding. Discharge medications reviewed with the patient and appropriate educational materials and side effects teaching were provided.   ___________________________________________________________________________________________________________________________________Discharge Instructions    Patient: Silvio Fountain MRN: [id number]  [facility]: [id number]    YOB: 1970  Age: 50 y.o. Sex: male    DOA: 2018 LOS:  LOS: 5 days   Discharge Date:      DIET:  Cardiac Diet    ACTIVITY: Activity as tolerated  Home health care for Skilled care for Hypertension and medication management    MyChart Activation    Thank you for requesting access to Hey[employer]. Please follow the instructions below to securely access and download your online medical record. Hey[employer] allows you to send messages to your doctor, view your test results, renew your prescriptions, schedule appointments, and more. How Do I Sign Up? 1. In your internet browser, go to [email or web address]. 2. Click on the First Time User? Click Here link in the Sign In box. You will see the New Member Sign Up page. 3. Enter your HeySpace Access Code exactly as it appears below. You will not need to use this code after youve completed the sign-up process. If you do not sign up before the expiration date, you must request a new code. HeySpace Access Code: [email or web address]  Expires: 2018 12:55 PM (This is the date your HeySpace access code will )    4. Enter the last four digits of your Social Security Number (xxxx) and Date of Birth (mm/dd/yyyy) as indicated and click Submit. You will be taken to the next sign-up page. 5. Create a HeySpace ID. This will be your HeySpace login ID and cannot be changed, so think of one that is secure and easy to remember. 6. Create a HeySpace password. You can change your password at any time. 7. Enter your Password Reset Question and Answer. This can be used at a later time if you forget your password. 8. Enter your e-mail address. You will receive e-mail notification when new information is available in [address] 9. Click Sign Up. You can now view and download portions of your medical record. 10. Click the Download Summary menu link to download a portable copy of your medical information.     Additional Information    If you have questions, please visit the Frequently Asked Questions section of the MyChart website at [email or web address] Remember, MyChart is NOT to be used for urgent needs. For medical emergencies, dial 911. Patient armband removed and shredded      ·    PT/OT consult  ·             Murillo care     ADDITIONAL INFORMATION: If you experience any of the following symptoms but not limited to Fever, chills, nausea, vomiting, diarrhea, change in mentation, falling, bleeding, shortness of breath, chest pain, please call your primary care physician or return to the emergency room if you cannot get hold of your doctor:     FOLLOW UP CARE:  Dr. Charissa Balbuena, ARLETTE on 4/30/18.   Dr. Srini Muller in 1 week    Dr. Jimbo Cerna in 2 weeks     Hill Hughes MD  4/26/2018 10:55 AM

## 2018-04-26 NOTE — PROGRESS NOTES
Problem: Mobility Impaired (Adult and Pediatric)  Goal: *Acute Goals and Plan of Care (Insert Text)  Physical Therapy Goals  Initiated 4/26/2018 and to be accomplished within 7 day(s)  1. Patient will move from supine to sit and sit to supine  and scoot up and down in bed with supervision/set-up. 2.  Patient will transfer from bed to chair and chair to bed with supervision/set-up using the least restrictive device. 3.  Patient will perform sit to stand with supervision/set-up. 4.  Patient will ambulate with supervision/set-up for >100 feet with the least restrictive device. 5.  Patient will ascend/descend 10 stairs with 1-2 handrail(s) with minimal assistance/contact guard assist.  Outcome: Progressing Towards Goal  physical Therapy EVALUATION    Patient: Samanta Husain (62 y.o. male)  Date: 4/26/2018  Primary Diagnosis: Acute on chronic renal failure (HCC)  Urinary retention  Hematuria  UTI (urinary tract infection)  Acute kidney injury ([facility] [address]  dx  dx  Procedure(s) (LRB):  CYSTOSCOPY/WITH FULGURATION (N/A) 3 Days Post-Op   Precautions: Fall    OBJECTIVE/ASSESSMENT :  Based on the objective data described below, the patient presents with impaired functional mobility including bed mobility, transfers, ambulation, and general activity tolerance following admission for Stage 5 CKD. Patient presents today semi-reclined in bed, alert and agreeable to PT evaluation. He required Capo and additional time to transfer to sitting EOB. Patient educated on use of assistive device, transferred to standing with [address]. Patient required verbal and tactile cuing for upright posture, unable to attain terminal knee extension on L at this time. He ambulated 12 ft in room with Capo for balance before transferring to locked recliner. Patient left seated with LE elevated, call bell in reach and nurse notified.   Education: bed mobility, transfers, ambulation, assistive device management, posture, body mechanics, activity pacing -- patient verbalized/demonstrated understanding    Patient will benefit from skilled intervention to address the above impairments. Patients rehabilitation potential is considered to be Fair  Factors which may influence rehabilitation potential include:   []         None noted  []         Mental ability/status  [x]         Medical condition  []         Home/family situation and support systems  [x]         Safety awareness  [x]         Pain tolerance/management  []         Other:      PLAN :  Recommendations and Planned Interventions:  [x]           Bed Mobility Training             [x]    Neuromuscular Re-Education  [x]           Transfer Training                   []    Orthotic/Prosthetic Training  [x]           Gait Training                          []    Modalities  [x]           Therapeutic Exercises          []    Edema Management/Control  [x]           Therapeutic Activities            [x]    Patient and Family Training/Education  []           Other (comment):    Frequency/Duration: Patient will be followed by physical therapy 1-2 times per day, 4-7 days per week to address goals. Discharge Recommendations: Skilled Nursing Facility  Further Equipment Recommendations for Discharge: rolling walker     SUBJECTIVE:   Patient stated I feel like an old man.     OBJECTIVE DATA SUMMARY:     Past Medical History:   Diagnosis Date    Hypertension     Joint pain     Kidney disease     Sleep apnea     on cpap     Past Surgical History:   Procedure Laterality Date    HX ORTHOPAEDIC Left     left knee sx     HX WISDOM TEETH EXTRACTION       Barriers to Learning/Limitations: None  Compensate with: N/A  Prior Level of Function/Home Situation: Patient lives with wife in 2 story home, was independent with functional mobility PTA.   Home Situation  Home Environment: Private residence  # Steps to Enter: 8  Rails to Enter: Yes  Hand Rails : Bilateral  One/Two Story Residence: Two story, live on 1st floor  Living Alone: No  Support Systems: Spouse/Significant Other/Partner  Patient Expects to be Discharged to[de-identified] Private residence  Current DME Used/Available at Home: None  Critical Behavior:  Neurologic State: Alert  Psychosocial  Patient Behaviors: Cooperative  Strength:    Strength: Generally decreased, functional (BLE)  Tone & Sensation:   Tone: Normal (BLE)  Sensation: Intact (BLE)   Range Of Motion:  AROM: Generally decreased, functional (LLE; RLE WFL)  Functional Mobility:  Bed Mobility:  Supine to Sit: Minimum assistance  Scooting: Minimum assistance  Transfers:  Sit to Stand: Moderate assistance  Stand to Sit: Minimum assistance  Balance:   Sitting: Intact  Standing: Impaired; With support (RW)  Standing - Static: Fair  Standing - Dynamic : Fair  Ambulation/Gait Training:  Distance (ft): 12 Feet (ft)  Assistive Device: Walker, rolling  Ambulation - Level of Assistance: Minimal assistance  Base of Support: Widened;Shift to right;Center of gravity altered  Speed/Enedelia: Pace decreased (<100 feet/min); Shuffled  Pain:  Pre session: L knee  Post session: L knee  Activity Tolerance:   Fair/good  Please refer to the flowsheet for vital signs taken during this treatment. After treatment:   [x] Patient left in no apparent distress sitting up in chair  [] Patient left sitting on EOB  [] Patient left in no apparent distress in bed  [] Patient declined to be OOB at this time due to  [x] Call bell left within reach  [x] Nursing notified Momo Ennis)  [] Caregiver present  [] Bed alarm activated  [] SCDs in place    COMMUNICATION/EDUCATION:   [x]         Fall prevention education was provided and the patient/caregiver indicated understanding. [x]         Patient/family have participated as able in goal setting and plan of care. [x]         Patient/family agree to work toward stated goals and plan of care. []         Patient understands intent and goals of therapy, but is neutral about his/her participation.   []         Patient is unable to participate in goal setting and plan of care. Thank you for this referral.  Katia Thomason   Time Calculation: 23 mins      Mobility  Current  CK= 40-59%   Goal  CI= 1-19%. The severity rating is based on the Level of Assistance required for Functional Mobility and ADLs.     Eval Complexity: History: MEDIUM  Complexity : 1-2 comorbidities / personal factors will impact the outcome/ POC Exam:MEDIUM Complexity : 3 Standardized tests and measures addressing body structure, function, activity limitation and / or participation in recreation  Presentation: MEDIUM Complexity : Evolving with changing characteristics  Clinical Decision Making:Medium Complexity   Overall Complexity:MEDIUM

## 2018-04-26 NOTE — ROUTINE PROCESS
1929 Assumed care of patient from off going nurse. Patient resting in bed. No distress noted. Call bell within reach, siderails up x 3, bed in lowest position, and patient instructed to use call bell for assistance. Spouse at bedside. Will continue to monitor. 2100 Patient c/o discomfort during urinating. Urine pink-tinged in color with one small clot noted. 6035 Urine is mitch in color. 6290 Bedside and Verbal shift change report given to [address] [place] [address] (oncoming nurse) by Livia Muniz RN(offgoing nurse). Report included the following information Kardex, Intake/Output, MAR, Recent Results and Cardiac Rhythm N/A (telemetry was discontinued).

## 2018-04-26 NOTE — PROGRESS NOTES
Care Management Interventions  PCP Verified by CM: Yes  Palliative Care Criteria Met (RRAT>21 & CHF Dx)?: No  Mode of Transport at Discharge:  (wife)  Transition of Care Consult (CM Consult): Discharge Planning  Physical Therapy Consult: Yes  Occupational Therapy Consult: Yes  Current Support Network: Lives with Spouse  Confirm Follow Up Transport: Family  Plan discussed with Pt/Family/Caregiver: Yes  Discharge Location  Discharge Placement: Home     CM met in room with pt who stated his plan is to return home with his spouse Doni Sears ([phone]). Pt is currently employed and plans on returning to work. Pt indicates being totally independent with ADLs and ambulation prior to admission.

## 2018-04-26 NOTE — PROGRESS NOTES
[facility] Hospitalist Group  Progress Note    Patient: Merlene Funez Age: 50 y.o. : 1970 MR#: [id number] SSN: [id number]  Date: 2018     Subjective:     Pt feels bladder spasm and burning with urination. Had GOUT few weeks back and since then he is using clutches to get around       Assessment/Plan:   1. Sepsis POA due to UTI: POA. Remains afebrile, WBC remains wnl. Urine cx negative. Cont cipro per urology. 2. Urinary retention: patient underwent prostate biopsy 2018 outpatient and developed urinary retentiont. In ED found with +700ml on bladder scan, gross blood with clots in urine after catheterization. Urology consulted. s/p cysto X 2 and fulguration of bleeding. Murillo out yesterday. 3. Acute blood loss anemia in context of Chronic Anemia: likely exacerbated by prostate biopsy, gross hematuria present on admission. Pt received 3 unit PRBC. Repeat Hgb stable. 4. Acute kidney injury superimposed on CKD stage 5. Nephrology on case. Pt doesn't want HD.     5. HTN. BP Uncontrolled. Will increase Norvasc and hydralazine, cont clonidine    Dispo: needs SNF      PVL's neg for DVT  D/w CM, SNF in am      Case discussed with:  [x]Patient  []Family  [x]Nursing  []Case Management  DVT Prophylaxis:  []Lovenox  []Hep SQ  [x]SCDs  []Coumadin   []On Heparin gtt    Objective:   VS:   Visit Vitals    BP (!) 169/96 (BP 1 Location: Left arm, BP Patient Position: At rest)  Comment: Nurse [place]    Pulse 92    Temp 98.1 °F (36.7 °C)    Resp 24    Ht 6' 2\" (1.88 m)    Wt 111.1 kg (245 lb)    SpO2 97%    BMI 31.46 kg/m2      Tmax/24hrs: Temp (24hrs), Av.3 °F (36.8 °C), Min:98.1 °F (36.7 °C), Max:98.5 °F (36.9 °C)      Intake/Output Summary (Last 24 hours) at 18 1634  Last data filed at 18 1325   Gross per 24 hour   Intake             1230 ml   Output             1200 ml   Net               30 ml       General: Awake, alert, NAD.    Cardiovascular: RRR  Pulmonary:  CTA B.   GI:  Soft, NT/ND, NABS. Extremities:  No CT or edema. Urine clear     Labs:    Recent Results (from the past 24 hour(s))   METABOLIC PANEL, BASIC    Collection Time: 04/26/18  4:30 AM   Result Value Ref Range    Sodium 145 136 - 145 mmol/L    Potassium 5.1 3.5 - 5.5 mmol/L    Chloride 114 (H) 100 - 108 mmol/L    CO2 21 21 - 32 mmol/L    Anion gap 10 3.0 - 18 mmol/L    Glucose 96 74 - 99 mg/dL     (H) 7.0 - 18 MG/DL    Creatinine 13.50 (H) 0.6 - 1.3 MG/DL    BUN/Creatinine ratio 8 (L) 12 - 20      GFR est AA 5 (L) >60 ml/min/1.73m2    GFR est non-AA 4 (L) >60 ml/min/1.73m2    Calcium 8.0 (L) 8.5 - 10.1 MG/DL   CBC WITH AUTOMATED DIFF    Collection Time: 04/26/18  4:30 AM   Result Value Ref Range    WBC 9.1 4.6 - 13.2 K/uL    RBC 3.29 (L) 4.70 - 5.50 M/uL    HGB 8.5 (L) 13.0 - 16.0 g/dL    HCT 27.0 (L) 36.0 - 48.0 %    MCV 82.1 74.0 - 97.0 FL    MCH 25.8 24.0 - 34.0 PG    MCHC 31.5 31.0 - 37.0 g/dL    RDW 15.7 (H) 11.6 - 14.5 %    PLATELET 695 132 - 946 K/uL    MPV 10.2 9.2 - 11.8 FL    NEUTROPHILS 81 (H) 40 - 73 %    LYMPHOCYTES 6 (L) 21 - 52 %    MONOCYTES 12 (H) 3 - 10 %    EOSINOPHILS 1 0 - 5 %    BASOPHILS 0 0 - 2 %    ABS. NEUTROPHILS 7.4 1.8 - 8.0 K/UL    ABS. LYMPHOCYTES 0.6 (L) 0.9 - 3.6 K/UL    ABS. MONOCYTES 1.1 0.05 - 1.2 K/UL    ABS. EOSINOPHILS 0.1 0.0 - 0.4 K/UL    ABS.  BASOPHILS 0.0 0.0 - 0.1 K/UL    DF AUTOMATED         Signed By: Joan Sanchez MD     April 26, 2018

## 2018-04-26 NOTE — PROGRESS NOTES
RENAL DAILY PROGRESS NOTE    Subjective:   Admitted for urinary retention, seen for BLAINE/CKD Stage 5    Complaint:Murillo cath out, some dysuria otherwise feels ok, hopes to go home today    Current Facility-Administered Medications   Medication Dose Route Frequency    [START ON 4/27/2018] amLODIPine (NORVASC) tablet 10 mg  10 mg Oral DAILY    hydrALAZINE (APRESOLINE) tablet 100 mg  100 mg Oral Q8H    0.9% sodium chloride infusion 250 mL  250 mL IntraVENous PRN    ciprofloxacin HCl (CIPRO) tablet 500 mg  500 mg Oral Q18H    sodium chloride (NS) flush 5-10 mL  5-10 mL IntraVENous Q8H    sodium chloride (NS) flush 5-10 mL  5-10 mL IntraVENous PRN    acetaminophen (TYLENOL) tablet 650 mg  650 mg Oral Q4H PRN    HYDROcodone-acetaminophen (NORCO) 5-325 mg per tablet 1 Tab  1 Tab Oral Q4H PRN    naloxone (NARCAN) injection 0.4 mg  0.4 mg IntraVENous PRN    ondansetron (ZOFRAN) injection 4 mg  4 mg IntraVENous Q4H PRN    zolpidem (AMBIEN) tablet 5 mg  5 mg Oral QHS PRN    hydrALAZINE (APRESOLINE) 20 mg/mL injection 10 mg  10 mg IntraVENous Q6H PRN    opium-belladonna (B&O 15-A) 16.2-30 mg suppository 1 Suppository  1 Suppository Rectal Q8H PRN    calcitRIOL (ROCALTROL) capsule 0.25 mcg  0.25 mcg Oral DAILY    allopurinol (ZYLOPRIM) tablet 100 mg  100 mg Oral Once per day on Mon Wed Fri    calcium acetate (PHOSLO) capsule 1,334 mg  2 Cap Oral TID WITH MEALS    sodium bicarbonate tablet 650 mg  650 mg Oral BID    cloNIDine HCl (CATAPRES) tablet 0.1 mg  0.1 mg Oral Q8H    docusate sodium (COLACE) capsule 100 mg  100 mg Oral BID    atorvastatin (LIPITOR) tablet 20 mg  20 mg Oral DAILY    cyclobenzaprine (FLEXERIL) tablet 10 mg  10 mg Oral TID PRN           Objective:     Patient Vitals for the past 24 hrs:   Temp Pulse Resp BP SpO2   04/26/18 1002 98.5 °F (36.9 °C) 93 20 (!) 171/93 95 %   04/26/18 0433 98.1 °F (36.7 °C) 88 20 (!) 173/108 98 %   04/26/18 0025 98.5 °F (36.9 °C) 88 20 157/90 96 %   04/25/18 2055 98.4 °F (36.9 °C) 85 19 (!) 163/102 96 %   04/25/18 1553 97.7 °F (36.5 °C) 85 20 - -        Weight change: 1.27 kg (2 lb 12.8 oz)     04/24 1901 - 04/26 0700  In: 5550 [P.O.:1440]  Out: 7600 [Urine:7600]    Intake/Output Summary (Last 24 hours) at 04/26/18 1336  Last data filed at 04/26/18 1325   Gross per 24 hour   Intake             1230 ml   Output             1600 ml   Net             -370 ml     Physical Exam: alert, oriented x 3 afebrile, not in any resp distress    HEENT: non icteric  Neck: No JVD  Cardiovascular: regular no rub  C/L: clear ant/lat  Ext: no LE edema    Data Review:     LABS:   Hematology:   Recent Labs      04/26/18   0430  04/25/18   0252  04/24/18 0228  04/23/18   2159   WBC  9.1  8.4  7.3  8.9   HGB  8.5*  8.3*  6.7*  7.0*   HCT  27.0*  25.7*  21.6*  22.5*   Pl 141K  Chemistry:   Recent Labs      04/26/18   0430  04/25/18   0252  04/24/18 0228 04/23/18   2159   BUN  110*  102*  109*  108*   CREA  13.50*  13.50*  14.20*  14.30*   CA  8.0*  7.7*  8.0*  8.0*   K  5.1  4.6  5.5  5.2   NA  145  142  144  145   CL  114*  111*  113*  116*   CO2  21  20*  21  20*   GLU  96  95  150*  116*   Hep Bs Ag neg    CULTURE: Urine neg      IMPRESSION AND PLAN:   CKD stage 5. No change in renal function. He remains relatively asymp and still undecided to start dialysis. Cont with current conservative management. Follow up with Dr. Marin Wilson when d/c. Anemia H/H stable.   Will look into Epo as outpt if he still does not want to start HD  HTN better cont to titrate meds to achieve goal BP<130/80  SHPT cont binders and calcitriol         Alex Santillan MD  4/26/2018

## 2018-04-26 NOTE — PROGRESS NOTES
Received report on pt.from off going RN. Resting quietly in bed on rounds. Denies c/o pain or SOB at this time. No acute distress noted. CBI off. urine blood tinged but no clots. Will cont to monitor. 0930 pt cont to c/o pain and soreness in left leg and knee. Dr Dawna Maria notified. 1200 resting in bed. Offered to assist pt with bathing and lined change but declined. Urine remains blood tinged w/o clots. [address] in and removed briones. Pt given urinal at bedside. 1830 has not voided since briones d/c'd. Urinal at bedside. Will cont to monitor. 1930 Bedside and Verbal shift change report given to Portillo Vieira (oncoming nurse) by Josefa Viera RN (offgoing nurse). Report given with Taisha WEISS and MAR.

## 2018-04-26 NOTE — PROCEDURES
[facility]  *** FINAL REPORT ***    Name: Silver Clarke  MRN: [id number]    Inpatient  : 1970  HIS Order #: [id number]  [address] [facility] Visit #: [id number]  Date: 2018    TYPE OF TEST: Peripheral Venous Testing    REASON FOR TEST  Pain in limb, Limb swelling    Left Leg:-  Deep venous thrombosis:           No  Superficial venous thrombosis:    No  Deep venous insufficiency:        Not examined  Superficial venous insufficiency: Not examined      INTERPRETATION/FINDINGS  Duplex images were obtained using 2-D gray scale, color flow, and  spectral Doppler analysis. Left leg :  1. Deep veins visualized include the common femoral, femoral,  popliteal, posterior tibial and peroneal veins. 2. No evidence of deep venous thrombosis detected in the veins  visualized. 3. No evidence of deep vein thrombosis in the contralateral common  femoral vein. 4. Superficial veins visualized include the great saphenous vein. 5. No evidence of superficial thrombosis detected. 6. Normal multiphasic flow in the posterior tibial artery. ADDITIONAL COMMENTS    I have personally reviewed the data relevant to the interpretation of  this  study.     TECHNOLOGIST: KATHERINE Pickering RVS  Signed: 2018 8:18:29 AM    PHYSICIAN: Jhon Dakin MD  Signed: 2018 1:25:57 PM

## 2018-04-26 NOTE — PROGRESS NOTES
PT orders received, chart reviewed and evaluation attempted. Patient currently on bedpan, however agreeable to PT evaluation. Following subjective history, patient reports needing to use urinal and demonstrates substantial pain response. Patient states he needs time to complete toileting and requests PT return later. Patient's nurse notified. Will continue to follow.     Thank you, Kelley Centeno PT, DPT

## 2018-04-26 NOTE — PROGRESS NOTES
Care Management Interventions  PCP Verified by CM: Yes  Palliative Care Criteria Met (RRAT>21 & CHF Dx)?: No  Mode of Transport at Discharge:  (wife)  Transition of Care Consult (CM Consult): Discharge Planning  Physical Therapy Consult: Yes  Occupational Therapy Consult: Yes  Current Support Network: Lives with Spouse  Confirm Follow Up Transport: Family  Plan discussed with Pt/Family/Caregiver: Yes  Discharge Location  Discharge Placement: Home     Physician informs that pt is interested in SNF placement. CM provided pt and his spouse Mila with [address] list of SNFs. Pt's spouse shares that she wants to look up the reviews on the facilities before assisting pt with making selections.

## 2018-04-26 NOTE — PROGRESS NOTES
Transition of Care ([facility]) Plan:  Pt plans on returning home with spouse Ulises Can and eventually return to work. [facility] Transportation:     How is patient being transported at discharge? Pt's spouse will assist him with transportation     When?  04/26/2018     Is transport scheduled? Yes    Follow-up appointment and transportation:     PCP/Specialist? Georgiana Hurley MD and  Hollie Burns MD     Time/Date? 8:30am /04/30/2018  And 3675 /5/16/2018      Who is transporting to the follow-up appointment? Patient will transport self      Is transport for follow up appointment scheduled? Yes    Communication plan (with patient/family): Patient      Who is being called? Patient or Next of Kin? Responsible party? Patient or spouse Ulises Can      What number(s) is to be used? [phone] or [phone]      What service provider is calling for [facility] services? When are they calling? Readmission Risk?   (Green/Low; Yellow/Moderate; Red/High):  Green/Low

## 2018-04-27 LAB
AMORPH CRY URNS QL MICRO: ABNORMAL
ANION GAP SERPL CALC-SCNC: 12 MMOL/L (ref 3–18)
APPEARANCE UR: ABNORMAL
BACTERIA URNS QL MICRO: ABNORMAL /HPF
BILIRUB UR QL: NEGATIVE
BUN SERPL-MCNC: 111 MG/DL (ref 7–18)
BUN/CREAT SERPL: 8 (ref 12–20)
CALCIUM SERPL-MCNC: 8.2 MG/DL (ref 8.5–10.1)
CHLORIDE SERPL-SCNC: 111 MMOL/L (ref 100–108)
CO2 SERPL-SCNC: 18 MMOL/L (ref 21–32)
COLOR UR: YELLOW
CREAT SERPL-MCNC: 13.4 MG/DL (ref 0.6–1.3)
EPITH CASTS URNS QL MICRO: ABNORMAL /LPF (ref 0–5)
ERYTHROCYTE [DISTWIDTH] IN BLOOD BY AUTOMATED COUNT: 15.7 % (ref 11.6–14.5)
GLUCOSE SERPL-MCNC: 101 MG/DL (ref 74–99)
GLUCOSE UR STRIP.AUTO-MCNC: NEGATIVE MG/DL
HCT VFR BLD AUTO: 27.2 % (ref 36–48)
HGB BLD-MCNC: 8.6 G/DL (ref 13–16)
HGB UR QL STRIP: ABNORMAL
KETONES UR QL STRIP.AUTO: NEGATIVE MG/DL
LEUKOCYTE ESTERASE UR QL STRIP.AUTO: ABNORMAL
MCH RBC QN AUTO: 25.7 PG (ref 24–34)
MCHC RBC AUTO-ENTMCNC: 31.6 G/DL (ref 31–37)
MCV RBC AUTO: 81.4 FL (ref 74–97)
NITRITE UR QL STRIP.AUTO: NEGATIVE
PH UR STRIP: 5 [PH] (ref 5–8)
PLATELET # BLD AUTO: 154 K/UL (ref 135–420)
PMV BLD AUTO: 10.3 FL (ref 9.2–11.8)
POTASSIUM SERPL-SCNC: 4.8 MMOL/L (ref 3.5–5.5)
PROT UR STRIP-MCNC: 300 MG/DL
RBC # BLD AUTO: 3.34 M/UL (ref 4.7–5.5)
RBC #/AREA URNS HPF: ABNORMAL /HPF (ref 0–5)
SODIUM SERPL-SCNC: 141 MMOL/L (ref 136–145)
SP GR UR REFRACTOMETRY: 1.01 (ref 1–1.03)
UROBILINOGEN UR QL STRIP.AUTO: 0.2 EU/DL (ref 0.2–1)
WBC # BLD AUTO: 10 K/UL (ref 4.6–13.2)
WBC URNS QL MICRO: ABNORMAL /HPF (ref 0–4)

## 2018-04-27 PROCEDURE — 74011250637 HC RX REV CODE- 250/637: Performed by: INTERNAL MEDICINE

## 2018-04-27 PROCEDURE — 65270000029 HC RM PRIVATE

## 2018-04-27 PROCEDURE — 81001 URINALYSIS AUTO W/SCOPE: CPT | Performed by: HOSPITALIST

## 2018-04-27 PROCEDURE — 80048 BASIC METABOLIC PNL TOTAL CA: CPT | Performed by: FAMILY MEDICINE

## 2018-04-27 PROCEDURE — 74011250637 HC RX REV CODE- 250/637: Performed by: HOSPITALIST

## 2018-04-27 PROCEDURE — 87086 URINE CULTURE/COLONY COUNT: CPT | Performed by: HOSPITALIST

## 2018-04-27 PROCEDURE — 74011250636 HC RX REV CODE- 250/636: Performed by: INTERNAL MEDICINE

## 2018-04-27 PROCEDURE — 85027 COMPLETE CBC AUTOMATED: CPT | Performed by: FAMILY MEDICINE

## 2018-04-27 PROCEDURE — 97530 THERAPEUTIC ACTIVITIES: CPT

## 2018-04-27 PROCEDURE — 36415 COLL VENOUS BLD VENIPUNCTURE: CPT | Performed by: FAMILY MEDICINE

## 2018-04-27 PROCEDURE — 97116 GAIT TRAINING THERAPY: CPT

## 2018-04-27 PROCEDURE — 74011250637 HC RX REV CODE- 250/637: Performed by: UROLOGY

## 2018-04-27 RX ADMIN — Medication 10 ML: at 23:29

## 2018-04-27 RX ADMIN — CALCIUM ACETATE 1334 MG: 667 CAPSULE ORAL at 08:42

## 2018-04-27 RX ADMIN — CLONIDINE HYDROCHLORIDE 0.1 MG: 0.1 TABLET ORAL at 14:29

## 2018-04-27 RX ADMIN — AMLODIPINE BESYLATE 10 MG: 10 TABLET ORAL at 08:42

## 2018-04-27 RX ADMIN — Medication 10 ML: at 06:09

## 2018-04-27 RX ADMIN — SODIUM BICARBONATE 650 MG TABLET 650 MG: at 17:12

## 2018-04-27 RX ADMIN — CLONIDINE HYDROCHLORIDE 0.1 MG: 0.1 TABLET ORAL at 21:29

## 2018-04-27 RX ADMIN — HYDRALAZINE HYDROCHLORIDE 100 MG: 50 TABLET, FILM COATED ORAL at 21:29

## 2018-04-27 RX ADMIN — SODIUM BICARBONATE 650 MG TABLET 650 MG: at 08:41

## 2018-04-27 RX ADMIN — CALCIUM ACETATE 1334 MG: 667 CAPSULE ORAL at 12:39

## 2018-04-27 RX ADMIN — CALCITRIOL 0.25 MCG: 0.25 CAPSULE, LIQUID FILLED ORAL at 08:43

## 2018-04-27 RX ADMIN — HYDROCODONE BITARTRATE AND ACETAMINOPHEN 1 TABLET: 5; 325 TABLET ORAL at 01:59

## 2018-04-27 RX ADMIN — TOLTERODINE TARTRATE 2 MG: 2 TABLET, FILM COATED ORAL at 08:42

## 2018-04-27 RX ADMIN — CYCLOBENZAPRINE HYDROCHLORIDE 10 MG: 10 TABLET, FILM COATED ORAL at 00:15

## 2018-04-27 RX ADMIN — DOCUSATE SODIUM 100 MG: 100 CAPSULE, LIQUID FILLED ORAL at 08:43

## 2018-04-27 RX ADMIN — HYDRALAZINE HYDROCHLORIDE 10 MG: 20 INJECTION INTRAMUSCULAR; INTRAVENOUS at 00:05

## 2018-04-27 RX ADMIN — HYDRALAZINE HYDROCHLORIDE 100 MG: 50 TABLET, FILM COATED ORAL at 14:29

## 2018-04-27 RX ADMIN — CLONIDINE HYDROCHLORIDE 0.1 MG: 0.1 TABLET ORAL at 06:09

## 2018-04-27 RX ADMIN — HYDROCODONE BITARTRATE AND ACETAMINOPHEN 1 TABLET: 5; 325 TABLET ORAL at 12:47

## 2018-04-27 RX ADMIN — HYDROCODONE BITARTRATE AND ACETAMINOPHEN 1 TABLET: 5; 325 TABLET ORAL at 08:41

## 2018-04-27 RX ADMIN — CIPROFLOXACIN HYDROCHLORIDE 500 MG: 500 TABLET, FILM COATED ORAL at 16:28

## 2018-04-27 RX ADMIN — Medication 10 ML: at 14:29

## 2018-04-27 RX ADMIN — HYDRALAZINE HYDROCHLORIDE 100 MG: 50 TABLET, FILM COATED ORAL at 06:09

## 2018-04-27 RX ADMIN — ATORVASTATIN CALCIUM 20 MG: 20 TABLET, FILM COATED ORAL at 08:43

## 2018-04-27 RX ADMIN — DOCUSATE SODIUM 100 MG: 100 CAPSULE, LIQUID FILLED ORAL at 17:12

## 2018-04-27 RX ADMIN — CALCIUM ACETATE 1334 MG: 667 CAPSULE ORAL at 16:28

## 2018-04-27 RX ADMIN — ALLOPURINOL 100 MG: 100 TABLET ORAL at 17:17

## 2018-04-27 NOTE — PROGRESS NOTES
RENAL DAILY PROGRESS NOTE          51y M with CKD 5, admitted for urinary retention and hematuria, following for renal failure  Subjective:     Complaint:  Overnight events noted  BP remain on higher side  Complaining still pain while passing urine  Urine output abot 2L over last 24hrs. Has urinary retention   no nausea, vomiting, chest pain, short of breath, cough, seizure. IMPRESSION:   CKD stage 5, creatinine remain on higher side, has mild uremic symptoms   HTN,   Anemia, low Hb   Secondary hyperparathyroid ,on calcitriol   Metabolic acidosis  Hematuria, s/p prostate biopsy  Dysuria    PLAN:    Mr. Kia Haider has severe CKD stage 5, needs to start dialysis for better management of his anemia, hyperparthyroid and clearance. He continues to refuse, understand risk including, severe electrolyte imbalance, hyperkalemia,  sudden cardiac death . He understands that. Continue to refuse for HD start. From renal stand point, continue current treatment   Discussed with Dr. Mane Mejia about dysuria. discussed with Dr. Romy Hernandez.          Current Facility-Administered Medications   Medication Dose Route Frequency    amLODIPine (NORVASC) tablet 10 mg  10 mg Oral DAILY    hydrALAZINE (APRESOLINE) tablet 100 mg  100 mg Oral Q8H    0.9% sodium chloride infusion 250 mL  250 mL IntraVENous PRN    ciprofloxacin HCl (CIPRO) tablet 500 mg  500 mg Oral Q18H    sodium chloride (NS) flush 5-10 mL  5-10 mL IntraVENous Q8H    sodium chloride (NS) flush 5-10 mL  5-10 mL IntraVENous PRN    acetaminophen (TYLENOL) tablet 650 mg  650 mg Oral Q4H PRN    HYDROcodone-acetaminophen (NORCO) 5-325 mg per tablet 1 Tab  1 Tab Oral Q4H PRN    naloxone (NARCAN) injection 0.4 mg  0.4 mg IntraVENous PRN    ondansetron (ZOFRAN) injection 4 mg  4 mg IntraVENous Q4H PRN    zolpidem (AMBIEN) tablet 5 mg  5 mg Oral QHS PRN    hydrALAZINE (APRESOLINE) 20 mg/mL injection 10 mg  10 mg IntraVENous Q6H PRN    opium-belladonna (B&O 15-A) 16.2-30 mg suppository 1 Suppository  1 Suppository Rectal Q8H PRN    calcitRIOL (ROCALTROL) capsule 0.25 mcg  0.25 mcg Oral DAILY    allopurinol (ZYLOPRIM) tablet 100 mg  100 mg Oral Once per day on Mon Wed Fri    calcium acetate (PHOSLO) capsule 1,334 mg  2 Cap Oral TID WITH MEALS    sodium bicarbonate tablet 650 mg  650 mg Oral BID    cloNIDine HCl (CATAPRES) tablet 0.1 mg  0.1 mg Oral Q8H    docusate sodium (COLACE) capsule 100 mg  100 mg Oral BID    atorvastatin (LIPITOR) tablet 20 mg  20 mg Oral DAILY    cyclobenzaprine (FLEXERIL) tablet 10 mg  10 mg Oral TID PRN       Review of Symptoms: comprehensive ROS negative except above.    Objective:   Patient Vitals for the past 24 hrs:   Temp Pulse Resp BP SpO2   04/27/18 1344 98.9 °F (37.2 °C) 96 18 (!) 157/93 99 %   04/27/18 1220 98.6 °F (37 °C) (!) 102 20 (!) 184/98 99 %   04/27/18 0818 98.7 °F (37.1 °C) (!) 110 20 (!) 180/100 99 %   04/27/18 0758 98.7 °F (37.1 °C) (!) 110 20 (!) 203/117 97 %   04/27/18 0349 99.7 °F (37.6 °C) (!) 110 20 (!) 177/99 93 %   04/26/18 2327 98.1 °F (36.7 °C) 100 20 (!) 185/103 96 %   04/26/18 1940 98.1 °F (36.7 °C) 91 20 (!) 187/104 97 %   04/26/18 1454 98.1 °F (36.7 °C) 92 24 (!) 169/96 97 %        Weight change: -4.491 kg (-9 lb 14.4 oz)     04/25 1901 - 04/27 0700  In: 1710 [P.O.:1710]  Out: 3000 [Urine:3000]    Intake/Output Summary (Last 24 hours) at 04/27/18 1410  Last data filed at 04/27/18 0208   Gross per 24 hour   Intake              480 ml   Output             1300 ml   Net             -820 ml     Physical Exam:   General: comfortable, no acute distress   HEENT , no thyromegaly  CVS: S1S2 heard,  no rub  RS: + air entry b/l,   Abd: Soft, Non tender, Not distended,  Neuro: non focal, awake, alert , CN II-XII are grossly intact  Extrm: no edema, no cyanosis, clubbing   Skin: no visible  Rash  Musculoskeletal: No gross joints or bone deformities         Data Review:     LABS:   Hematology: Recent Labs      04/27/18 0330  04/26/18   0430  04/25/18   0252   WBC  10.0  9.1  8.4   HGB  8.6*  8.5*  8.3*   HCT  27.2*  27.0*  25.7*     Chemistry: Recent Labs      04/27/18   0330  04/26/18   0430  04/25/18   0252   BUN  111*  110*  102*   CREA  13.40*  13.50*  13.50*   CA  8.2*  8.0*  7.7*   K  4.8  5.1  4.6   NA  141  145  142   CL  111*  114*  111*   CO2  18*  21  20*   GLU  101*  96  95            Procedures/imaging: see electronic medical records for all procedures, Xrays and details which were not copied into this note but were reviewed prior to creation of Plan          Assessment & Plan:             Kishor Galindo MD  4/27/2018  2:10 PM

## 2018-04-27 NOTE — PROGRESS NOTES
Received report on pt.from off going RN. Resting quietly in bed on rounds. Denies c/o pain or SOB at this time. No acute distress noted. Call bell at side. Voiding in urinal but still having some bladder spasms. Will cont to monitor. 1600 talked with DR Carolina Ge in regards to continued bladder spasms. 1700 assisted pt from recliner back to bed with assist of 2 and walker. Unsteady gait and minimal wt bearing on left leg due to pain. 1950 Bedside and Verbal shift change report given to Thania STARKS (oncoming nurse) by Fide Agudelo RN (offgoing nurse). Report given with ABHISHEK, Taisha and MAR.

## 2018-04-27 NOTE — PROGRESS NOTES
[facility] Hospitalist Group  Progress Note    Patient: Nathaniel Lazaro Age: 50 y.o. : 1970 MR#: [id number] SSN: [id number]  Date: 2018     Subjective:     RN called me that pt is in discomfort, so came to see pt. Pt been in discomfort and pain in suprapubic area since this am. He has urinated little urine since this am.  When I came to see pt he was in significant distress. Pt and wife upset that nothing was done. RN told me that pt was feeling ok with pain med's so they did not page me earlier today. Start bladder scan showed >770 ml urine in bladder. Post brioens >1200 ml urine out pt and pt felt better     Assessment/Plan:   1. Sepsis POA due to UTI: POA. Remains afebrile, WBC remains wnl. Urine cx negative. Cont cipro per urology. 2. Recurrent Urinary retention: needing briones again today. Pt decline briones without anesthesia but later agreed with dr. Mary Jacobsen. s/p prostate biopsy 2018 outpatient and developed urinary retention and hematuria. s/p cysto X 2 and fulguration of bleeding. 3. Acute blood loss anemia in context of Chronic Anemia: likely exacerbated by prostate biopsy, gross hematuria present on admission. s/p 3 unit PRBC. Repeat Hgb stable. 4. Acute kidney injury superimposed on CKD stage 5. Nephrology on case. Pt doesn't want HD. D/w Dr. Letty Gilliland, no need to monitor crt daily     5. HTN. BP Uncontrolled this am due to discomfort, better now. Will cont Norvasc and hydralazine, clonidine    Dispo: needs SNF but his insurance doesn't have SNF benefits, d/w pt and wife, they will go home once pt able to ambulate with walker         D/w CM    D/w pt and wife in detail.      Case discussed with:  [x]Patient  [x]Family  [x]Nursing  []Case Management  DVT Prophylaxis:  []Lovenox  []Hep SQ  [x]SCDs  []Coumadin   []On Heparin gtt    Objective:   VS:   Visit Vitals    BP (!) 157/93 (BP 1 Location: Left arm, BP Patient Position: At rest)    Pulse 96    Temp 98.9 °F (37.2 °C)    Resp 18    Ht 6' 2\" (1.88 m)    Wt 106.6 kg (235 lb 1.6 oz)    SpO2 99%    BMI 30.19 kg/m2      Tmax/24hrs: Temp (24hrs), Av.7 °F (37.1 °C), Min:98.1 °F (36.7 °C), Max:99.7 °F (37.6 °C)      Intake/Output Summary (Last 24 hours) at 18 1510  Last data filed at 18 0208   Gross per 24 hour   Intake              480 ml   Output             1150 ml   Net             -670 ml       General: Awake, alert, NAD. Cardiovascular:  RRR  Pulmonary:  CTA B.   GI:  Soft, NT/ND, NABS. Extremities:  No CT or edema.    Murillo cath with dark urine      Labs:    Recent Results (from the past 24 hour(s))   CBC W/O DIFF    Collection Time: 18  3:30 AM   Result Value Ref Range    WBC 10.0 4.6 - 13.2 K/uL    RBC 3.34 (L) 4.70 - 5.50 M/uL    HGB 8.6 (L) 13.0 - 16.0 g/dL    HCT 27.2 (L) 36.0 - 48.0 %    MCV 81.4 74.0 - 97.0 FL    MCH 25.7 24.0 - 34.0 PG    MCHC 31.6 31.0 - 37.0 g/dL    RDW 15.7 (H) 11.6 - 14.5 %    PLATELET 944 197 - 087 K/uL    MPV 10.3 9.2 - 24.9 FL   METABOLIC PANEL, BASIC    Collection Time: 18  3:30 AM   Result Value Ref Range    Sodium 141 136 - 145 mmol/L    Potassium 4.8 3.5 - 5.5 mmol/L    Chloride 111 (H) 100 - 108 mmol/L    CO2 18 (L) 21 - 32 mmol/L    Anion gap 12 3.0 - 18 mmol/L    Glucose 101 (H) 74 - 99 mg/dL     (H) 7.0 - 18 MG/DL    Creatinine 13.40 (H) 0.6 - 1.3 MG/DL    BUN/Creatinine ratio 8 (L) 12 - 20      GFR est AA 5 (L) >60 ml/min/1.73m2    GFR est non-AA 4 (L) >60 ml/min/1.73m2    Calcium 8.2 (L) 8.5 - 10.1 MG/DL   URINALYSIS W/ RFLX MICROSCOPIC    Collection Time: 18  1:20 PM   Result Value Ref Range    Color YELLOW      Appearance CLOUDY      Specific gravity 1.013 1.005 - 1.030      pH (UA) 5.0 5.0 - 8.0      Protein 300 (A) NEG mg/dL    Glucose NEGATIVE  NEG mg/dL    Ketone NEGATIVE  NEG mg/dL    Bilirubin NEGATIVE  NEG      Blood LARGE (A) NEG      Urobilinogen 0.2 0.2 - 1.0 EU/dL    Nitrites NEGATIVE  NEG      Leukocyte Esterase TRACE (A) NEG         Signed By: Kena Brenner MD     April 27, 2018

## 2018-04-27 NOTE — PROGRESS NOTES
Bedside shift change report given to this RN (oncoming nurse) by Loli Cochran (offgoing nurse). Report included the following information SBAR and Kardex.

## 2018-04-27 NOTE — PROGRESS NOTES
04/27/18 1220   Vital Signs   Temp 98.6 °F (37 °C)   Temp Source Oral   Pulse (Heart Rate) (!) 102   Heart Rate Source Monitor   Cardiac Rhythm N/A (comment)   Resp Rate 20   O2 Sat (%) 99 %   Level of Consciousness Alert   BP (!) 184/98   MAP (Calculated) 127   BP 1 Method Manual   BP 1 Location Left arm   BP Patient Position At rest   MEWS Score 2   MD made aware

## 2018-04-27 NOTE — PROGRESS NOTES
Problem: Falls - Risk of  Goal: *Absence of Falls  Document Jaspal Fall Risk and appropriate interventions in the flowsheet. Outcome: Progressing Towards Goal  Fall Risk Interventions:  Mobility Interventions: Assess mobility with egress test, Bed/chair exit alarm, Communicate number of staff needed for ambulation/transfer, OT consult for ADLs, Patient to call before getting OOB, PT Consult for mobility concerns         Medication Interventions: Bed/chair exit alarm, Evaluate medications/consider consulting pharmacy, Patient to call before getting OOB, Teach patient to arise slowly    Elimination Interventions: Call light in reach, Patient to call for help with toileting needs, Toilet paper/wipes in reach, Toileting schedule/hourly rounds, Urinal in reach             Problem: Pressure Injury - Risk of  Goal: *Prevention of pressure injury  Document Tres Scale and appropriate interventions in the flowsheet. Outcome: Progressing Towards Goal  Pressure Injury Interventions:             Activity Interventions: Increase time out of bed, Pressure redistribution bed/mattress(bed type), PT/OT evaluation    Mobility Interventions: HOB 30 degrees or less, Pressure redistribution bed/mattress (bed type), PT/OT evaluation    Nutrition Interventions: Document food/fluid/supplement intake    Friction and Shear Interventions: HOB 30 degrees or less

## 2018-04-27 NOTE — ROUTINE PROCESS
Received report from [facility]. Pt AAOx3, NAD, breathing non labored, on room air, HOB up. IV site clean, dry and intact. Family members at the bedside. Bed at the lowest level on lock position, call bell w/i reach. Bedside and Verbal shift change report given to BOOGIE Carpio (oncoming nurse) by me (offgoing nurse). Report included the following information SBAR, Kardex, Procedure Summary, Intake/Output, MAR and Recent Results.

## 2018-04-27 NOTE — PROGRESS NOTES
Bedside shift change report given to this RN (oncoming nurse) by Shabana Campa (offgoing nurse). Report included the following information SBAR and Kardex. 1245 Preformed a bladder scan 770 ml of urine in the bladder. Dr Yair Ahumada made aware. 18 Dr. Simón Aguila inserted a 16F briones catheter at this time. Patient tolerated procedure. [address] into speak with the patient's wife.  Per patient's wife request.

## 2018-04-27 NOTE — ROUTINE PROCESS
Received bedside shift report from Faustino HUFFMAN And Assumed patient care Patient in bed, awake, alert and oriented x4. Patient stated pain to left knee and moaning for bladder spasms. Medicated for pain. Positioned for comfort. 7:31 AM -Bedside shift change report given to Chandler Abel (oncoming nurse) by Sunitha Mack RN (offgoing nurse). Report given with SBAR, Kardex, Intake/Output, MAR and Recent Results.

## 2018-04-27 NOTE — PROGRESS NOTES
New PT evaluation order noted. Pt is currently on caseload with initial PT evaluation completed on 4/26/18. Will continue to follow.   Talisha Ibrahim, PT

## 2018-04-27 NOTE — PROGRESS NOTES
Care Management Interventions  PCP Verified by CM: Yes  Palliative Care Criteria Met (RRAT>21 & CHF Dx)?: No  Mode of Transport at Discharge:  (wife)  Transition of Care Consult (CM Consult): Discharge Planning  Physical Therapy Consult: Yes  Occupational Therapy Consult: Yes  Current Support Network: Lives with Spouse  Confirm Follow Up Transport: Family  Plan discussed with Pt/Family/Caregiver: Yes  Discharge Location  Discharge Placement: Home     Pt's spouse shares that pt is a  and requested for CM to sent information to the [address] [place] [address] for their assistance with home health and dialysis. Pt's spouse mentions that she contacted RN at the [address] [place] [address] who ask for her to hand carry information to their facility. CM contacted Ms Quinn at the [facility] who informs that pt receives 10% disability and may only qualify for assistance with home health.

## 2018-04-27 NOTE — PROGRESS NOTES
NUTRITION    Nutrition Screen     RECOMMENDATIONS / PLAN:     - Add supplements: Ensure Enlive, BID.  - Monitor and encourage po/supplement intake. - Continue RD inpatient monitoring and evaluation. NUTRITION INTERVENTIONS & DIAGNOSIS:     [x] Meals/snacks: modify composition  [x] Medical food supplement therapy: initiate    Nutrition Diagnosis: Inadequate energy intake related to poor appetite as evidenced by poor meal consumption (25%) over the past couple of days, per wife report. ASSESSMENT:     Pt sleeping during visit, spoke with pt's wife who reports good meal intake/appetite PTA. Currently with poor appetite and meal intake. Agreeable to supplements. PT CKD stage 5, pt undecided to start dialysis. Did not eat breakfast today or dinner last night.     Average po intake adequate to meet patients estimated nutritional needs:   [] Yes     [x] No   [] Unable to determine at this time    Diet: DIET REGULAR      Food Allergies: NKFA  Current Appetite:   [] Good     [] Fair     [x] Poor     [] Other:  Appetite/meal intake prior to admission:   [x] Good     [] Fair     [] Poor     [] Other:  Feeding Limitations:  [] Swallowing difficulty    [] Chewing difficulty    [] Other:  Current Meal Intake: Patient Vitals for the past 100 hrs:   % Diet Eaten   04/26/18 1325 25 %   04/26/18 1152 25 %   04/24/18 1744 75 %   04/24/18 1320 100 %   04/24/18 0758 75 %       BM:  4/26  Skin Integrity: WDL  Edema: Trace RLE  Pertinent Medications: Reviewed: rocaltrol, phoslo, zofran, sodium bicarbonate    Recent Labs      04/27/18   0330  04/26/18   0430  04/25/18   0252   NA  141  145  142   K  4.8  5.1  4.6   CL  111*  114*  111*   CO2  18*  21  20*   GLU  101*  96  95   BUN  111*  110*  102*   CREA  13.40*  13.50*  13.50*   CA  8.2*  8.0*  7.7*       Intake/Output Summary (Last 24 hours) at 04/27/18 1311  Last data filed at 04/27/18 0208   Gross per 24 hour   Intake              600 ml   Output             1400 ml   Net -800 ml       Anthropometrics:  Ht Readings from Last 1 Encounters:   04/26/18 6' 2\" (1.88 m)     Last 3 Recorded Weights in this Encounter    04/25/18 0350 04/26/18 0433 04/27/18 0349   Weight: 109.9 kg (242 lb 3.2 oz) 111.1 kg (245 lb) 106.6 kg (235 lb 1.6 oz)     Body mass index is 30.19 kg/(m^2). Obese Class I         Weight History: Weight gain noted per chart hx review. Weight Metrics 4/27/2018 4/20/2018 4/11/2018 3/22/2018 2/28/2018 2/10/2017 1/13/2017   Weight 235 lb 1.6 oz 231 lb 247 lb 247 lb 245 lb 247 lb 247 lb 12.8 oz   BMI 30.19 kg/m2 29.66 kg/m2 31.71 kg/m2 31.71 kg/m2 31.46 kg/m2 31.71 kg/m2 31.82 kg/m2        Admitting Diagnosis: Acute on chronic renal failure (HCC)  Urinary retention  Hematuria  UTI (urinary tract infection)  Acute kidney injury ([facility] [address]  dx  dx  Pertinent PMHx: HTN, CKD    Education Needs:        [x] None identified  [] Identified - Not appropriate at this time  []  Identified and addressed - refer to education log  Learning Limitations:   [x] None identified  [] Identified    Cultural, Protestant & ethnic food preferences:  [x] None identified    [] Identified and addressed     ESTIMATED NUTRITION NEEDS:     Calories: 4769-6313 kcal (25-30 kcal/kg) based on  [] Actual BW      [x] SBW: 95 kg   Protein: 76-95 gm (0.8-1 gm/kg) based on  [] Actual BW      [x] SBW   Fluid: 1 mL/kcal     MONITORING & EVALUATION:     Nutrition Goal(s):   1. Po intake of meals will meet >75% of patient estimated nutritional needs within the next 7 days.   Outcome:  [] Met/Ongoing    []  Not Met    [x] New/Initial Goal     Monitoring:   [x] Food and beverage intake   [x] Diet order   [x] Nutrition-focused physical findings   [x] Treatment/therapy   [] Weight   [] Enteral nutrition intake        Previous Recommendations (for follow-up assessments only):     []   Implemented       []   Not Implemented (RD to address)      [] No Longer Appropriate     [] No Recommendation Made     Discharge Planning: renal diet   [x] Participated in care planning, discharge planning, & interdisciplinary rounds as appropriate      Jaleel Sandhu RD   Pager: [phone]

## 2018-04-27 NOTE — PROGRESS NOTES
Urology Note    Pt c/o dysuria- bladder scan 700 + cc    Procedure:         20 Fr Coude Murillo catheter placement- bladder urine is  mitch clear        Imp: retention s/p prostate3 biopsy    Rec:  Murillo to gravity drainage           Voiding trial; in 5-7 day- pt must be ambulating, off all narcotic and anticholinergic meds, and tolerating reg diet and physical activity            alpha  Blocker Rx with Flomax is indicated and recommended    (pt's spouse expressing indignation with my attitude today )    Kit Mendez MD  -

## 2018-04-27 NOTE — PROGRESS NOTES
04/27/18 0758   Vital Signs   Temp 98.7 °F (37.1 °C)   Temp Source Oral   Pulse (Heart Rate) (!) 110   Heart Rate Source Monitor   Cardiac Rhythm N/A (comment)   Resp Rate 20   O2 Sat (%) 97 %   Level of Consciousness Alert   BP (!) 203/117   MAP (Calculated) 146   BP 1 Method Automatic   BP 1 Location Left arm   BP Patient Position At rest   MEWS Score 4   Notified MD

## 2018-04-28 ENCOUNTER — HOME HEALTH ADMISSION (OUTPATIENT)
Dept: HOME HEALTH SERVICES | Facility: HOME HEALTH | Age: 48
End: 2018-04-28
Payer: COMMERCIAL

## 2018-04-28 LAB
BACTERIA SPEC CULT: NORMAL
SERVICE CMNT-IMP: NORMAL

## 2018-04-28 PROCEDURE — 65270000029 HC RM PRIVATE

## 2018-04-28 PROCEDURE — 74011250637 HC RX REV CODE- 250/637: Performed by: UROLOGY

## 2018-04-28 PROCEDURE — 74011250636 HC RX REV CODE- 250/636: Performed by: HOSPITALIST

## 2018-04-28 PROCEDURE — 74011250637 HC RX REV CODE- 250/637: Performed by: INTERNAL MEDICINE

## 2018-04-28 PROCEDURE — 97165 OT EVAL LOW COMPLEX 30 MIN: CPT

## 2018-04-28 PROCEDURE — 74011250637 HC RX REV CODE- 250/637: Performed by: HOSPITALIST

## 2018-04-28 PROCEDURE — 77030010545

## 2018-04-28 RX ORDER — HYDROCODONE BITARTRATE AND ACETAMINOPHEN 5; 300 MG/1; MG/1
1 TABLET ORAL
Qty: 12 TAB | Refills: 0 | Status: ON HOLD | OUTPATIENT
Start: 2018-04-28 | End: 2018-07-20

## 2018-04-28 RX ORDER — TAMSULOSIN HYDROCHLORIDE 0.4 MG/1
0.4 CAPSULE ORAL DAILY
Qty: 30 CAP | Refills: 0 | Status: SHIPPED | OUTPATIENT
Start: 2018-04-28 | End: 2020-02-29

## 2018-04-28 RX ORDER — MORPHINE SULFATE 4 MG/ML
2 INJECTION, SOLUTION INTRAMUSCULAR; INTRAVENOUS ONCE
Status: COMPLETED | OUTPATIENT
Start: 2018-04-28 | End: 2018-04-28

## 2018-04-28 RX ORDER — TAMSULOSIN HYDROCHLORIDE 0.4 MG/1
0.4 CAPSULE ORAL DAILY
Status: DISCONTINUED | OUTPATIENT
Start: 2018-04-28 | End: 2018-04-29 | Stop reason: HOSPADM

## 2018-04-28 RX ORDER — SAME BUTANEDISULFONATE/BETAINE 400-600 MG
500 POWDER IN PACKET (EA) ORAL 2 TIMES DAILY
Status: DISCONTINUED | OUTPATIENT
Start: 2018-04-28 | End: 2018-04-29 | Stop reason: HOSPADM

## 2018-04-28 RX ADMIN — HYDRALAZINE HYDROCHLORIDE 100 MG: 50 TABLET, FILM COATED ORAL at 06:52

## 2018-04-28 RX ADMIN — CLONIDINE HYDROCHLORIDE 0.1 MG: 0.1 TABLET ORAL at 13:42

## 2018-04-28 RX ADMIN — MORPHINE SULFATE 2 MG: 4 INJECTION, SOLUTION INTRAMUSCULAR; INTRAVENOUS at 11:16

## 2018-04-28 RX ADMIN — TAMSULOSIN HYDROCHLORIDE 0.4 MG: 0.4 CAPSULE ORAL at 11:16

## 2018-04-28 RX ADMIN — AMLODIPINE BESYLATE 10 MG: 10 TABLET ORAL at 09:08

## 2018-04-28 RX ADMIN — Medication 500 MG: at 22:34

## 2018-04-28 RX ADMIN — ATROPA BELLADONNA AND OPIUM 1 SUPPOSITORY: 16.2; 3 SUPPOSITORY RECTAL at 19:17

## 2018-04-28 RX ADMIN — CALCIUM ACETATE 1334 MG: 667 CAPSULE ORAL at 09:08

## 2018-04-28 RX ADMIN — CLONIDINE HYDROCHLORIDE 0.1 MG: 0.1 TABLET ORAL at 06:00

## 2018-04-28 RX ADMIN — CALCIUM ACETATE 1334 MG: 667 CAPSULE ORAL at 13:42

## 2018-04-28 RX ADMIN — HYDRALAZINE HYDROCHLORIDE 100 MG: 50 TABLET, FILM COATED ORAL at 22:34

## 2018-04-28 RX ADMIN — Medication 10 ML: at 06:53

## 2018-04-28 RX ADMIN — HYDROCODONE BITARTRATE AND ACETAMINOPHEN 1 TABLET: 5; 325 TABLET ORAL at 09:08

## 2018-04-28 RX ADMIN — CLONIDINE HYDROCHLORIDE 0.1 MG: 0.1 TABLET ORAL at 22:00

## 2018-04-28 RX ADMIN — CALCITRIOL 0.25 MCG: 0.25 CAPSULE, LIQUID FILLED ORAL at 09:08

## 2018-04-28 RX ADMIN — CALCIUM ACETATE 1334 MG: 667 CAPSULE ORAL at 19:17

## 2018-04-28 RX ADMIN — SODIUM BICARBONATE 650 MG TABLET 650 MG: at 09:08

## 2018-04-28 RX ADMIN — SODIUM BICARBONATE 650 MG TABLET 650 MG: at 19:17

## 2018-04-28 RX ADMIN — HYDRALAZINE HYDROCHLORIDE 100 MG: 50 TABLET, FILM COATED ORAL at 13:42

## 2018-04-28 RX ADMIN — ATORVASTATIN CALCIUM 20 MG: 20 TABLET, FILM COATED ORAL at 09:08

## 2018-04-28 RX ADMIN — Medication 10 ML: at 22:35

## 2018-04-28 RX ADMIN — CIPROFLOXACIN HYDROCHLORIDE 500 MG: 500 TABLET, FILM COATED ORAL at 11:16

## 2018-04-28 NOTE — PROGRESS NOTES
WE SW NOTE:     YOKO left voicemail for pt's wife, Pam Vaughn [phone] per pt's request. Pt was unsure of [facility] agency. He did report some concern about cost. However, he did agree to allowing [facility] to come out do their assessment and discuss cost and make decision afterward. SW left pager number for the wife to call back to. SW will be available to assist with d/c needs. DAVY Kaminski LSW  [phone] (pager)    Addendum: Pt's wife called back and provided verbal consent to send referral to [facility]. FOC in the chart.     DAVY Kaminski LSW   [phone] (pager)

## 2018-04-28 NOTE — ROUTINE PROCESS
Received report from [facility]. Pt AAOx3, NAD, breathing non labored, on room air, HOB up. IV site clean, dry and intact. Murillo cath in place. Bed at the lowest level on lock position, call bell w/i reach. Bedside and Verbal shift change report given to BOOGIE Carpio (oncoming nurse) by me (offgoing nurse). Report included the following information SBAR, Kardex, Procedure Summary, Intake/Output, MAR and Recent Results.

## 2018-04-28 NOTE — PROGRESS NOTES
Problem: Self Care Deficits Care Plan (Adult)  Goal: *Acute Goals and Plan of Care (Insert Text)  Occupational Therapy EVALUATION/discharge    Patient: Sammy Garcia (3 y.o. male)  Date: 4/28/2018  Primary Diagnosis: Acute on chronic renal failure (HCC)  Urinary retention  Hematuria  UTI (urinary tract infection)  Acute kidney injury ([facility] [address]  dx  dx  Procedure(s) (LRB):  CYSTOSCOPY/WITH FULGURATION (N/A) 5 Days Post-Op   Precautions: Fall    ASSESSMENT AND RECOMMENDATIONS:  Based on the objective data described below, the patient presents with out functional deficits. He adamantly declines to stand and walk this afternoon. He states: \"I walk just fine, I just don't think I should go home because people will see my catheter when I walk since I need to hang the catheter from my walker. He was recommended to request a leg bag that can be worn underneath his pants. He is agreeable to that although is upset that his wife is needing to go buy furniture for his downstairs room (to accommodate his discharge from the hospital) and not according to the design they had planned. His UB strength is good, he is satisfied with his functional mobility at the walker level. He states: \"I can walk around this entire floor a few times\". He is now pleased with the idea of a leg bag and reports he will discuss this with nursing. Further skilled occupational therapy is not indicated at this time. Discharge Recommendations: Home Health  Further Equipment Recommendations for Discharge: N/A      Barriers to Learning/Limitations: None    COMPLEXITY     Eval Complexity: History: LOW Complexity : Brief history review ; Examination: LOW Complexity : 1-3 performance deficits relating to physical, cognitive , or psychosocial skils that result in activity limitations and / or participation restrictions ;  Decision Making:LOW Complexity : No comorbidities that affect functional and no verbal or physical assistance needed to complete eval tasks Assessment: LOW Complexity        G-CODES:     Self Care  Current  CN= 100%   Goal  CN= 100%   D/C  CN= 100%. The severity rating is based on the Level of Assistance required for Functional Mobility and ADLs. SUBJECTIVE:   Patient stated I don't want to get up, it's embarassing.     OBJECTIVE DATA SUMMARY:     Past Medical History:   Diagnosis Date    Hypertension     Joint pain     Kidney disease     Sleep apnea     on cpap     Past Surgical History:   Procedure Laterality Date    HX ORTHOPAEDIC Left     left knee sx     HX WISDOM TEETH EXTRACTION       Prior Level of Function/Home Situation:   Home Situation  Home Environment: Private residence  # Steps to Enter: 8  Rails to Enter: Yes  Hand Rails : Bilateral  One/Two Story Residence: Two story, live on 1st floor  Living Alone: No  Support Systems: Spouse/Significant Other/Partner  Patient Expects to be Discharged to[de-identified] Private residence  Current DME Used/Available at Home: None  [x]     Right hand dominant   []     Left hand dominant  Cognitive/Behavioral Status:   he is alert, oriented X 3   Skin: no skin integrity  Edema: no extremity edema is noted  Vision/Perceptual:      tracking is WFL    Coordination:   BUE's WFL    Balance:   he declined  Strength:  BUE's 4+/5   Tone & Sensation:  BUE's WFL   Range of Motion:  BUE's WFL   Functional Mobility and Transfers for ADLs:  Bed Mobility:   he reports he is independent   Transfers:   Patient and PT report he is CG   ADL Assessment:   he reports he is independent with his lower body dressing and declines to simulate  ADL Intervention:   leg bag was recommended  Pain:  Pt reports 0/10 pain or discomfort prior to treatment.    Pt reports 0/10 pain or discomfort post treatment. Activity Tolerance:   No SOB noted    Please refer to the flowsheet for vital signs taken during this treatment.   After treatment:   []  Patient left in no apparent distress sitting up in chair  [x]  Patient left in no apparent distress in bed  [x]  Call bell left within reach  []  Nursing notified  []  Caregiver present  []  Bed alarm activated    COMMUNICATION/EDUCATION:   Communication/Collaboration:  []      Home safety education was provided and the patient/caregiver indicated understanding. [x]      Patient/family have participated as able and agree with findings and recommendations. []      Patient is unable to participate in plan of care at this time.     Julita Brown, OTR/L  Time Calculation: 13 mins

## 2018-04-28 NOTE — PROGRESS NOTES
RENAL DAILY PROGRESS NOTE          51y M with CKD 5, admitted for urinary retention and hematuria, following for renal failure  Subjective:     Complaint:  Overnight events noted  Briones catheter was placed, good urine output   No available lab this morning   BP improving   no nausea, vomiting, chest pain, short of breath, cough, seizure. IMPRESSION:   CKD stage 5, creatinine remain on higher side, has mild uremic symptoms   HTN,   Anemia, low Hb   Secondary hyperparathyroid ,on calcitriol   Metabolic acidosis  Hematuria, s/p prostate biopsy  Dysuria    PLAN:    Again refusing to start dialysis. No new rec,briones management per Dr. Mane Mejia   Will be available if any qeustion or concern. Follow up appt in 1 week. discussed with Dr. Romy Hernandez.          Current Facility-Administered Medications   Medication Dose Route Frequency    tamsulosin (FLOMAX) capsule 0.4 mg  0.4 mg Oral DAILY    amLODIPine (NORVASC) tablet 10 mg  10 mg Oral DAILY    hydrALAZINE (APRESOLINE) tablet 100 mg  100 mg Oral Q8H    0.9% sodium chloride infusion 250 mL  250 mL IntraVENous PRN    ciprofloxacin HCl (CIPRO) tablet 500 mg  500 mg Oral Q18H    sodium chloride (NS) flush 5-10 mL  5-10 mL IntraVENous Q8H    sodium chloride (NS) flush 5-10 mL  5-10 mL IntraVENous PRN    acetaminophen (TYLENOL) tablet 650 mg  650 mg Oral Q4H PRN    HYDROcodone-acetaminophen (NORCO) 5-325 mg per tablet 1 Tab  1 Tab Oral Q4H PRN    naloxone (NARCAN) injection 0.4 mg  0.4 mg IntraVENous PRN    ondansetron (ZOFRAN) injection 4 mg  4 mg IntraVENous Q4H PRN    zolpidem (AMBIEN) tablet 5 mg  5 mg Oral QHS PRN    hydrALAZINE (APRESOLINE) 20 mg/mL injection 10 mg  10 mg IntraVENous Q6H PRN    opium-belladonna (B&O 15-A) 16.2-30 mg suppository 1 Suppository  1 Suppository Rectal Q8H PRN    calcitRIOL (ROCALTROL) capsule 0.25 mcg  0.25 mcg Oral DAILY    allopurinol (ZYLOPRIM) tablet 100 mg  100 mg Oral Once per day on Mon Wed Fri    calcium acetate (PHOSLO) capsule 1,334 mg  2 Cap Oral TID WITH MEALS    sodium bicarbonate tablet 650 mg  650 mg Oral BID    cloNIDine HCl (CATAPRES) tablet 0.1 mg  0.1 mg Oral Q8H    docusate sodium (COLACE) capsule 100 mg  100 mg Oral BID    atorvastatin (LIPITOR) tablet 20 mg  20 mg Oral DAILY    cyclobenzaprine (FLEXERIL) tablet 10 mg  10 mg Oral TID PRN       Review of Symptoms: comprehensive ROS negative except above.    Objective:     Patient Vitals for the past 24 hrs:   Temp Pulse Resp BP SpO2   04/28/18 0812 97.8 °F (36.6 °C) 90 18 139/84 97 %   04/28/18 0420 98 °F (36.7 °C) 90 18 145/88 97 %   04/28/18 0001 98 °F (36.7 °C) 87 20 (!) 156/94 98 %   04/27/18 2044 98.1 °F (36.7 °C) 81 19 146/89 98 %   04/27/18 1704 97.8 °F (36.6 °C) 85 18 138/89 95 %   04/27/18 1344 98.9 °F (37.2 °C) 96 18 (!) 157/93 99 %   04/27/18 1220 98.6 °F (37 °C) (!) 102 20 (!) 184/98 99 %        Weight change: -0.045 kg (-1.6 oz)     04/26 1901 - 04/28 0700  In: 960 [P.O.:960]  Out: 1925 [Urine:1925]    Intake/Output Summary (Last 24 hours) at 04/28/18 1118  Last data filed at 04/28/18 1048   Gross per 24 hour   Intake              600 ml   Output             1975 ml   Net            -1375 ml     Physical Exam:   General: comfortable, no acute distress   HEENT , no thyromegaly  CVS: S1S2 heard,  no rub  RS: + air entry b/l,   Abd: Soft, Non tender, Not distended,  Neuro: non focal, awake, alert , CN II-XII are grossly intact  Extrm: no edema, no cyanosis, clubbing   Skin: no visible  Rash  Musculoskeletal: No gross joints or bone deformities         Data Review:     LABS:   Hematology:   Recent Labs      04/27/18   0330  04/26/18   0430   WBC  10.0  9.1   HGB  8.6*  8.5*   HCT  27.2*  27.0*     Chemistry:   Recent Labs      04/27/18   0330  04/26/18 0430   BUN  111*  110*   CREA  13.40*  13.50*   CA  8.2*  8.0*   K  4.8  5.1   NA  141  145   CL  111*  114*   CO2  18*  21   GLU  101*  96            Procedures/imaging: see electronic medical records for all procedures, Xrays and details which were not copied into this note but were reviewed prior to creation of Plan          Assessment & Plan:     As above         Reddy Lockwood MD  4/28/2018  2:10 PM

## 2018-04-28 NOTE — DISCHARGE SUMMARY
Discharge Summary    Patient: Sammy Garcia MRN: [id number]  CSN: [id number]    YOB: 1970  Age: 50 y.o. Sex: male    DOA: 4/21/2018 LOS:  LOS: 7 days   Discharge Date:      Admission Diagnoses: Acute on chronic renal failure ([facility]  Urinary retention  Hematuria  UTI (urinary tract infection)  Acute kidney injury ([facility]  dx  dx    Discharge Diagnoses:  PLEASE SEE DICTATION. Discharge Condition: Stable    PHYSICAL EXAM  Visit Vitals    /89 (BP 1 Location: Left arm, BP Patient Position: At rest)    Pulse 87    Temp 98 °F (36.7 °C)    Resp 18    Ht 6' 2\" (1.88 m)    Wt 106.6 kg (235 lb)    SpO2 98%    BMI 30.17 kg/m2     General: Alert, cooperative, no acute distress    Lungs:  CTA Bilaterally. No Wheezing/Rhonchi/Rales. Heart:  Regular rate and Rhythm. Abdomen: Soft, Non distended, Non tender. + Bowel sounds. Extremities: No edema/ cyanosis/ clubbing  Psych:   Good insight. Not anxious or agitated. Neurologic:  AA oriented X 3. Moves all extremities. Briones with clear urine                            Hospital Course: Please see dictation. code # [id number]. Pt feels lot better today, min loose stools, no abd pain, still has bladder spasms but suppository helping him. D/w pt and wife Spike Dumont at bedside in detail about discharge plan, briones care and follow up's, they understand and agree with plan. Current Discharge Medication List      START taking these medications    Details   Saccharomyces boulardii (FLORASTOR) 250 mg capsule Take 2 Caps by mouth two (2) times a day for 7 days. Qty: 28 Cap, Refills: 0      opium-belladonna (B&O 15-A) 16.2-30 mg suppository Insert 1 Suppository into rectum every eight (8) hours as needed. Max Daily Amount: 3 Suppositories. Qty: 30 Suppository, Refills: 0    Associated Diagnoses: Painful bladder spasm      !! OTHER This is to certify that Sammy Garcia was admitted to [facility]"" from 04/21/2018 to 04/29/2018.  He may may return to work on 05/07/2018. Please feel free to contact my office if you have any questions or concerns. Thank you. Qty: 1 Each, Refills: 0      tamsulosin (FLOMAX) 0.4 mg capsule Take 1 Cap by mouth daily. Qty: 30 Cap, Refills: 0      calcitRIOL (ROCALTROL) 0.25 mcg capsule Take 1 Cap by mouth daily. Qty: 30 Cap, Refills: 0      calcium acetate (PHOSLO) 667 mg cap Take 2 Caps by mouth three (3) times daily (with meals). Qty: 180 Cap, Refills: 0      sodium bicarbonate 650 mg tablet Take 1 Tab by mouth two (2) times a day. Qty: 60 Tab, Refills: 0      !! OTHER CBC in 1 week  Dx: Hematuria   Fax results to Dr. Bernal Lines: 1 Each, Refills: 0       !! - Potential duplicate medications found. Please discuss with provider. CONTINUE these medications which have CHANGED    Details   HYDROcodone-acetaminophen (XODOL) 5-300 mg tablet Take 1 Tab by mouth every six (6) hours as needed. Max Daily Amount: 4 Tabs. Qty: 12 Tab, Refills: 0    Associated Diagnoses: Elevated PSA      docusate sodium (COLACE) 100 mg capsule Take 1 Cap by mouth two (2) times daily as needed for Constipation for up to 5 days. Qty: 10 Cap, Refills: 2    Associated Diagnoses: Elevated PSA         CONTINUE these medications which have NOT CHANGED    Details   predniSONE (DELTASONE) 5 mg tablet Take 1 Tab by mouth as needed. Qty: 30 Tab, Refills: 0      atorvastatin (LIPITOR) 20 mg tablet Take  by mouth daily. cloNIDine HCl (CATAPRES) 0.1 mg tablet Take  by mouth. hydrALAZINE (APRESOLINE) 100 mg tablet Take  by mouth.      sildenafil citrate (VIAGRA) 50 mg tablet Take 1 Tab by mouth as needed. Qty: 30 Tab, Refills: 1    Associated Diagnoses: Erectile dysfunction, unspecified erectile dysfunction type      amLODIPine (NORVASC) 10 mg tablet Take 1 Tab by mouth daily. Qty: 30 Tab, Refills: 3    Associated Diagnoses: HTN, goal below 130/80      allopurinol (ZYLOPRIM) 100 mg tablet Take 2 Tabs by mouth daily.   Qty: 60 Tab, Refills: 3         STOP taking these medications       ciprofloxacin HCl (CIPRO) 500 mg tablet Comments:   Reason for Stopping:         ciprofloxacin HCl (CIPRO) 500 mg tablet Comments:   Reason for Stopping:               · It is important that you take the medication exactly as they are prescribed. · Keep your medication in the bottles provided by the pharmacist and keep a list of the medication names, dosages, and times to be taken in your wallet. · Do not take other medications without consulting your doctor. DIET:  Cardiac Diet    ACTIVITY: Activity as tolerated  Home health care for Skilled care for Hypertension and medication management    ·    PT/OT consult  ·             Murillo care     ADDITIONAL INFORMATION: If you experience any of the following symptoms but not limited to Fever, chills, nausea, vomiting, diarrhea, change in mentation, falling, bleeding, shortness of breath, chest pain, please call your primary care physician or return to the emergency room if you cannot get hold of your doctor:     FOLLOW UP CARE:  Dr. Tsering Rios, NP on 4/30/18.   Dr. Jana Aguilera in 1 week    Dr. Abrahan Goncalves in 2 weeks    Minutes spent on discharge: 40 minutes spent coordinating this discharge (review instructions/follow-up, prescriptions, preparing report for sign off)    Tray Field MD  4/28/2018 9:54 AM

## 2018-04-28 NOTE — PROGRESS NOTES
Problem: Falls - Risk of  Goal: *Absence of Falls  Document Jaspal Fall Risk and appropriate interventions in the flowsheet. Outcome: Progressing Towards Goal  Fall Risk Interventions:  Mobility Interventions: Assess mobility with egress test, Communicate number of staff needed for ambulation/transfer, OT consult for ADLs, Patient to call before getting OOB, PT Consult for mobility concerns, PT Consult for assist device competence, Utilize walker, cane, or other assitive device, Strengthening exercises (ROM-active/passive)         Medication Interventions: Assess postural VS orthostatic hypotension, Evaluate medications/consider consulting pharmacy, Patient to call before getting OOB, Teach patient to arise slowly    Elimination Interventions: Call light in reach, Elevated toilet seat, Patient to call for help with toileting needs, Toilet paper/wipes in reach, Toileting schedule/hourly rounds             Problem: Pressure Injury - Risk of  Goal: *Prevention of pressure injury  Document Tres Scale and appropriate interventions in the flowsheet. Outcome: Progressing Towards Goal  Pressure Injury Interventions:       Moisture Interventions: Absorbent underpads, Internal/External urinary devices, Maintain skin hydration (lotion/cream), Minimize layers, Offer toileting Q_hr    Activity Interventions: Assess need for specialty bed, Chair cushion, Increase time out of bed, Pressure redistribution bed/mattress(bed type), PT/OT evaluation    Mobility Interventions: Assess need for specialty bed, Chair cushion, HOB 30 degrees or less, Pressure redistribution bed/mattress (bed type), PT/OT evaluation, Turn and reposition approx.  every two hours(pillow and wedges) (encouraged)    Nutrition Interventions: Document food/fluid/supplement intake    Friction and Shear Interventions: HOB 30 degrees or less, Sit at 90-degree angle

## 2018-04-28 NOTE — DISCHARGE SUMMARY
[address] Maureen Hilario  MR#: [id number]  : 1970  ACCOUNT #: [de-identified]   ADMIT DATE: 2018  DISCHARGE DATE:     PRIMARY CARE PHYSICIAN:  Irene Arce NP     DISPOSITION:  Discharged to home with home health care. DISCHARGE CONDITION:  Stable. DISCHARGE DIAGNOSES:  1. Sepsis present on admission due to UTI. 2.  Recurrent urinary retention, needing Murillo catheter. 3.  Hematuria post prostate biopsy, status post cystoscopy and fulguration for bleeding, resolved now. 4.  Acute blood loss anemia on top of chronic anemia due to hematuria. 5.  Chronic kidney disease stage V. The patient currently does not want to be dialyzed. 6.  Chronic metabolic acidosis due to chronic renal failure. 7.  Hypertension. 8.  Secondary hyperparathyroidism. DISCHARGE MEDICATIONS:  Allopurinol 100 mg daily, amlodipine 10 mg daily. Lipitor 20 mg at bedtime, calcitriol 0.25 mcg daily, PhosLo 2 capsules 3 times daily, ciprofloxacin 250 mg twice daily for 4 days, clonidine 0.1 mg every 8 hours, Colace 100 mg b.i.d. p.r.n. for constipation, hydralazine 100 mg 3 times daily, Norco or hydrocodone with acetaminophen 5/300 mg q.6 hours p.r.n. for pain, prednisone 5 mg as needed for gout flare, Viagra 50 mg as needed, sodium bicarbonate tablet 1 tablet b.i.d., Flomax 0.4 mg daily. CONSULTATIONS IN HOSPITAL:  Consultation with Dr. Leti Swift, urology consultation with Fabienne Harris, nephrology. MAJOR PROCEDURES IN THE HOSPITAL:  The patient underwent cystoscopy twice and also had CT abdomen and pelvis on admission, which showed urinary bladder distention and filled with amorphous dense material, likely blood products. Patient also had a left lower extremity PVL which showed no DVT. HOSPITAL COURSE:  This is a 78-year-old [facility] American male who presents to the Emergency Room with complaint of acute urinary retention.   The patient had a Murillo catheter in the ED, he was noted to have significant bleeding with clots in the Murillo. Urology was consulted. The patient had a recent prostate biopsy which was thought to be cause for his hematuria. During admission, he was also noted to have chronic kidney disease stage V, also slightly worse than his baseline. There was also concern of a UTI. The patient was started on antibiotics and cultures were obtained. Urology saw the patient and recommended cystoscopy. Cystoscopy was done and urine started clearing up, but within a day, he started having more clots, so he had a recurrent blockage of Murillo so patient was taken back to the OR again, cystoscopy was done and at that time fulguration was done. After that, his bleeding stopped. Patient was continued on continuous bladder irrigation in the hospital.  Once his urine started clearing up, bladder irrigation was stopped and his urine continued to be more clear so Murillo catheter was taken out by urology. Patient was in a lot of cramps, so received some pain medications and eventually the patient developed retention again requiring Murillo catheter. Post-catheter urine remained clear. It was thought that his retention most likely from his biopsy and also possibly from his pain medications. Urology recommended to keep the Murillo for another 5-10 days and recommended to keep off narcotics as much as possible. Patient is still having lot of cramps and requesting a lot of pain medications. I advised him about the holding of the pain medications, but he is still taking 1 or 2 tablets a day. I have advised the patient to try to avoid the opiates which could also cause retention. The patient is in agreement with the plan. Patient recently had a gout episode at home and because of that, his ability to ambulate has been cut down significantly.   PT initially saw the patient and recommended SNF, but his insurance does not SNF coverage, so patient was not discharged and continued on PT at the hospital.  PT saw him yesterday and recommended home health care with rolling walker would be a choice. I discussed with the patient about discharge home with home health care and rolling walker. Patient says he could do it. I have discussed with the patient in very detail, he says he thinks he will do fine at home and he would like to go home. Patient will be having home health care and PT and the patient will be discharged if patient is not uncomfortable going home. Urology has already cleared him and recommended to follow up with his office in a week so voiding trial could be done. Patient is currently medically stable for discharge. Patient will be discharged home with Murillo catheter today. The patient does have chronic renal disease. His baseline creatinine is around 13-14. He also has some metabolic acidosis because of his renal dysfunction. Nephrology will follow up on the patient. The patient was discussed about dialysis. The patient declined dialysis. Patient wants to continue to monitor. I have discussed with nephrology, Dr. Bharat Garcia, who will be following the patient as well. Discharge instructions, followup appointments and physical exam, please refer to the previous discharge summary. Patient tolerated the current stay. Discussed with the patient in detail about his assessment, followup appointments. Also discussed with him about benefits of Murillo and also risk of infection. He completely understood and agrees with the plan. Also discussed with the patient about physical therapy at home. He agrees with the plan. Answered all his questions and concerns appropriately.       Kati Florez MD BT/  D: 04/28/2018 10:13     T: 04/28/2018 12:48  JOB #: [id number]  CC: Valeriy Velazquez NP  CC: Nitin Rubio MD  CC: Ruby Johnston MD

## 2018-04-28 NOTE — PROGRESS NOTES
Urology Note:    Vital signs-within normal limits/afebrile    Patient is doing well - complains of mild pain at tip of penis    Murillo catheter output mitch clear urine      WBC 9.1  HCT 27.0    Creatinine 13.5    Urinalysis negative nitrite 7 April 2018   urine culture no growth 27 April 2018    Impression: BPH with urinary retention following prostate biopsy                       -Elevated PSA with negative prostate biopsy    Plan:  Murillo catheter bladder drainage-          - Flomax 0.4 mg daily          -  Okay from a urologic viewpoint to discharge to home-follow-up in office on 3 May 2018 for voiding trial-spontaneous voiding is expected once patient is fully ambulatory and no longer taking             analgesic medication-PSA surveillance during the next few months with repeat prostate biopsy if PSA level rises      Issues related to urinary retention, elevated PSA, as well as chronic renal insufficiency and need for hemodialysis access discussed in detail with patient today  Pathology report from prostate biopsy also discussed, again      Linda Luis MD

## 2018-04-28 NOTE — PROGRESS NOTES
WE YOKO NOTE: YOKO contacted [facility] to obtain DME. YOKO faxed face sheet and DME order to [phone]. Deliver of the rolling walker will be at the hospital prior to pt discharge. Staff is aware that it may be 6 pm before the deliver can arrive.     DAVY Mendez LSW  [phone] (pager)

## 2018-04-29 ENCOUNTER — HOME CARE VISIT (OUTPATIENT)
Dept: HOME HEALTH SERVICES | Facility: HOME HEALTH | Age: 48
End: 2018-04-29

## 2018-04-29 VITALS
HEART RATE: 93 BPM | BODY MASS INDEX: 30.16 KG/M2 | DIASTOLIC BLOOD PRESSURE: 101 MMHG | OXYGEN SATURATION: 97 % | HEIGHT: 74 IN | SYSTOLIC BLOOD PRESSURE: 164 MMHG | WEIGHT: 235 LBS | TEMPERATURE: 98.2 F | RESPIRATION RATE: 18 BRPM

## 2018-04-29 PROCEDURE — 74011250637 HC RX REV CODE- 250/637: Performed by: HOSPITALIST

## 2018-04-29 PROCEDURE — 97116 GAIT TRAINING THERAPY: CPT

## 2018-04-29 PROCEDURE — 74011250637 HC RX REV CODE- 250/637: Performed by: INTERNAL MEDICINE

## 2018-04-29 PROCEDURE — 74011250637 HC RX REV CODE- 250/637: Performed by: UROLOGY

## 2018-04-29 RX ORDER — SAME BUTANEDISULFONATE/BETAINE 400-600 MG
500 POWDER IN PACKET (EA) ORAL 2 TIMES DAILY
Qty: 28 CAP | Refills: 0 | Status: SHIPPED | OUTPATIENT
Start: 2018-04-29 | End: 2018-05-06

## 2018-04-29 RX ORDER — ATROPA BELLADONNA AND OPIUM 16.2; 3 MG/1; MG/1
1 SUPPOSITORY RECTAL
Qty: 30 SUPPOSITORY | Refills: 0 | Status: SHIPPED | OUTPATIENT
Start: 2018-04-29 | End: 2018-07-03

## 2018-04-29 RX ADMIN — CLONIDINE HYDROCHLORIDE 0.1 MG: 0.1 TABLET ORAL at 15:15

## 2018-04-29 RX ADMIN — SODIUM BICARBONATE 650 MG TABLET 650 MG: at 08:45

## 2018-04-29 RX ADMIN — HYDROCODONE BITARTRATE AND ACETAMINOPHEN 1 TABLET: 5; 325 TABLET ORAL at 15:15

## 2018-04-29 RX ADMIN — CALCIUM ACETATE 1334 MG: 667 CAPSULE ORAL at 08:45

## 2018-04-29 RX ADMIN — ATORVASTATIN CALCIUM 20 MG: 20 TABLET, FILM COATED ORAL at 08:45

## 2018-04-29 RX ADMIN — HYDRALAZINE HYDROCHLORIDE 100 MG: 50 TABLET, FILM COATED ORAL at 15:15

## 2018-04-29 RX ADMIN — CALCIUM ACETATE 1334 MG: 667 CAPSULE ORAL at 15:15

## 2018-04-29 RX ADMIN — TAMSULOSIN HYDROCHLORIDE 0.4 MG: 0.4 CAPSULE ORAL at 08:45

## 2018-04-29 RX ADMIN — CALCITRIOL 0.25 MCG: 0.25 CAPSULE, LIQUID FILLED ORAL at 08:45

## 2018-04-29 RX ADMIN — HYDRALAZINE HYDROCHLORIDE 100 MG: 50 TABLET, FILM COATED ORAL at 05:07

## 2018-04-29 RX ADMIN — CIPROFLOXACIN HYDROCHLORIDE 500 MG: 500 TABLET, FILM COATED ORAL at 05:07

## 2018-04-29 RX ADMIN — AMLODIPINE BESYLATE 10 MG: 10 TABLET ORAL at 08:45

## 2018-04-29 RX ADMIN — Medication 500 MG: at 08:45

## 2018-04-29 RX ADMIN — Medication 10 ML: at 07:46

## 2018-04-29 RX ADMIN — ATROPA BELLADONNA AND OPIUM 1 SUPPOSITORY: 16.2; 3 SUPPOSITORY RECTAL at 10:31

## 2018-04-29 RX ADMIN — CLONIDINE HYDROCHLORIDE 0.1 MG: 0.1 TABLET ORAL at 05:07

## 2018-04-29 NOTE — PROGRESS NOTES
Received report on pt.from off going RN. Resting quietly in bed on rounds. Denies c/o pain or SOB at this time. Briones patent and draining clear yellow urine. Call bell at side. No acute distress noted. Will cont to monitor for any changes in status. 1030 pt c/o bladder spasms. B&O supp given. Urine noted to be draining from meatus around briones. Dr Mohsen Mireles was notified. Bulb deflated, briones repositioned slightly and bulb re inflated and briones irrigated with normal saline as ordered. Urine flow clear yellow urine into tubing and no further leaking noted. Will cont to monitor. 1515 walked in hallway with PT using a walker. Pt c/o increased spasms and pain at briones site once back in room. Briones checked for patency. Medicated with [place].     4113 discharge instructions and prescriptions were given to pt and wife. Both also instructed on how to change  from regular briones bag to leg bag and how to empty both. Pt wanted to be discharged home with regular briones bag attached. Expressed understanding. 1630 transported to exit per w/c. Personal walker sent home w pt. No distress noted at time of discharge.

## 2018-04-29 NOTE — PROGRESS NOTES
Problem: Mobility Impaired (Adult and Pediatric)  Goal: *Acute Goals and Plan of Care (Insert Text)  Physical Therapy Goals  Initiated 4/26/2018 and to be accomplished within 7 day(s)  1. Patient will move from supine to sit and sit to supine  and scoot up and down in bed with supervision/set-up. 2.  Patient will transfer from bed to chair and chair to bed with supervision/set-up using the least restrictive device. 3.  Patient will perform sit to stand with supervision/set-up. 4.  Patient will ambulate with supervision/set-up for >100 feet with the least restrictive device. 5.  Patient will ascend/descend 10 stairs with 1-2 handrail(s) with minimal assistance/contact guard assist.   physical Therapy TREATMENT    Patient: Calli Gutiérrez (67 y.o. male)  Date: 4/29/2018  Diagnosis: Acute on chronic renal failure (HCC)  Urinary retention  Hematuria  UTI (urinary tract infection)  Acute kidney injury ([address]  dx  dx Acute kidney injury ([address]  Procedure(s) (LRB):  CYSTOSCOPY/WITH FULGURATION (N/A) 6 Days Post-Op  Precautions: Fall  Chart, physical therapy assessment, plan of care and goals were reviewed. ASSESSMENT:  Pt agreeable to PT session, states he's looking forward to going home. Ambulates with RW, VC's for gait pattern and foot placement. Up/down 4 stairs, (B) UE assist.  VC's for gait pattern. Returned to supine in bed. RW adjusted, pt educated on how to adjust to proper height. Pt and caregiver verbalized understanding. Progression toward goals:  [x]      Improving appropriately and progressing toward goals  []      Improving slowly and progressing toward goals  []      Not making progress toward goals and plan of care will be adjusted     PLAN:  Patient continues to benefit from skilled intervention to address the above impairments. Continue treatment per established plan of care.   Discharge Recommendations:  [address]  Further Equipment Recommendations for Discharge:  rolling walker and N/A     SUBJECTIVE:   Patient stated This catheter is killing me.   Mobility [id number] Current  CI= 1-19%   Goal  CI= 1-19%. The severity rating is based on the Other level of assistance required for functional mobility and ADLs. OBJECTIVE DATA SUMMARY:   Critical Behavior:  Neurologic State: Alert  Orientation Level: Oriented X4  Cognition: Follows commands  Safety/Judgement: Awareness of environment, Fall prevention  Functional Mobility Training:  Bed Mobility:     Supine to Sit: Stand-by assistance     Scooting: Stand-by assistance         Transfers:  Sit to Stand: Contact guard assistance  Stand to Sit: Contact guard assistance                             Balance:  Sitting: Intact  Standing: Impaired  Standing - Static: Good  Standing - Dynamic : Fair  Ambulation/Gait Training:  Distance (ft): 250 Feet (ft)  Assistive Device: Walker, rolling  Ambulation - Level of Assistance: Contact guard assistance        Gait Abnormalities: Antalgic;Decreased step clearance        Base of Support: Widened     Speed/Enedelia: Shuffled; Slow  Step Length: Right shortened;Left shortened     Stairs:  Number of Stairs Trained: 4  Stairs - Level of Assistance: Contact guard assistance  Rail Use: Both  Pain:  Pt pain was reported as  Na  pre-treatment. Pt pain was reported as na  post-treatment. Activity Tolerance:   Good  Please refer to the flowsheet for vital signs taken during this treatment.   After treatment:   [] Patient left in no apparent distress sitting up in chair  [x] Patient left in no apparent distress in bed  [x] Call bell left within reach  [x] Nursing notified  [x] Caregiver present  [] Bed alarm activated      Velton Stamp, PTA   Time Calculation: 24 mins

## 2018-04-29 NOTE — ROUTINE PROCESS
Bedside and Verbal shift change report given to Senait Mary RN (oncoming nurse) by Fide Agudelo RN (offgoing nurse). Report given with Taisha WEISS and MAR.

## 2018-04-29 NOTE — PROGRESS NOTES
[facility] Hospitalist Group  Progress Note    Patient: Samanta Husain Age: 50 y.o. : 1970 MR#: [id number] SSN: [id number]  Date: 2018     Subjective:     Pt was seen on 18 but note late entry   Pt felt good in am and pt was discharged but later in day had more bladder spasms and diarrhea so d/c was cancelled. Assessment/Plan:   1. Sepsis POA due to UTI: POA. Remains afebrile, WBC remains wnl. Urine cx negative. Cont cipro per urology. 2. Recurrent Urinary retention: needing briones again today. Pt decline briones without anesthesia but later agreed with dr. Mane Mejia. s/p prostate biopsy 2018 outpatient and developed urinary retention and hematuria. s/p cysto X 2 and fulguration of bleeding. 3. Acute blood loss anemia in context of Chronic Anemia: likely exacerbated by prostate biopsy, gross hematuria present on admission. s/p 3 unit PRBC. Repeat Hgb stable. 4. Acute kidney injury superimposed on CKD stage 5. Nephrology on case. Pt doesn't want HD. D/w Dr. Gudelia Taveras, no need to monitor crt daily     5. HTN. BP better now. Will cont Norvasc and hydralazine, clonidine    6. Diarrhea due to abx: will start probiotics     Dispo: home once better     D/w pt and wife in detail.      Case discussed with:  [x]Patient  [x]Family  [x]Nursing  []Case Management  DVT Prophylaxis:  []Lovenox  []Hep SQ  [x]SCDs  []Coumadin   []On Heparin gtt    Objective:   VS:   Visit Vitals    /89 (BP 1 Location: Left arm, BP Patient Position: At rest)    Pulse 87    Temp 98 °F (36.7 °C)    Resp 18    Ht 6' 2\" (1.88 m)    Wt 106.6 kg (235 lb)    SpO2 98%    BMI 30.17 kg/m2      Tmax/24hrs: Temp (24hrs), Av.2 °F (36.8 °C), Min:98 °F (36.7 °C), Max:98.7 °F (37.1 °C)      Intake/Output Summary (Last 24 hours) at 18 1122  Last data filed at 18 0921   Gross per 24 hour   Intake              920 ml   Output             1200 ml   Net             -280 ml General: Awake, alert, NAD. Cardiovascular:  RRR  Pulmonary:  CTA B.   GI:  Soft, NT/ND, NABS. Extremities:  No CT or edema. Murillo cath with dark urine      Labs:    No results found for this or any previous visit (from the past 24 hour(s)).     Signed By: Kena Brenner MD     April 29, 2018

## 2018-04-29 NOTE — PROGRESS NOTES
Received report on pt.from off going RN. Resting quietly in bed on rounds. Denies c/o pain or SOB at this time. Briones patent and draining yellow urine with flecks of blood. Still having some pain at site of the meatus and with urinating. No acute distress noted. Will cont to monitor for any changes in status. 0900 c/o increasing bladder spasm pain. Norco given for pain    1100 still yelling out with c/o pain from briones and when voiding. Dr Alessandro Mahmood in to see pt. Order received for MS 2 mgm. 1115 meidcated with morphine for pain. urine remains yellow with a few flecks of blood. 1300 still c/o frequent pain with urination ay meatus site. 1400 feeling a little better but sts he hurts \" all the time\" when trying to void. 1500 had a very large inct BM. Cleaned and briones care given. 1600 had another incont loose stool. sts when he has bladder spasms the stool squirts out also. Cleaned and linens changed. 1800 pt having frequent episodes of pain and spasms with urine coming from around the meatus and loose stools. Dr Alessandro Mahmood notified. Orders received    1915 cleaned of inct stool and urine from leakage. B&O supp given. 1930 Bedside and Verbal shift change report given to Irena Painting RN (oncoming nurse) by Jimbo Chowdhury RN (offgoing nurse). Report given with Taisha WEISS and MAR.

## 2018-04-29 NOTE — PROGRESS NOTES
Problem: Falls - Risk of  Goal: *Absence of Falls  Document Jaspal Fall Risk and appropriate interventions in the flowsheet. Outcome: Progressing Towards Goal  Fall Risk Interventions:  Mobility Interventions: Assess mobility with egress test, Communicate number of staff needed for ambulation/transfer, OT consult for ADLs, Patient to call before getting OOB, PT Consult for mobility concerns, PT Consult for assist device competence, Strengthening exercises (ROM-active/passive), Utilize walker, cane, or other assitive device         Medication Interventions: Assess postural VS orthostatic hypotension, Evaluate medications/consider consulting pharmacy, Patient to call before getting OOB, Teach patient to arise slowly    Elimination Interventions: Call light in reach, Elevated toilet seat, Patient to call for help with toileting needs, Toilet paper/wipes in reach, Toileting schedule/hourly rounds             Problem: Pressure Injury - Risk of  Goal: *Prevention of pressure injury  Document Tres Scale and appropriate interventions in the flowsheet.    Outcome: Progressing Towards Goal  Pressure Injury Interventions:       Moisture Interventions: Absorbent underpads, Check for incontinence Q2 hours and as needed, Internal/External urinary devices, Limit adult briefs, Maintain skin hydration (lotion/cream), Minimize layers, Offer toileting Q_hr    Activity Interventions: Assess need for specialty bed, Chair cushion, Increase time out of bed, Pressure redistribution bed/mattress(bed type), PT/OT evaluation    Mobility Interventions: Assess need for specialty bed, Chair cushion, HOB 30 degrees or less, Pressure redistribution bed/mattress (bed type), PT/OT evaluation    Nutrition Interventions: Document food/fluid/supplement intake    Friction and Shear Interventions: Apply protective barrier, creams and emollients, HOB 30 degrees or less, Minimize layers, Sit at 90-degree angle

## 2018-04-29 NOTE — PROGRESS NOTES
WE SW NOTE:     SW was contacted that the pt's walker was never delivered to the hospital last night. YOKO spoke with Margaret Gabriel with [employer] who reported that he was informed there are a closet of walkers at the hospital that we can utilize. YOKO called and spoke with Ning Coker who provided information about the location of the walkers and stated she was not aware that we were able to use them. YOKO will take the walker to the pt's nurse prior to his discharge.      DAVY Reyes [facility]  [phone] (pager)

## 2018-04-30 ENCOUNTER — HOME CARE VISIT (OUTPATIENT)
Dept: HOME HEALTH SERVICES | Facility: HOME HEALTH | Age: 48
End: 2018-04-30

## 2018-05-02 ENCOUNTER — HOME CARE VISIT (OUTPATIENT)
Dept: SCHEDULING | Facility: HOME HEALTH | Age: 48
End: 2018-05-02
Payer: COMMERCIAL

## 2018-05-02 VITALS
TEMPERATURE: 99.7 F | OXYGEN SATURATION: 96 % | DIASTOLIC BLOOD PRESSURE: 100 MMHG | SYSTOLIC BLOOD PRESSURE: 200 MMHG | HEART RATE: 107 BPM

## 2018-05-02 VITALS
DIASTOLIC BLOOD PRESSURE: 90 MMHG | TEMPERATURE: 99.7 F | OXYGEN SATURATION: 96 % | SYSTOLIC BLOOD PRESSURE: 170 MMHG | RESPIRATION RATE: 18 BRPM | HEART RATE: 96 BPM

## 2018-05-02 PROCEDURE — G0151 HHCP-SERV OF PT,EA 15 MIN: HCPCS

## 2018-05-02 PROCEDURE — 400013 HH SOC

## 2018-05-02 PROCEDURE — G0299 HHS/HOSPICE OF RN EA 15 MIN: HCPCS

## 2018-05-03 ENCOUNTER — HOME CARE VISIT (OUTPATIENT)
Dept: HOME HEALTH SERVICES | Facility: HOME HEALTH | Age: 48
End: 2018-05-03
Payer: COMMERCIAL

## 2018-05-03 ENCOUNTER — OFFICE VISIT (OUTPATIENT)
Dept: UROLOGY | Age: 48
End: 2018-05-03

## 2018-05-03 VITALS
HEIGHT: 74 IN | OXYGEN SATURATION: 92 % | WEIGHT: 235 LBS | DIASTOLIC BLOOD PRESSURE: 94 MMHG | HEART RATE: 118 BPM | SYSTOLIC BLOOD PRESSURE: 166 MMHG | BODY MASS INDEX: 30.16 KG/M2

## 2018-05-03 DIAGNOSIS — R33.9 URINARY RETENTION: Primary | ICD-10-CM

## 2018-05-03 DIAGNOSIS — N39.0 URINARY TRACT INFECTION WITHOUT HEMATURIA, SITE UNSPECIFIED: ICD-10-CM

## 2018-05-03 DIAGNOSIS — R97.20 ELEVATED PSA: ICD-10-CM

## 2018-05-03 RX ORDER — CIPROFLOXACIN 500 MG/1
500 TABLET ORAL 2 TIMES DAILY
Qty: 10 TAB | Refills: 0 | Status: SHIPPED | OUTPATIENT
Start: 2018-05-03 | End: 2018-05-08

## 2018-05-03 NOTE — PROGRESS NOTES
Mr. Jeronimo Small has a reminder for a \"due or due soon\" health maintenance. I have asked that he contact his primary care provider for follow-up on this health maintenance.

## 2018-05-03 NOTE — MR AVS SNAPSHOT
[facility] [place] [address] [address] 
[phone] Patient: Kermit Zhang MRN: [id number] YTX:[id number] Visit Information Date & Time Provider Department Dept. Phone Encounter #  
 5/3/2018  [address], [address] [place] [address] [facility] [id number] Your Appointments 5/16/2018  3:30 PM  
Office Visit with Jh Germain MD  
[facility] Appt Note: f/u from [address] [place] [address] [address]  
[phone] [phone]  
  
    
 5/29/2018 11:15 AM  
ROUTINE CARE with Whitney Ruiz NP [facility] Main Office ([facility] Appt Note: 3 month f/u appt  for HTN, CKD  
 14 [address] [address]  
[phone]  
  
   
 14 [address] Upcoming Health Maintenance Date Due DTaP/Tdap/Td series (1 - Tdap) 6/11/1989 Influenza Age 5 to Adult 8/1/2018 Allergies as of 5/3/2018  Review Complete On: 5/3/2018 By: Laura Garcia No Known Allergies Current Immunizations  Never Reviewed No immunizations on file. Not reviewed this visit Vitals BP Pulse Height(growth percentile) Weight(growth percentile) SpO2 BMI  
 (!) 166/94 (BP 1 Location: Left arm, BP Patient Position: Sitting) (!) 118 6' 2\" (1.88 m) 235 lb (106.6 kg) 92% 30.17 kg/m2 Smoking Status Never Smoker Vitals History BMI and BSA Data Body Mass Index Body Surface Area  
 30.17 kg/m 2 2.36 m 2 Preferred Pharmacy Pharmacy Name Phone [id number] [address] [phone] Your Updated Medication List  
  
   
This list is accurate as of 5/3/18  9:26 AM.  Always use your most recent med list.  
  
  
  
  
 allopurinol 100 mg tablet Commonly known as:  Lenore Jai Take 2 Tabs by mouth daily. amLODIPine 10 mg tablet Commonly known as:  [place] Radha Take 1 Tab by mouth daily. atorvastatin 20 mg tablet Commonly known as:  LIPITOR Take  by mouth daily. calcitRIOL 0.25 mcg capsule Commonly known as:  ROCALTROL Take 1 Cap by mouth daily. calcium acetate 667 mg Cap Commonly known as:  PHOSLO Take 2 Caps by mouth three (3) times daily (with meals). cloNIDine HCl 0.1 mg tablet Commonly known as:  CATAPRES Take  by mouth. hydrALAZINE 100 mg tablet Commonly known as:  APRESOLINE Take  by mouth. HYDROcodone-acetaminophen 5-300 mg tablet Commonly known as:  Barnie Hashimoto Take 1 Tab by mouth every six (6) hours as needed. Max Daily Amount: 4 Tabs. opium-belladonna 16.2-30 mg suppository Commonly known as:  B&O 15-A Insert 1 Suppository into rectum every eight (8) hours as needed. Max Daily Amount: 3 Suppositories. OTHER  
CBC in 1 week Dx: Hematuria  Fax results to Dr. Reddy Crew OTHER This is to certify that Avelina Rodriguez was admitted to [facility] from 04/21/2018 to 04/29/2018. He may may return to work on 05/07/2018. Please feel free to contact my office if you have any questions or concerns. Thank you. predniSONE 5 mg tablet Commonly known as:  Alvie Reform Take 1 Tab by mouth as needed. Saccharomyces boulardii 250 mg capsule Commonly known as:  Martin Controls Take 2 Caps by mouth two (2) times a day for 7 days. sildenafil citrate 50 mg tablet Commonly known as:  VIAGRA Take 1 Tab by mouth as needed. sodium bicarbonate 650 mg tablet Take 1 Tab by mouth two (2) times a day. tamsulosin 0.4 mg capsule Commonly known as:  FLOMAX Take 1 Cap by mouth daily. Patient Instructions Urinary Retention: Care Instructions Your Care Instructions Urinary retention means that you aren't able to urinate. In men, it is often caused by a blockage of the urinary tract from an enlarged prostate gland. In men and women, it can also be caused by an infection or nerve damage. Or it may be a side effect of a medicine. The doctor may have drained the urine from your bladder. If you still have problems passing urine, you may need to use a catheter at home. This is used to empty your bladder until the problem can be fixed. Your doctor may put a catheter in your bladder before you go home. If so, he or she will tell you when to come back to have the catheter removed. The doctor has checked you closely. But problems can develop later. If you notice any problems or new symptoms, get medical treatment right away. Follow-up care is a key part of your treatment and safety. Be sure to make and go to all appointments, and call your doctor if you are having problems. It's also a good idea to know your test results and keep a list of the medicines you take. How can you care for yourself at home? · Take your medicines exactly as prescribed. Call your doctor if you think you are having a problem with your medicine. You will get more details on the specific medicines your doctor prescribes. · Check with your doctor before you use any over-the-counter medicines. Many cold and allergy medicines, for example, can make this problem worse. Make sure your doctor knows all of the medicines, vitamins, supplements, and herbal remedies you take. · Spread out through the day the amount of fluid you drink. Do not drink a lot at bedtime. · Avoid alcohol and caffeine. · If you have been given a catheter, or if one is already in place, follow the instructions you were given. Always wash your hands before and after you handle the catheter. When should you call for help? Call your doctor now or seek immediate medical care if: ? · You cannot urinate at all, or it is getting harder to urinate. ? · You have symptoms of a urinary tract infection. These may include: 
¨ Pain or burning when you urinate. ¨ A frequent need to urinate without being able to pass much urine. ¨ Pain in the flank, which is just below the rib cage and above the waist on either side of the back. ¨ Blood in your urine. ¨ A fever. ? Watch closely for changes in your health, and be sure to contact your doctor if: 
? · You have any problems with your catheter. ? · You do not get better as expected. Where can you learn more? Go to http://nichole-shahnaz.info/. Enter M244 in the search box to learn more about \"Urinary Retention: Care Instructions. \" Current as of: May 12, 2017 Content Version: 11.4 © 7876-7163 [employer]. Care instructions adapted under license by [employer] (which disclaims liability or warranty for this information). If you have questions about a medical condition or this instruction, always ask your healthcare professional. [place] [address] any warranty or liability for your use of this information. Introducing [facility]! [facility] introduces [employer] patient portal. Now you can access parts of your medical record, email your doctor's office, and request medication refills online. 1. In your internet browser, go to [email or web address] 2. Click on the First Time User? Click Here link in the Sign In box. You will see the New Member Sign Up page. 3. Enter your [employer] Access Code exactly as it appears below. You will not need to use this code after youve completed the sign-up process. If you do not sign up before the expiration date, you must request a new code. · [employer] Access Code: [email or web address] Expires: 5/29/2018 12:55 PM 
 
4.  Enter the last four digits of your Social Security Number (xxxx) and Date of Birth (mm/dd/yyyy) as indicated and click Submit. You will be taken to the next sign-up page. 5. Create a Abeelo ID. This will be your Abe[employer] login ID and cannot be changed, so think of one that is secure and easy to remember. 6. Create a Abeelo password. You can change your password at any time. 7. Enter your Password Reset Question and Answer. This can be used at a later time if you forget your password. 8. Enter your e-mail address. You will receive e-mail notification when new information is available in [address] 9. Click Sign Up. You can now view and download portions of your medical record. 10. Click the Download Summary menu link to download a portable copy of your medical information. If you have questions, please visit the Frequently Asked Questions section of the Abe[employer] website. Remember, [employer] is NOT to be used for urgent needs. For medical emergencies, dial 911. Now available from your iPhone and Android! Please provide this summary of care documentation to your next provider. Your primary care clinician is listed as Joy Lund. If you have any questions after today's visit, please call [phone].

## 2018-05-03 NOTE — PATIENT INSTRUCTIONS

## 2018-05-04 ENCOUNTER — HOME CARE VISIT (OUTPATIENT)
Dept: HOME HEALTH SERVICES | Facility: HOME HEALTH | Age: 48
End: 2018-05-04
Payer: COMMERCIAL

## 2018-05-04 NOTE — PROGRESS NOTES
Angela Espinoza 50 y.o. male     Mr. Joanna Ross seen today for a voiding trial status post negative prostate biopsy with postbiopsy retention and gross hematuria managed by dose scopic fulguration -discharged 3 days ago with   Foleycatheter indwelling-patient has not taken narcotic analgesics for several days- now  on Flomax 0.4 mg daily    Patient has hypertensive nephropathy with history of chronic renal insufficiency with stage IV renal failure from vascular access advised but declined by the patient-creatinine in the 14-18 range    PSA (6.9) in January 2018                                               transperineal saturation 30 core biopsy on 20 April 2018 negative for prostate carcinoma  Has no irritative or obstructive voiding symptoms  No family history of prostate malignancy      Review of Systems:   CNS: No seizure syncope headaches dizziness or visual changes  Respiratory: No wheezing shortness of breath chest pain or coughing  Cardiovascular: No palpitations no angina/hypertension  Intestinal: No dyspepsia diarrhea or constipation  Urinary: No urgency frequency dysuria or hematuria-chronic renal insufficiency secondary to hypertensive nephropathy  Skeletal: Large joint arthritis  Endocrine: No diabetes or thyroid disease  Other:                                                          [employer]        Allergies: No Known Allergies   Medications:    Current Outpatient Prescriptions   Medication Sig Dispense Refill    ciprofloxacin HCl (CIPRO) 500 mg tablet Take 1 Tab by mouth two (2) times a day for 5 days. 10 Tab 0    Saccharomyces boulardii (FLORASTOR) 250 mg capsule Take 2 Caps by mouth two (2) times a day for 7 days. 28 Cap 0    opium-belladonna (B&O 15-A) 16.2-30 mg suppository Insert 1 Suppository into rectum every eight (8) hours as needed. Max Daily Amount: 3 Suppositories. 30 Suppository 0    HYDROcodone-acetaminophen (XODOL) 5-300 mg tablet Take 1 Tab by mouth every six (6) hours as needed. Max Daily Amount: 4 Tabs. (Patient taking differently: Take 1 Tab by mouth every six (6) hours as needed (pain). ) 12 Tab 0    tamsulosin (FLOMAX) 0.4 mg capsule Take 1 Cap by mouth daily. 30 Cap 0    calcitRIOL (ROCALTROL) 0.25 mcg capsule Take 1 Cap by mouth daily. 30 Cap 0    calcium acetate (PHOSLO) 667 mg cap Take 2 Caps by mouth three (3) times daily (with meals). 180 Cap 0    sodium bicarbonate 650 mg tablet Take 1 Tab by mouth two (2) times a day. 60 Tab 0    atorvastatin (LIPITOR) 20 mg tablet Take  by mouth daily.  cloNIDine HCl (CATAPRES) 0.1 mg tablet Take  by mouth.  hydrALAZINE (APRESOLINE) 100 mg tablet Take  by mouth.  amLODIPine (NORVASC) 10 mg tablet Take 1 Tab by mouth daily. 30 Tab 3    allopurinol (ZYLOPRIM) 100 mg tablet Take 2 Tabs by mouth daily. 60 Tab 3    OTHER This is to certify that Pricilla Williamson was admitted to [facility] from 04/21/2018 to 04/29/2018. He may may return to work on 05/07/2018. Please feel free to contact my office if you have any questions or concerns. Thank you. 1 Each 0    OTHER CBC in 1 week  Dx: Hematuria   Fax results to Dr. Remi Kuhn 1 Each 0    predniSONE (DELTASONE) 5 mg tablet Take 1 Tab by mouth as needed. (Patient taking differently: Take 1 Tab by mouth nightly as needed (for gout flare). ) 30 Tab 0    sildenafil citrate (VIAGRA) 50 mg tablet Take 1 Tab by mouth as needed.  30 Tab 1       Past Medical History:   Diagnosis Date    Hypertension     Joint pain     Kidney disease     Sleep apnea     on cpap      Past Surgical History:   Procedure Laterality Date    HX ORTHOPAEDIC Left     left knee sx     HX WISDOM TEETH EXTRACTION       Family History   Problem Relation Age of Onset    Hypertension Mother     Heart Disease Father     Hypertension Father     Hypertension Child         Physical Examination: Well-nourished mature male ambulating with assistance of 4 posted aluminum walker/Murillo catheter indwelling draining mitch clear urine into a leg bag    Procedure Note:                                                                                      Voiding Trial       After instilling 100 cc of saline solution into the bladder through the existing Murillo catheter that catheter was removed patient immediately voided 75 cc of saline solution                                                                      Impression: Resolved urinary retention status post negative prostate biopsy                       Grade 4 chronic renal insufficiency secondary to hypertensive nephropathy         Plan: Continue Rx Flomax 0.4 mg daily    RTC 1 week PVR assessment    RTC 4 months PSA testing    Described discussed prospect of repeat prostate biopsy if PSA level increases-negative prostate biopsy does not exclude the presence of prostate malignancy-patient understands this concept      More than 1/2 of this 25 minute visit was spent in counselling and coordination of care, as described above. Gonzalo Lipscomb MD  -electronically signed-    PLEASE NOTE:  This document has been produced using voice recognition software. Unrecognized errors in transcription may be present.

## 2018-05-07 ENCOUNTER — OFFICE VISIT (OUTPATIENT)
Dept: FAMILY MEDICINE CLINIC | Facility: CLINIC | Age: 48
End: 2018-05-07

## 2018-05-07 VITALS
TEMPERATURE: 98.1 F | HEART RATE: 98 BPM | BODY MASS INDEX: 28.11 KG/M2 | WEIGHT: 219 LBS | SYSTOLIC BLOOD PRESSURE: 146 MMHG | OXYGEN SATURATION: 98 % | HEIGHT: 74 IN | DIASTOLIC BLOOD PRESSURE: 96 MMHG | RESPIRATION RATE: 16 BRPM

## 2018-05-07 DIAGNOSIS — R29.898 WEAKNESS OF BOTH LEGS: ICD-10-CM

## 2018-05-07 DIAGNOSIS — I10 HTN, GOAL BELOW 130/80: ICD-10-CM

## 2018-05-07 DIAGNOSIS — Z09 HOSPITAL DISCHARGE FOLLOW-UP: Primary | ICD-10-CM

## 2018-05-07 DIAGNOSIS — N18.4 STAGE 4 CHRONIC KIDNEY DISEASE (HCC): ICD-10-CM

## 2018-05-07 LAB
IRON SATN MFR SERPL: 11 % (ref 20–50)
IRON SERPL-MCNC: 32 UG/DL (ref 50–175)
TIBC SERPL-MCNC: 283 UG/DL (ref 250–450)

## 2018-05-07 NOTE — MR AVS SNAPSHOT
[address] [facility] 
 
 
 14 [facility] [address] [facility] [address] 
[phone] Patient: Dunia Walton MRN: [id number] PLX:[id number] Visit Information Date & Time Provider Department Dept. Phone Encounter #  
 5/7/2018  3:00 PM Mariana Salinas NP [employer] [id number] Follow-up Instructions Return if symptoms worsen or fail to improve. Your Appointments 5/9/2018 11:15 AM  
ULTRASOUND with Jorge A Deleon MD  
[facility] Appt Note: PVR  
 [address] [address]  
[phone] [address] [address]  
  
    
 5/29/2018 11:15 AM  
ROUTINE CARE with Mariana Salinas NP [facility] Main Office ([facility]) Appt Note: 3 month f/u appt  for HTN, CKD  
 14 [facility] 1 [facility] [address]  
[phone]  
  
   
 [address] Upcoming Health Maintenance Date Due DTaP/Tdap/Td series (1 - Tdap) 6/11/1989 Influenza Age 5 to Adult 8/1/2018 Allergies as of 5/7/2018  Review Complete On: 5/7/2018 By: Alisha Smoker No Known Allergies Current Immunizations  Never Reviewed No immunizations on file. Not reviewed this visit You Were Diagnosed With   
  
 Codes Comments Hospital discharge follow-up    -  Primary ICD-10-CM: 5[address] [facility] ICD-9-CM: V67.59   
 HTN, goal below 130/80     ICD-10-CM: I10 
ICD-9-CM: 401.9 Stage 4 chronic kidney disease ([facility] [address]     ICD-10-CM: N18.4 ICD-9-CM: 353. 4 Vitals BP Pulse Temp Resp Height(growth percentile) Weight(growth percentile) (!) 146/96 98 98.1 °F (36.7 °C) 16 6' 2\" (1.88 m) 219 lb (99.3 kg) SpO2 BMI Smoking Status 98% 28.12 kg/m2 Never Smoker Vitals History BMI and BSA Data  Body Mass Index Body Surface Area  
 28.12 kg/m 2 2.28 m 2  
  
  
 Preferred Pharmacy Pharmacy Name Phone [id number] [phone] Your Updated Medication List  
  
   
This list is accurate as of 5/7/18  3:26 PM.  Always use your most recent med list.  
  
  
  
  
 allopurinol 100 mg tablet Commonly known as:  Stephania Galley Take 2 Tabs by mouth daily. amLODIPine 10 mg tablet Commonly known as:  [place] Take 1 Tab by mouth daily. atorvastatin 20 mg tablet Commonly known as:  LIPITOR Take  by mouth daily. calcitRIOL 0.25 mcg capsule Commonly known as:  ROCALTROL Take 1 Cap by mouth daily. calcium acetate 667 mg Cap Commonly known as:  PHOSLO Take 2 Caps by mouth three (3) times daily (with meals). ciprofloxacin HCl 500 mg tablet Commonly known as:  CIPRO Take 1 Tab by mouth two (2) times a day for 5 days. cloNIDine HCl 0.1 mg tablet Commonly known as:  CATAPRES Take  by mouth. hydrALAZINE 100 mg tablet Commonly known as:  APRESOLINE Take  by mouth. HYDROcodone-acetaminophen 5-300 mg tablet Commonly known as:  [place] Take 1 Tab by mouth every six (6) hours as needed. Max Daily Amount: 4 Tabs. opium-belladonna 16.2-30 mg suppository Commonly known as:  B&O 15-A Insert 1 Suppository into rectum every eight (8) hours as needed. Max Daily Amount: 3 Suppositories. OTHER  
CBC in 1 week Dx: Hematuria  Fax results to Dr. Kingsley Reese OTHER This is to certify that Nathaniel Lazaro was admitted to [facility] from 04/21/2018 to 04/29/2018. He may may return to work on 05/07/2018. Please feel free to contact my office if you have any questions or concerns. Thank you. predniSONE 5 mg tablet Commonly known as:  Jamshid Janie Take 1 Tab by mouth as needed. sildenafil citrate 50 mg tablet Commonly known as:  VIAGRA Take 1 Tab by mouth as needed. sodium bicarbonate 650 mg tablet Take 1 Tab by mouth two (2) times a day. tamsulosin 0.4 mg capsule Commonly known as:  FLOMAX Take 1 Cap by mouth daily. Follow-up Instructions Return if symptoms worsen or fail to improve. Patient Instructions DASH Diet: Care Instructions Your Care Instructions The DASH diet is an eating plan that can help lower your blood pressure. DASH stands for Dietary Approaches to Stop Hypertension. Hypertension is high blood pressure. The DASH diet focuses on eating foods that are high in calcium, potassium, and magnesium. These nutrients can lower blood pressure. The foods that are highest in these nutrients are fruits, vegetables, low-fat dairy products, nuts, seeds, and legumes. But taking calcium, potassium, and magnesium supplements instead of eating foods that are high in those nutrients does not have the same effect. The DASH diet also includes whole grains, fish, and poultry. The DASH diet is one of several lifestyle changes your doctor may recommend to lower your high blood pressure. Your doctor may also want you to decrease the amount of sodium in your diet. Lowering sodium while following the DASH diet can lower blood pressure even further than just the DASH diet alone. Follow-up care is a key part of your treatment and safety. Be sure to make and go to all appointments, and call your doctor if you are having problems. It's also a good idea to know your test results and keep a list of the medicines you take. How can you care for yourself at home? Following the DASH diet · Eat 4 to 5 servings of fruit each day. A serving is 1 medium-sized piece of fruit, ½ cup chopped or canned fruit, 1/4 cup dried fruit, or 4 ounces (½ cup) of fruit juice. Choose fruit more often than fruit juice. · Eat 4 to 5 servings of vegetables each day.  A serving is 1 cup of lettuce or raw leafy vegetables, ½ cup of chopped or cooked vegetables, or 4 ounces (½ cup) of vegetable juice. Choose vegetables more often than vegetable juice. · Get 2 to 3 servings of low-fat and fat-free dairy each day. A serving is 8 ounces of milk, 1 cup of yogurt, or 1 ½ ounces of cheese. · Eat 6 to 8 servings of grains each day. A serving is 1 slice of bread, 1 ounce of dry cereal, or ½ cup of cooked rice, pasta, or cooked cereal. Try to choose whole-grain products as much as possible. · Limit lean meat, poultry, and fish to 2 servings each day. A serving is 3 ounces, about the size of a deck of cards. · Eat 4 to 5 servings of nuts, seeds, and legumes (cooked dried beans, lentils, and split peas) each week. A serving is 1/3 cup of nuts, 2 tablespoons of seeds, or ½ cup of cooked beans or peas. · Limit fats and oils to 2 to 3 servings each day. A serving is 1 teaspoon of vegetable oil or 2 tablespoons of salad dressing. · Limit sweets and added sugars to 5 servings or less a week. A serving is 1 tablespoon jelly or jam, ½ cup sorbet, or 1 cup of lemonade. · Eat less than 2,300 milligrams (mg) of sodium a day. If you limit your sodium to 1,500 mg a day, you can lower your blood pressure even more. Tips for success · Start small. Do not try to make dramatic changes to your diet all at once. You might feel that you are missing out on your favorite foods and then be more likely to not follow the plan. Make small changes, and stick with them. Once those changes become habit, add a few more changes. · Try some of the following: ¨ Make it a goal to eat a fruit or vegetable at every meal and at snacks. This will make it easy to get the recommended amount of fruits and vegetables each day. ¨ Try yogurt topped with fruit and nuts for a snack or healthy dessert. ¨ Add lettuce, tomato, cucumber, and onion to sandwiches. ¨ Combine a ready-made pizza crust with low-fat mozzarella cheese and lots of vegetable toppings.  Try using tomatoes, squash, spinach, broccoli, carrots, cauliflower, and onions. ¨ Have a variety of cut-up vegetables with a low-fat dip as an appetizer instead of chips and dip. ¨ Sprinkle sunflower seeds or chopped almonds over salads. Or try adding chopped walnuts or almonds to cooked vegetables. ¨ Try some vegetarian meals using beans and peas. Add garbanzo or kidney beans to salads. Make burritos and tacos with mashed andrade beans or black beans. Where can you learn more? Go to http://nicholeGoMotoshahnaz.info/. Enter J332 in the search box to learn more about \"DASH Diet: Care Instructions. \" Current as of: September 21, 2016 Content Version: 11.4 © 1169-6413 [employer]. Care instructions adapted under license by [employer] (which disclaims liability or warranty for this information). If you have questions about a medical condition or this instruction, always ask your healthcare professional. [place] [address] any warranty or liability for your use of this information. Kidney Dialysis: Care Instructions Your Care Instructions Dialysis is a process that filters wastes from the blood when your kidneys can no longer do the job. It is not a cure, but it can help you live longer and feel better. It is a lifesaving treatment when you have kidney failure. Normal kidneys work 24 hours a day to clean wastes from your blood. Your kidneys are not able to do this job, so a process called dialysis will do some of the work for your kidneys. You and your doctor will decide which type of dialysis you should have. Peritoneal dialysis uses the lining of your belly (peritoneum) to filter your blood. You can do it at home, on a daily basis. Hemodialysis uses a man-made filter called a dialyzer to clean your blood. Most people need to go to a hospital or clinic 3 days a week for several hours each time. Sometimes hemodialysis can be done at home. It is normal to have questions about your treatment, and you have a right to know what is happening to you. Learning about dialysis can help you take an active role in your treatment. Dialysis does not cure kidney disease, but it can help you live longer and feel better. You will need to follow your diet and treatment schedule carefully. Follow-up care is a key part of your treatment and safety. Be sure to make and go to all appointments, and call your doctor if you are having problems. It's also a good idea to know your test results and keep a list of the medicines you take. What do you need to know about peritoneal dialysis? Peritoneal dialysis uses the lining of your belly (or peritoneal membrane) to filter your blood. Before you can begin peritoneal dialysis, your doctor will need to place a thin tube called a catheter in your belly. This is the dialysis access. · Peritoneal dialysis can be done at home or in any clean place. You may be able to do it while you sleep. · You can do it by yourself. You do not have to rely on help from others. · You can do it at the times you choose as long as you do the right number of treatments. · It has to be done every day of the week. · Some people find it hard to do all the required steps. · It increases your chance for a serious infection of the lining of the belly (peritoneum). What do you need to know about hemodialysis? Hemodialysis uses a man-made membrane called a dialyzer to clean your blood. You are connected to the dialyzer by tubes attached to your blood vessels. Before you start hemodialysis, your doctor will create a site where the blood can flow in and out of your body during your dialysis sessions. This site is called the vascular access. It may be a fistula, made by connecting an artery and a vein. Or it may be a graft, which is a tube implanted under your skin. · Hemodialysis is done mainly by trained health workers who can watch for any problems. · It allows you to be in contact with other people having dialysis. This can help provide emotional support. · You can schedule your treatments in the evenings so you can keep working. · You may be able to do home hemodialysis, which gives you more control over your schedule. · It usually needs to be done on a set schedule 3 times a week. · It can cause side effects. The most common side effects are low blood pressure and muscle cramps. These can often be treated easily. · It requires needle sticks during every treatment, which bothers some people. Others get used to it and even do the needle sticks themselves. How can you care for yourself at home? · Be sure to have all of your dialysis sessions. Do not try to shorten or skip your sessions. You have a better chance of a longer and healthier life by getting your full treatment. · Your doctor or health care team will show you the steps you need to go through each day before, during, and after dialysis. Be sure to follow these steps. If you do not understand a step, talk to your team. 
· Your doctor and dietitian will help you design menus that follow your diet. Be sure to follow your diet guidelines. ¨ You will need to limit fluids and certain foods that contain salt (sodium), potassium, and phosphorus. ¨ You may need to follow a heart-healthy diet to keep the fat (cholesterol) in your blood under control. ¨ You may need higher levels of protein in your diet. · Your doctor may recommend certain vitamins. But do not take any other medicine, including over-the-counter medicines, vitamins, and herbal products, without talking to your doctor first. 
· Do not smoke. Smoking raises your risk of many health problems, including more kidney damage. If you need help quitting, talk to your doctor about stop-smoking programs and medicines. These can increase your chances of quitting for good.  
· Do not take ibuprofen (Advil, Motrin), naproxen (Aleve), or similar medicines, unless your doctor tells you to. These medicines may make kidney problems worse. When should you call for help? Call your doctor now or seek immediate medical care if: 
? · You have a fever. ? · You are dizzy or lightheaded, or you feel like you may faint. ? · You are confused or cannot think clearly. ? · You have new or worse nausea or vomiting. ? · You have new or more blood in your urine. ? · You have new swelling. ? Watch closely for changes in your health, and be sure to contact your doctor if: 
? · You do not get better as expected. Where can you learn more? Go to http://nichole-shahnaz.info/. Enter Q967 in the search box to learn more about \"Kidney Dialysis: Care Instructions. \" Current as of: May 12, 2017 Content Version: 11.4 © 4177-6351 [employer] [place]. Care instructions adapted under license by [employer] (which disclaims liability or warranty for this information). If you have questions about a medical condition or this instruction, always ask your healthcare professional. [address] any warranty or liability for your use of this information. Introducing \[facility] [place] [facility]\"" & HEALTH SERVICES! [employer] introduces Kabam patient portal. Now you can access parts of your medical record, email your doctor's office, and request medication refills online. 1. In your internet browser, go to [email or web address] 2. Click on the First Time User? Click Here link in the Sign In box. You will see the New Member Sign Up page. 3. Enter your Kabam Access Code exactly as it appears below. You will not need to use this code after youve completed the sign-up process. If you do not sign up before the expiration date, you must request a new code. · Kabam Access Code: [email or web address] Expires: 5/29/2018 12:55 PM 
 
4.  Enter the last four digits of your Social Security Number (xxxx) and Date of Birth (mm/dd/yyyy) as indicated and click Submit. You will be taken to the next sign-up page. 5. Create a Exmovere ID. This will be your Exmovere login ID and cannot be changed, so think of one that is secure and easy to remember. 6. Create a Exmovere password. You can change your password at any time. 7. Enter your Password Reset Question and Answer. This can be used at a later time if you forget your password. 8. Enter your e-mail address. You will receive e-mail notification when new information is available in [address] 9. Click Sign Up. You can now view and download portions of your medical record. 10. Click the Download Summary menu link to download a portable copy of your medical information. If you have questions, please visit the Frequently Asked Questions section of the Exmovere website. Remember, Exmovere is NOT to be used for urgent needs. For medical emergencies, dial 911. Now available from your iPhone and Android! Please provide this summary of care documentation to your next provider. Your primary care clinician is listed as Alvin Chavis. If you have any questions after today's visit, please call [phone].

## 2018-05-07 NOTE — PROGRESS NOTES
1. Have you been to the ER, urgent care clinic since your last visit? Hospitalized since your last visit? Yes When: 04/21/18 [facility] acute kidney injury    2. Have you seen or consulted any other health care providers outside of the [address] [place] [address] since your last visit? Include any pap smears or colon screening.  No

## 2018-05-07 NOTE — PATIENT INSTRUCTIONS
DASH Diet: Care Instructions  Your Care Instructions    The DASH diet is an eating plan that can help lower your blood pressure. DASH stands for Dietary Approaches to Stop Hypertension. Hypertension is high blood pressure. The DASH diet focuses on eating foods that are high in calcium, potassium, and magnesium. These nutrients can lower blood pressure. The foods that are highest in these nutrients are fruits, vegetables, low-fat dairy products, nuts, seeds, and legumes. But taking calcium, potassium, and magnesium supplements instead of eating foods that are high in those nutrients does not have the same effect. The DASH diet also includes whole grains, fish, and poultry. The DASH diet is one of several lifestyle changes your doctor may recommend to lower your high blood pressure. Your doctor may also want you to decrease the amount of sodium in your diet. Lowering sodium while following the DASH diet can lower blood pressure even further than just the DASH diet alone. Follow-up care is a key part of your treatment and safety. Be sure to make and go to all appointments, and call your doctor if you are having problems. It's also a good idea to know your test results and keep a list of the medicines you take. How can you care for yourself at home? Following the DASH diet  · Eat 4 to 5 servings of fruit each day. A serving is 1 medium-sized piece of fruit, ½ cup chopped or canned fruit, 1/4 cup dried fruit, or 4 ounces (½ cup) of fruit juice. Choose fruit more often than fruit juice. · Eat 4 to 5 servings of vegetables each day. A serving is 1 cup of lettuce or raw leafy vegetables, ½ cup of chopped or cooked vegetables, or 4 ounces (½ cup) of vegetable juice. Choose vegetables more often than vegetable juice. · Get 2 to 3 servings of low-fat and fat-free dairy each day. A serving is 8 ounces of milk, 1 cup of yogurt, or 1 ½ ounces of cheese. · Eat 6 to 8 servings of grains each day.  A serving is 1 slice of bread, 1 ounce of dry cereal, or ½ cup of cooked rice, pasta, or cooked cereal. Try to choose whole-grain products as much as possible. · Limit lean meat, poultry, and fish to 2 servings each day. A serving is 3 ounces, about the size of a deck of cards. · Eat 4 to 5 servings of nuts, seeds, and legumes (cooked dried beans, lentils, and split peas) each week. A serving is 1/3 cup of nuts, 2 tablespoons of seeds, or ½ cup of cooked beans or peas. · Limit fats and oils to 2 to 3 servings each day. A serving is 1 teaspoon of vegetable oil or 2 tablespoons of salad dressing. · Limit sweets and added sugars to 5 servings or less a week. A serving is 1 tablespoon jelly or jam, ½ cup sorbet, or 1 cup of lemonade. · Eat less than 2,300 milligrams (mg) of sodium a day. If you limit your sodium to 1,500 mg a day, you can lower your blood pressure even more. Tips for success  · Start small. Do not try to make dramatic changes to your diet all at once. You might feel that you are missing out on your favorite foods and then be more likely to not follow the plan. Make small changes, and stick with them. Once those changes become habit, add a few more changes. · Try some of the following:  ¨ Make it a goal to eat a fruit or vegetable at every meal and at snacks. This will make it easy to get the recommended amount of fruits and vegetables each day. ¨ Try yogurt topped with fruit and nuts for a snack or healthy dessert. ¨ Add lettuce, tomato, cucumber, and onion to sandwiches. ¨ Combine a ready-made pizza crust with low-fat mozzarella cheese and lots of vegetable toppings. Try using tomatoes, squash, spinach, broccoli, carrots, cauliflower, and onions. ¨ Have a variety of cut-up vegetables with a low-fat dip as an appetizer instead of chips and dip. ¨ Sprinkle sunflower seeds or chopped almonds over salads. Or try adding chopped walnuts or almonds to cooked vegetables.   ¨ Try some vegetarian meals using beans and peas. Add garbanzo or kidney beans to salads. Make burritos and tacos with mashed andrade beans or black beans. Where can you learn more? Go to http://nichole-shahnaz.info/. Enter T643 in the search box to learn more about \"DASH Diet: Care Instructions. \"  Current as of: September 21, 2016  Content Version: 11.4  © 6843-0574 [employer]. Care instructions adapted under license by [employer] (which disclaims liability or warranty for this information). If you have questions about a medical condition or this instruction, always ask your healthcare professional. [place] [address] any warranty or liability for your use of this information. Kidney Dialysis: Care Instructions  Your Care Instructions    Dialysis is a process that filters wastes from the blood when your kidneys can no longer do the job. It is not a cure, but it can help you live longer and feel better. It is a lifesaving treatment when you have kidney failure. Normal kidneys work 24 hours a day to clean wastes from your blood. Your kidneys are not able to do this job, so a process called dialysis will do some of the work for your kidneys. You and your doctor will decide which type of dialysis you should have. Peritoneal dialysis uses the lining of your belly (peritoneum) to filter your blood. You can do it at home, on a daily basis. Hemodialysis uses a man-made filter called a dialyzer to clean your blood. Most people need to go to a hospital or clinic 3 days a week for several hours each time. Sometimes hemodialysis can be done at home. It is normal to have questions about your treatment, and you have a right to know what is happening to you. Learning about dialysis can help you take an active role in your treatment. Dialysis does not cure kidney disease, but it can help you live longer and feel better. You will need to follow your diet and treatment schedule carefully.   Follow-up care is a key part of your treatment and safety. Be sure to make and go to all appointments, and call your doctor if you are having problems. It's also a good idea to know your test results and keep a list of the medicines you take. What do you need to know about peritoneal dialysis? Peritoneal dialysis uses the lining of your belly (or peritoneal membrane) to filter your blood. Before you can begin peritoneal dialysis, your doctor will need to place a thin tube called a catheter in your belly. This is the dialysis access. · Peritoneal dialysis can be done at home or in any clean place. You may be able to do it while you sleep. · You can do it by yourself. You do not have to rely on help from others. · You can do it at the times you choose as long as you do the right number of treatments. · It has to be done every day of the week. · Some people find it hard to do all the required steps. · It increases your chance for a serious infection of the lining of the belly (peritoneum). What do you need to know about hemodialysis? Hemodialysis uses a man-made membrane called a dialyzer to clean your blood. You are connected to the dialyzer by tubes attached to your blood vessels. Before you start hemodialysis, your doctor will create a site where the blood can flow in and out of your body during your dialysis sessions. This site is called the vascular access. It may be a fistula, made by connecting an artery and a vein. Or it may be a graft, which is a tube implanted under your skin. · Hemodialysis is done mainly by trained health workers who can watch for any problems. · It allows you to be in contact with other people having dialysis. This can help provide emotional support. · You can schedule your treatments in the evenings so you can keep working. · You may be able to do home hemodialysis, which gives you more control over your schedule. · It usually needs to be done on a set schedule 3 times a week.   · It can cause side effects. The most common side effects are low blood pressure and muscle cramps. These can often be treated easily. · It requires needle sticks during every treatment, which bothers some people. Others get used to it and even do the needle sticks themselves. How can you care for yourself at home? · Be sure to have all of your dialysis sessions. Do not try to shorten or skip your sessions. You have a better chance of a longer and healthier life by getting your full treatment. · Your doctor or health care team will show you the steps you need to go through each day before, during, and after dialysis. Be sure to follow these steps. If you do not understand a step, talk to your team.  · Your doctor and dietitian will help you design menus that follow your diet. Be sure to follow your diet guidelines. ¨ You will need to limit fluids and certain foods that contain salt (sodium), potassium, and phosphorus. ¨ You may need to follow a heart-healthy diet to keep the fat (cholesterol) in your blood under control. ¨ You may need higher levels of protein in your diet. · Your doctor may recommend certain vitamins. But do not take any other medicine, including over-the-counter medicines, vitamins, and herbal products, without talking to your doctor first.  · Do not smoke. Smoking raises your risk of many health problems, including more kidney damage. If you need help quitting, talk to your doctor about stop-smoking programs and medicines. These can increase your chances of quitting for good. · Do not take ibuprofen (Advil, Motrin), naproxen (Aleve), or similar medicines, unless your doctor tells you to. These medicines may make kidney problems worse. When should you call for help? Call your doctor now or seek immediate medical care if:  ? · You have a fever. ? · You are dizzy or lightheaded, or you feel like you may faint. ? · You are confused or cannot think clearly.    ? · You have new or worse nausea or vomiting. ? · You have new or more blood in your urine. ? · You have new swelling. ? Watch closely for changes in your health, and be sure to contact your doctor if:  ? · You do not get better as expected. Where can you learn more? Go to http://nichole-shahnaz.info/. Enter K448 in the search box to learn more about \"Kidney Dialysis: Care Instructions. \"  Current as of: May 12, 2017  Content Version: 11.4  © 9091-0551 [employer], [employer]. Care instructions adapted under license by [employer] (which disclaims liability or warranty for this information). If you have questions about a medical condition or this instruction, always ask your healthcare professional. [address] any warranty or liability for your use of this information.

## 2018-05-07 NOTE — PROGRESS NOTES
HISTORY OF PRESENT ILLNESS  Sammy Garcia is a 50 y.o. male. HPI Comments: Hospital d/c follow up. Pt was admitted to [facility] from 4/21/18 to 4/29/18. DISCHARGE DIAGNOSES:    1. Sepsis present on admission due to UTI. 2.  Recurrent urinary retention, needing Murillo catheter. 3.  Hematuria post prostate biopsy, status post cystoscopy and fulguration for bleeding, resolved now. 4.  Acute blood loss anemia on top of chronic anemia due to hematuria. 5.  Chronic kidney disease stage V. The patient currently does not want to be dialyzed. 6.  Chronic metabolic acidosis due to chronic renal failure. 7.  Hypertension. 8.  Secondary hyperparathyroidism. He presents today ambulating with a rolling walker. He no longer has HH PT, stating the this insurance did not cover this. He will like to return to work and is requesting a letter for work and an order for a cane. He reports he has been performing his own exercises at home. Has an upcoming appt with Dr Alonzo Watson (nephrologist) on 5/9/18. BP elevated today but improving, reports med compliance, attempting to follow a low sodium diet. Murillo cath was removed by urologist on 5/4/18. Pt voiding without difficulty. Denies any blood in urine. Hospital Follow Up   The history is provided by the patient. This is a new problem. Hypertension    The history is provided by the patient. This is a chronic problem. The current episode started more than 1 week ago. The problem has been gradually improving. Risk factors include renal failure, hypertension and male gender. Chronic Kidney Disease   The history is provided by the patient. This is a chronic problem. The current episode started more than 1 week ago. The problem occurs daily. The problem has not changed since onset. Review of Systems   Constitutional: Negative. HENT: Negative. Respiratory: Negative. Cardiovascular: Negative. Musculoskeletal:        BLE weakness     Neurological: Negative. Past Medical History:   Diagnosis Date    Hypertension     Joint pain     Kidney disease     Sleep apnea     on cpap     Past Surgical History:   Procedure Laterality Date    HX ORTHOPAEDIC Left     left knee sx     HX WISDOM TEETH EXTRACTION       Current Outpatient Prescriptions on File Prior to Visit   Medication Sig Dispense Refill    ciprofloxacin HCl (CIPRO) 500 mg tablet Take 1 Tab by mouth two (2) times a day for 5 days. 10 Tab 0    OTHER This is to certify that Dunia Walton was admitted to [facility] from 2018 to 2018. He may may return to work on 2018. Please feel free to contact my office if you have any questions or concerns. Thank you. 1 Each 0    HYDROcodone-acetaminophen (XODOL) 5-300 mg tablet Take 1 Tab by mouth every six (6) hours as needed. Max Daily Amount: 4 Tabs. (Patient taking differently: Take 1 Tab by mouth every six (6) hours as needed (pain). ) 12 Tab 0    tamsulosin (FLOMAX) 0.4 mg capsule Take 1 Cap by mouth daily. 30 Cap 0    calcitRIOL (ROCALTROL) 0.25 mcg capsule Take 1 Cap by mouth daily. 30 Cap 0    calcium acetate (PHOSLO) 667 mg cap Take 2 Caps by mouth three (3) times daily (with meals). 180 Cap 0    sodium bicarbonate 650 mg tablet Take 1 Tab by mouth two (2) times a day. 60 Tab 0    OTHER CBC in 1 week  Dx: Hematuria   Fax results to Dr. Nathaly Taveras 1 Each 0    atorvastatin (LIPITOR) 20 mg tablet Take  by mouth daily.  cloNIDine HCl (CATAPRES) 0.1 mg tablet Take  by mouth.  hydrALAZINE (APRESOLINE) 100 mg tablet Take  by mouth.  sildenafil citrate (VIAGRA) 50 mg tablet Take 1 Tab by mouth as needed. 30 Tab 1    amLODIPine (NORVASC) 10 mg tablet Take 1 Tab by mouth daily. 30 Tab 3    allopurinol (ZYLOPRIM) 100 mg tablet Take 2 Tabs by mouth daily. 60 Tab 3    [] Saccharomyces boulardii (FLORASTOR) 250 mg capsule Take 2 Caps by mouth two (2) times a day for 7 days.  28 Cap 0    opium-belladonna (B&O 15-A) 16.2-30 mg suppository Insert 1 Suppository into rectum every eight (8) hours as needed. Max Daily Amount: 3 Suppositories. 30 Suppository 0     No current facility-administered medications on file prior to visit. Allergies and Intolerances:   No Known Allergies    Family History:   Family History   Problem Relation Age of Onset    Hypertension Mother     Heart Disease Father     Hypertension Father     Hypertension Child        Social History:   He  reports that he has never smoked. He has never used smokeless tobacco. He  reports that he does not drink alcohol. Vitals:   Visit Vitals    BP (!) 146/96    Pulse 98    Temp 98.1 °F (36.7 °C)    Resp 16    Ht 6' 2\" (1.88 m)    Wt 219 lb (99.3 kg)    SpO2 98%    BMI 28.12 kg/m2     Body surface area is 2.28 meters squared. Physical Exam   Constitutional: He is oriented to person, place, and time. He appears well-developed and well-nourished. HENT:   Head: Atraumatic. Cardiovascular: Normal rate. Pulmonary/Chest: Effort normal.   Musculoskeletal:   Ambulating with rolling walker   Neurological: He is alert and oriented to person, place, and time. Psychiatric: He has a normal mood and affect. His behavior is normal.   Nursing note and vitals reviewed. ASSESSMENT and PLAN    ICD-10-CM ICD-9-CM    1. Hospital discharge follow-up Z09 V67.59    2. HTN, goal below 130/80 I10 401.9    3. Stage 4 chronic kidney disease (HCC) N18.4 585.4    4. Weakness of both legs R29.898 729.89      Follow-up Disposition:  Return if symptoms worsen or fail to improve. cardiovascular risk and specific lipid/LDL goals reviewed  reviewed medications and side effects in detail  Order for cane placed through [facility] medical.   Return to work 5/10/18- letter provided to pt. - Alarm signals discussed. ER precautions  - Plan of care reviewed with patient. Understanding verbalized and they are in agreement with plan of care.

## 2018-05-07 NOTE — LETTER
NOTIFICATION RETURN TO WORK  
 
5/7/2018 3:25 PM 
 
Mr. Pietro Bryan [address] [place] [id number] To Whom It May Concern: Pietro Bryan is currently under the care of Juanita Barragan. He will return to work 5/10/18 If there are questions or concerns please have the patient contact our office. Sincerely, 
 
Velma Plunkett DNP, FNP-C [address] [place] [address]

## 2018-05-08 ENCOUNTER — HOSPITAL ENCOUNTER (OUTPATIENT)
Dept: LAB | Age: 48
Discharge: HOME | End: 2018-05-08
Payer: OTHER GOVERNMENT

## 2018-05-08 LAB
ALBUMIN SERPL-MCNC: 3.3 G/DL (ref 3.4–5)
ANION GAP SERPL CALC-SCNC: 14 MMOL/L (ref 3–18)
BUN SERPL-MCNC: 112 MG/DL (ref 7–18)
BUN/CREAT SERPL: 8 (ref 12–20)
CALCIUM SERPL-MCNC: 7.7 MG/DL (ref 8.5–10.1)
CHLORIDE SERPL-SCNC: 108 MMOL/L (ref 100–108)
CO2 SERPL-SCNC: 20 MMOL/L (ref 21–32)
CREAT SERPL-MCNC: 14.3 MG/DL (ref 0.6–1.3)
GLUCOSE SERPL-MCNC: 93 MG/DL (ref 74–99)
HCT VFR BLD AUTO: 29.1 % (ref 36–48)
HGB BLD-MCNC: 9.2 G/DL (ref 13–16)
IRON SATN MFR SERPL: 16 % (ref 20–50)
IRON SERPL-MCNC: 31 UG/DL (ref 50–175)
PHOSPHATE SERPL-MCNC: 7.8 MG/DL (ref 2.5–4.9)
POTASSIUM SERPL-SCNC: 4.5 MMOL/L (ref 3.5–5.5)
SODIUM SERPL-SCNC: 142 MMOL/L (ref 136–145)
TIBC SERPL-MCNC: 197 UG/DL (ref 250–450)

## 2018-05-08 PROCEDURE — 36415 COLL VENOUS BLD VENIPUNCTURE: CPT | Performed by: INTERNAL MEDICINE

## 2018-05-08 PROCEDURE — 80069 RENAL FUNCTION PANEL: CPT | Performed by: INTERNAL MEDICINE

## 2018-05-08 PROCEDURE — 85018 HEMOGLOBIN: CPT | Performed by: INTERNAL MEDICINE

## 2018-05-08 PROCEDURE — 83540 ASSAY OF IRON: CPT | Performed by: INTERNAL MEDICINE

## 2018-05-09 ENCOUNTER — OFFICE VISIT (OUTPATIENT)
Dept: UROLOGY | Age: 48
End: 2018-05-09

## 2018-05-09 VITALS
DIASTOLIC BLOOD PRESSURE: 82 MMHG | OXYGEN SATURATION: 97 % | HEIGHT: 74 IN | HEART RATE: 101 BPM | SYSTOLIC BLOOD PRESSURE: 131 MMHG

## 2018-05-09 DIAGNOSIS — R97.20 ELEVATED PSA: ICD-10-CM

## 2018-05-09 DIAGNOSIS — R33.9 RETENTION OF URINE: Primary | ICD-10-CM

## 2018-05-09 NOTE — MR AVS SNAPSHOT
[address] [facility] [address] [facility] [address] 
[phone] Patient: Charmaine Ayala MRN: [id number] XMI:[id number] Visit Information Date & Time Provider Department Dept. Phone Encounter #  
 5/9/2018 11:15 AM Hermila Coello Kiersten Bayard [facility] [id number] Your Appointments 5/29/2018 11:15 AM  
ROUTINE CARE with Georgiana Hurley NP [facility] Main Office ([facility]) Appt Note: 3 month f/u appt  for HTN, CKD  
 [address] [address]  
[phone]  
  
   
 [address] Upcoming Health Maintenance Date Due DTaP/Tdap/Td series (1 - Tdap) 6/11/1989 Influenza Age 5 to Adult 8/1/2018 Allergies as of 5/9/2018  Review Complete On: 5/9/2018 By: Doug Baugh LPN No Known Allergies Current Immunizations  Never Reviewed No immunizations on file. Not reviewed this visit You Were Diagnosed With   
  
 Codes Comments Retention of urine    -  Primary ICD-10-CM: R33.9 ICD-9-CM: 788.20 Vitals BP Pulse Height(growth percentile) SpO2 Smoking Status 131/82 (BP 1 Location: Left arm, BP Patient Position: Sitting) (!) 101 6' 2\" (1.88 m) 97% Never Smoker Vitals History Preferred Pharmacy Pharmacy Name Phone [id number] [address] [address] [phone] Your Updated Medication List  
  
   
This list is accurate as of 5/9/18 12:11 PM.  Always use your most recent med list.  
  
  
  
  
 allopurinol 100 mg tablet Commonly known as:  Geradine Colder Take 2 Tabs by mouth daily. amLODIPine 10 mg tablet Commonly known as:  Brisa Nancy Take 1 Tab by mouth daily. atorvastatin 20 mg tablet Commonly known as:  LIPITOR Take  by mouth daily. calcitRIOL 0.25 mcg capsule Commonly known as:  ROCALTROL Take 1 Cap by mouth daily. calcium acetate 667 mg Cap Commonly known as:  PHOSLO Take 2 Caps by mouth three (3) times daily (with meals). cloNIDine HCl 0.1 mg tablet Commonly known as:  CATAPRES Take  by mouth. hydrALAZINE 100 mg tablet Commonly known as:  APRESOLINE Take  by mouth. HYDROcodone-acetaminophen 5-300 mg tablet Commonly known as:  Sandy Niles Take 1 Tab by mouth every six (6) hours as needed. Max Daily Amount: 4 Tabs. opium-belladonna 16.2-30 mg suppository Commonly known as:  B&O 15-A Insert 1 Suppository into rectum every eight (8) hours as needed. Max Daily Amount: 3 Suppositories. OTHER  
CBC in 1 week Dx: Hematuria  Fax results to Dr. Kingsley Reese OTHER This is to certify that Nathaniel Lazaro was admitted to [facility] from 04/21/2018 to 04/29/2018. He may may return to work on 05/07/2018. Please feel free to contact my office if you have any questions or concerns. Thank you. predniSONE 5 mg tablet Commonly known as:  DELTASONE  
TAKE 1 TAB BY MOUTH AS NEEDED. sildenafil citrate 50 mg tablet Commonly known as:  VIAGRA Take 1 Tab by mouth as needed. sodium bicarbonate 650 mg tablet Take 1 Tab by mouth two (2) times a day. tamsulosin 0.4 mg capsule Commonly known as:  FLOMAX Take 1 Cap by mouth daily. We Performed the Following AMB POC URINALYSIS DIP STICK AUTO W/O MICRO [33445 CPT(R)] Patient Instructions Urinary Retention: Care Instructions Your Care Instructions Urinary retention means that you aren't able to urinate. In men, it is often caused by a blockage of the urinary tract from an enlarged prostate gland. In men and women, it can also be caused by an infection or nerve damage. Or it may be a side effect of a medicine. The doctor may have drained the urine from your bladder.  If you still have problems passing urine, you may need to use a catheter at home. This is used to empty your bladder until the problem can be fixed. Your doctor may put a catheter in your bladder before you go home. If so, he or she will tell you when to come back to have the catheter removed. The doctor has checked you closely. But problems can develop later. If you notice any problems or new symptoms, get medical treatment right away. Follow-up care is a key part of your treatment and safety. Be sure to make and go to all appointments, and call your doctor if you are having problems. It's also a good idea to know your test results and keep a list of the medicines you take. How can you care for yourself at home? · Take your medicines exactly as prescribed. Call your doctor if you think you are having a problem with your medicine. You will get more details on the specific medicines your doctor prescribes. · Check with your doctor before you use any over-the-counter medicines. Many cold and allergy medicines, for example, can make this problem worse. Make sure your doctor knows all of the medicines, vitamins, supplements, and herbal remedies you take. · Spread out through the day the amount of fluid you drink. Do not drink a lot at bedtime. · Avoid alcohol and caffeine. · If you have been given a catheter, or if one is already in place, follow the instructions you were given. Always wash your hands before and after you handle the catheter. When should you call for help? Call your doctor now or seek immediate medical care if: 
? · You cannot urinate at all, or it is getting harder to urinate. ? · You have symptoms of a urinary tract infection. These may include: 
¨ Pain or burning when you urinate. ¨ A frequent need to urinate without being able to pass much urine. ¨ Pain in the flank, which is just below the rib cage and above the waist on either side of the back. ¨ Blood in your urine. ¨ A fever. ?Watch closely for changes in your health, and be sure to contact your doctor if: 
? · You have any problems with your catheter. ? · You do not get better as expected. Where can you learn more? Go to http://nichole-shahnaz.info/. Enter M244 in the search box to learn more about \"Urinary Retention: Care Instructions. \" Current as of: May 12, 2017 Content Version: 11.4 © 3552-5099 [employer]. Care instructions adapted under license by [employer] (which disclaims liability or warranty for this information). If you have questions about a medical condition or this instruction, always ask your healthcare professional. [address] any warranty or liability for your use of this information. Introducing [facility]! [employer] introduces [employer]rack patient portal. Now you can access parts of your medical record, email your doctor's office, and request medication refills online. 1. In your internet browser, go to [email or web address] 2. Click on the First Time User? Click Here link in the Sign In box. You will see the New Member Sign Up page. 3. Enter your Brainrack Access Code exactly as it appears below. You will not need to use this code after youve completed the sign-up process. If you do not sign up before the expiration date, you must request a new code. · Brainrack Access Code: [email or web address] Expires: 5/29/2018 12:55 PM 
 
4. Enter the last four digits of your Social Security Number (xxxx) and Date of Birth (mm/dd/yyyy) as indicated and click Submit. You will be taken to the next sign-up page. 5. Create a [employer] ID. This will be your Brainrack login ID and cannot be changed, so think of one that is secure and easy to remember. 6. Create a Brainrack password. You can change your password at any time. 7. Enter your Password Reset Question and Answer.  This can be used at a later time if you forget your password. 8. Enter your e-mail address. You will receive e-mail notification when new information is available in [address] 9. Click Sign Up. You can now view and download portions of your medical record. 10. Click the Download Summary menu link to download a portable copy of your medical information. If you have questions, please visit the Frequently Asked Questions section of the [employer] website. Remember, [employer] is NOT to be used for urgent needs. For medical emergencies, dial 911. Now available from your iPhone and Android! Please provide this summary of care documentation to your next provider. Your primary care clinician is listed as Tamiko Prajapati. If you have any questions after today's visit, please call [phone].

## 2018-05-09 NOTE — PROGRESS NOTES
Mr. Augusto Vega has a reminder for a \"due or due soon\" health maintenance. I have asked that he contact his primary care provider for follow-up on this health maintenance.

## 2018-05-09 NOTE — PATIENT INSTRUCTIONS

## 2018-05-10 NOTE — PROGRESS NOTES
Rosemarie Lazaro 50 y.o. male     Mr. Sg Shaw seen today for a voiding assessment having removed Murillo catheter 3 days ago  Patient describes voiding with a solid stream 3 or 4 times per day-no dysuria no gross hematuria     prostate biopsy on 20 April 2018  with postbiopsy retention and gross hematuria managed by endoscopic fulguration x 2 -discharged  with   Foleycatheter indwelling-patient has not taken narcotic analgesics for several days- now  on Flomax 0.4 mg daily     Patient has hypertensive nephropathy with history of chronic renal insufficiency with stage IV renal failure - vascular access advised but declined by the patient-creatinine in the 14-18 range     PSA (6.9) in January 2018                                               transperineal saturation 30 core biopsy on 20 April 2018 negative for prostate carcinoma  Has no irritative or obstructive voiding symptoms  No family history of prostate malignancy    Creatinine 14.2 on 8 May 2018      Review of Systems:   CNS: No seizure syncope headaches dizziness or visual changes  Respiratory: No wheezing shortness of breath chest pain or coughing  Cardiovascular: No palpitations no angina/hypertension  Intestinal: No dyspepsia diarrhea or constipation  Urinary: No urgency frequency dysuria or hematuria-chronic renal insufficiency secondary to hypertensive nephropathy  Skeletal: Large joint arthritis  Endocrine: No diabetes or thyroid disease  Other:                                                          [employer]         Allergies: No Known Allergies   Medications:    Current Outpatient Prescriptions   Medication Sig Dispense Refill    predniSONE (DELTASONE) 5 mg tablet TAKE 1 TAB BY MOUTH AS NEEDED. 30 Tab 1    HYDROcodone-acetaminophen (XODOL) 5-300 mg tablet Take 1 Tab by mouth every six (6) hours as needed. Max Daily Amount: 4 Tabs. (Patient taking differently: Take 1 Tab by mouth every six (6) hours as needed (pain). ) 12 Tab 0    tamsulosin (FLOMAX) 0.4 mg capsule Take 1 Cap by mouth daily. 30 Cap 0    calcitRIOL (ROCALTROL) 0.25 mcg capsule Take 1 Cap by mouth daily. 30 Cap 0    calcium acetate (PHOSLO) 667 mg cap Take 2 Caps by mouth three (3) times daily (with meals). 180 Cap 0    sodium bicarbonate 650 mg tablet Take 1 Tab by mouth two (2) times a day. 60 Tab 0    atorvastatin (LIPITOR) 20 mg tablet Take  by mouth daily.  cloNIDine HCl (CATAPRES) 0.1 mg tablet Take  by mouth.  hydrALAZINE (APRESOLINE) 100 mg tablet Take  by mouth.  amLODIPine (NORVASC) 10 mg tablet Take 1 Tab by mouth daily. 30 Tab 3    allopurinol (ZYLOPRIM) 100 mg tablet Take 2 Tabs by mouth daily. 60 Tab 3    opium-belladonna (B&O 15-A) 16.2-30 mg suppository Insert 1 Suppository into rectum every eight (8) hours as needed. Max Daily Amount: 3 Suppositories. 30 Suppository 0    OTHER This is to certify that Verner Distad was admitted to [facility] from 04/21/2018 to 04/29/2018. He may may return to work on 05/07/2018. Please feel free to contact my office if you have any questions or concerns. Thank you. 1 Each 0    OTHER CBC in 1 week  Dx: Hematuria   Fax results to Dr. Magui Patel 1 Each 0    sildenafil citrate (VIAGRA) 50 mg tablet Take 1 Tab by mouth as needed.  30 Tab 1       Past Medical History:   Diagnosis Date    Hypertension     Joint pain     Kidney disease     Sleep apnea     on cpap      Past Surgical History:   Procedure Laterality Date    HX ORTHOPAEDIC Left     left knee sx     HX WISDOM TEETH EXTRACTION       Family History   Problem Relation Age of Onset    Hypertension Mother     Heart Disease Father     Hypertension Father     Hypertension Child         Physical Examination: Well-nourished mature male in no apparent distress       Urinalysis: Could not provide specimen today    PVR today 118 cc    Impression: Resolved urinary retention status post negative prostate biopsy                       Chronic renal insufficiency with hypertensive nephropathy        Plan: Maintain treatment with alpha-blocker Flomax 0.4 mg daily                RTC 1 week for PVR assessment  RTC 3 months for PSA surveillance    Described and discussed strong indication for vascular access and initiation of hemodialysis in light of all advanced renal failure-also discussed prospect of kidney transplantation      More than 1/2 of this 15 minute visit was spent in counselling and coordination of care, as described above. Gaviota Pearson MD  -electronically signed-    PLEASE NOTE:  This document has been produced using voice recognition software. Unrecognized errors in transcription may be present.

## 2018-05-17 DIAGNOSIS — R06.6 INTRACTABLE HICCUPS: Primary | ICD-10-CM

## 2018-05-17 DIAGNOSIS — R29.898 WEAKNESS OF BOTH LOWER EXTREMITIES: ICD-10-CM

## 2018-05-17 RX ORDER — CHLORPROMAZINE HYDROCHLORIDE 25 MG/1
25 TABLET, FILM COATED ORAL 3 TIMES DAILY
Qty: 60 TAB | Refills: 0 | Status: SHIPPED | OUTPATIENT
Start: 2018-05-17 | End: 2018-07-03

## 2018-05-17 RX ORDER — PREDNISONE 5 MG/1
TABLET ORAL
Qty: 30 TAB | Refills: 1 | Status: SHIPPED | OUTPATIENT
Start: 2018-05-17 | End: 2018-09-10 | Stop reason: SDUPTHER

## 2018-05-17 NOTE — PROGRESS NOTES
Pt scheduled today for referral to PT. Spoke with pt and he reports he was informed to schedule an appt for the referral to be placed. Will go ahead and order this now. He also c/o hiccups daily which is painful and causes \"heart burn\". He denies eating any spicy foods and does not smoke. Will start him on Thorazine TID. Plan of care reviewed with patient. Understanding verbalized and they are in agreement with plan of care.

## 2018-09-10 RX ORDER — PREDNISONE 5 MG/1
TABLET ORAL
Qty: 30 TAB | Refills: 1 | Status: SHIPPED | OUTPATIENT
Start: 2018-09-10 | End: 2018-11-27 | Stop reason: SDUPTHER

## 2018-09-11 ENCOUNTER — HOSPITAL ENCOUNTER (OUTPATIENT)
Dept: LAB | Age: 48
Discharge: HOME | End: 2018-09-11
Payer: COMMERCIAL

## 2018-09-11 ENCOUNTER — HOSPITAL ENCOUNTER (OUTPATIENT)
Dept: GENERAL RADIOLOGY | Age: 48
Discharge: HOME | End: 2018-09-11
Payer: COMMERCIAL

## 2018-09-11 DIAGNOSIS — N18.6 END STAGE RENAL DISEASE (HCC): ICD-10-CM

## 2018-09-11 LAB
ALBUMIN SERPL-MCNC: 3.2 G/DL (ref 3.4–5)
ANION GAP SERPL CALC-SCNC: 11 MMOL/L (ref 3–18)
BUN SERPL-MCNC: 69 MG/DL (ref 7–18)
BUN/CREAT SERPL: 5 (ref 12–20)
CALCIUM SERPL-MCNC: 7.2 MG/DL (ref 8.5–10.1)
CALCIUM SERPL-MCNC: 7.5 MG/DL (ref 8.5–10.1)
CHLORIDE SERPL-SCNC: 110 MMOL/L (ref 100–108)
CO2 SERPL-SCNC: 23 MMOL/L (ref 21–32)
CREAT SERPL-MCNC: 13.6 MG/DL (ref 0.6–1.3)
GLUCOSE SERPL-MCNC: 87 MG/DL (ref 74–99)
HBV SURFACE AB SER QL IA: NEGATIVE
HBV SURFACE AB SERPL IA-ACNC: <3.1 MIU/ML
HBV SURFACE AG SER QL: <0.1 INDEX
HBV SURFACE AG SER QL: NEGATIVE
HCT VFR BLD AUTO: 25.8 % (ref 36–48)
HEP BS AB COMMENT,HBSAC: ABNORMAL
HGB BLD-MCNC: 8.1 G/DL (ref 13–16)
IRON SATN MFR SERPL: 14 %
IRON SERPL-MCNC: 29 UG/DL (ref 50–175)
PHOSPHATE SERPL-MCNC: 8 MG/DL (ref 2.5–4.9)
POTASSIUM SERPL-SCNC: 3.9 MMOL/L (ref 3.5–5.5)
PTH-INTACT SERPL-MCNC: 1383.3 PG/ML (ref 18.4–88)
SODIUM SERPL-SCNC: 144 MMOL/L (ref 136–145)
TIBC SERPL-MCNC: 201 UG/DL (ref 250–450)

## 2018-09-11 PROCEDURE — 80069 RENAL FUNCTION PANEL: CPT | Performed by: INTERNAL MEDICINE

## 2018-09-11 PROCEDURE — 71046 X-RAY EXAM CHEST 2 VIEWS: CPT

## 2018-09-11 PROCEDURE — 83540 ASSAY OF IRON: CPT | Performed by: INTERNAL MEDICINE

## 2018-09-11 PROCEDURE — 85018 HEMOGLOBIN: CPT | Performed by: INTERNAL MEDICINE

## 2018-09-11 PROCEDURE — 36415 COLL VENOUS BLD VENIPUNCTURE: CPT | Performed by: INTERNAL MEDICINE

## 2018-09-11 PROCEDURE — 87340 HEPATITIS B SURFACE AG IA: CPT | Performed by: INTERNAL MEDICINE

## 2018-09-11 PROCEDURE — 86706 HEP B SURFACE ANTIBODY: CPT | Performed by: INTERNAL MEDICINE

## 2018-09-11 PROCEDURE — 86803 HEPATITIS C AB TEST: CPT | Performed by: INTERNAL MEDICINE

## 2018-09-11 PROCEDURE — 83970 ASSAY OF PARATHORMONE: CPT | Performed by: INTERNAL MEDICINE

## 2018-09-12 LAB
COMMENT, 144067: NORMAL
HCV AB S/CO SERPL IA: <0.1 S/CO RATIO (ref 0–0.9)

## 2018-11-27 NOTE — TELEPHONE ENCOUNTER
Last seen   05/07/18  Dr Scott Gutierrez  Next appt    12/04/18  Dr. Bonnie Cherry  Last filled     09/10/18    Requested Prescriptions     Pending Prescriptions Disp Refills    predniSONE (DELTASONE) 5 mg tablet 30 Tab 1     Sig: TAKE 1 TAB BY MOUTH AS NEEDED FOR GOUT FLARES.

## 2018-11-28 RX ORDER — PREDNISONE 5 MG/1
TABLET ORAL
Qty: 30 TAB | Refills: 0 | Status: SHIPPED | OUTPATIENT
Start: 2018-11-28 | End: 2019-01-19 | Stop reason: SDUPTHER

## 2019-01-21 RX ORDER — PREDNISONE 5 MG/1
TABLET ORAL
Qty: 30 TAB | Refills: 0 | Status: SHIPPED | OUTPATIENT
Start: 2019-01-21 | End: 2020-02-29

## 2019-02-21 ENCOUNTER — HOSPITAL ENCOUNTER (OUTPATIENT)
Dept: GENERAL RADIOLOGY | Age: 49
Discharge: HOME | End: 2019-02-21

## 2019-02-21 DIAGNOSIS — R10.30 GROIN PAIN: ICD-10-CM

## 2019-04-24 ENCOUNTER — OFFICE VISIT (OUTPATIENT)
Dept: CARDIOLOGY CLINIC | Age: 49
End: 2019-04-24

## 2019-04-24 VITALS
HEART RATE: 87 BPM | DIASTOLIC BLOOD PRESSURE: 108 MMHG | SYSTOLIC BLOOD PRESSURE: 174 MMHG | WEIGHT: 218 LBS | HEIGHT: 74 IN | BODY MASS INDEX: 27.98 KG/M2

## 2019-04-24 DIAGNOSIS — N18.6 ESRD (END STAGE RENAL DISEASE) (HCC): Primary | ICD-10-CM

## 2019-04-24 DIAGNOSIS — I42.8 OTHER CARDIOMYOPATHY (HCC): ICD-10-CM

## 2019-04-24 DIAGNOSIS — I10 ESSENTIAL HYPERTENSION: ICD-10-CM

## 2019-04-24 DIAGNOSIS — I50.42 CHRONIC COMBINED SYSTOLIC AND DIASTOLIC CHF (CONGESTIVE HEART FAILURE) (HCC): ICD-10-CM

## 2019-04-24 RX ORDER — CARVEDILOL 25 MG/1
25 TABLET ORAL 2 TIMES DAILY WITH MEALS
Qty: 60 TAB | Refills: 3 | Status: SHIPPED | OUTPATIENT
Start: 2019-04-24 | End: 2019-06-16 | Stop reason: SDUPTHER

## 2019-04-24 RX ORDER — LOSARTAN POTASSIUM 100 MG/1
100 TABLET ORAL DAILY
Qty: 30 TAB | Refills: 30 | Status: SHIPPED | OUTPATIENT
Start: 2019-04-24

## 2019-04-24 NOTE — PROGRESS NOTES
HISTORY OF PRESENT ILLNESS  Adali Purcell is a 46 y.o. male. Patient with PMH of CKD, HTN, STEFANIE. Recent hospitalization for acute on chronic combined Systolic and diastolic CHF. Echocardiogram showed EF dropped from 56% to 46%. Hypertension   This is a chronic problem. The current episode started more than 1 week ago. The problem occurs constantly. The problem has not changed since onset. Pertinent negatives include no chest pain, no abdominal pain and no shortness of breath. Nothing aggravates the symptoms. The symptoms are relieved by medications. New Patient   The history is provided by the patient. This is a chronic problem. The current episode started more than 1 week ago. The problem occurs constantly. Pertinent negatives include no chest pain, no abdominal pain and no shortness of breath. Nothing aggravates the symptoms. The symptoms are relieved by medications. CHF   Pertinent negatives include no chest pain, no abdominal pain and no shortness of breath. Family History   Problem Relation Age of Onset    Hypertension Mother     Heart Disease Father     Hypertension Father     Hypertension Child        Past Medical History:   Diagnosis Date    Chronic kidney disease     Gout     [facility] ([facility] [address]     History of blood transfusion 05/22/2018    Hypertension     Joint pain     Kidney disease     Sleep apnea 07/03/2018    does not use c pap       Past Surgical History:   Procedure Laterality Date    HX ORTHOPAEDIC Left     left knee sx     HX UROLOGICAL  05/20/2018    prostate biopsy    HX WISDOM TEETH EXTRACTION         Social History     Tobacco Use    Smoking status: Never Smoker    Smokeless tobacco: Never Used   Substance Use Topics    Alcohol use: No       No Known Allergies    Prior to Admission medications    Medication Sig Start Date End Date Taking? Authorizing Provider   carvedilol (COREG) 25 mg tablet Take 1 Tab by mouth two (2) times daily (with meals).  4/24/19  Yes Odalys Prather NP   losartan (COZAAR) 100 mg tablet Take 1 Tab by mouth daily. 4/24/19  Yes Odalys Prather NP   predniSONE (DELTASONE) 5 mg tablet TAKE 1 TAB BY MOUTH AS NEEDED FOR GOUT FLARES. 1/21/19  Yes Nellie Obrien MD   tamsulosin (FLOMAX) 0.4 mg capsule Take 1 Cap by mouth daily. 4/28/18  Yes Lonnie Belcher MD   calcitRIOL (ROCALTROL) 0.25 mcg capsule Take 1 Cap by mouth daily. 4/27/18  Yes Lonnie Belcher MD   calcium acetate (PHOSLO) 667 mg cap Take 2 Caps by mouth three (3) times daily (with meals). 4/26/18  Yes Lonnie Belcher MD   sodium bicarbonate 650 mg tablet Take 1 Tab by mouth two (2) times a day. 4/26/18  Yes Lonnie Belcher MD   cloNIDine HCl (CATAPRES) 0.1 mg tablet Take 0.1 mg by mouth two (2) times a day. 10/30/17  Yes Provider, Historical   hydrALAZINE (APRESOLINE) 100 mg tablet Take 100 mg by mouth two (2) times a day. 10/30/17  Yes Provider, Historical   amLODIPine (NORVASC) 10 mg tablet Take 1 Tab by mouth daily. 3/22/17  Yes Alisha Suh NP   allopurinol (ZYLOPRIM) 100 mg tablet Take 2 Tabs by mouth daily. 3/20/17  Yes Alisha Suh NP         Visit Vitals  BP (!) 174/108   Pulse 87   Ht 6' 2\" (1.88 m)   Wt 98.9 kg (218 lb)   BMI 27.99 kg/m²       Review of Systems   Constitutional: Negative for malaise/fatigue. Respiratory: Negative for cough, shortness of breath and wheezing. Cardiovascular: Positive for leg swelling. Negative for chest pain, palpitations, orthopnea, claudication and PND. Gastrointestinal: Negative for abdominal pain, nausea and vomiting. Musculoskeletal: Negative for falls. Neurological: Negative for dizziness. Endo/Heme/Allergies: Does not bruise/bleed easily. Physical Exam   Constitutional: He is oriented to person, place, and time. He appears well-developed and well-nourished. HENT:   Head: Normocephalic. Neck: Neck supple. No JVD present.    Cardiovascular: Normal rate, regular rhythm, normal heart sounds and intact distal pulses. Exam reveals no gallop and no friction rub. No murmur heard. Pulmonary/Chest: Breath sounds normal. No respiratory distress. He has no wheezes. He has no rales. He exhibits no tenderness. Musculoskeletal: He exhibits edema. Neurological: He is alert and oriented to person, place, and time. Skin: Skin is warm and dry. Psychiatric: He has a normal mood and affect. Nursing note and vitals reviewed. 9/2018 - NST  THIS MYOCARDIAL PERFUSION STUDY IS NORMAL   THIS IS A LOW RISK STUDY   LEXISCAN GATED SPECT SESTAMIBI STRESS TEST SHOWS:   MARKEDLY DILATED LEFT VENTICLE WITH LOW NORMAL SYSTOLIC FUNCTION, CALCULATED EJECTION   FRACTION WAS 42% (50% BY [facility] ALGORITHM). MYOCARDIAL PERFUSION IMAGING IS NORMAL. NO ABNORMALITIES OR EVIDENCE OF ISCHEMIA OR PRIOR INFARCTION. HYPERTENSIVE RESPONSE TO STRESS. THIS IS A LOW RISK STUDY. Echo - 4/2019  Interpretation Summary  A complete two-dimensional transthoracic echocardiogram was performed (2D, M-  mode, Doppler and color flow Doppler). The study was technically adequate. 1. Mildly dilated LV cavity size with moderate concentric hypertrophy. 2. Mildly reduced LV systolic function with an EF of 46%. Mild global  hypokinesis. 3. Grade III diastolic dysfunction. 4. Normal RV size and function. 5. No hemodynamically significant valvular abnormalities. 6. Estimated RVSP of 45-50mmHg based on TR jet. All other findings detailed below. Previous echocardiogram done at [facility]  dated 08/09/2018 showed an EF of 56%, mild to moderate TR, and an RVSP of  49mmHg. ASSESSMENT and PLAN    Mr. Jia Duncan has a reminder for a \"due or due soon\" health maintenance. I have asked that he contact his primary care provider for follow-up on this health maintenance. No flowsheet data found. Assessment         ICD-10-CM ICD-9-CM    1. ESRD (end stage renal disease) ([facility] [address] N18.6 585. 6     Peritoneal dialysis   2. Other cardiomyopathy ([facility] [address] I42.8 425.4     Ef 46% - likely due to uncontrolled HTN   3. Essential hypertension I10 401.9     Elevated - will increase Coreg and Losartan   4. Chronic combined systolic and diastolic CHF (congestive heart failure) (HCC) I50.42 428.42      428.0     Will continue to adjust medications      4/2019 - History of ESRD on PD. New diagnosis of cardiomyopathy likely related to uncontrolled HTN, with EF 46 %, decreased from 56% in 8/2018. NST 9/2018 - negative for ischemia. Denies chest pain, SOB or palpitations. B/P remains uncontrolled. Will increase coreg to 25 mg/ BID and increase Losartan to 100 mg / day. To monitor b/p and submit log to office in 1 week after medication adjustments. Medications Discontinued During This Encounter   Medication Reason    carvedilol (COREG) 12.5 mg tablet Reorder    losartan (COZAAR) 50 mg tablet Reorder       Orders Placed This Encounter    carvedilol (COREG) 25 mg tablet     Sig: Take 1 Tab by mouth two (2) times daily (with meals). Dispense:  60 Tab     Refill:  3    losartan (COZAAR) 100 mg tablet     Sig: Take 1 Tab by mouth daily. Dispense:  30 Tab     Refill:  30       Follow-up and Dispositions    · Return in about 3 months (around 7/24/2019), or if symptoms worsen or fail to improve. Gunner Junior NP    I have independently evaluated taken history and examined the patient. All relevant labs and testing data's are reviewed. Care plan discussed and updated after review.   Roland Govea MD

## 2019-04-24 NOTE — PATIENT INSTRUCTIONS
Increase Coreg to 25 mg/ Twice daily  Increase Cozaar to 100 mg / 1 tab daily    New prescriptions sent to pharmacy. After the recommended changes have been made in blood pressure medicines, patient advised to keep BP/HR(pulse rate) chart twice daily and bring us results in next 2 weeks or so. Patient may send the results via \"My Chart\" if desired. Please rest for 5-10 minutes before checking blood pressure           Heart-Healthy Diet: Care Instructions  Your Care Instructions    A heart-healthy diet has lots of vegetables, fruits, nuts, beans, and whole grains, and is low in salt. It limits foods that are high in saturated fat, such as meats, cheeses, and fried foods. It may be hard to change your diet, but even small changes can lower your risk of heart attack and heart disease. Follow-up care is a key part of your treatment and safety. Be sure to make and go to all appointments, and call your doctor if you are having problems. It's also a good idea to know your test results and keep a list of the medicines you take. How can you care for yourself at home? Watch your portions  · Learn what a serving is. A \"serving\" and a \"portion\" are not always the same thing. Make sure that you are not eating larger portions than are recommended. For example, a serving of pasta is ½ cup. A serving size of meat is 2 to 3 ounces. A 3-ounce serving is about the size of a deck of cards. Measure serving sizes until you are good at [place]" them. Keep in mind that restaurants often serve portions that are 2 or 3 times the size of one serving. · To keep your energy level up and keep you from feeling hungry, eat often but in smaller portions. · Eat only the number of calories you need to stay at a healthy weight. If you need to lose weight, eat fewer calories than your body burns (through exercise and other physical activity). Eat more fruits and vegetables  · Eat a variety of fruit and vegetables every day.  Dark green, deep orange, red, or yellow fruits and vegetables are especially good for you. Examples include spinach, carrots, peaches, and berries. · Keep carrots, celery, and other veggies handy for snacks. Buy fruit that is in season and store it where you can see it so that you will be tempted to eat it. · Cook dishes that have a lot of veggies in them, such as stir-fries and soups. Limit saturated and trans fat  · Read food labels, and try to avoid saturated and trans fats. They increase your risk of heart disease. Trans fat is found in many processed foods such as cookies and crackers. · Use olive or canola oil when you cook. Try cholesterol-lowering spreads, such as Benecol or Take Control. · Bake, broil, grill, or steam foods instead of frying them. · Choose lean meats instead of high-fat meats such as hot dogs and sausages. Cut off all visible fat when you prepare meat. · Eat fish, skinless poultry, and meat alternatives such as soy products instead of high-fat meats. Soy products, such as tofu, may be especially good for your heart. · Choose low-fat or fat-free milk and dairy products. Eat fish  · Eat at least two servings of fish a week. Certain fish, such as salmon and tuna, contain omega-3 fatty acids, which may help reduce your risk of heart attack. Eat foods high in fiber  · Eat a variety of grain products every day. Include whole-grain foods that have lots of fiber and nutrients. Examples of whole-grain foods include oats, whole wheat bread, and brown rice. · Buy whole-grain breads and cereals, instead of white bread or pastries. Limit salt and sodium  · Limit how much salt and sodium you eat to help lower your blood pressure. · Taste food before you salt it. Add only a little salt when you think you need it. With time, your taste buds will adjust to less salt. · Eat fewer snack items, fast foods, and other high-salt, processed foods.  Check food labels for the amount of sodium in packaged foods.  · Choose low-sodium versions of canned goods (such as soups, vegetables, and beans). Limit sugar  · Limit drinks and foods with added sugar. These include candy, desserts, and soda pop. Limit alcohol  · Limit alcohol to no more than 2 drinks a day for men and 1 drink a day for women. Too much alcohol can cause health problems. When should you call for help? Watch closely for changes in your health, and be sure to contact your doctor if:    · You would like help planning heart-healthy meals. Where can you learn more? Go to http://nichole-shahnaz.info/. Enter V137 in the search box to learn more about \"Heart-Healthy Diet: Care Instructions. \"  Current as of: July 22, 2018  Content Version: 11.9  © 7275-0235 [employer]. Care instructions adapted under license by [employer] (which disclaims liability or warranty for this information). If you have questions about a medical condition or this instruction, always ask your healthcare professional. [place] [address] any warranty or liability for your use of this information.

## 2019-06-13 ENCOUNTER — ANESTHESIA EVENT (OUTPATIENT)
Dept: ENDOSCOPY | Age: 49
End: 2019-06-13
Payer: COMMERCIAL

## 2019-06-14 ENCOUNTER — ANESTHESIA (OUTPATIENT)
Dept: ENDOSCOPY | Age: 49
End: 2019-06-14
Payer: COMMERCIAL

## 2019-06-14 ENCOUNTER — HOSPITAL ENCOUNTER (OUTPATIENT)
Age: 49
Setting detail: OUTPATIENT SURGERY
Discharge: HOME | End: 2019-06-14
Attending: INTERNAL MEDICINE | Admitting: INTERNAL MEDICINE
Payer: COMMERCIAL

## 2019-06-14 VITALS
BODY MASS INDEX: 26.95 KG/M2 | SYSTOLIC BLOOD PRESSURE: 225 MMHG | OXYGEN SATURATION: 97 % | WEIGHT: 210 LBS | RESPIRATION RATE: 18 BRPM | HEIGHT: 74 IN | TEMPERATURE: 98.5 F | DIASTOLIC BLOOD PRESSURE: 138 MMHG | HEART RATE: 90 BPM

## 2019-06-14 LAB
BUN BLD-MCNC: 60 MG/DL (ref 7–18)
CHLORIDE BLD-SCNC: 103 MMOL/L (ref 100–108)
GLUCOSE BLD STRIP.AUTO-MCNC: 83 MG/DL (ref 74–106)
HCT VFR BLD CALC: 24 % (ref 36–49)
HGB BLD-MCNC: 8.2 G/DL (ref 12–16)
POTASSIUM BLD-SCNC: 4.1 MMOL/L (ref 3.5–5.5)
SODIUM BLD-SCNC: 141 MMOL/L (ref 136–145)

## 2019-06-14 PROCEDURE — 96374 THER/PROPH/DIAG INJ IV PUSH: CPT

## 2019-06-14 PROCEDURE — 74011250636 HC RX REV CODE- 250/636: Performed by: NURSE ANESTHETIST, CERTIFIED REGISTERED

## 2019-06-14 PROCEDURE — 74011250636 HC RX REV CODE- 250/636: Performed by: ANESTHESIOLOGY

## 2019-06-14 PROCEDURE — 82947 ASSAY GLUCOSE BLOOD QUANT: CPT

## 2019-06-14 PROCEDURE — 74011000250 HC RX REV CODE- 250: Performed by: NURSE ANESTHETIST, CERTIFIED REGISTERED

## 2019-06-14 RX ORDER — SODIUM CHLORIDE, SODIUM LACTATE, POTASSIUM CHLORIDE, CALCIUM CHLORIDE 600; 310; 30; 20 MG/100ML; MG/100ML; MG/100ML; MG/100ML
75 INJECTION, SOLUTION INTRAVENOUS CONTINUOUS
Status: DISCONTINUED | OUTPATIENT
Start: 2019-06-14 | End: 2019-06-14 | Stop reason: HOSPADM

## 2019-06-14 RX ORDER — SODIUM CHLORIDE 0.9 % (FLUSH) 0.9 %
5-40 SYRINGE (ML) INJECTION AS NEEDED
Status: DISCONTINUED | OUTPATIENT
Start: 2019-06-14 | End: 2019-06-14 | Stop reason: HOSPADM

## 2019-06-14 RX ORDER — SODIUM CHLORIDE 0.9 % (FLUSH) 0.9 %
5-40 SYRINGE (ML) INJECTION EVERY 8 HOURS
Status: DISCONTINUED | OUTPATIENT
Start: 2019-06-14 | End: 2019-06-14 | Stop reason: HOSPADM

## 2019-06-14 RX ORDER — INSULIN LISPRO 100 [IU]/ML
INJECTION, SOLUTION INTRAVENOUS; SUBCUTANEOUS ONCE
Status: DISCONTINUED | OUTPATIENT
Start: 2019-06-14 | End: 2019-06-14 | Stop reason: HOSPADM

## 2019-06-14 RX ORDER — SODIUM CHLORIDE 9 MG/ML
25 INJECTION, SOLUTION INTRAVENOUS CONTINUOUS
Status: DISCONTINUED | OUTPATIENT
Start: 2019-06-14 | End: 2019-06-14 | Stop reason: HOSPADM

## 2019-06-14 RX ADMIN — FAMOTIDINE 20 MG: 10 INJECTION INTRAVENOUS at 06:40

## 2019-06-14 RX ADMIN — SODIUM CHLORIDE 25 ML/HR: 900 INJECTION, SOLUTION INTRAVENOUS at 07:40

## 2019-06-14 NOTE — PROGRESS NOTES
BP too high in pre-op. Procedure canceled. Pt advised to go to ER-refuses. Understands the risks of a stroke/death. Will reschedule for a later date. Summer Huynh MD  [facility].  [email or web address]  Phone: [phone]  Pager: [phone]  Cell: [phone]. [email or web address]

## 2019-06-14 NOTE — PERIOP NOTES
Pt's procedure cancelled R/T elevated blood Pressure(see flow sheet) patient declined to be discharged ER, per DR. Geo Matos and DR. Bennett  No orders from DR. Bennett to treat blood pressure. Patient was advised to F/U with DR. Bryson R/T elevated BP.  Pt. Is AOx4 no c/o headache, dizzyness, or blurry vision no distress noted

## 2019-06-14 NOTE — ANESTHESIA PREPROCEDURE EVALUATION
Relevant Problems   No relevant active problems       Anesthetic History   No history of anesthetic complications            Review of Systems / Medical History  Patient summary reviewed and pertinent labs reviewed    Pulmonary        Sleep apnea           Neuro/Psych              Cardiovascular    Hypertension: poorly controlled      CHF             GI/Hepatic/Renal         Renal disease: ESRD and dialysis      Comments: Peritoneal dialysis Endo/Other        Anemia     Other Findings              Physical Exam    Airway  Mallampati: III  TM Distance: > 6 cm  Neck ROM: normal range of motion   Mouth opening: Normal     Cardiovascular    Rhythm: regular  Rate: normal         Dental      Comments: Missing teeth   Pulmonary  Breath sounds clear to auscultation               Abdominal  GI exam deferred       Other Findings            Anesthetic Plan    ASA: 3  Anesthesia type: MAC            Anesthetic plan and risks discussed with: Patient

## 2019-06-14 NOTE — DISCHARGE INSTRUCTIONS
DISCHARGE SUMMARY from Nurse            PATIENT INSTRUCTIONS:      Follow up with  Dr. Joe Guerrier  regarding reschedule of case, also follow up with Dr. Bill Oliveira (nephrologist)  Related to elevated blood pressure . patient was advised to be discharged  To ER. By dr. Joe Guerrier and Dr. Param Matos   Patient  Declined to be discharged to ER and he signed Release from responsibility for discharge form. Pt is awake alert ox4 no signs/symptome of distress, no c/o of headache, no dizzyness, no blurry vision        These are general instructions for a healthy lifestyle:    No smoking/ No tobacco products/ Avoid exposure to second hand smoke    Surgeon General's Warning:  Quitting smoking now greatly reduces serious risk to your health. Obesity, smoking, and sedentary lifestyle greatly increases your risk for illness    A healthy diet, regular physical exercise & weight monitoring are important for maintaining a healthy lifestyle    You may be retaining fluid if you have a history of heart failure or if you experience any of the following symptoms:  Weight gain of 3 pounds or more overnight or 5 pounds in a week, increased swelling in our hands or feet or shortness of breath while lying flat in bed. Please call your doctor as soon as you notice any of these symptoms; do not wait until your next office visit. Recognize signs and symptoms of STROKE:    F-face looks uneven    A-arms unable to move or move unevenly    S-speech slurred or non-existent    T-time-call 911 as soon as signs and symptoms begin-DO NOT go       Back to bed or wait to see if you get better-TIME IS BRAIN. Warning Signs of HEART ATTACK     Call 911 if you have these symptoms:   Chest discomfort. Most heart attacks involve discomfort in the center of the chest that lasts more than a few minutes, or that goes away and comes back. It can feel like uncomfortable pressure, squeezing, fullness, or pain.  Discomfort in other areas of the upper body.  Symptoms can include pain or discomfort in one or both arms, the back, neck, jaw, or stomach.  Shortness of breath with or without chest discomfort.  Other signs may include breaking out in a cold sweat, nausea, or lightheadedness. Don't wait more than five minutes to call 911 - MINUTES MATTER! Fast action can save your life. Calling 911 is almost always the fastest way to get lifesaving treatment. Emergency Medical Services staff can begin treatment when they arrive -- up to an hour sooner than if someone gets to the hospital by car. The discharge information has been reviewed with the {PATIENT PARENT GUARDIAN:[id number]}. The {PATIENT PARENT GUARDIAN:64007} verbalized understanding.     Patient armband removed and shredded

## 2019-06-17 RX ORDER — CARVEDILOL 25 MG/1
TABLET ORAL
Qty: 60 TAB | Refills: 1 | Status: SHIPPED | OUTPATIENT
Start: 2019-06-17 | End: 2019-12-30

## 2019-07-26 ENCOUNTER — HOSPITAL ENCOUNTER (OUTPATIENT)
Age: 49
Setting detail: OUTPATIENT SURGERY
Discharge: HOME | End: 2019-07-26
Attending: INTERNAL MEDICINE | Admitting: INTERNAL MEDICINE

## 2019-07-26 VITALS
DIASTOLIC BLOOD PRESSURE: 133 MMHG | SYSTOLIC BLOOD PRESSURE: 212 MMHG | WEIGHT: 204.25 LBS | HEIGHT: 74 IN | RESPIRATION RATE: 20 BRPM | OXYGEN SATURATION: 78 % | HEART RATE: 90 BPM | TEMPERATURE: 97.9 F | BODY MASS INDEX: 26.21 KG/M2

## 2019-07-26 RX ORDER — LIDOCAINE HYDROCHLORIDE 10 MG/ML
0.1 INJECTION, SOLUTION EPIDURAL; INFILTRATION; INTRACAUDAL; PERINEURAL AS NEEDED
Status: DISCONTINUED | OUTPATIENT
Start: 2019-07-26 | End: 2019-07-26 | Stop reason: HOSPADM

## 2019-07-26 RX ORDER — FAMOTIDINE 20 MG/1
20 TABLET, FILM COATED ORAL ONCE
Status: DISCONTINUED | OUTPATIENT
Start: 2019-07-26 | End: 2019-07-26 | Stop reason: HOSPADM

## 2019-07-26 RX ORDER — SODIUM CHLORIDE, SODIUM LACTATE, POTASSIUM CHLORIDE, CALCIUM CHLORIDE 600; 310; 30; 20 MG/100ML; MG/100ML; MG/100ML; MG/100ML
50 INJECTION, SOLUTION INTRAVENOUS CONTINUOUS
Status: DISCONTINUED | OUTPATIENT
Start: 2019-07-26 | End: 2019-07-26

## 2019-07-26 RX ORDER — SODIUM CHLORIDE 9 MG/ML
25 INJECTION, SOLUTION INTRAVENOUS CONTINUOUS
Status: DISCONTINUED | OUTPATIENT
Start: 2019-07-26 | End: 2019-07-26 | Stop reason: HOSPADM

## 2019-07-26 NOTE — DISCHARGE INSTRUCTIONS
Call Dr. Merlinda Figures office to reschedule. Go to emergency room for symptoms of elevated blood pressure  DISCHARGE SUMMARY from Nurse    PATIENT INSTRUCTIONS:    After general anesthesia or intravenous sedation, for 24 hours or while taking prescription Narcotics:  · Limit your activities  · Do not drive and operate hazardous machinery  · Do not make important personal or business decisions  · Do  not drink alcoholic beverages  · If you have not urinated within 8 hours after discharge, please contact your surgeon on call. Report the following to your surgeon:  · Excessive pain, swelling, redness or odor of or around the surgical area  · Temperature over 100.5  · Nausea and vomiting lasting longer than 4 hours or if unable to take medications  · Any signs of decreased circulation or nerve impairment to extremity: change in color, persistent  numbness, tingling, coldness or increase pain  · Any questions    What to do at Home:  Recommended activity: Activity as tolerated          *  Please carry medication information at all times in case of emergency situations. These are general instructions for a healthy lifestyle:    No smoking/ No tobacco products/ Avoid exposure to second hand smoke  Surgeon General's Warning:  Quitting smoking now greatly reduces serious risk to your health. Obesity, smoking, and sedentary lifestyle greatly increases your risk for illness    Patient armband removed and shredded    MyChart Activation    Thank you for requesting access to [facility]. Please follow the instructions below to securely access and download your online medical record. [facility] allows you to send messages to your doctor, view your test results, renew your prescriptions, schedule appointments, and more. How Do I Sign Up? 1. In your internet browser, go to [email or web address]  2. Click on the First Time User? Click Here link in the Sign In box. You will be redirect to the New Member Sign Up page.   3. Enter your Social[employer] Access Code exactly as it appears below. You will not need to use this code after youve completed the sign-up process. If you do not sign up before the expiration date, you must request a new code. Social[employer] Access Code: [email or web address]  Expires: 2019 10:32 AM (This is the date your Socialbomb access code will )    4. Enter the last four digits of your Social Security Number (xxxx) and Date of Birth (mm/dd/yyyy) as indicated and click Submit. You will be taken to the next sign-up page. 5. Create a Socialbomb ID. This will be your Socialbomb login ID and cannot be changed, so think of one that is secure and easy to remember. 6. Create a Socialb[employer] password. You can change your password at any time. 7. Enter your Password Reset Question and Answer. This can be used at a later time if you forget your password. 8. Enter your e-mail address. You will receive e-mail notification when new information is available in [address]. 9. Click Sign Up. You can now view and download portions of your medical record. 10. Click the Download Summary menu link to download a portable copy of your medical information. Additional Information    If you have questions, please visit the Frequently Asked Questions section of the Socialb[employer] website at [email or web address] Remember, Socialb[employer] is NOT to be used for urgent needs. For medical emergencies, dial 911. A healthy diet, regular physical exercise & weight monitoring are important for maintaining a healthy lifestyle    You may be retaining fluid if you have a history of heart failure or if you experience any of the following symptoms:  Weight gain of 3 pounds or more overnight or 5 pounds in a week, increased swelling in our hands or feet or shortness of breath while lying flat in bed. Please call your doctor as soon as you notice any of these symptoms; do not wait until your next office visit.         The discharge information has been reviewed with the patient. The patient verbalized understanding. Discharge medications reviewed with the patient and appropriate educational materials and side effects teaching were provided.   ___________________________________________________________________________________________________________________________________

## 2019-07-26 NOTE — PROGRESS NOTES
Procedure canceled due to high BP in pre-op      Phil Gaitan MD  [facility].  [email or web address]. [employer]  Phone: [phone]  Pager: [phone]  Cell: [phone]. [email or web address]

## 2019-07-27 NOTE — H&P
Procedure canceled due to high BP  To be rescheduled. Roberto Nelson MD  [facility].  [email or web address]  Phone: [phone]  Pager: [phone]  Cell: [phone]. [email or web address]

## 2019-08-22 ENCOUNTER — ANESTHESIA EVENT (OUTPATIENT)
Dept: ENDOSCOPY | Age: 49
End: 2019-08-22
Payer: COMMERCIAL

## 2019-08-23 ENCOUNTER — ANESTHESIA (OUTPATIENT)
Dept: ENDOSCOPY | Age: 49
End: 2019-08-23
Payer: COMMERCIAL

## 2019-08-23 ENCOUNTER — HOSPITAL ENCOUNTER (OUTPATIENT)
Age: 49
Setting detail: OUTPATIENT SURGERY
Discharge: HOME | End: 2019-08-23
Attending: INTERNAL MEDICINE | Admitting: INTERNAL MEDICINE
Payer: COMMERCIAL

## 2019-08-23 VITALS
HEART RATE: 85 BPM | BODY MASS INDEX: 25.74 KG/M2 | HEIGHT: 74 IN | SYSTOLIC BLOOD PRESSURE: 127 MMHG | RESPIRATION RATE: 21 BRPM | TEMPERATURE: 98.5 F | WEIGHT: 200.6 LBS | OXYGEN SATURATION: 92 % | DIASTOLIC BLOOD PRESSURE: 85 MMHG

## 2019-08-23 LAB
BUN BLD-MCNC: 77 MG/DL (ref 7–18)
CHLORIDE BLD-SCNC: 106 MMOL/L (ref 100–108)
GLUCOSE BLD STRIP.AUTO-MCNC: 100 MG/DL (ref 74–106)
HCT VFR BLD CALC: 30 % (ref 36–49)
HGB BLD-MCNC: 10.2 G/DL (ref 12–16)
POTASSIUM BLD-SCNC: 3.4 MMOL/L (ref 3.5–5.5)
SODIUM BLD-SCNC: 141 MMOL/L (ref 136–145)

## 2019-08-23 PROCEDURE — 76040000019: Performed by: INTERNAL MEDICINE

## 2019-08-23 PROCEDURE — 82947 ASSAY GLUCOSE BLOOD QUANT: CPT

## 2019-08-23 PROCEDURE — 74011250637 HC RX REV CODE- 250/637: Performed by: NURSE ANESTHETIST, CERTIFIED REGISTERED

## 2019-08-23 PROCEDURE — 77030008565 HC TBNG SUC IRR ERBE -B: Performed by: INTERNAL MEDICINE

## 2019-08-23 PROCEDURE — 74011250636 HC RX REV CODE- 250/636: Performed by: ANESTHESIOLOGY

## 2019-08-23 PROCEDURE — 74011000258 HC RX REV CODE- 258

## 2019-08-23 PROCEDURE — 77030013992 HC SNR POLYP ENDOSC BSC -B: Performed by: INTERNAL MEDICINE

## 2019-08-23 PROCEDURE — 77030018846 HC SOL IRR STRL H20 ICUM -A: Performed by: INTERNAL MEDICINE

## 2019-08-23 PROCEDURE — 74011000250 HC RX REV CODE- 250

## 2019-08-23 PROCEDURE — 76060000031 HC ANESTHESIA FIRST 0.5 HR: Performed by: INTERNAL MEDICINE

## 2019-08-23 PROCEDURE — 77030021593 HC FCPS BIOP ENDOSC BSC -A: Performed by: INTERNAL MEDICINE

## 2019-08-23 PROCEDURE — 74011250636 HC RX REV CODE- 250/636

## 2019-08-23 RX ORDER — GLYCOPYRROLATE 0.2 MG/ML
INJECTION INTRAMUSCULAR; INTRAVENOUS AS NEEDED
Status: DISCONTINUED | OUTPATIENT
Start: 2019-08-23 | End: 2019-08-23 | Stop reason: HOSPADM

## 2019-08-23 RX ORDER — ONDANSETRON 2 MG/ML
4 INJECTION INTRAMUSCULAR; INTRAVENOUS ONCE
Status: DISCONTINUED | OUTPATIENT
Start: 2019-08-23 | End: 2019-08-23 | Stop reason: HOSPADM

## 2019-08-23 RX ORDER — LIDOCAINE HYDROCHLORIDE 10 MG/ML
0.1 INJECTION, SOLUTION EPIDURAL; INFILTRATION; INTRACAUDAL; PERINEURAL AS NEEDED
Status: DISCONTINUED | OUTPATIENT
Start: 2019-08-23 | End: 2019-08-23 | Stop reason: HOSPADM

## 2019-08-23 RX ORDER — SODIUM CHLORIDE 9 MG/ML
25 INJECTION, SOLUTION INTRAVENOUS CONTINUOUS
Status: DISCONTINUED | OUTPATIENT
Start: 2019-08-23 | End: 2019-08-23 | Stop reason: HOSPADM

## 2019-08-23 RX ORDER — SODIUM CHLORIDE 0.9 % (FLUSH) 0.9 %
5-40 SYRINGE (ML) INJECTION AS NEEDED
Status: DISCONTINUED | OUTPATIENT
Start: 2019-08-23 | End: 2019-08-23 | Stop reason: HOSPADM

## 2019-08-23 RX ORDER — SODIUM CHLORIDE 0.9 % (FLUSH) 0.9 %
5-40 SYRINGE (ML) INJECTION EVERY 8 HOURS
Status: DISCONTINUED | OUTPATIENT
Start: 2019-08-23 | End: 2019-08-23 | Stop reason: HOSPADM

## 2019-08-23 RX ORDER — FAMOTIDINE 20 MG/1
20 TABLET, FILM COATED ORAL ONCE
Status: COMPLETED | OUTPATIENT
Start: 2019-08-23 | End: 2019-08-23

## 2019-08-23 RX ORDER — FENTANYL CITRATE 50 UG/ML
INJECTION, SOLUTION INTRAMUSCULAR; INTRAVENOUS AS NEEDED
Status: DISCONTINUED | OUTPATIENT
Start: 2019-08-23 | End: 2019-08-23 | Stop reason: HOSPADM

## 2019-08-23 RX ORDER — SODIUM CHLORIDE 9 MG/ML
500 INJECTION, SOLUTION INTRAVENOUS CONTINUOUS
Status: DISCONTINUED | OUTPATIENT
Start: 2019-08-23 | End: 2019-08-23

## 2019-08-23 RX ORDER — SODIUM CHLORIDE, SODIUM LACTATE, POTASSIUM CHLORIDE, CALCIUM CHLORIDE 600; 310; 30; 20 MG/100ML; MG/100ML; MG/100ML; MG/100ML
75 INJECTION, SOLUTION INTRAVENOUS CONTINUOUS
Status: DISCONTINUED | OUTPATIENT
Start: 2019-08-23 | End: 2019-08-23 | Stop reason: HOSPADM

## 2019-08-23 RX ORDER — LIDOCAINE HYDROCHLORIDE 20 MG/ML
INJECTION, SOLUTION EPIDURAL; INFILTRATION; INTRACAUDAL; PERINEURAL AS NEEDED
Status: DISCONTINUED | OUTPATIENT
Start: 2019-08-23 | End: 2019-08-23 | Stop reason: HOSPADM

## 2019-08-23 RX ORDER — PROPOFOL 10 MG/ML
INJECTION, EMULSION INTRAVENOUS AS NEEDED
Status: DISCONTINUED | OUTPATIENT
Start: 2019-08-23 | End: 2019-08-23 | Stop reason: HOSPADM

## 2019-08-23 RX ADMIN — PROPOFOL 100 MG: 10 INJECTION, EMULSION INTRAVENOUS at 14:53

## 2019-08-23 RX ADMIN — PROPOFOL 100 MG: 10 INJECTION, EMULSION INTRAVENOUS at 14:47

## 2019-08-23 RX ADMIN — PROPOFOL 100 MG: 10 INJECTION, EMULSION INTRAVENOUS at 14:43

## 2019-08-23 RX ADMIN — FAMOTIDINE 20 MG: 20 TABLET ORAL at 13:26

## 2019-08-23 RX ADMIN — PROPOFOL 100 MG: 10 INJECTION, EMULSION INTRAVENOUS at 14:58

## 2019-08-23 RX ADMIN — SODIUM CHLORIDE 25 ML/HR: 900 INJECTION, SOLUTION INTRAVENOUS at 13:24

## 2019-08-23 RX ADMIN — GLYCOPYRROLATE 0.2 MG: 0.2 INJECTION INTRAMUSCULAR; INTRAVENOUS at 14:37

## 2019-08-23 RX ADMIN — FENTANYL CITRATE 100 MCG: 50 INJECTION, SOLUTION INTRAMUSCULAR; INTRAVENOUS at 14:47

## 2019-08-23 RX ADMIN — LIDOCAINE HYDROCHLORIDE 100 MG: 20 INJECTION, SOLUTION EPIDURAL; INFILTRATION; INTRACAUDAL; PERINEURAL at 14:43

## 2019-08-23 NOTE — ANESTHESIA POSTPROCEDURE EVALUATION
Procedure(s):  COLONOSCOPY. MAC    Anesthesia Post Evaluation      Multimodal analgesia: multimodal analgesia used between 6 hours prior to anesthesia start to PACU discharge  Patient location during evaluation: bedside  Patient participation: complete - patient participated  Level of consciousness: awake  Pain management: adequate  Airway patency: patent  Anesthetic complications: no  Cardiovascular status: stable  Respiratory status: acceptable  Hydration status: acceptable  Post anesthesia nausea and vomiting:  controlled      Vitals Value Taken Time   /85 8/23/2019  3:39 PM   Temp 36.9 °C (98.5 °F) 8/23/2019  3:10 PM   Pulse 90 8/23/2019  3:42 PM   Resp 26 8/23/2019  3:42 PM   SpO2 92 % 8/23/2019  3:42 PM   Vitals shown include unvalidated device data.

## 2019-08-23 NOTE — H&P
[email or web address]  [phone]      Impression:   1. Average risk colon cancer screening exam      Plan:     1.  Colonoscopy      Chief Complaint: Average risk colon cancer screening exam.      HPI:  Michel Valdez is a 44 y.o. male who is being seen on consult for average risk colon cancer screening with colonoscopy    PMH:   Past Medical History:   Diagnosis Date    Chronic kidney disease     ESRD-   peritoneal dilaysis    Gout     [facility] failure ([facility] [address]     History of blood transfusion 05/22/2018    Hypertension     Joint pain     Kidney disease     Sleep apnea 07/03/2018    does not use c pap       PSH:   Past Surgical History:   Procedure Laterality Date    HX ORTHOPAEDIC Left     left knee sx     HX UROLOGICAL  05/20/2018    prostate biopsy    HX WISDOM TEETH EXTRACTION         Social HX:   Social History     Socioeconomic History    Marital status:      Spouse name: Not on file    Number of children: Not on file    Years of education: Not on file    Highest education level: Not on file   Occupational History    Not on file   Social Needs    Financial resource strain: Not on file    Food insecurity:     Worry: Not on file     Inability: Not on file    Transportation needs:     Medical: Not on file     Non-medical: Not on file   Tobacco Use    Smoking status: Never Smoker    Smokeless tobacco: Never Used   Substance and Sexual Activity    Alcohol use: No    Drug use: No    Sexual activity: Yes     Partners: Female   Lifestyle    Physical activity:     Days per week: Not on file     Minutes per session: Not on file    Stress: Not on file   Relationships    Social connections:     Talks on phone: Not on file     Gets together: Not on file     Attends Jehovah's witness service: Not on file     Active member of club or organization: Not on file     Attends meetings of clubs or organizations: Not on file     Relationship status: Not on file    Intimate partner violence:     Fear of current or ex partner: Not on file     Emotionally abused: Not on file     Physically abused: Not on file     Forced sexual activity: Not on file   Other Topics Concern    Not on file   Social History Narrative    Not on file       FHX:   Family History   Problem Relation Age of Onset    Hypertension Mother     Heart Disease Father     Hypertension Father     Hypertension Child        Allergy:   No Known Allergies    Home Medications:     Medications Prior to Admission   Medication Sig    carvedilol (COREG) 25 mg tablet TAKE 1 TABLET BY MOUTH TWICE A DAY WITH MEALS    losartan (COZAAR) 100 mg tablet Take 1 Tab by mouth daily.  predniSONE (DELTASONE) 5 mg tablet TAKE 1 TAB BY MOUTH AS NEEDED FOR GOUT FLARES.  tamsulosin (FLOMAX) 0.4 mg capsule Take 1 Cap by mouth daily.  calcitRIOL (ROCALTROL) 0.25 mcg capsule Take 1 Cap by mouth daily.  calcium acetate (PHOSLO) 667 mg cap Take 2 Caps by mouth three (3) times daily (with meals).  sodium bicarbonate 650 mg tablet Take 1 Tab by mouth two (2) times a day.  cloNIDine HCl (CATAPRES) 0.1 mg tablet Take 0.1 mg by mouth two (2) times a day.  hydrALAZINE (APRESOLINE) 100 mg tablet Take 100 mg by mouth two (2) times a day.  amLODIPine (NORVASC) 10 mg tablet Take 1 Tab by mouth daily.  allopurinol (ZYLOPRIM) 100 mg tablet Take 2 Tabs by mouth daily. Review of Systems:     Constitutional: No fevers, chills, weight loss, fatigue. Skin: No rashes, pruritis, jaundice, ulcerations, erythema. HENT: No headaches, nosebleeds, sinus pressure, rhinorrhea, sore throat. Eyes: No visual changes, blurred vision, eye pain, photophobia, jaundice. Cardiovascular: No chest pain, heart palpitations. Respiratory: No cough, SOB, wheezing, chest discomfort, orthopnea. Gastrointestinal:    Genitourinary: No dysuria, bleeding, discharge, pyuria. Musculoskeletal: No weakness, arthralgias, wasting. Endo: No sweats. Heme: No bruising, easy bleeding. Allergies: As noted. Neurological: Cranial nerves intact. Alert and oriented. Gait not assessed. Psychiatric:  No anxiety, depression, hallucinations. Visit Vitals  BP (!) 170/99   Pulse 97   Temp 98.5 °F (36.9 °C)   Resp 20   Ht 6' 2\" (1.88 m)   Wt 91 kg (200 lb 9.6 oz)   SpO2 97%   BMI 25.76 kg/m²       Physical Assessment:     constitutional: appearance: well developed, well nourished, normal habitus, no deformities, in no acute distress. skin: inspection: no rashes, ulcers, icterus or other lesions; no clubbing or telangiectasias. palpation: no induration or subcutaneos nodules. eyes: inspection: normal conjunctivae and lids; no jaundice pupils: symmetrical, normoreactive to light, normal accommodation and size. ENMT: mouth: normal oral mucosa,lips and gums; good dentition. oropharynx: normal tongue, hard and soft palate; posterior pharynx without erithema, exudate or lesions. neck: thyroid: normal size, consistency and position; no masses or tenderness. respiratory: effort: normal chest excursion; no intercostal retraction or accessory muscle use. cardiovascular: abdominal aorta: normal size and position; no bruits. palpation: PMI of normal size and position; normal rhythm; no thrill or murmurs. abdominal: abdomen: normal consistency; no tenderness or masses. hernias: no hernias appreciated. liver: normal size and consistency. spleen: not palpable. rectal: hemoccult/guaiac: not performed. musculoskeletal: digits and nails: no clubbing, cyanosis, petechiae or other inflammatory conditions. gait: normal gait and station head and neck: normal range of motion; no pain, crepitation or contracture. spine/ribs/pelvis: normal range of motion; no pain, deformity or contracture. lymphatic: axilae: not palpable. groin: not palpable. neck: within normal limits. other: not palpable. neurologic: cranial nerves: II-XII normal.   psychiatric: judgement/insight: within normal limits. memory: within normal limits for recent and remote events. mood and affect: no evidence of depression, anxiety or agitation. orientation: oriented to time, space and person. Basic Metabolic Profile   No results for input(s): NA, K, CL, CO2, BUN, GLU, CA, MG, PHOS in the last 72 hours. No lab exists for component: CREAT      CBC w/Diff    No results for input(s): WBC, RBC, HGB, HCT, MCV, MCH, MCHC, RDW, PLT, HGBEXT, HCTEXT, PLTEXT in the last 72 hours. No lab exists for component: MPV No results for input(s): GRANS, LYMPH, EOS, PRO, MYELO, METAS, BLAST in the last 72 hours. No lab exists for component: MONO, BASO     Hepatic Function   No results for input(s): ALB, TP, TBILI, GPT, SGOT, AP, AML, LPSE in the last 72 hours. No lab exists for component: Luis Barrett MD, M.D. [facility], [address] [place] [address]  [email or web address]. [facility]

## 2019-08-23 NOTE — ANESTHESIA PREPROCEDURE EVALUATION
Relevant Problems   No relevant active problems       Anesthetic History               Review of Systems / Medical History  Patient summary reviewed and pertinent labs reviewed    Pulmonary        Sleep apnea: CPAP           Neuro/Psych              Cardiovascular    Hypertension: well controlled              Exercise tolerance: >4 METS     GI/Hepatic/Renal         Renal disease: dialysis and ESRD       Endo/Other  Within defined limits           Other Findings              Physical Exam    Airway  Mallampati: III  TM Distance: 4 - 6 cm  Neck ROM: decreased range of motion   Mouth opening: Normal     Cardiovascular    Rhythm: regular  Rate: normal         Dental    Dentition: Poor dentition     Pulmonary  Breath sounds clear to auscultation               Abdominal  GI exam deferred       Other Findings            Anesthetic Plan    ASA: 3  Anesthesia type: MAC            Anesthetic plan and risks discussed with: Patient

## 2019-08-23 NOTE — DISCHARGE INSTRUCTIONS
Patient Education     Patient Education        Colonoscopy: What to Expect at Home  Your Recovery  After you have a colonoscopy, you will stay at the clinic for 1 to 2 hours until the medicines wear off. Then you can go home. But you will need to arrange for a ride. Your doctor will tell you when you can eat and do your other usual activities. Your doctor will talk to you about when you will need your next colonoscopy. Your doctor can help you decide how often you need to be checked. This will depend on the results of your test and your risk for colorectal cancer. After the test, you may be bloated or have gas pains. You may need to pass gas. If a biopsy was done or a polyp was removed, you may have streaks of blood in your stool (feces) for a few days. Problems such as heavy rectal bleeding may not occur until several weeks after the test. This isn't common. But it can happen after polyps are removed. This care sheet gives you a general idea about how long it will take for you to recover. But each person recovers at a different pace. Follow the steps below to get better as quickly as possible. How can you care for yourself at home? Activity    · Rest when you feel tired.     · You can do your normal activities when it feels okay to do so. Diet    · Follow your doctor's directions for eating.     · Unless your doctor has told you not to, drink plenty of fluids. This helps to replace the fluids that were lost during the colon prep.     · Do not drink alcohol. Medicines    · Your doctor will tell you if and when you can restart your medicines. He or she will also give you instructions about taking any new medicines.     · If you take blood thinners, such as warfarin (Coumadin), clopidogrel (Plavix), or aspirin, be sure to talk to your doctor. He or she will tell you if and when to start taking those medicines again.  Make sure that you understand exactly what your doctor wants you to do.     · If polyps were removed or a biopsy was done during the test, your doctor may tell you not to take aspirin or other anti-inflammatory medicines for a few days. These include ibuprofen (Advil, Motrin) and naproxen (Aleve). Other instructions    · For your safety, do not drive or operate machinery until the medicine wears off and you can think clearly. Your doctor may tell you not to drive or operate machinery until the day after your test.     · Do not sign legal documents or make major decisions until the medicine wears off and you can think clearly. The anesthesia can make it hard for you to fully understand what you are agreeing to. Follow-up care is a key part of your treatment and safety. Be sure to make and go to all appointments, and call your doctor if you are having problems. It's also a good idea to know your test results and keep a list of the medicines you take. When should you call for help? Call 911 anytime you think you may need emergency care. For example, call if:    · You passed out (lost consciousness).     · You pass maroon or bloody stools.     · You have trouble breathing.    Call your doctor now or seek immediate medical care if:    · You have pain that does not get better after you take pain medicine.     · You are sick to your stomach or cannot drink fluids.     · You have new or worse belly pain.     · You have blood in your stools.     · You have a fever.     · You cannot pass stools or gas.    Watch closely for changes in your health, and be sure to contact your doctor if you have any problems. Where can you learn more? Go to http://nichole-shahnaz.info/. Enter E264 in the search box to learn more about \"Colonoscopy: What to Expect at Home. \"  Current as of: December 19, 2018  Content Version: 12.1  © 9370-0981 [employer], [employer]. Care instructions adapted under license by [employer] (which disclaims liability or warranty for this information).  If you have questions about a medical condition or this instruction, always ask your healthcare professional. [address] any warranty or liability for your use of this information. DISCHARGE SUMMARY from Nurse    PATIENT INSTRUCTIONS:    After general anesthesia or intravenous sedation, for 24 hours or while taking prescription Narcotics:  · Limit your activities  · Do not drive and operate hazardous machinery  · Do not make important personal or business decisions  · Do  not drink alcoholic beverages  · If you have not urinated within 8 hours after discharge, please contact your surgeon on call. Report the following to your surgeon:  · Excessive pain, swelling, redness or odor of or around the surgical area  · Temperature over 100.5  · Nausea and vomiting lasting longer than 4 hours or if unable to take medications  · Any signs of decreased circulation or nerve impairment to extremity: change in color, persistent  numbness, tingling, coldness or increase pain  · Any questions    What to do at Home:  Recommended activity: Activity as tolerated and no driving for today. *  Please give a list of your current medications to your Primary Care Provider. *  Please update this list whenever your medications are discontinued, doses are      changed, or new medications (including over-the-counter products) are added. *  Please carry medication information at all times in case of emergency situations. These are general instructions for a healthy lifestyle:    No smoking/ No tobacco products/ Avoid exposure to second hand smoke  Surgeon General's Warning:  Quitting smoking now greatly reduces serious risk to your health.     Obesity, smoking, and sedentary lifestyle greatly increases your risk for illness    A healthy diet, regular physical exercise & weight monitoring are important for maintaining a healthy lifestyle    You may be retaining fluid if you have a history of heart failure or if you experience any of the following symptoms:  Weight gain of 3 pounds or more overnight or 5 pounds in a week, increased swelling in our hands or feet or shortness of breath while lying flat in bed. Please call your doctor as soon as you notice any of these symptoms; do not wait until your next office visit. The discharge information has been reviewed with the patient.   The patient verbalized understanding.    ___________________________________________________________________________________________________________________________________

## 2019-08-23 NOTE — PROGRESS NOTES
[email or web address] [address]  [address] [facility], [address]      Brief Procedure Note    Luci Simpson  1970  [id number]    Date of Procedure: 8/23/2019    Preoperative diagnosis: COLON CANCER SCREENING    Postoperative diagnosis: Diverticulosis, Hemorrhoids    Type of Anesthesia: MAC (Monitored anesthesia care)    Description of findings: same as post op dx    Procedure: Procedure(s):  COLONOSCOPY    :  Dr. Angel Salas MD    Assistant(s): Endoscopy Technician-1: Kate Wade  Endoscopy RN-1: Stephany Quach RN; Debbie Phillips RN; Jorge A Tavares RN    Devices/implants/grafts/tissues/prosthesis: None    EBL:None    Specimens: * No specimens in log *    Findings: See printed and scanned procedure note    Complications: None    Dr. Angel Salas MD  8/23/2019  3:16 PM

## 2019-11-06 PROBLEM — D64.9 ANEMIA: Status: ACTIVE | Noted: 2019-11-06

## 2019-11-06 RX ORDER — SODIUM CHLORIDE 0.9 % (FLUSH) 0.9 %
10 SYRINGE (ML) INJECTION AS NEEDED
Status: CANCELLED
Start: 2019-11-20

## 2019-11-06 RX ORDER — HYDROCORTISONE SODIUM SUCCINATE 100 MG/2ML
100 INJECTION, POWDER, FOR SOLUTION INTRAMUSCULAR; INTRAVENOUS AS NEEDED
Status: CANCELLED | OUTPATIENT
Start: 2019-11-20

## 2019-11-06 RX ORDER — ONDANSETRON 2 MG/ML
8 INJECTION INTRAMUSCULAR; INTRAVENOUS AS NEEDED
Status: CANCELLED | OUTPATIENT
Start: 2019-11-20

## 2019-11-06 RX ORDER — SODIUM CHLORIDE 9 MG/ML
25 INJECTION, SOLUTION INTRAVENOUS CONTINUOUS
Status: CANCELLED | OUTPATIENT
Start: 2019-11-20

## 2019-11-06 RX ORDER — DIPHENHYDRAMINE HYDROCHLORIDE 50 MG/ML
50 INJECTION, SOLUTION INTRAMUSCULAR; INTRAVENOUS AS NEEDED
Status: CANCELLED
Start: 2019-11-20

## 2019-11-06 RX ORDER — SODIUM CHLORIDE 9 MG/ML
10 INJECTION INTRAMUSCULAR; INTRAVENOUS; SUBCUTANEOUS AS NEEDED
Status: CANCELLED | OUTPATIENT
Start: 2019-11-20

## 2019-11-06 RX ORDER — ALBUTEROL SULFATE 0.83 MG/ML
2.5 SOLUTION RESPIRATORY (INHALATION) AS NEEDED
Status: CANCELLED
Start: 2019-11-20

## 2019-11-06 RX ORDER — EPINEPHRINE 1 MG/ML
0.3 INJECTION, SOLUTION, CONCENTRATE INTRAVENOUS AS NEEDED
Status: CANCELLED | OUTPATIENT
Start: 2019-11-20

## 2019-11-06 RX ORDER — HEPARIN 100 UNIT/ML
300-500 SYRINGE INTRAVENOUS AS NEEDED
Status: CANCELLED
Start: 2019-11-20

## 2019-11-06 RX ORDER — ACETAMINOPHEN 325 MG/1
650 TABLET ORAL AS NEEDED
Status: CANCELLED
Start: 2019-11-20

## 2019-11-14 ENCOUNTER — HOSPITAL ENCOUNTER (OUTPATIENT)
Dept: INFUSION THERAPY | Age: 49
End: 2019-11-14

## 2019-11-14 DIAGNOSIS — N18.9 ANEMIA DUE TO CHRONIC KIDNEY DISEASE, UNSPECIFIED CKD STAGE: ICD-10-CM

## 2019-11-14 DIAGNOSIS — D63.1 ANEMIA DUE TO CHRONIC KIDNEY DISEASE, UNSPECIFIED CKD STAGE: ICD-10-CM

## 2019-11-21 DIAGNOSIS — N18.9 ANEMIA DUE TO CHRONIC KIDNEY DISEASE, UNSPECIFIED CKD STAGE: ICD-10-CM

## 2019-11-21 DIAGNOSIS — D63.1 ANEMIA DUE TO CHRONIC KIDNEY DISEASE, UNSPECIFIED CKD STAGE: ICD-10-CM

## 2019-11-25 ENCOUNTER — HOSPITAL ENCOUNTER (OUTPATIENT)
Dept: INTERVENTIONAL RADIOLOGY/VASCULAR | Age: 49
Discharge: HOME | End: 2019-11-25
Attending: INTERNAL MEDICINE | Admitting: RADIOLOGY
Payer: COMMERCIAL

## 2019-11-25 VITALS
BODY MASS INDEX: 25.03 KG/M2 | OXYGEN SATURATION: 91 % | TEMPERATURE: 98.6 F | HEART RATE: 82 BPM | RESPIRATION RATE: 17 BRPM | HEIGHT: 74 IN | DIASTOLIC BLOOD PRESSURE: 115 MMHG | SYSTOLIC BLOOD PRESSURE: 179 MMHG | WEIGHT: 195 LBS

## 2019-11-25 DIAGNOSIS — N19 RENAL FAILURE: ICD-10-CM

## 2019-11-25 DIAGNOSIS — D64.9 ANEMIA: ICD-10-CM

## 2019-11-25 LAB
ANION GAP SERPL CALC-SCNC: 10 MMOL/L (ref 3–18)
APTT PPP: 29.3 SEC (ref 23–36.4)
BASOPHILS # BLD: 0 K/UL (ref 0–0.1)
BASOPHILS NFR BLD: 1 % (ref 0–2)
BUN SERPL-MCNC: 78 MG/DL (ref 7–18)
BUN/CREAT SERPL: 4 (ref 12–20)
CALCIUM SERPL-MCNC: 7.6 MG/DL (ref 8.5–10.1)
CHLORIDE SERPL-SCNC: 106 MMOL/L (ref 100–111)
CO2 SERPL-SCNC: 27 MMOL/L (ref 21–32)
CREAT SERPL-MCNC: 22 MG/DL (ref 0.6–1.3)
DIFFERENTIAL METHOD BLD: ABNORMAL
EOSINOPHIL # BLD: 0.9 K/UL (ref 0–0.4)
EOSINOPHIL NFR BLD: 12 % (ref 0–5)
ERYTHROCYTE [DISTWIDTH] IN BLOOD BY AUTOMATED COUNT: 16.3 % (ref 11.6–14.5)
GLUCOSE SERPL-MCNC: 92 MG/DL (ref 74–99)
HCT VFR BLD AUTO: 23 % (ref 36–48)
HGB BLD-MCNC: 7.2 G/DL (ref 13–16)
INR PPP: 1.1 (ref 0.8–1.2)
LYMPHOCYTES # BLD: 0.6 K/UL (ref 0.9–3.6)
LYMPHOCYTES NFR BLD: 8 % (ref 21–52)
MCH RBC QN AUTO: 26.5 PG (ref 24–34)
MCHC RBC AUTO-ENTMCNC: 31.3 G/DL (ref 31–37)
MCV RBC AUTO: 84.6 FL (ref 74–97)
MONOCYTES # BLD: 0.4 K/UL (ref 0.05–1.2)
MONOCYTES NFR BLD: 5 % (ref 3–10)
NEUTS SEG # BLD: 5.2 K/UL (ref 1.8–8)
NEUTS SEG NFR BLD: 74 % (ref 40–73)
PLATELET # BLD AUTO: 155 K/UL (ref 135–420)
PMV BLD AUTO: 9.7 FL (ref 9.2–11.8)
POTASSIUM SERPL-SCNC: 3.9 MMOL/L (ref 3.5–5.5)
PROTHROMBIN TIME: 14 SEC (ref 11.5–15.2)
RBC # BLD AUTO: 2.72 M/UL (ref 4.7–5.5)
SODIUM SERPL-SCNC: 143 MMOL/L (ref 136–145)
WBC # BLD AUTO: 7.1 K/UL (ref 4.6–13.2)

## 2019-11-25 PROCEDURE — 88313 SPECIAL STAINS GROUP 2: CPT

## 2019-11-25 PROCEDURE — 88185 FLOWCYTOMETRY/TC ADD-ON: CPT

## 2019-11-25 PROCEDURE — 88237 TISSUE CULTURE BONE MARROW: CPT

## 2019-11-25 PROCEDURE — 88264 CHROMOSOME ANALYSIS 20-25: CPT

## 2019-11-25 PROCEDURE — 88311 DECALCIFY TISSUE: CPT

## 2019-11-25 PROCEDURE — 74011000250 HC RX REV CODE- 250: Performed by: RADIOLOGY

## 2019-11-25 PROCEDURE — 85025 COMPLETE CBC W/AUTO DIFF WBC: CPT

## 2019-11-25 PROCEDURE — 74011250636 HC RX REV CODE- 250/636: Performed by: RADIOLOGY

## 2019-11-25 PROCEDURE — 85610 PROTHROMBIN TIME: CPT

## 2019-11-25 PROCEDURE — 88184 FLOWCYTOMETRY/ TC 1 MARKER: CPT

## 2019-11-25 PROCEDURE — 99152 MOD SED SAME PHYS/QHP 5/>YRS: CPT

## 2019-11-25 PROCEDURE — 74011250636 HC RX REV CODE- 250/636

## 2019-11-25 PROCEDURE — 88305 TISSUE EXAM BY PATHOLOGIST: CPT

## 2019-11-25 PROCEDURE — 80048 BASIC METABOLIC PNL TOTAL CA: CPT

## 2019-11-25 PROCEDURE — 85730 THROMBOPLASTIN TIME PARTIAL: CPT

## 2019-11-25 RX ORDER — SODIUM CHLORIDE 9 MG/ML
20 INJECTION, SOLUTION INTRAVENOUS CONTINUOUS
Status: DISCONTINUED | OUTPATIENT
Start: 2019-11-25 | End: 2019-11-25 | Stop reason: HOSPADM

## 2019-11-25 RX ORDER — LIDOCAINE HYDROCHLORIDE 10 MG/ML
30 INJECTION, SOLUTION EPIDURAL; INFILTRATION; INTRACAUDAL; PERINEURAL ONCE
Status: DISCONTINUED | OUTPATIENT
Start: 2019-11-25 | End: 2019-11-25 | Stop reason: HOSPADM

## 2019-11-25 RX ORDER — MIDAZOLAM HYDROCHLORIDE 1 MG/ML
1 INJECTION, SOLUTION INTRAMUSCULAR; INTRAVENOUS
Status: DISCONTINUED | OUTPATIENT
Start: 2019-11-25 | End: 2019-11-25 | Stop reason: HOSPADM

## 2019-11-25 RX ORDER — FENTANYL CITRATE 50 UG/ML
12.5-5 INJECTION, SOLUTION INTRAMUSCULAR; INTRAVENOUS
Status: DISCONTINUED | OUTPATIENT
Start: 2019-11-25 | End: 2019-11-25 | Stop reason: HOSPADM

## 2019-11-25 RX ORDER — FENTANYL CITRATE 50 UG/ML
INJECTION, SOLUTION INTRAMUSCULAR; INTRAVENOUS
Status: COMPLETED
Start: 2019-11-25 | End: 2019-11-25

## 2019-11-25 RX ORDER — HYDRALAZINE HYDROCHLORIDE 20 MG/ML
10 INJECTION INTRAMUSCULAR; INTRAVENOUS
Status: COMPLETED | OUTPATIENT
Start: 2019-11-25 | End: 2019-11-25

## 2019-11-25 RX ORDER — LIDOCAINE HYDROCHLORIDE 10 MG/ML
INJECTION, SOLUTION EPIDURAL; INFILTRATION; INTRACAUDAL; PERINEURAL
Status: DISCONTINUED
Start: 2019-11-25 | End: 2019-11-25 | Stop reason: HOSPADM

## 2019-11-25 RX ORDER — LIDOCAINE HYDROCHLORIDE 10 MG/ML
30 INJECTION, SOLUTION EPIDURAL; INFILTRATION; INTRACAUDAL; PERINEURAL ONCE
Status: COMPLETED | OUTPATIENT
Start: 2019-11-25 | End: 2019-11-25

## 2019-11-25 RX ORDER — MIDAZOLAM HYDROCHLORIDE 1 MG/ML
INJECTION, SOLUTION INTRAMUSCULAR; INTRAVENOUS
Status: COMPLETED
Start: 2019-11-25 | End: 2019-11-25

## 2019-11-25 RX ADMIN — MIDAZOLAM 1 MG: 1 INJECTION INTRAMUSCULAR; INTRAVENOUS at 13:10

## 2019-11-25 RX ADMIN — LIDOCAINE HYDROCHLORIDE 30 ML: 10 INJECTION, SOLUTION EPIDURAL; INFILTRATION; INTRACAUDAL; PERINEURAL at 13:05

## 2019-11-25 RX ADMIN — MIDAZOLAM 1 MG: 1 INJECTION INTRAMUSCULAR; INTRAVENOUS at 13:05

## 2019-11-25 RX ADMIN — FENTANYL CITRATE 50 MCG: 50 INJECTION, SOLUTION INTRAMUSCULAR; INTRAVENOUS at 13:05

## 2019-11-25 RX ADMIN — SODIUM CHLORIDE 20 ML/HR: 900 INJECTION, SOLUTION INTRAVENOUS at 10:00

## 2019-11-25 RX ADMIN — HYDRALAZINE HYDROCHLORIDE 10 MG: 20 INJECTION, SOLUTION INTRAMUSCULAR; INTRAVENOUS at 10:33

## 2019-11-25 RX ADMIN — HYDRALAZINE HYDROCHLORIDE 10 MG: 20 INJECTION, SOLUTION INTRAMUSCULAR; INTRAVENOUS at 10:00

## 2019-11-25 RX ADMIN — FENTANYL CITRATE 50 MCG: 50 INJECTION, SOLUTION INTRAMUSCULAR; INTRAVENOUS at 13:10

## 2019-11-25 NOTE — DISCHARGE INSTRUCTIONS
DISCHARGE SUMMARY from Nurse:     Please resume taking your home medication as prescribed    PATIENT INSTRUCTIONS:    After general anesthesia or intravenous sedation, for 24 hours or while taking prescription Narcotics:  · Limit your activities  · Do not drive and operate hazardous machinery  · Do not make important personal or business decisions  · Do  not drink alcoholic beverages  · If you have not urinated within 8 hours after discharge, please contact your surgeon on call. Report the following to your surgeon:  · Excessive pain, swelling, redness or odor of or around the surgical area  · Temperature over 100.5  · Nausea and vomiting lasting longer than 4 hours or if unable to take medications  · Any signs of decreased circulation or nerve impairment to extremity: change in color, persistent  numbness, tingling, coldness or increase pain  · Any questions    What to do at Home:  Recommended activity: Activity as tolerated and no driving for today. If you experience any of the following symptoms bleeding,swelling,acute pain or numbness, fever, please follow up with Dr. Niurka Minaya MD.    *  Please give a list of your current medications to your Primary Care Provider. *  Please update this list whenever your medications are discontinued, doses are      changed, or new medications (including over-the-counter products) are added. *  Please carry medication information at all times in case of emergency situations. These are general instructions for a healthy lifestyle:    No smoking/ No tobacco products/ Avoid exposure to second hand smoke  Surgeon General's Warning:  Quitting smoking now greatly reduces serious risk to your health.     Obesity, smoking, and sedentary lifestyle greatly increases your risk for illness    A healthy diet, regular physical exercise & weight monitoring are important for maintaining a healthy lifestyle    You may be retaining fluid if you have a history of heart failure or if you experience any of the following symptoms:  Weight gain of 3 pounds or more overnight or 5 pounds in a week, increased swelling in our hands or feet or shortness of breath while lying flat in bed. Please call your doctor as soon as you notice any of these symptoms; do not wait until your next office visit. The discharge information has been reviewed with the patient. The patient verbalized understanding. Discharge medications reviewed with the patient and appropriate educational materials and side effects teaching were provided. Patient armband removed and shredded  MyChart Activation    Thank you for requesting access to [employer]. Please follow the instructions below to securely access and download your online medical record. [employer] allows you to send messages to your doctor, view your test results, renew your prescriptions, schedule appointments, and more. How Do I Sign Up? 1. In your internet browser, go to [email or web address]. 2. Click on the First Time User? Click Here link in the Sign In box. You will see the New Member Sign Up page. 3. Enter your [employer] Access Code exactly as it appears below. You will not need to use this code after youve completed the sign-up process. If you do not sign up before the expiration date, you must request a new code. [employer] Access Code: [email or web address]  Expires: 2019  8:44 AM (This is the date your [employer] access code will )    4. Enter the last four digits of your Social Security Number (xxxx) and Date of Birth (mm/dd/yyyy) as indicated and click Submit. You will be taken to the next sign-up page. 5. Create a IGIGIt ID. This will be your [employer] login ID and cannot be changed, so think of one that is secure and easy to remember. 6. Create a [employer] password. You can change your password at any time. 7. Enter your Password Reset Question and Answer. This can be used at a later time if you forget your password. 8. Enter your e-mail address. You will receive e-mail notification when new information is available in [address] 9. Click Sign Up. You can now view and download portions of your medical record. 10. Click the Download Summary menu link to download a portable copy of your medical information. Additional Information    If you have questions, please visit the Frequently Asked Questions section of the [email or web address] website at [email or web address] Remember, [email or web address] is NOT to be used for urgent needs. For medical emergencies, dial 911.    ___________________________________________________________________________________________________________________________________    BIOPSY DISCHARGE INSTRUCTIONS    General Instructions:     A biopsy is the removal of a small piece of tissue for microscopic examination or testing. Healthy tissue can be obtained for the purpose of tissue-type matching for transplants. Unhealthy tissues are more commonly biopsied to diagnose disease. Lung Biopsy:     A needle lung biopsy is performed when there is a mass discovered in the lung area. The most serious risk is infection, bleeding, and pneumothorax (a collapsed lung). Signs of pneumothorax include shortness of breath, rapid heart rate, and blueness of the skin. If any of these occur, call 911. Liver Biopsy: This test helps detect cancer, infections, and the cause of an enlargement of the liver or elevated liver enzymes. It also helps to diagnose a number of liver diseases. The pain associated with the procedure may be felt in the shoulder. The risks include internal bleeding, pneumothorax, and injury to the surrounding organs. Renal Biopsy: This procedure is sometimes done to evaluate a transplanted kidney. It is also used to evaluate unexplained decrease in kidney function, blood, or protein in the urine.  You may see bright red blood in the urine the first 24 hours after the test. If the bleeding lasts longer, you need to call your doctor. There is a risk of infection and bleeding into the muscle, which may cause soreness. Spinal Biopsy: This test is sometimes done in conjunction with other procedures. Your back will be sore, as we are taking a small sample of bone, which is slightly more difficult to sample than tissue. General Biopsy:     A mass can grow in any area of the body, and we are taking a specimen as ordered by your doctor. The risks are the same. They include bleeding, pain, and infection. Home Care Instructions: You may resume your regular diet and medication regimen. Do not drink alcohol, drive, or make any important legal decisions in the next 24 hours. Do not lift anything heavier than a gallon of milk until the soreness goes away. You may use over the counter acetaminophen or ibuprofen for the soreness. You may apply an ice pack to the affected area for 20-30 minutes at time for the first 24 hours. After that, you may apply a heat pack. Call If: You should call your Physician and/or the Radiology Nurse if you have any questions or concerns about the biopsy site. Call if you should have increased pain, fever, redness, drainage, or bleeding more than a small spot on the bandage. Follow-Up Instructions: Please see your ordering doctor as he/she has requested. To Reach Us:   Interventional Radiology Lab @ [phone]

## 2019-11-25 NOTE — ROUTINE PROCESS
A+Ox4, ambulatory without difficulty, denied any complaints. Lower back dressing intact, no bleeding or swelling. Discharge information reviewed. Escorted to car tot his wife for transport.

## 2019-11-25 NOTE — PROCEDURES
RADIOLOGY POST PROCEDURE NOTE     November 25, 2019       2:14 PM     Preoperative Diagnosis:   Anemia. Postoperative Diagnosis:  Same. :  Dr. Madison Irene    Assistant:  None. Type of Anesthesia: 1% plain lidocaine and IV moderate sedation with Versed and Fentanyl. Procedure/Description:  Image guided bone marrow Bx. Findings:   No bleeding. Estimated blood Loss:  Minimal    Specimen Removed:   yes    Blood transfusions:  None. Implants:  None.     Complications: None    Condition: Stable    Discharge Plan:  discharge home     Merlinda Hook, MD

## 2019-11-25 NOTE — PROGRESS NOTES
Informed Dr. Tushar Warren MD about Mr. De Santiago's elevated BP. Dr. Yusra Esquivel  Stated that if the patient was not symptomatic to advise him to speak with the physician who is prescribing his hypertension medication.

## 2019-11-25 NOTE — H&P
OUTPATIENT HISTORY AND PHYSICAL      Today 11/25/2019     Indication/Symptoms:   Last Ramsey is a 44 y.o. male with a history of anemia and renal failure who presents for an image-guided bone marrow biopsy with moderate sedation. Patient has been NPO since midnight and takes no blood thinning medications. Current Meds:    Prior to Admission medications    Medication Sig Start Date End Date Taking? Authorizing Provider   losartan (COZAAR) 100 mg tablet Take 1 Tab by mouth daily. 4/24/19  Yes Odalys Prather NP   cloNIDine HCl (CATAPRES) 0.1 mg tablet Take 0.1 mg by mouth two (2) times a day. 10/30/17  Yes Provider, Historical   amLODIPine (NORVASC) 10 mg tablet Take 1 Tab by mouth daily. 3/22/17  Yes Tom Sarkar NP   cinacalcet (SENSIPAR) 30 mg tablet Take  by mouth daily. Provider, Historical   ergocalciferol (ERGOCALCIFEROL) 50,000 unit capsule Take  by mouth every seven (7) days. Provider, Historical   furosemide (LASIX) 40 mg tablet Take  by mouth daily. 10/19/19   Provider, Historical   LORazepam (ATIVAN) 1 mg tablet Take  by mouth as needed. 8/22/19   Provider, Historical   calcitRIOL (ROCALTROL) 0.5 mcg capsule Take  by mouth daily. 7/19/19   Provider, Historical   naloxone (NARCAN) 4 mg/actuation nasal spray Use 1 spray intranasally, then discard. Repeat with new spray every 2 min as needed for opioid overdose symptoms, alternating nostrils. 10/23/19   Jamel Obrien MD   carvedilol (COREG) 25 mg tablet TAKE 1 TABLET BY MOUTH TWICE A DAY WITH MEALS 6/17/19   Odalys Prather NP   predniSONE (DELTASONE) 5 mg tablet TAKE 1 TAB BY MOUTH AS NEEDED FOR GOUT FLARES. 1/21/19   Victorino Xiong MD   tamsulosin (FLOMAX) 0.4 mg capsule Take 1 Cap by mouth daily. 4/28/18   Mikal Bernheim, MD   calcium acetate (PHOSLO) 667 mg cap Take 2 Caps by mouth three (3) times daily (with meals).  4/26/18   Mikal Bernheim, MD   sodium bicarbonate 650 mg tablet Take 1 Tab by mouth two (2) times a day. 4/26/18   Britton Hashimoto, MD   hydrALAZINE (APRESOLINE) 100 mg tablet Take 100 mg by mouth two (2) times a day. 10/30/17   Provider, Historical   allopurinol (ZYLOPRIM) 100 mg tablet Take 2 Tabs by mouth daily. 3/20/17   Modesta Mcduffie NP       Allergies:    No Known Allergies    Comorbid Conditions:    Past Medical History:   Diagnosis Date    Anemia     Chronic kidney disease     ESRD-   peritoneal dilaysis    Gout     Heart failure ([address] 04/2019    History of blood transfusion 05/22/2018    Hypertension     Joint pain     Sleep apnea 07/03/2018    does not use c pap          Past Surgical History:   Procedure Laterality Date    COLONOSCOPY N/A 8/23/2019    COLONOSCOPY performed by Maricarmen Turner MD at . [facility]  10/23/2019    HX ORTHOPAEDIC Left     left knee sx     HX OTHER SURGICAL  2018    tenkoff catheter    HX UROLOGICAL  05/20/2018    prostate biopsy    HX WISDOM TEETH EXTRACTION       Data:    Visit Vitals  BP (!) 181/113   Pulse 68   Resp 23   SpO2 97%   :  Recent Labs     11/25/19  0945        No results for input(s): INR, APTT, INREXT in the last 72 hours. No lab exists for component: PT    The H & P and/or progress notes and any available imaging were reviewed. The risks, indications and possible alternatives to the procedure, including doing nothing, were discussed and informed consent was obtained. Physical Exam:      Mental status:   Alert and oriented. Examination specific to the procedure proposed to be performed and any co morbid conditions:   Mallampati classification 2 ,  ASA 3   Heart:   Regular rate. Lungs:   Normal respiratory effort. Symmetrical rise and fall of chest    The patient is an appropriate candidate to undergo the planned procedure and sedation once blood pressure is under control.     Matty Garibay

## 2019-11-25 NOTE — PROGRESS NOTES
TRANSFER - OUT REPORT:    Verbal report given to Niya RN(name) on Kyle Brennan  being transferred to [facility](unit) for routine post - op       Report consisted of patients Situation, Background, Assessment and   Recommendations(SBAR). Information from the following report(s) SBAR, Kardex, Procedure Summary, MAR and Recent Results was reviewed with the receiving nurse. Lines:   Peripheral IV 11/25/19 Posterior;Right Hand (Active)   Site Assessment Clean, dry, & intact 11/25/2019  9:45 AM   Phlebitis Assessment 0 11/25/2019  9:45 AM   Infiltration Assessment 0 11/25/2019  9:45 AM   Dressing Status Clean, dry, & intact 11/25/2019  9:45 AM   Dressing Type Transparent 11/25/2019  9:45 AM   Hub Color/Line Status Pink;Flushed 11/25/2019  9:45 AM        Opportunity for questions and clarification was provided.       Patient transported with:   [employer]

## 2019-12-19 DIAGNOSIS — D63.1 ANEMIA DUE TO CHRONIC KIDNEY DISEASE, UNSPECIFIED CKD STAGE: ICD-10-CM

## 2019-12-19 DIAGNOSIS — N18.9 ANEMIA DUE TO CHRONIC KIDNEY DISEASE, UNSPECIFIED CKD STAGE: ICD-10-CM

## 2019-12-30 DIAGNOSIS — I10 ESSENTIAL HYPERTENSION: Primary | ICD-10-CM

## 2019-12-30 RX ORDER — CARVEDILOL 25 MG/1
TABLET ORAL
Qty: 60 TAB | Refills: 1 | Status: SHIPPED | OUTPATIENT
Start: 2019-12-30 | End: 2020-01-02 | Stop reason: SDUPTHER

## 2020-01-02 RX ORDER — CARVEDILOL 25 MG/1
25 TABLET ORAL 2 TIMES DAILY WITH MEALS
Qty: 90 TAB | Refills: 1 | Status: SHIPPED | OUTPATIENT
Start: 2020-01-02

## 2020-01-02 NOTE — TELEPHONE ENCOUNTER
Requested Prescriptions     Pending Prescriptions Disp Refills    carvedilol (COREG) 25 mg tablet 90 Tab 1     Sig: Take 1 Tab by mouth two (2) times daily (with meals).      Signed Prescriptions Disp Refills    carvedilol (COREG) 25 mg tablet 60 Tab 1     Sig: TAKE 1 TABLET BY MOUTH TWICE A DAY WITH MEALS     Authorizing Provider: Derald Crigler

## 2020-07-01 ENCOUNTER — HOSPITAL ENCOUNTER (OUTPATIENT)
Dept: PREADMISSION TESTING | Age: 50
Discharge: HOME | End: 2020-07-01
Payer: COMMERCIAL

## 2020-07-01 PROCEDURE — 87635 SARS-COV-2 COVID-19 AMP PRB: CPT

## 2020-07-02 ENCOUNTER — ANESTHESIA EVENT (OUTPATIENT)
Dept: SURGERY | Age: 50
DRG: 674 | End: 2020-07-02
Payer: COMMERCIAL

## 2020-07-02 LAB — SARS-COV-2, COV2NT: NOT DETECTED

## 2020-07-02 RX ORDER — SODIUM CHLORIDE, SODIUM LACTATE, POTASSIUM CHLORIDE, CALCIUM CHLORIDE 600; 310; 30; 20 MG/100ML; MG/100ML; MG/100ML; MG/100ML
50 INJECTION, SOLUTION INTRAVENOUS CONTINUOUS
Status: CANCELLED | OUTPATIENT
Start: 2020-07-02 | End: 2020-07-03

## 2020-07-05 NOTE — H&P
[address] [place] [address]   HISTORY AND PHYSICAL    Name:  Jalil Kenney  MR#:   [id number]  :  1970  ACCOUNT #:  [de-identified]  ADMIT DATE:  2020      DATE OF SERVICE:  2020    REASON FOR ADMISSION:  Secondary hyperparathyroidism. HISTORY OF PRESENT ILLNESS:  The patient is a 75-year-old male who has had history of end-stage renal disease undergoing peritoneal dialysis 7 days per week. The patient does have a history of hypertension as well. He has furthermore had difficulties with increasing PTH levels. PTH in 2020 was noted to be 1500 and in 2020 was noted to be almost 2300. The patient is having increasing difficulties with his secondary hyperparathyroidism. The patient now to undergo subtotal parathyroidectomy. ALLERGIES:  DENIED. MEDICATIONS USE:  Include amlodipine, carvedilol, Klonopin, and losartan. PAST SURGICAL HISTORY:  Includes history of a blood transfusion. SOCIAL HISTORY:  The patient denies any tobacco or alcohol intake. REVIEW OF SYSTEMS:  Noncontributory. PHYSICAL EXAMINATION:  GENERAL:  A well-developed, well-nourished, very pleasant 53-year-old male. VITAL SIGNS:  Height of 6 feet 2 inches, weight of 190, blood pressure of 80/50, pulse 66, and respirations 16. HEENT:  Ear exam reveals normal-appearing tympanic membranes. The oral cavity and oropharynx within normal limits. The intranasal exam reveals no evidence of any mucopurulent discharge. NECK:  Supple, nontender. The patient does have a fairly thin long neck without any evidence of any other abnormality. CHEST:  Bilaterally clear. HEART:  S1, S2. No murmur audible. EXTREMITIES:  Within normal limits. NEUROLOGIC:  Grossly intact. IMPRESSION:  Secondary hyperparathyroidism. PLAN:  The patient to undergo a subtotal parathyroidectomy. Risks, benefits, and complications were discussed with the patient who understands and is aware.   The patient to undergo the above procedure on 07/06/2020.       MD KOKO Martinez/[email or web address]  D:  07/05/2020 8:56  T:  07/05/2020 11:06  JOB #:  [id number]

## 2020-07-06 ENCOUNTER — HOSPITAL ENCOUNTER (INPATIENT)
Age: 50
LOS: 11 days | Discharge: HOME | DRG: 674 | End: 2020-07-17
Attending: OTOLARYNGOLOGY | Admitting: OTOLARYNGOLOGY
Payer: COMMERCIAL

## 2020-07-06 ENCOUNTER — ANESTHESIA (OUTPATIENT)
Dept: SURGERY | Age: 50
DRG: 674 | End: 2020-07-06
Payer: COMMERCIAL

## 2020-07-06 PROBLEM — E21.3 HYPERPARATHYROIDISM (HCC): Status: ACTIVE | Noted: 2020-07-06

## 2020-07-06 LAB
ALBUMIN SERPL-MCNC: 2.4 G/DL (ref 3.4–5)
ALBUMIN/GLOB SERPL: 0.6 {RATIO} (ref 0.8–1.7)
ALP SERPL-CCNC: 74 U/L (ref 45–117)
ALT SERPL-CCNC: 9 U/L (ref 16–61)
ANION GAP SERPL CALC-SCNC: 15 MMOL/L (ref 3–18)
AST SERPL-CCNC: 7 U/L (ref 10–38)
BASOPHILS # BLD: 0 K/UL (ref 0–0.1)
BASOPHILS NFR BLD: 0 % (ref 0–2)
BILIRUB SERPL-MCNC: 0.6 MG/DL (ref 0.2–1)
BUN BLD-MCNC: 105 MG/DL (ref 7–18)
BUN SERPL-MCNC: 106 MG/DL (ref 7–18)
BUN/CREAT SERPL: 4 (ref 12–20)
CA-I SERPL-SCNC: 0.9 MMOL/L (ref 1.12–1.32)
CA-I SERPL-SCNC: 0.9 MMOL/L (ref 1.12–1.32)
CALCIUM SERPL-MCNC: 6.8 MG/DL (ref 8.5–10.1)
CALCIUM SERPL-MCNC: 7 MG/DL (ref 8.5–10.1)
CALCIUM SERPL-MCNC: 7.1 MG/DL (ref 8.5–10.1)
CHLORIDE BLD-SCNC: 107 MMOL/L (ref 100–108)
CHLORIDE SERPL-SCNC: 104 MMOL/L (ref 100–111)
CO2 SERPL-SCNC: 21 MMOL/L (ref 21–32)
CREAT SERPL-MCNC: 26.4 MG/DL (ref 0.6–1.3)
DIFFERENTIAL METHOD BLD: ABNORMAL
EOSINOPHIL # BLD: 0.2 K/UL (ref 0–0.4)
EOSINOPHIL NFR BLD: 5 % (ref 0–5)
ERYTHROCYTE [DISTWIDTH] IN BLOOD BY AUTOMATED COUNT: 17.5 % (ref 11.6–14.5)
GLOBULIN SER CALC-MCNC: 3.7 G/DL (ref 2–4)
GLUCOSE BLD STRIP.AUTO-MCNC: 86 MG/DL (ref 74–106)
GLUCOSE SERPL-MCNC: 79 MG/DL (ref 74–99)
HCT VFR BLD AUTO: 30.2 % (ref 36–48)
HCT VFR BLD CALC: 30 % (ref 36–49)
HGB BLD-MCNC: 10.2 G/DL (ref 12–16)
HGB BLD-MCNC: 9.3 G/DL (ref 13–16)
LYMPHOCYTES # BLD: 0.5 K/UL (ref 0.9–3.6)
LYMPHOCYTES NFR BLD: 10 % (ref 21–52)
MAGNESIUM SERPL-MCNC: 2.2 MG/DL (ref 1.6–2.6)
MCH RBC QN AUTO: 24.9 PG (ref 24–34)
MCHC RBC AUTO-ENTMCNC: 30.8 G/DL (ref 31–37)
MCV RBC AUTO: 81 FL (ref 74–97)
MONOCYTES # BLD: 0.3 K/UL (ref 0.05–1.2)
MONOCYTES NFR BLD: 6 % (ref 3–10)
NEUTS SEG # BLD: 3.8 K/UL (ref 1.8–8)
NEUTS SEG NFR BLD: 79 % (ref 40–73)
PHOSPHATE SERPL-MCNC: 14.3 MG/DL (ref 2.5–4.9)
PLATELET # BLD AUTO: 142 K/UL (ref 135–420)
PMV BLD AUTO: 9.2 FL (ref 9.2–11.8)
POTASSIUM BLD-SCNC: 4.6 MMOL/L (ref 3.5–5.5)
POTASSIUM SERPL-SCNC: 4.8 MMOL/L (ref 3.5–5.5)
PROT SERPL-MCNC: 6.1 G/DL (ref 6.4–8.2)
PTH-INTACT SERPL-MCNC: 171.3 PG/ML (ref 18.4–88)
RBC # BLD AUTO: 3.73 M/UL (ref 4.7–5.5)
SODIUM BLD-SCNC: 139 MMOL/L (ref 136–145)
SODIUM SERPL-SCNC: 140 MMOL/L (ref 136–145)
WBC # BLD AUTO: 4.8 K/UL (ref 4.6–13.2)

## 2020-07-06 PROCEDURE — 74011250636 HC RX REV CODE- 250/636: Performed by: PHYSICIAN ASSISTANT

## 2020-07-06 PROCEDURE — 82310 ASSAY OF CALCIUM: CPT

## 2020-07-06 PROCEDURE — 74011250636 HC RX REV CODE- 250/636

## 2020-07-06 PROCEDURE — 83970 ASSAY OF PARATHORMONE: CPT

## 2020-07-06 PROCEDURE — 77030013079 HC BLNKT BAIR HGGR 3M -A: Performed by: ANESTHESIOLOGY

## 2020-07-06 PROCEDURE — 74011250636 HC RX REV CODE- 250/636: Performed by: NURSE ANESTHETIST, CERTIFIED REGISTERED

## 2020-07-06 PROCEDURE — 76060000035 HC ANESTHESIA 2 TO 2.5 HR: Performed by: OTOLARYNGOLOGY

## 2020-07-06 PROCEDURE — 65610000006 HC RM INTENSIVE CARE

## 2020-07-06 PROCEDURE — 77030008683 HC TU ET CUF COVD -A: Performed by: ANESTHESIOLOGY

## 2020-07-06 PROCEDURE — 77030002996 HC SUT SLK J&J -A: Performed by: OTOLARYNGOLOGY

## 2020-07-06 PROCEDURE — 82330 ASSAY OF CALCIUM: CPT

## 2020-07-06 PROCEDURE — 88331 PATH CONSLTJ SURG 1 BLK 1SPC: CPT

## 2020-07-06 PROCEDURE — 77030002916 HC SUT ETHLN J&J -A: Performed by: OTOLARYNGOLOGY

## 2020-07-06 PROCEDURE — 82947 ASSAY GLUCOSE BLOOD QUANT: CPT

## 2020-07-06 PROCEDURE — 0GBP0ZZ EXCISION OF LEFT INFERIOR PARATHYROID GLAND, OPEN APPROACH: ICD-10-PCS | Performed by: OTOLARYNGOLOGY

## 2020-07-06 PROCEDURE — 74011250637 HC RX REV CODE- 250/637: Performed by: NURSE ANESTHETIST, CERTIFIED REGISTERED

## 2020-07-06 PROCEDURE — 77030026438 HC STYL ET INTUB CARD -A: Performed by: ANESTHESIOLOGY

## 2020-07-06 PROCEDURE — 0GTN0ZZ RESECTION OF RIGHT INFERIOR PARATHYROID GLAND, OPEN APPROACH: ICD-10-PCS | Performed by: OTOLARYNGOLOGY

## 2020-07-06 PROCEDURE — 77030012407 HC DRN WND BARD -B: Performed by: OTOLARYNGOLOGY

## 2020-07-06 PROCEDURE — 74011000258 HC RX REV CODE- 258: Performed by: OTOLARYNGOLOGY

## 2020-07-06 PROCEDURE — 85025 COMPLETE CBC W/AUTO DIFF WBC: CPT

## 2020-07-06 PROCEDURE — 77030031753 HC SHR ENDO COAG HARM J&J -E: Performed by: OTOLARYNGOLOGY

## 2020-07-06 PROCEDURE — 74011000258 HC RX REV CODE- 258: Performed by: NURSE ANESTHETIST, CERTIFIED REGISTERED

## 2020-07-06 PROCEDURE — 77030019605: Performed by: OTOLARYNGOLOGY

## 2020-07-06 PROCEDURE — 77030040356 HC CORD BPLR FRCP COVD -A: Performed by: OTOLARYNGOLOGY

## 2020-07-06 PROCEDURE — 77030013567 HC DRN WND RESERV BARD -A: Performed by: OTOLARYNGOLOGY

## 2020-07-06 PROCEDURE — 77030011265 HC ELECTRD BLD HEX COVD -A: Performed by: OTOLARYNGOLOGY

## 2020-07-06 PROCEDURE — 76010000131 HC OR TIME 2 TO 2.5 HR: Performed by: OTOLARYNGOLOGY

## 2020-07-06 PROCEDURE — 88305 TISSUE EXAM BY PATHOLOGIST: CPT

## 2020-07-06 PROCEDURE — 83735 ASSAY OF MAGNESIUM: CPT

## 2020-07-06 PROCEDURE — 77030031139 HC SUT VCRL2 J&J -A: Performed by: OTOLARYNGOLOGY

## 2020-07-06 PROCEDURE — 0GTP0ZZ RESECTION OF LEFT INFERIOR PARATHYROID GLAND, OPEN APPROACH: ICD-10-PCS | Performed by: OTOLARYNGOLOGY

## 2020-07-06 PROCEDURE — 80053 COMPREHEN METABOLIC PANEL: CPT

## 2020-07-06 PROCEDURE — 74011000250 HC RX REV CODE- 250: Performed by: NURSE ANESTHETIST, CERTIFIED REGISTERED

## 2020-07-06 PROCEDURE — 77030008462 HC STPLR SKN PROX J&J -A: Performed by: OTOLARYNGOLOGY

## 2020-07-06 PROCEDURE — 74011250637 HC RX REV CODE- 250/637: Performed by: OTOLARYNGOLOGY

## 2020-07-06 PROCEDURE — 77030011267 HC ELECTRD BLD COVD -A: Performed by: OTOLARYNGOLOGY

## 2020-07-06 PROCEDURE — 74011000258 HC RX REV CODE- 258: Performed by: PHYSICIAN ASSISTANT

## 2020-07-06 PROCEDURE — 77030040361 HC SLV COMPR DVT MDII -B: Performed by: OTOLARYNGOLOGY

## 2020-07-06 PROCEDURE — 74011250636 HC RX REV CODE- 250/636: Performed by: OTOLARYNGOLOGY

## 2020-07-06 PROCEDURE — 77030010512 HC APPL CLP LIG J&J -C: Performed by: OTOLARYNGOLOGY

## 2020-07-06 PROCEDURE — 0GTL0ZZ RESECTION OF RIGHT SUPERIOR PARATHYROID GLAND, OPEN APPROACH: ICD-10-PCS | Performed by: OTOLARYNGOLOGY

## 2020-07-06 PROCEDURE — 74011000250 HC RX REV CODE- 250

## 2020-07-06 PROCEDURE — 77030040922 HC BLNKT HYPOTHRM STRY -A: Performed by: OTOLARYNGOLOGY

## 2020-07-06 PROCEDURE — 3E1M39Z IRRIGATION OF PERITONEAL CAVITY USING DIALYSATE, PERCUTANEOUS APPROACH: ICD-10-PCS | Performed by: INTERNAL MEDICINE

## 2020-07-06 PROCEDURE — 84100 ASSAY OF PHOSPHORUS: CPT

## 2020-07-06 RX ORDER — SUCCINYLCHOLINE CHLORIDE 20 MG/ML
INJECTION INTRAMUSCULAR; INTRAVENOUS AS NEEDED
Status: DISCONTINUED | OUTPATIENT
Start: 2020-07-06 | End: 2020-07-06 | Stop reason: HOSPADM

## 2020-07-06 RX ORDER — SODIUM CHLORIDE 0.9 % (FLUSH) 0.9 %
5-40 SYRINGE (ML) INJECTION AS NEEDED
Status: DISCONTINUED | OUTPATIENT
Start: 2020-07-06 | End: 2020-07-17 | Stop reason: HOSPADM

## 2020-07-06 RX ORDER — FENTANYL CITRATE 50 UG/ML
INJECTION, SOLUTION INTRAMUSCULAR; INTRAVENOUS AS NEEDED
Status: DISCONTINUED | OUTPATIENT
Start: 2020-07-06 | End: 2020-07-06 | Stop reason: HOSPADM

## 2020-07-06 RX ORDER — DEXAMETHASONE SODIUM PHOSPHATE 4 MG/ML
INJECTION, SOLUTION INTRA-ARTICULAR; INTRALESIONAL; INTRAMUSCULAR; INTRAVENOUS; SOFT TISSUE AS NEEDED
Status: DISCONTINUED | OUTPATIENT
Start: 2020-07-06 | End: 2020-07-06 | Stop reason: HOSPADM

## 2020-07-06 RX ORDER — ONDANSETRON 2 MG/ML
INJECTION INTRAMUSCULAR; INTRAVENOUS AS NEEDED
Status: DISCONTINUED | OUTPATIENT
Start: 2020-07-06 | End: 2020-07-06 | Stop reason: HOSPADM

## 2020-07-06 RX ORDER — CALCITRIOL 0.25 UG/1
0.5 CAPSULE ORAL DAILY
Status: DISCONTINUED | OUTPATIENT
Start: 2020-07-07 | End: 2020-07-07

## 2020-07-06 RX ORDER — GLYCOPYRROLATE 0.2 MG/ML
INJECTION INTRAMUSCULAR; INTRAVENOUS AS NEEDED
Status: DISCONTINUED | OUTPATIENT
Start: 2020-07-06 | End: 2020-07-06 | Stop reason: HOSPADM

## 2020-07-06 RX ORDER — SODIUM CHLORIDE 0.9 % (FLUSH) 0.9 %
5-40 SYRINGE (ML) INJECTION EVERY 8 HOURS
Status: DISCONTINUED | OUTPATIENT
Start: 2020-07-06 | End: 2020-07-17 | Stop reason: HOSPADM

## 2020-07-06 RX ORDER — NALOXONE HYDROCHLORIDE 0.4 MG/ML
0.4 INJECTION, SOLUTION INTRAMUSCULAR; INTRAVENOUS; SUBCUTANEOUS AS NEEDED
Status: DISCONTINUED | OUTPATIENT
Start: 2020-07-06 | End: 2020-07-17 | Stop reason: HOSPADM

## 2020-07-06 RX ORDER — HYDROCODONE BITARTRATE AND ACETAMINOPHEN 5; 325 MG/1; MG/1
1 TABLET ORAL
Status: DISCONTINUED | OUTPATIENT
Start: 2020-07-06 | End: 2020-07-17 | Stop reason: HOSPADM

## 2020-07-06 RX ORDER — PROPOFOL 10 MG/ML
INJECTION, EMULSION INTRAVENOUS AS NEEDED
Status: DISCONTINUED | OUTPATIENT
Start: 2020-07-06 | End: 2020-07-06 | Stop reason: HOSPADM

## 2020-07-06 RX ORDER — MIDAZOLAM HYDROCHLORIDE 1 MG/ML
INJECTION, SOLUTION INTRAMUSCULAR; INTRAVENOUS AS NEEDED
Status: DISCONTINUED | OUTPATIENT
Start: 2020-07-06 | End: 2020-07-06 | Stop reason: HOSPADM

## 2020-07-06 RX ORDER — ROCURONIUM BROMIDE 10 MG/ML
INJECTION, SOLUTION INTRAVENOUS AS NEEDED
Status: DISCONTINUED | OUTPATIENT
Start: 2020-07-06 | End: 2020-07-06 | Stop reason: HOSPADM

## 2020-07-06 RX ORDER — CLONIDINE HYDROCHLORIDE 0.1 MG/1
0.1 TABLET ORAL 2 TIMES DAILY
Status: DISCONTINUED | OUTPATIENT
Start: 2020-07-06 | End: 2020-07-11

## 2020-07-06 RX ORDER — LOSARTAN POTASSIUM 50 MG/1
100 TABLET ORAL DAILY
Status: DISCONTINUED | OUTPATIENT
Start: 2020-07-07 | End: 2020-07-17 | Stop reason: HOSPADM

## 2020-07-06 RX ORDER — AMLODIPINE BESYLATE 10 MG/1
10 TABLET ORAL DAILY
Status: DISCONTINUED | OUTPATIENT
Start: 2020-07-07 | End: 2020-07-17 | Stop reason: HOSPADM

## 2020-07-06 RX ORDER — ALLOPURINOL 100 MG/1
100 TABLET ORAL DAILY
Status: DISCONTINUED | OUTPATIENT
Start: 2020-07-07 | End: 2020-07-06

## 2020-07-06 RX ORDER — ERGOCALCIFEROL 1.25 MG/1
50000 CAPSULE ORAL
Status: DISCONTINUED | OUTPATIENT
Start: 2020-07-06 | End: 2020-07-17 | Stop reason: HOSPADM

## 2020-07-06 RX ORDER — ONDANSETRON 4 MG/1
4 TABLET, ORALLY DISINTEGRATING ORAL
Status: DISCONTINUED | OUTPATIENT
Start: 2020-07-06 | End: 2020-07-17 | Stop reason: HOSPADM

## 2020-07-06 RX ORDER — FAMOTIDINE 20 MG/1
20 TABLET, FILM COATED ORAL ONCE
Status: COMPLETED | OUTPATIENT
Start: 2020-07-06 | End: 2020-07-06

## 2020-07-06 RX ORDER — PHENYLEPHRINE HCL IN 0.9% NACL 1 MG/10 ML
SYRINGE (ML) INTRAVENOUS AS NEEDED
Status: DISCONTINUED | OUTPATIENT
Start: 2020-07-06 | End: 2020-07-06 | Stop reason: HOSPADM

## 2020-07-06 RX ORDER — LIDOCAINE HYDROCHLORIDE 20 MG/ML
INJECTION, SOLUTION EPIDURAL; INFILTRATION; INTRACAUDAL; PERINEURAL AS NEEDED
Status: DISCONTINUED | OUTPATIENT
Start: 2020-07-06 | End: 2020-07-06 | Stop reason: HOSPADM

## 2020-07-06 RX ORDER — CARVEDILOL 25 MG/1
25 TABLET ORAL 2 TIMES DAILY WITH MEALS
Status: DISCONTINUED | OUTPATIENT
Start: 2020-07-06 | End: 2020-07-17 | Stop reason: HOSPADM

## 2020-07-06 RX ORDER — ALLOPURINOL 100 MG/1
100 TABLET ORAL
Status: DISCONTINUED | OUTPATIENT
Start: 2020-07-07 | End: 2020-07-17 | Stop reason: HOSPADM

## 2020-07-06 RX ORDER — INSULIN LISPRO 100 [IU]/ML
INJECTION, SOLUTION INTRAVENOUS; SUBCUTANEOUS ONCE
Status: DISCONTINUED | OUTPATIENT
Start: 2020-07-06 | End: 2020-07-06 | Stop reason: HOSPADM

## 2020-07-06 RX ORDER — SODIUM CHLORIDE 9 MG/ML
25 INJECTION, SOLUTION INTRAVENOUS CONTINUOUS
Status: DISPENSED | OUTPATIENT
Start: 2020-07-06 | End: 2020-07-07

## 2020-07-06 RX ORDER — EPHEDRINE SULFATE/0.9% NACL/PF 25 MG/5 ML
SYRINGE (ML) INTRAVENOUS AS NEEDED
Status: DISCONTINUED | OUTPATIENT
Start: 2020-07-06 | End: 2020-07-06 | Stop reason: HOSPADM

## 2020-07-06 RX ORDER — HYDROMORPHONE HYDROCHLORIDE 1 MG/ML
0.5 INJECTION, SOLUTION INTRAMUSCULAR; INTRAVENOUS; SUBCUTANEOUS
Status: DISCONTINUED | OUTPATIENT
Start: 2020-07-06 | End: 2020-07-11

## 2020-07-06 RX ADMIN — SUGAMMADEX 200 MG: 100 INJECTION, SOLUTION INTRAVENOUS at 11:51

## 2020-07-06 RX ADMIN — DEXMEDETOMIDINE HYDROCHLORIDE 2 MCG: 100 INJECTION, SOLUTION, CONCENTRATE INTRAVENOUS at 11:23

## 2020-07-06 RX ADMIN — FENTANYL CITRATE 100 MCG: 50 INJECTION, SOLUTION INTRAMUSCULAR; INTRAVENOUS at 10:04

## 2020-07-06 RX ADMIN — LIDOCAINE HYDROCHLORIDE 100 MG: 20 INJECTION, SOLUTION EPIDURAL; INFILTRATION; INTRACAUDAL; PERINEURAL at 10:04

## 2020-07-06 RX ADMIN — SODIUM CHLORIDE 25 ML/HR: 900 INJECTION, SOLUTION INTRAVENOUS at 08:56

## 2020-07-06 RX ADMIN — DEXMEDETOMIDINE HYDROCHLORIDE 10 MCG: 100 INJECTION, SOLUTION, CONCENTRATE INTRAVENOUS at 10:26

## 2020-07-06 RX ADMIN — MIDAZOLAM HYDROCHLORIDE 2 MG: 2 INJECTION, SOLUTION INTRAMUSCULAR; INTRAVENOUS at 09:56

## 2020-07-06 RX ADMIN — ROCURONIUM BROMIDE 10 MG: 50 INJECTION INTRAVENOUS at 11:16

## 2020-07-06 RX ADMIN — CALCIUM GLUCONATE 4 G: 98 INJECTION, SOLUTION INTRAVENOUS at 17:02

## 2020-07-06 RX ADMIN — FAMOTIDINE 20 MG: 20 TABLET ORAL at 08:56

## 2020-07-06 RX ADMIN — ROCURONIUM BROMIDE 20 MG: 50 INJECTION INTRAVENOUS at 10:15

## 2020-07-06 RX ADMIN — GLYCOPYRROLATE 0.1 MG: 0.2 INJECTION INTRAMUSCULAR; INTRAVENOUS at 09:56

## 2020-07-06 RX ADMIN — DEXMEDETOMIDINE HYDROCHLORIDE 10 MCG: 100 INJECTION, SOLUTION, CONCENTRATE INTRAVENOUS at 10:35

## 2020-07-06 RX ADMIN — DEXAMETHASONE SODIUM PHOSPHATE 4 MG: 4 INJECTION, SOLUTION INTRAMUSCULAR; INTRAVENOUS at 10:30

## 2020-07-06 RX ADMIN — DEXMEDETOMIDINE HYDROCHLORIDE 2 MCG: 100 INJECTION, SOLUTION, CONCENTRATE INTRAVENOUS at 11:17

## 2020-07-06 RX ADMIN — CLONIDINE HYDROCHLORIDE 0.1 MG: 0.1 TABLET ORAL at 20:06

## 2020-07-06 RX ADMIN — ONDANSETRON 4 MG: 2 INJECTION INTRAMUSCULAR; INTRAVENOUS at 11:39

## 2020-07-06 RX ADMIN — ROCURONIUM BROMIDE 10 MG: 50 INJECTION INTRAVENOUS at 10:04

## 2020-07-06 RX ADMIN — DEXMEDETOMIDINE HYDROCHLORIDE 4 MCG: 100 INJECTION, SOLUTION, CONCENTRATE INTRAVENOUS at 10:44

## 2020-07-06 RX ADMIN — SODIUM CHLORIDE: 900 INJECTION, SOLUTION INTRAVENOUS at 09:55

## 2020-07-06 RX ADMIN — CALCIUM GLUCONATE 4 G: 98 INJECTION, SOLUTION INTRAVENOUS at 23:18

## 2020-07-06 RX ADMIN — Medication 100 MCG: at 11:11

## 2020-07-06 RX ADMIN — Medication 10 ML: at 14:00

## 2020-07-06 RX ADMIN — Medication 5 MG: at 11:46

## 2020-07-06 RX ADMIN — PROPOFOL 170 MG: 10 INJECTION, EMULSION INTRAVENOUS at 10:04

## 2020-07-06 RX ADMIN — DEXMEDETOMIDINE HYDROCHLORIDE 6 MCG: 100 INJECTION, SOLUTION, CONCENTRATE INTRAVENOUS at 11:22

## 2020-07-06 RX ADMIN — SUCCINYLCHOLINE CHLORIDE 180 MG: 20 INJECTION, SOLUTION INTRAMUSCULAR; INTRAVENOUS at 10:04

## 2020-07-06 RX ADMIN — DEXMEDETOMIDINE HYDROCHLORIDE 4 MCG: 100 INJECTION, SOLUTION, CONCENTRATE INTRAVENOUS at 11:20

## 2020-07-06 RX ADMIN — DEXMEDETOMIDINE HYDROCHLORIDE 10 MCG: 100 INJECTION, SOLUTION, CONCENTRATE INTRAVENOUS at 10:15

## 2020-07-06 NOTE — CONSULTS
[address] [place] [address] [facility]  Pulmonary, Critical Care, and Sleep Medicine    Name: Javier Soto MRN: [id number]   : 1970 Hospital: [address] [place] [address]   Date: 2020        Critical Care Consult      IMPRESSION:   · Secondary hyperparathyroidism - 2/2 ESRD; s/p subtotal parathyroidectomy 20 with Dr. Conrad Fabry  · ESRD - on PD 7 days/week; Dr. Mackenzie Acevedo following  · H/o HTN  · H/o HFrEF 46% ()      Patient Active Problem List   Diagnosis Code    HTN, goal below 130/80 I10    Urinary retention R33.9    UTI (urinary tract infection) N39.0    Hematuria R31.9    Acute kidney injury ([facility] [address] N17.9    Anemia D64.9    Hyperparathyroidism ([facility] [facility] E21.3        RECOMMENDATIONS:   · Resp: Supp O2 for SpO2 >90%, Pulmonary Hygiene, IS.  · I/D: Afebrile, aleukocytosis; trend WBCs and temp curve. Monitor surgical site. · Hem/Onc: Daily CBC; H/H, and plts are stable. · CVS: Monitor hemodynamics. Start on home antihypertensives. · Metabolic: Trend BMP, Mag, Phos. Monitor closely for hypocalcemia. · Renal: Trend Renal indices, strict I/Os. · Endocrine: Monitor PTH and calcium. POC Glucose q6 until diet started. Avoid hypoglycemia. · GI: Clear liquids, advance diet per surgeon recs. Trend LFTs, Zofran PRN for N/V.   · Musc/Skin: Surgical incision care. · Neuro: PRN analgesics. Monitor neuro status as anesthesia wears off. · Code Status: FULL CODE     Best practice:  · Sepsis Bundle per Hospital Protocol: N/A  · Glycemic control; avoid Hypoglycemia  · IHI ICU Bundles: N/A    · Stress ulcer prophylaxis: N/A  · DVT prophylaxis: SCDs  · Need for Lines, Murillo assessed. Subjective/History:       Patient is a 44 y.o. male with PMH ESRD on PD, HTN, STEFANIE, gout, BPH, and HFrEF 46% (2019) who was admitted to ICU s/p subtotal parathyroidectomy with Dr. Conrad Fabry for close monitoring of calcium levels. Dr. Mackenzie Acevedo is following and plans to start PD today.  Patient is hemodynamically stable, doing well on 2L NC, sleepy but arousable and follows commands. Patient denies any complaints at this time. Past Medical History:   Diagnosis Date    Anemia     Chronic kidney disease     ESRD-   peritoneal dilaysis    Gout     Heart failure ([facility] [address] 04/2019    History of blood transfusion 05/22/2018    Hypertension     Joint pain     Sleep apnea 07/03/2018    does not use c pap        Past Surgical History:   Procedure Laterality Date    COLONOSCOPY N/A 8/23/2019    COLONOSCOPY performed by Italo Zamora MD at [address]  10/23/2019    HX ORTHOPAEDIC Left     left knee sx     HX OTHER SURGICAL  2018    tenkoff catheter    HX UROLOGICAL  05/20/2018    prostate biopsy    HX WISDOM TEETH EXTRACTION      IR BX BONE MARROW DIAGNOSTIC  11/25/2019        Prior to Admission medications    Medication Sig Start Date End Date Taking? Authorizing Provider   cinacalcet (SENSIPAR) 30 mg tablet Take  by mouth daily. Yes Provider, Historical   ergocalciferol (ERGOCALCIFEROL) 50,000 unit capsule Take  by mouth every seven (7) days. Yes Provider, Historical   calcitRIOL (ROCALTROL) 0.5 mcg capsule Take  by mouth daily. 7/19/19  Yes Provider, Historical   losartan (COZAAR) 100 mg tablet Take 1 Tab by mouth daily. 4/24/19  Yes Odalys Prather NP   cloNIDine HCl (CATAPRES) 0.1 mg tablet Take 0.1 mg by mouth two (2) times a day. 10/30/17  Yes Provider, Historical   amLODIPine (NORVASC) 10 mg tablet Take 1 Tab by mouth daily. 3/22/17  Yes Megan Mealing, NP   carvedilol (COREG) 25 mg tablet Take 1 Tab by mouth two (2) times daily (with meals). 1/2/20   Annabelle Oshea NP   allopurinol (ZYLOPRIM) 100 mg tablet Take 2 Tabs by mouth daily.  3/20/17   Megan Mealing, NP       Current Facility-Administered Medications   Medication Dose Route Frequency    0.9% sodium chloride infusion  25 mL/hr IntraVENous CONTINUOUS    [START ON 7/7/2020] amLODIPine (NORVASC) tablet 10 mg  10 mg Oral DAILY    [START ON 2020] calcitRIOL (ROCALTROL) capsule 0.5 mcg  0.5 mcg Oral DAILY    carvediloL (COREG) tablet 25 mg  25 mg Oral BID WITH MEALS    cloNIDine HCL (CATAPRES) tablet 0.1 mg  0.1 mg Oral BID    ergocalciferol capsule 50,000 Units  50,000 Units Oral Q7D    [START ON 2020] losartan (COZAAR) tablet 100 mg  100 mg Oral DAILY    sodium chloride (NS) flush 5-40 mL  5-40 mL IntraVENous Q8H    [START ON 2020] allopurinoL (ZYLOPRIM) tablet 100 mg  100 mg Oral Q TUE, THU & SAT    peritoneal dialysis DEXTROSE 2.5% (2.5 mEq/L low calcium) solution 10,000 mL  10,000 mL IntraPERitoneal DAILY       No Known Allergies     Social History     Tobacco Use    Smoking status: Never Smoker    Smokeless tobacco: Never Used   Substance Use Topics    Alcohol use: No        Family History   Problem Relation Age of Onset    Hypertension Mother     Heart Disease Father     Hypertension Father     Hypertension Child           Review of Systems:  Pertinent items are noted in HPI. Objective:   Vital Signs:    Visit Vitals  /84   Pulse (!) 57   Temp 97.3 °F (36.3 °C)   Resp 14   Ht 6' 2\" (1.88 m)   Wt 86.2 kg (190 lb)   SpO2 100%   BMI 24.39 kg/m²       O2 Device: Nasal cannula   O2 Flow Rate (L/min): 2 l/min   Temp (24hrs), Av.7 °F (36.5 °C), Min:97.3 °F (36.3 °C), Max:98 °F (36.7 °C)       Intake/Output:   Last shift:      701 -  1900  In: 500 [I.V.:500]  Out: -   Last 3 shifts: No intake/output data recorded. Intake/Output Summary (Last 24 hours) at 2020 1347  Last data filed at 2020 1142  Gross per 24 hour   Intake 500 ml   Output --   Net 500 ml       Physical Exam:     General:  Sleepy, but cooperative, NAD. Head:  Normocephalic, without obvious abnormality, atraumatic. Eyes:  Conjunctivae/corneas clear. PERRL. Nose: Nares normal. Septum midline.  Mucosa normal.   Throat: Lips, mucosa, and tongue normal. Teeth and gums normal.   Neck: Supple, symmetrical, trachea midline, fresh surgical incision to anterior lower neck with bandage and HANNAH drain in place. Lungs:   Symmetrical chest rise; good AE bilat; CTAB   Heart:  Borderline bradycardic, regular rhythm, S1, S2 normal   Abdomen:   Soft, non-tender. Bowel sounds normal.   Extremities: Extremities normal, atraumatic, no cyanosis or edema. Pulses: 2+ and symmetric all extremities. Skin: Skin color, texture, turgor normal. No rashes or lesions. Neurologic: Grossly non-focal, follows commands. Data:     Recent Results (from the past 24 hour(s))   POC 6 PLUS    Collection Time: 07/06/20  8:43 AM   Result Value Ref Range    Sodium,  136 - 145 MMOL/L    Potassium, POC 4.6 3.5 - 5.5 MMOL/L    Chloride,  100 - 108 MMOL/L    BUN,  (H) 7 - 18 MG/DL    Glucose, POC 86 74 - 106 MG/DL    Hematocrit, POC 30 (L) 36 - 49 %    Hemoglobin, POC 10.2 (L) 12 - 16 G/DL           No results for input(s): FIO2I, IFO2, HCO3I, IHCO3, HCOPOC, PCO2I, PCOPOC, IPHI, PHI, PHPOC, PO2I, PO2POC in the last 72 hours. No lab exists for component: IPOC2    Telemetry: sinus bradycardia (mild)    Imaging:  No imaging this admission              Shaun Almendarez PA-C   07/06/20  Pulmonary, Critical Care Medicine  [facility]          [employer] [facility] Staff Addendum     I have independently evaluated the patient and reviewed the patient's chart. I have discussed the findings and care plan with ICU Care Team. Care Plan reviewed on ICU milti-D rounds. I agree with the above evaluation, assessment and recommendations along with the following comments and observations. Please also review my orders. Patient evaluated in ICU upon arrival from OP and later this evening with Dr. Misti Rivera. The patient has filled HANNAH drain x 3 with light serosangenous drainage. Incision with very minimal oozing  placed drain to low continuous wall suction. Ionized calcium 0.9. replacing.  Changing ionized calcium from q 6 to q 4 hr checks. Monitor electrolytes and replace as needed    Nephrology has been consulted and will manage PD component    Patient with adequate pain control at this time. He is a little sleepy but able to hold full conversations. He is on room air. Complex decision making was made in the evaluation and management plans during this consultation. More than 50% of time was spent in counseling and coordination of care including review of data and discussion with other team members. Further recommendations and therapies pending clinical course.     Critical Care and time spent coordinating care, minus procedure time:  35 min    Andrei Dykes DO, JOHANP  Pulmonary, Sleep and Critical Care Medicine  6:55 PM

## 2020-07-06 NOTE — PROGRESS NOTES
Reason for Renal Dosing:  Per Renal Dosing Policy    Patient clinical status and labs ordered/reviewed. Pt Weight Weight: 86.2 kg (190 lb)   Serum Creatinine Lab Results   Component Value Date/Time    Creatinine 18.6 (H) 03/16/2020 06:30 AM       Creatinine Clearance Estimated Creatinine Clearance: 5.4 mL/min (A) (by C-G formula based on SCr of 18.6 mg/dL (H)). BUN Lab Results   Component Value Date/Time    BUN 59 (H) 03/16/2020 06:30 AM    BUN,  (H) 07/06/2020 08:43 AM       WBC Lab Results   Component Value Date/Time    WBC 6.8 02/29/2020 04:30 AM      Temperature Temp: 97.3 °F (36.3 °C)   HR Pulse (Heart Rate): 64     BP BP: 123/90           Drug type: Non-Antibiotic      Drug/dose: was adjusted to : Allopurinol 100 mg orallly daily    Continue to monitor.     Signed Jonathan Josue [facility]  Date 7/6/2020  Time 12:26 PM

## 2020-07-06 NOTE — BRIEF OP NOTE
Brief Postoperative Note    Patient: Kilo Hamilton  YOB: 1970  MRN: [id number]    Date of Procedure: 7/6/2020     Pre-Op Diagnosis: secondary hyper parathyroidism    Post-Op Diagnosis: Same as preoperative diagnosis.       Procedure(s):  SUBTOTAL PARATHYROIDECTOMY    Surgeon(s):  Marquise Falcon MD    Surgical Assistant: None    Anesthesia: General     Estimated Blood Loss (mL): Minimal    Complications: None    Specimens:   ID Type Source Tests Collected by Time Destination   1 : RIGHT inferior parathyroid Frozen Section Parathyroid  Marquise Falcon MD 7/6/2020 1049 Pathology   2 : RIGHT superior parathyroid Frozen Section Parathyroid  Marquise Falcon MD 7/6/2020 1057 Pathology   3 : LEFT inferior parathyroid Frozen Section Parathyroid  Marquise Falcon MD 7/6/2020 1118 Pathology   4 : LEFT superior parathyroid Frozen Section Parathyroid  Marquise Falcon MD 7/6/2020 1124 Pathology        Implants: * No implants in log *    Drains:   Thony-Carter Drain 07/06/20 Anterior;Mid Neck (Active)       Findings: 4 parathyroid glands found,50% of left superior removed, all others excised    Electronically Signed by Apollo Hay MD on 7/6/2020 at 12:01 PM

## 2020-07-06 NOTE — PROGRESS NOTES
Post op check   vss   pt somewhat sleepy   drainage of serosang drainage  Wound otherwise clean   PTH level noted   calcium level noted as well   will need close monitoring of calcium    will follow  Boy Staley

## 2020-07-06 NOTE — ANESTHESIA PREPROCEDURE EVALUATION
Relevant Problems   No relevant active problems       Anesthetic History   No history of anesthetic complications            Review of Systems / Medical History  Patient summary reviewed and pertinent labs reviewed    Pulmonary        Sleep apnea: CPAP           Neuro/Psych              Cardiovascular    Hypertension: well controlled              Exercise tolerance: >4 METS     GI/Hepatic/Renal         Renal disease: ESRD and dialysis       Endo/Other        Arthritis     Other Findings              Physical Exam    Airway  Mallampati: III  TM Distance: 4 - 6 cm  Neck ROM: normal range of motion   Mouth opening: Diminished (comment)     Cardiovascular    Rhythm: regular  Rate: normal         Dental    Dentition: Poor dentition     Pulmonary  Breath sounds clear to auscultation               Abdominal  GI exam deferred       Other Findings            Anesthetic Plan    ASA: 4  Anesthesia type: general          Induction: Intravenous  Anesthetic plan and risks discussed with: Patient

## 2020-07-06 NOTE — ANESTHESIA POSTPROCEDURE EVALUATION
Procedure(s):  SUBTOTAL PARATHYROIDECTOMY. general    Anesthesia Post Evaluation      Multimodal analgesia: multimodal analgesia used between 6 hours prior to anesthesia start to PACU discharge  Patient location during evaluation: bedside  Patient participation: complete - patient participated  Level of consciousness: awake  Pain management: adequate  Airway patency: patent  Anesthetic complications: no  Cardiovascular status: stable  Respiratory status: acceptable  Hydration status: acceptable  Post anesthesia nausea and vomiting:  controlled      INITIAL Post-op Vital signs:   Vitals Value Taken Time   /89 7/6/2020  6:00 PM   Temp 36.3 °C (97.4 °F) 7/6/2020  4:00 PM   Pulse 57 7/6/2020  6:11 PM   Resp 24 7/6/2020  6:11 PM   SpO2 98 % 7/6/2020  6:11 PM   Vitals shown include unvalidated device data.

## 2020-07-06 NOTE — PROGRESS NOTES
Pt arrived to floor from OR. P drows responds to voice, HANNAH drain Rt. Neck mid neck incsion clean/dry/intact, skin intact, vitals taken, pt given CHG bath gown changed. Blood drawn and sent to lab.    1900-Bedside and Verbal shift change report given to Lizeth Salvador (oncoming nurse) by [place] RN(offgoing nurse). Report included the following information SBAR, Kardex, MAR and Cardiac Rhythm SR/SB.

## 2020-07-06 NOTE — PROGRESS NOTES
RENAL DAILY PROGRESS NOTE    Patient: Wong Lambert               Sex: male          DOA: 7/6/2020  7:22 AM        YOB: 1970      Age:  44 y.o.        LOS:  LOS: 0 days     Subjective: Wong Lambert is a 44 y.o.  who presents with Hyperparathyroidism ([facility] [address] [E21.3]. Asked to evaluate for esrd,on peritoneal dialysis. hx of hyperparathyroidism,htn.has parathyroidectomy this am,3.5 glands removed  Chief complains:  - Reviewed last 24 hrs events     Current Facility-Administered Medications   Medication Dose Route Frequency    0.9% sodium chloride infusion  25 mL/hr IntraVENous CONTINUOUS    [START ON 7/7/2020] amLODIPine (NORVASC) tablet 10 mg  10 mg Oral DAILY    [START ON 7/7/2020] calcitRIOL (ROCALTROL) capsule 0.5 mcg  0.5 mcg Oral DAILY    carvediloL (COREG) tablet 25 mg  25 mg Oral BID WITH MEALS    cloNIDine HCL (CATAPRES) tablet 0.1 mg  0.1 mg Oral BID    ergocalciferol capsule 50,000 Units  50,000 Units Oral Q7D    [START ON 7/7/2020] losartan (COZAAR) tablet 100 mg  100 mg Oral DAILY    sodium chloride (NS) flush 5-40 mL  5-40 mL IntraVENous Q8H    sodium chloride (NS) flush 5-40 mL  5-40 mL IntraVENous PRN    HYDROcodone-acetaminophen (NORCO) 5-325 mg per tablet 1 Tab  1 Tab Oral Q4H PRN    HYDROmorphone (DILAUDID) injection 0.5 mg  0.5 mg IntraVENous Q4H PRN    naloxone (NARCAN) injection 0.4 mg  0.4 mg IntraVENous PRN    ondansetron (ZOFRAN ODT) tablet 4 mg  4 mg Oral Q8H PRN    ELECTROLYTE REPLACEMENT PROTOCOL - Calcium  1 Each Other PRN    [START ON 7/7/2020] allopurinoL (ZYLOPRIM) tablet 100 mg  100 mg Oral Q TUE, THU & SAT    peritoneal dialysis DEXTROSE 2.5% (2.5 mEq/L low calcium) solution 10,000 mL  10,000 mL IntraPERitoneal DAILY       Objective:     Visit Vitals  /90   Pulse 64   Temp 97.3 °F (36.3 °C)   Resp 16   Ht 6' 2\" (1.88 m)   Wt 86.2 kg (190 lb)   SpO2 100%   BMI 24.39 kg/m²       Intake/Output Summary (Last 24 hours) at 7/6/2020 [address] [facility] [address] filed at 7/6/2020 1142  Gross per 24 hour   Intake 500 ml   Output --   Net 500 ml       Physical Examination:     GEN: sedated post op  RS: Chest is bilateral equal, no wheezing / rales / crackles  CVS: S1-S2 heard, RRR, No S3 / murmur  Abdomen: Soft,  Extremities: No edema, no cyanosis, skin is warm on touch  HEENT: Head is atraumatic, PERRLA, conjunctiva pink & non icteric. No JVD or carotid bruit       Data Review:      Labs:     Hematology: No results for input(s): WBC, HGB, HCT, HGBEXT, HCTEXT in the last 72 hours. Chemistry: No results for input(s): BUN, CREA, CA, ALB, K, NA, CL, CO2, PHOS, GLU in the last 72 hours. Images:    XR (Most Recent). CXR reviewed by me and compared with previous CXR Results from Hospital Encounter encounter on 04/14/19   XR CHEST PA LAT    Narrative Indication: Short of breath, dialysis  PA and lateral chest     The heart is mildly enlarged. Interstitial and alveolar density seen throughout  the lungs with shallow inspiration and bilateral moderate size pleural  effusions. Impression IMPRESSION: Moderate CHF. CT (Most Recent) Results from Hospital Encounter encounter on 02/29/20   CT ABD PELV WO CONT    Narrative Indication: evaluate PD cuff. Peritoneal dialysis tube malpositioned. Impression IMPRESSION: Left lower quadrant peritoneal dialysis catheter looped in pelvis. Bilateral pleural effusions. Cardiomegaly. Gallbladder wall thickening,  ultrasound follow-up recommended. Bilateral renal cysts. Diverticulosis. Left  inguinal canal fluid collection. Comment: CT images of the abdomen and pelvis were obtained without oral or  intravenous contrast. The initial report was rendered by [facility]  radiology services. Small bilateral pleural effusions are present with atelectasis in the underlying  lungs. The heart is enlarged.     The gallbladder is contracted and thickening of the fundus wall is noted with  suspicion of tiny stone. Nonemergent ultrasound recommended for further  evaluation. The liver spleen pancreas and adrenal glands are unremarkable. The kidneys are mildly atrophic and harbor cysts. Calculus or hydronephrosis. The ureters and bladder are unremarkable. Peritoneal dialysis catheter is looped in the pelvis entering the peritoneum in  the left lower quadrant. Fluid collection is noted in the left inguinal canal.    Mild colonic diverticulosis is noted without diverticulitis. Pelvic viscera is unremarkable. No bowel obstruction, free intraperitoneal air, free fluid or appendicitis. DICOM format imaged data is available to non-affiliated external healthcare  facilities or entities on a secure, media free, reciprocally searchable basis  with patient authorization  for 12 months following the date of the study. EKG No results found for this or any previous visit. I have personally reviewed the old medical records and patient's labs    Plan / Recommendation:      1. esrd ,on ccpd,check labs and continue op ccpd orders. adjust prn  2.s/p parathyroidectomy,watch for hypocalcemia and replace . recheck pth ,continue calcitriol and vitamin d  3.htn,adjust meds prn    D/w Loren Bowman MD  Nephrology  7/6/2020

## 2020-07-07 LAB
ALBUMIN SERPL-MCNC: 2.3 G/DL (ref 3.4–5)
ALBUMIN/GLOB SERPL: 0.6 {RATIO} (ref 0.8–1.7)
ALP SERPL-CCNC: 72 U/L (ref 45–117)
ALT SERPL-CCNC: 7 U/L (ref 16–61)
ANION GAP SERPL CALC-SCNC: 18 MMOL/L (ref 3–18)
AST SERPL-CCNC: 5 U/L (ref 10–38)
BASOPHILS # BLD: 0 K/UL (ref 0–0.1)
BASOPHILS NFR BLD: 0 % (ref 0–2)
BILIRUB SERPL-MCNC: 0.4 MG/DL (ref 0.2–1)
BUN SERPL-MCNC: 122 MG/DL (ref 7–18)
BUN/CREAT SERPL: 4 (ref 12–20)
CA-I SERPL-SCNC: 0.74 MMOL/L (ref 1.12–1.32)
CA-I SERPL-SCNC: 0.88 MMOL/L (ref 1.12–1.32)
CA-I SERPL-SCNC: 0.89 MMOL/L (ref 1.12–1.32)
CA-I SERPL-SCNC: 0.98 MMOL/L (ref 1.12–1.32)
CA-I SERPL-SCNC: 1.01 MMOL/L (ref 1.12–1.32)
CALCIUM SERPL-MCNC: 7.2 MG/DL (ref 8.5–10.1)
CALCIUM SERPL-MCNC: 7.3 MG/DL (ref 8.5–10.1)
CHLORIDE SERPL-SCNC: 105 MMOL/L (ref 100–111)
CO2 SERPL-SCNC: 16 MMOL/L (ref 21–32)
CREAT SERPL-MCNC: 28.1 MG/DL (ref 0.6–1.3)
DIFFERENTIAL METHOD BLD: ABNORMAL
EOSINOPHIL # BLD: 0.4 K/UL (ref 0–0.4)
EOSINOPHIL NFR BLD: 6 % (ref 0–5)
ERYTHROCYTE [DISTWIDTH] IN BLOOD BY AUTOMATED COUNT: 17.5 % (ref 11.6–14.5)
GLOBULIN SER CALC-MCNC: 4 G/DL (ref 2–4)
GLUCOSE BLD STRIP.AUTO-MCNC: 103 MG/DL (ref 70–110)
GLUCOSE BLD STRIP.AUTO-MCNC: 136 MG/DL (ref 70–110)
GLUCOSE BLD STRIP.AUTO-MCNC: 170 MG/DL (ref 70–110)
GLUCOSE SERPL-MCNC: 72 MG/DL (ref 74–99)
HCT VFR BLD AUTO: 30.5 % (ref 36–48)
HGB BLD-MCNC: 9.5 G/DL (ref 13–16)
LYMPHOCYTES # BLD: 0.7 K/UL (ref 0.9–3.6)
LYMPHOCYTES NFR BLD: 11 % (ref 21–52)
MAGNESIUM SERPL-MCNC: 2.4 MG/DL (ref 1.6–2.6)
MCH RBC QN AUTO: 25 PG (ref 24–34)
MCHC RBC AUTO-ENTMCNC: 31.1 G/DL (ref 31–37)
MCV RBC AUTO: 80.3 FL (ref 74–97)
MONOCYTES # BLD: 0.4 K/UL (ref 0.05–1.2)
MONOCYTES NFR BLD: 6 % (ref 3–10)
NEUTS SEG # BLD: 5.3 K/UL (ref 1.8–8)
NEUTS SEG NFR BLD: 77 % (ref 40–73)
PHOSPHATE SERPL-MCNC: 14.7 MG/DL (ref 2.5–4.9)
PLATELET # BLD AUTO: 132 K/UL (ref 135–420)
PMV BLD AUTO: 9.2 FL (ref 9.2–11.8)
POTASSIUM SERPL-SCNC: 5.2 MMOL/L (ref 3.5–5.5)
PROT SERPL-MCNC: 6.3 G/DL (ref 6.4–8.2)
PTH-INTACT SERPL-MCNC: 22.4 PG/ML (ref 18.4–88)
RBC # BLD AUTO: 3.8 M/UL (ref 4.7–5.5)
SODIUM SERPL-SCNC: 139 MMOL/L (ref 136–145)
WBC # BLD AUTO: 6.8 K/UL (ref 4.6–13.2)

## 2020-07-07 PROCEDURE — 94762 N-INVAS EAR/PLS OXIMTRY CONT: CPT

## 2020-07-07 PROCEDURE — 83735 ASSAY OF MAGNESIUM: CPT

## 2020-07-07 PROCEDURE — 84100 ASSAY OF PHOSPHORUS: CPT

## 2020-07-07 PROCEDURE — 82962 GLUCOSE BLOOD TEST: CPT

## 2020-07-07 PROCEDURE — 74011250636 HC RX REV CODE- 250/636: Performed by: PHYSICIAN ASSISTANT

## 2020-07-07 PROCEDURE — 74011250637 HC RX REV CODE- 250/637: Performed by: OTOLARYNGOLOGY

## 2020-07-07 PROCEDURE — 80053 COMPREHEN METABOLIC PANEL: CPT

## 2020-07-07 PROCEDURE — 74011000258 HC RX REV CODE- 258: Performed by: INTERNAL MEDICINE

## 2020-07-07 PROCEDURE — 82330 ASSAY OF CALCIUM: CPT

## 2020-07-07 PROCEDURE — 65610000006 HC RM INTENSIVE CARE

## 2020-07-07 PROCEDURE — 85025 COMPLETE CBC W/AUTO DIFF WBC: CPT

## 2020-07-07 PROCEDURE — 36415 COLL VENOUS BLD VENIPUNCTURE: CPT

## 2020-07-07 PROCEDURE — 74011250637 HC RX REV CODE- 250/637: Performed by: INTERNAL MEDICINE

## 2020-07-07 PROCEDURE — 74011000258 HC RX REV CODE- 258: Performed by: PHYSICIAN ASSISTANT

## 2020-07-07 PROCEDURE — A4726 DIALYS SOL FLD VOL > 5999CC: HCPCS | Performed by: INTERNAL MEDICINE

## 2020-07-07 PROCEDURE — 83970 ASSAY OF PARATHORMONE: CPT

## 2020-07-07 PROCEDURE — 74011250636 HC RX REV CODE- 250/636: Performed by: INTERNAL MEDICINE

## 2020-07-07 RX ORDER — HYDRALAZINE HYDROCHLORIDE 20 MG/ML
10 INJECTION INTRAMUSCULAR; INTRAVENOUS ONCE
Status: COMPLETED | OUTPATIENT
Start: 2020-07-07 | End: 2020-07-07

## 2020-07-07 RX ORDER — HYDRALAZINE HYDROCHLORIDE 20 MG/ML
10 INJECTION INTRAMUSCULAR; INTRAVENOUS
Status: DISCONTINUED | OUTPATIENT
Start: 2020-07-07 | End: 2020-07-12

## 2020-07-07 RX ORDER — CALCITRIOL 0.25 UG/1
0.5 CAPSULE ORAL 2 TIMES DAILY
Status: DISCONTINUED | OUTPATIENT
Start: 2020-07-07 | End: 2020-07-09

## 2020-07-07 RX ORDER — CALCIUM ACETATE 667 MG/1
4 CAPSULE ORAL
Status: DISCONTINUED | OUTPATIENT
Start: 2020-07-07 | End: 2020-07-10

## 2020-07-07 RX ADMIN — CALCITRIOL CAPSULES 0.25 MCG 0.5 MCG: 0.25 CAPSULE ORAL at 09:00

## 2020-07-07 RX ADMIN — Medication 10 ML: at 21:40

## 2020-07-07 RX ADMIN — SODIUM CHLORIDE, SODIUM LACTATE, CALCIUM CHLORIDE, MAGNESIUM CHLORIDE AND DEXTROSE 12000 ML: 2.5; 538; 448; 18.3; 5.08 INJECTION, SOLUTION INTRAPERITONEAL at 09:00

## 2020-07-07 RX ADMIN — CALCITRIOL CAPSULES 0.25 MCG 0.5 MCG: 0.25 CAPSULE ORAL at 18:00

## 2020-07-07 RX ADMIN — CALCIUM GLUCONATE 2 G: 98 INJECTION, SOLUTION INTRAVENOUS at 16:00

## 2020-07-07 RX ADMIN — CALCIUM GLUCONATE 4 G: 98 INJECTION, SOLUTION INTRAVENOUS at 09:00

## 2020-07-07 RX ADMIN — CALCIUM ACETATE 2668 MG: 667 CAPSULE ORAL at 17:00

## 2020-07-07 RX ADMIN — CARVEDILOL 25 MG: 25 TABLET, FILM COATED ORAL at 09:00

## 2020-07-07 RX ADMIN — HYDRALAZINE HYDROCHLORIDE 10 MG: 20 INJECTION INTRAMUSCULAR; INTRAVENOUS at 02:00

## 2020-07-07 RX ADMIN — Medication 10 ML: at 14:00

## 2020-07-07 RX ADMIN — CLONIDINE HYDROCHLORIDE 0.1 MG: 0.1 TABLET ORAL at 18:00

## 2020-07-07 RX ADMIN — CALCIUM ACETATE 2668 MG: 667 CAPSULE ORAL at 12:00

## 2020-07-07 RX ADMIN — HYDROCODONE BITARTRATE AND ACETAMINOPHEN 1 TABLET: 5; 325 TABLET ORAL at 11:20

## 2020-07-07 RX ADMIN — CARVEDILOL 25 MG: 25 TABLET, FILM COATED ORAL at 17:00

## 2020-07-07 RX ADMIN — Medication 10 ML: at 06:00

## 2020-07-07 RX ADMIN — LOSARTAN POTASSIUM 100 MG: 50 TABLET, FILM COATED ORAL at 09:00

## 2020-07-07 RX ADMIN — AMLODIPINE BESYLATE 10 MG: 10 TABLET ORAL at 09:00

## 2020-07-07 RX ADMIN — CLONIDINE HYDROCHLORIDE 0.1 MG: 0.1 TABLET ORAL at 09:00

## 2020-07-07 RX ADMIN — CALCIUM GLUCONATE 4 G: 98 INJECTION, SOLUTION INTRAVENOUS at 05:00

## 2020-07-07 NOTE — PROGRESS NOTES
RENAL DAILY PROGRESS NOTE    Patient: Dakota Zelaya               Sex: male          DOA: 7/6/2020  7:22 AM        YOB: 1970      Age:  44 y.o.        LOS:  LOS: 1 day     Subjective: Dakota Zelaya is a 44 y.o.  who [facility] [E21.3]. Asked to evaluate for esrd,on peritoneal dialysis. hx of hyperparathyroidism,htn.has parathyroidectomy this am,3.5 glands removed  Chief complains:  - Reviewed last 24 hrs events     Current Facility-Administered Medications   Medication Dose Route Frequency    calcitRIOL (ROCALTROL) capsule 0.5 mcg  0.5 mcg Oral BID    calcium acetate(phosphat bind) (PHOSLO) capsule 2,668 mg  4 Cap Oral TID WITH MEALS    amLODIPine (NORVASC) tablet 10 mg  10 mg Oral DAILY    carvediloL (COREG) tablet 25 mg  25 mg Oral BID WITH MEALS    cloNIDine HCL (CATAPRES) tablet 0.1 mg  0.1 mg Oral BID    ergocalciferol capsule 50,000 Units  50,000 Units Oral Q7D    losartan (COZAAR) tablet 100 mg  100 mg Oral DAILY    sodium chloride (NS) flush 5-40 mL  5-40 mL IntraVENous Q8H    sodium chloride (NS) flush 5-40 mL  5-40 mL IntraVENous PRN    HYDROcodone-acetaminophen (NORCO) 5-325 mg per tablet 1 Tab  1 Tab Oral Q4H PRN    HYDROmorphone (DILAUDID) injection 0.5 mg  0.5 mg IntraVENous Q4H PRN    naloxone (NARCAN) injection 0.4 mg  0.4 mg IntraVENous PRN    ondansetron (ZOFRAN ODT) tablet 4 mg  4 mg Oral Q8H PRN    ELECTROLYTE REPLACEMENT PROTOCOL - Calcium  1 Each Other PRN    allopurinoL (ZYLOPRIM) tablet 100 mg  100 mg Oral Q TUE, THU & SAT    peritoneal dialysis DEXTROSE 2.5% (2.5 mEq/L low calcium) solution 12,000 mL  12,000 mL IntraPERitoneal DAILY    ELECTROLYTE REPLACEMENT PROTOCOL - Calcium   1 Each Other PRN       Objective:     Visit Vitals  BP (!) 171/104   Pulse 72   Temp 97.7 °F (36.5 °C)   Resp 23   Ht 6' 2\" (1.88 m)   Wt 84 kg (185 lb 3 oz)   SpO2 96%   BMI 23.78 kg/m²       Intake/Output Summary (Last 24 hours) at 7/7/2020 1213  Last data filed at 7/7/2020 0800  Gross per 24 hour   Intake 146.25 ml   Output 10 ml   Net 136.25 ml       Physical Examination:     GEN: sedated post op  RS: Chest is bilateral equal, no wheezing / rales / crackles  CVS: S1-S2 heard, RRR, No S3 / murmur  Abdomen: Soft,  Extremities: No edema, no cyanosis, skin is warm on touch  HEENT: Head is atraumatic, PERRLA, conjunctiva pink & non icteric. No JVD or carotid bruit       Data Review:      Labs:     Hematology:   Recent Labs     07/07/20  0455 07/06/20  1320   WBC 6.8 4.8   HGB 9.5* 9.3*   HCT 30.5* 30.2*     Chemistry:   Recent Labs     07/07/20  0455 07/06/20  1330 07/06/20  1320   *  --  106*   CREA 28.10*  --  26.40*   CA 7.2*  7.3* 7.0* 7.1*  6.8*   ALB 2.3*  --  2.4*   K 5.2  --  4.8     --  140     --  104   CO2 16*  --  21   PHOS 14.7*  --  14.3*   GLU 72*  --  79        Images:    XR (Most Recent). CXR reviewed by me and compared with previous CXR Results from Hospital Encounter encounter on 04/14/19   XR CHEST PA LAT    Narrative Indication: Short of breath, dialysis  PA and lateral chest     The heart is mildly enlarged. Interstitial and alveolar density seen throughout  the lungs with shallow inspiration and bilateral moderate size pleural  effusions. Impression IMPRESSION: Moderate CHF. CT (Most Recent) Results from Hospital Encounter encounter on 02/29/20   CT ABD PELV WO CONT    Narrative Indication: evaluate PD cuff. Peritoneal dialysis tube malpositioned. Impression IMPRESSION: Left lower quadrant peritoneal dialysis catheter looped in pelvis. Bilateral pleural effusions. Cardiomegaly. Gallbladder wall thickening,  ultrasound follow-up recommended. Bilateral renal cysts. Diverticulosis. Left  inguinal canal fluid collection.     Comment: CT images of the abdomen and pelvis were obtained without oral or  intravenous contrast. The initial report was rendered by [facility]  radiology services. Small bilateral pleural effusions are present with atelectasis in the underlying  lungs. The heart is enlarged. The gallbladder is contracted and thickening of the fundus wall is noted with  suspicion of tiny stone. Nonemergent ultrasound recommended for further  evaluation. The liver spleen pancreas and adrenal glands are unremarkable. The kidneys are mildly atrophic and harbor cysts. Calculus or hydronephrosis. The ureters and bladder are unremarkable. Peritoneal dialysis catheter is looped in the pelvis entering the peritoneum in  the left lower quadrant. Fluid collection is noted in the left inguinal canal.    Mild colonic diverticulosis is noted without diverticulitis. Pelvic viscera is unremarkable. No bowel obstruction, free intraperitoneal air, free fluid or appendicitis. DICOM format imaged data is available to non-affiliated external healthcare  facilities or entities on a secure, media free, reciprocally searchable basis  with patient authorization  for 12 months following the date of the study. EKG No results found for this or any previous visit.      I have personally reviewed the old medical records and patient's labs    Plan / Recommendation:      1. esrd ,on ccpd,restart this morning .monitor labs  2.s/p subtotoal parathyroidectomy,replace calcium,increase calcitriol  3.htn,adjust meds prn  4-hyperphosphatemia,give calcium acetate with meals  5-epo,will give epo    D/w Celestino Esparza MD  Nephrology  7/7/2020

## 2020-07-07 NOTE — PROGRESS NOTES
Otolaryngology   postop day 1.   vital signs stable afebrile   patient without complaints   PTH level 22. Calcium level relatively normal for this patient based on his past history however has necessitated several IV runs of calcium to maintain this level. Neck wound- clean. No hematoma. Drain functioning well   chest- bilateral clear   heart- no murmur     impression  1. Status post subtotal parathyroidectomy. PTH level low normal.  Will probably need to closely monitor calcium levels however hopefully this may stabilize.  We will maintain drain at the present time   advanced diet     appreciate Nephrology and ICU consult    Agustín Hanson

## 2020-07-07 NOTE — PROGRESS NOTES
0700: Bedside and Verbal shift change report given to [facility], RN (oncoming nurse) by Diane Gómez RN (offgoing nurse). Report included the following information SBAR, Procedure Summary, Intake/Output, MAR, Recent Results and Med Rec Status. 0900: Peritonieal dialysis started this morning. 1330: Dr Grace Ziegler notified of patients continuing elevated BP. Orders received for PRN Hydrolazine (systolic over 668)      4367: Bedside and Verbal shift change report given to [facility] (oncoming nurse) by [facility], RN (offgoing nurse). Report included the following information SBAR, OR Summary, Procedure Summary, Intake/Output, MAR, Recent Results and Med Rec Status.      1915: Peritoneal dialysis completed: 1st drain vol: 94 mL,  Total UF : 258 mL   Avg dwell Time: 1.33   Lost Dwell time: 0.05

## 2020-07-07 NOTE — OP NOTES
[address] [place] [address]   OPERATIVE REPORT    Name:  Alison Sotelo  MR#:   [id number]  :  1970  ACCOUNT #:  [de-identified]  DATE OF SERVICE:  2020    PREOPERATIVE DIAGNOSIS:  Parathyroid hyperplasia secondary to secondary hyperparathyroidism. POSTOPERATIVE DIAGNOSIS:  Parathyroid hyperplasia secondary to secondary hyperparathyroidism. PROCEDURE PERFORMED:  Subtotal parathyroidectomy. SURGEON:  Ketty Goldberg MD    ASSISTANT:  None. ANESTHESIA:  General endotracheal anesthesia. COMPLICATIONS:  None. SPECIMENS REMOVED:  Four parathyroid glands sent to Pathology. The left superior parathyroid gland approximately 50% sent to Pathology. IMPLANTS:  None. ESTIMATED BLOOD LOSS:  10ml    DRAINS:  One Thony-Carter drain. FINDINGS:  1. All four parathyroid glands identified and resected. Only 50% of the left superior parathyroid gland was removed. 2.  Recurrent laryngeal nerves found bilaterally. 3.  Minimal bleeding noted during the course of the procedure. PROCEDURE:  The patient was brought to the operating room, placed supine on the operating room table. General endotracheal anesthesia was given per Anesthesiology Department. Once the patient was sufficiently anesthetized, a standard thyroidectomy incision was made in the anterior neck from the anterior border of the sternocleidomastoid to the right and to the left side. The incision was carried down through the skin, subcutaneous tissue, and platysma. Once the platysma muscle was incised, skin muscle flaps were then created superiorly and inferiorly in the subplatysmal plane, superior to the thyroid notch, inferior to the sternal notch. After this was complete, the strap muscles were opened in the midline, taken down to the level of the thyroid isthmus and the trachea. The right lobe of the thyroid gland was then mobilized with elevation of the strap muscles from medial to lateral direction.   Middle thyroid vein was isolated and ligated with 2-0 silk suture. This was doubly ligated as well. It should be noted that during the course of the procedure, hemostasis was accomplished with the use of small and medium Ligaclips, 2-0 silk suture as well as Harmonic scalpel and bipolar cautery. Attention was now directed inferiorly. The inferior parathyroid gland was found in its usual position. This was relatively small, especially considering the elevation of the PTH level. This was completely removed and sent to Pathology. Attention was then directed toward the right superior parathyroid gland, which was likewise found in its usual position just on the inferior surface of the thyroid gland between the thyroid gland and the recurrent laryngeal nerve as it makes its entrance into the cricothyroid membrane. This was completely removed, sent to Pathology as well. The recurrent laryngeal nerve was completely identified and kept intact. Attention was now directed toward the contralateral left side. Similar procedure was performed. Similar findings were noted. The left superior parathyroid gland was the last to be identified. Only 50% of the gland itself was removed. This was in its usual position between the deep edge of the thyroid gland and the recurrent laryngeal nerve as it makes its way into the cricothyroid membrane. No difficulties were encountered. Valsalva maneuver was performed. Bleeding was completely controlled. Irrigation was applied. Pathology did confirm the above pathology of the parathyroid glands in question; hence a drain was then placed. This was a 10-Finnish fluted drain placed via separate stab wound. The strap muscles, platysma, and skin were then closed with 3-0 Vicryl, 3-0 Vicryl, and 5-0 nylon respectively.   Bacitracin was applied as a topical preparation, and the patient was subsequently taken from the operating room to the recovery room in stable condition after correct needle and sponge count were obtained.       Concha Schuler MD      [id number]  D:  07/06/2020 16:22  T:  07/07/2020 1:36  JOB #:  [id number]

## 2020-07-07 NOTE — PROGRESS NOTES
attended the interdisciplinary rounds for Ortencia Galeazzi, who is a 44 y. o.,male. Patients Primary Language is: [place]. According to the patients EMR Mandaen Affiliation is: No preference. The reason the Patient came to the hospital is:   Patient Active Problem List    Diagnosis Date Noted    [facility] 07/06/2020    Anemia 11/06/2019    Urinary retention 04/21/2018    UTI (urinary tract infection) 04/21/2018    Hematuria 04/21/2018    Acute kidney injury ([facility] 04/21/2018    HTN, goal below 130/80 02/10/2017        Plan:  Valdo Andrew will continue to follow and will provide pastoral care on an as needed/requested basis.  recommends bedside caregivers page  on duty if patient shows signs of acute spiritual or emotional distress.     [address] [facility]  Board Certified [address] [facility]   [phone]

## 2020-07-07 NOTE — PROGRESS NOTES
NUTRITION    Nursing Referral: [facility]     RECOMMENDATIONS / PLAN:     - Add supplements: Nepron Enriquezke, BID.  - Monitor diet tolerance. - Continue RD inpatient monitoring and evaluation. NUTRITION INTERVENTIONS & DIAGNOSIS:     - Meals/snacks: modified composition  - Medical food supplement therapy: initiate    Nutrition Diagnosis: Increased nutrient needs protein & energy related to increased expenditure as evidenced by pt with ESRD on PD    ASSESSMENT:     S/p subtotal parathyroidectomy 7/6, ESRD on PD. Excellent intake of clear liquid diet & diet advanced today for lunch. Pt denies painful swallowing, encouraged pt to ask RN for softer foods if difficulty noted with lunch meal. Ca replaced. Nutritional intake adequate to meet patients estimated nutritional needs:  No    Diet: DIET RENAL Regular      Food Allergies: NKFA  Current Appetite: Good  Appetite/meal intake prior to admission: Good  Feeding Limitations:  [] Swallowing difficulty    [] Chewing difficulty    [] Other:  Current Meal Intake: No data found.     BM: 7/4- formed  Skin Integrity: surgical incision to neck with HANNAH drain  Edema:   [x] No     [] Yes   Pertinent Medications: Reviewed: calcitrol, calcium acetate TID, electrolyte replacement protocol- 8 gm Ca, ergocalciferol, PD solution dextrose 2.5% (1.2 L) - 459 average kcal/day    Recent Labs     07/07/20  0455 07/06/20  1330 07/06/20  1320     --  140   K 5.2  --  4.8     --  104   CO2 16*  --  21   GLU 72*  --  79   *  --  106*   CREA 28.10*  --  26.40*   CA 7.2*  7.3* 7.0* 7.1*  6.8*   MG 2.4  --  2.2   PHOS 14.7*  --  14.3*   ALB 2.3*  --  2.4*   ALT 7*  --  9*       Intake/Output Summary (Last 24 hours) at 7/7/2020 1119  Last data filed at 7/6/2020 2000  Gross per 24 hour   Intake 646.25 ml   Output --   Net 646.25 ml       Anthropometrics:  Ht Readings from Last 1 Encounters:   06/30/20 6' 2\" (1.88 m)     Last 3 Recorded Weights in this Encounter    06/30/20 1530 07/07/20 0800   Weight: 86.2 kg (190 lb) 84 kg (185 lb 3 oz)     Body mass index is 23.78 kg/m². Weight History:  Pt denies recent changes in wt with UBW around 190#    Weight Metrics 7/7/2020 7/6/2020 4/30/2020 3/16/2020 2/29/2020 2/28/2020 11/25/2019   Weight 185 lb 3 oz - 195 lb 187 lb 6.3 oz - 195 lb 195 lb   BMI - 23.78 kg/m2 25.04 kg/m2 24.06 kg/m2 25.04 kg/m2 - 25.04 kg/m2        Admitting Diagnosis: Hyperparathyroidism ([facility] [E21.3]  Pertinent PMHx: ESRD on PD, heart failure, HTN    Education Needs:        [x] None identified  [] Identified - Not appropriate at this time  []  Identified and addressed - refer to education log  Learning Limitations:   [x] None identified  [] Identified    Cultural, Anabaptist & ethnic food preferences:  [x] None identified    [] Identified and addressed     ESTIMATED NUTRITION NEEDS:     Calories: 0324-9416 kcal (30-35 kcal/kg) based on  [] Actual BW      [x] SBW: 88 kg   Protein: 106-114 gm (1.2-1.3 gm/kg) based on  [] Actual BW      [x] SBW   Fluid: 1 mL/kcal     MONITORING & EVALUATION:     Nutrition Goal(s):   - PO nutrition intake will meet >75% of patient estimated nutritional needs within the next 7 days. Outcome: New/Initial goal     Monitoring:   [x] Food and nutrient intake   [x] Food and nutrient administration  [x] Comparative standards   [x] Nutrition-focused physical findings   [x] Anthropometric Measurements   [x] Treatment/therapy   [x] Biochemical data, medical tests, and procedures        Previous Recommendations (for follow-up assessments only):  Not Applicable    Discharge Planning: No nutritional discharge needs at this time. Participated in care planning, discharge planning, & interdisciplinary rounds as appropriate.       Madie Dang RD  Pager: [phone]

## 2020-07-07 NOTE — PROGRESS NOTES
[facility] Pulmonary, Critical Care, and Sleep Medicine Name: Adriana Sanchez MRN: [id number] : 1970 Hospital: [address] [place] [address] Date: 2020 Critical Care Progress Note IMPRESSION:  
· Secondary hyperparathyroidism - 2/2 ESRD; s/p subtotal parathyroidectomy 20 with Dr. Raissa Zavala · Hypocalcemia - 2/2 above; requiring aggressive replacement · ESRD - on PD 7 days/week, Dr. Katherine García following · H/o HTN 
· H/o HFrEF 46% () Patient Active Problem List  
Diagnosis Code  
 HTN, goal below 130/80 I10  
 Urinary retention R33.9  
 UTI (urinary tract infection) N39.0  Hematuria R31.9  Acute kidney injury ([facility] N17.9  Anemia D64.9  Hyperparathyroidism ([facility] E21.3 RECOMMENDATIONS:  
· Resp: Supp O2 PRN for SpO2 >90%, Pulmonary Hygiene, IS. · I/D: Afebrile, aleukocytosis; trend WBCs and temp curve. Monitor surgical site. · Hem/Onc: Daily CBC; H/H, and plts are stable. · CVS: Patient started on home antihypertensives. Monitor hemodynamics. · Metabolic: Trend BMP, Mag, Phos. Continue ionized calcium check q4h. Replace lytes per protocol. · Renal: Trend Renal indices, strict I/Os. PD today. · Endocrine: POC Glucose AC&HS. Avoid hypoglycemia. · GI: Start renal diet. Zofran PRN for N/V.  
· Musc/Skin: Surgical incision care. HANNAH drain to low suction. · Neuro: PRN analgesics. · Code Status: FULL CODE Best practice: · Sepsis Bundle per Hospital Protocol: N/A 
· Glycemic control; avoid Hypoglycemia · IHI ICU Bundles: N/A · Stress ulcer prophylaxis: N/A 
· DVT prophylaxis: SCDs · Need for Lines, Murillo assessed. Subjective/History:  
 
 
Patient is a 44 y.o. male with PMH ESRD on PD, HTN, STEFANIE, gout, BPH, and HFrEF 46% (2019) who was admitted to ICU s/p subtotal parathyroidectomy with Dr. Raissa Zavala for close monitoring of calcium levels. Dr. Katherine García is following and plans to start PD today.  Patient remains hemodynamically stable, doing well on 2L NC, arousable and follows commands. Patient denies any complaints at this time. Requiring aggressive calcium replacement. Tolerating liquid diet. Surgical site well-healing, HANNAH is draining small amount of serosanguinous drainage. Past Medical History:  
Diagnosis Date  Anemia  Chronic kidney disease ESRD-   peritoneal dilaysis  Gout  Heart failure ([facility] 04/2019  
 History of blood transfusion 05/22/2018  Hypertension  Joint pain  Sleep apnea 07/03/2018  
 does not use c pap Past Surgical History:  
Procedure Laterality Date  COLONOSCOPY N/A 8/23/2019 COLONOSCOPY performed by Ellen Gamez MD at [place] [address]  10/23/2019  HX ORTHOPAEDIC Left   
 left knee sx  HX OTHER SURGICAL  2018  
 tenkoff catheter  HX UROLOGICAL  05/20/2018  
 prostate biopsy  HX WISDOM TEETH EXTRACTION    
 IR BX BONE MARROW DIAGNOSTIC  11/25/2019 Prior to Admission medications Medication Sig Start Date End Date Taking? Authorizing Provider  
cinacalcet (SENSIPAR) 30 mg tablet Take  by mouth daily. Yes Provider, Historical  
ergocalciferol (ERGOCALCIFEROL) 50,000 unit capsule Take  by mouth every seven (7) days. Yes Provider, Historical  
calcitRIOL (ROCALTROL) 0.5 mcg capsule Take  by mouth daily. 7/19/19  Yes Provider, Historical  
losartan (COZAAR) 100 mg tablet Take 1 Tab by mouth daily. 4/24/19  Yes Odalys Prather NP  
cloNIDine HCl (CATAPRES) 0.1 mg tablet Take 0.1 mg by mouth two (2) times a day. 10/30/17  Yes Provider, Historical  
amLODIPine (NORVASC) 10 mg tablet Take 1 Tab by mouth daily. 3/22/17  Yes Gwendolyn Lam NP  
carvedilol (COREG) 25 mg tablet Take 1 Tab by mouth two (2) times daily (with meals). 1/2/20   Annabelle Oshea NP  
allopurinol (ZYLOPRIM) 100 mg tablet Take 2 Tabs by mouth daily. 3/20/17   Gwendolyn Lam NP Current Facility-Administered Medications Medication Dose Route Frequency  calcitRIOL (ROCALTROL) capsule 0.5 mcg  0.5 mcg Oral BID  calcium acetate(phosphat bind) (PHOSLO) capsule 2,668 mg  4 Cap Oral TID WITH MEALS  
 amLODIPine (NORVASC) tablet 10 mg  10 mg Oral DAILY  carvediloL (COREG) tablet 25 mg  25 mg Oral BID WITH MEALS  cloNIDine HCL (CATAPRES) tablet 0.1 mg  0.1 mg Oral BID  ergocalciferol capsule 50,000 Units  50,000 Units Oral Q7D  
 losartan (COZAAR) tablet 100 mg  100 mg Oral DAILY  sodium chloride (NS) flush 5-40 mL  5-40 mL IntraVENous Q8H  
 allopurinoL (ZYLOPRIM) tablet 100 mg  100 mg Oral Q TUE, THU & SAT  peritoneal dialysis DEXTROSE 2.5% (2.5 mEq/L low calcium) solution 12,000 mL  12,000 mL IntraPERitoneal DAILY No Known Allergies Social History Tobacco Use  Smoking status: Never Smoker  Smokeless tobacco: Never Used Substance Use Topics  Alcohol use: No  
  
 
Family History Problem Relation Age of Onset  Hypertension Mother  Heart Disease Father  Hypertension Father  Hypertension Child Review of Systems: 
Pertinent items are noted in HPI. Objective:  
Vital Signs:   
Visit Vitals BP (!) 171/104 Pulse 72 Temp 97.6 °F (36.4 °C) Resp 23 Ht 6' 2\" (1.88 m) Wt 84 kg (185 lb 3 oz) SpO2 96% BMI 23.78 kg/m² O2 Device: Room air O2 Flow Rate (L/min): 2 l/min Temp (24hrs), Av.6 °F (36.4 °C), Min:97.3 °F (36.3 °C), Max:98 °F (36.7 °C) Intake/Output:  
Last shift:      701 - 1900 In: -  
Out: 10 [Drains:10] Last 3 shifts: 1901 -  07 In: 646.3 [I.V.:646.3] Out: - Intake/Output Summary (Last 24 hours) at 2020 1156 Last data filed at 2020 0800 Gross per 24 hour Intake 146.25 ml Output 10 ml Net 136.25 ml Physical Exam:  
 
General:  Sleepy, but cooperative, NAD. Head:  Normocephalic, without obvious abnormality, atraumatic. Eyes:  Conjunctivae/corneas clear. PERRL. Nose: Nares normal. Septum midline. Mucosa normal.  
Throat: Lips, mucosa, and tongue normal. Teeth and gums normal.  
Neck: Supple, symmetrical, trachea midline, fresh surgical incision to anterior lower neck with bandage and HANNAH drain in place to low suction w/ small amount of serosanguineous drainage. Lungs:   Symmetrical chest rise; good AE bilat; CTAB Heart:  Borderline bradycardic, regular rhythm, S1, S2 normal  
Abdomen:   Soft, non-tender. Bowel sounds normal.   
Extremities: Extremities normal, atraumatic, no cyanosis or edema. Pulses: 2+ and symmetric all extremities. Skin: Skin color, texture, turgor normal. No rashes or lesions. Neurologic: Grossly non-focal, follows commands, A&Ox4. Data:  
 
Recent Results (from the past 24 hour(s)) CALCIUM, IONIZED Collection Time: 07/06/20  1:20 PM  
Result Value Ref Range Ionized Calcium 0.90 (L) 1.12 - 9.31 MMOL/L  
METABOLIC PANEL, COMPREHENSIVE Collection Time: 07/06/20  1:20 PM  
Result Value Ref Range Sodium 140 136 - 145 mmol/L Potassium 4.8 3.5 - 5.5 mmol/L Chloride 104 100 - 111 mmol/L  
 CO2 21 21 - 32 mmol/L Anion gap 15 3.0 - 18 mmol/L Glucose 79 74 - 99 mg/dL  (H) 7.0 - 18 MG/DL Creatinine 26.40 (H) 0.6 - 1.3 MG/DL  
 BUN/Creatinine ratio 4 (L) 12 - 20 GFR est AA 2 (L) >60 ml/min/1.73m2 GFR est non-AA 2 (L) >60 ml/min/1.73m2 Calcium 6.8 (L) 8.5 - 10.1 MG/DL Bilirubin, total 0.6 0.2 - 1.0 MG/DL  
 ALT (SGPT) 9 (L) 16 - 61 U/L  
 AST (SGOT) 7 (L) 10 - 38 U/L Alk. phosphatase 74 45 - 117 U/L Protein, total 6.1 (L) 6.4 - 8.2 g/dL Albumin 2.4 (L) 3.4 - 5.0 g/dL Globulin 3.7 2.0 - 4.0 g/dL A-G Ratio 0.6 (L) 0.8 - 1.7    
CBC WITH AUTOMATED DIFF Collection Time: 07/06/20  1:20 PM  
Result Value Ref Range WBC 4.8 4.6 - 13.2 K/uL  
 RBC 3.73 (L) 4.70 - 5.50 M/uL HGB 9.3 (L) 13.0 - 16.0 g/dL HCT 30.2 (L) 36.0 - 48.0 %  MCV 81.0 74.0 - 97.0 FL  
 MCH 24.9 24.0 - 34.0 PG  
 MCHC 30.8 (L) 31.0 - 37.0 g/dL  
 RDW 17.5 (H) 11.6 - 14.5 % PLATELET 480 682 - 340 K/uL MPV 9.2 9.2 - 11.8 FL  
 NEUTROPHILS 79 (H) 40 - 73 % LYMPHOCYTES 10 (L) 21 - 52 % MONOCYTES 6 3 - 10 % EOSINOPHILS 5 0 - 5 % BASOPHILS 0 0 - 2 %  
 ABS. NEUTROPHILS 3.8 1.8 - 8.0 K/UL  
 ABS. LYMPHOCYTES 0.5 (L) 0.9 - 3.6 K/UL  
 ABS. MONOCYTES 0.3 0.05 - 1.2 K/UL  
 ABS. EOSINOPHILS 0.2 0.0 - 0.4 K/UL  
 ABS. BASOPHILS 0.0 0.0 - 0.1 K/UL  
 DF AUTOMATED MAGNESIUM Collection Time: 07/06/20  1:20 PM  
Result Value Ref Range Magnesium 2.2 1.6 - 2.6 mg/dL PHOSPHORUS Collection Time: 07/06/20  1:20 PM  
Result Value Ref Range Phosphorus 14.3 (H) 2.5 - 4.9 MG/DL  
PTH INTACT Collection Time: 07/06/20  1:20 PM  
Result Value Ref Range Calcium 7.1 (L) 8.5 - 10.1 MG/DL  
 PTH, Intact 171.3 (H) 18.4 - 88.0 pg/mL CALCIUM Collection Time: 07/06/20  1:30 PM  
Result Value Ref Range Calcium 7.0 (L) 8.5 - 10.1 MG/DL  
CALCIUM, IONIZED Collection Time: 07/06/20  9:00 PM  
Result Value Ref Range Ionized Calcium 0.90 (L) 1.12 - 1.32 MMOL/L  
CALCIUM, IONIZED Collection Time: 07/07/20  2:30 AM  
Result Value Ref Range Ionized Calcium 0.74 (LL) 1.12 - 0.19 MMOL/L  
METABOLIC PANEL, COMPREHENSIVE Collection Time: 07/07/20  4:55 AM  
Result Value Ref Range Sodium 139 136 - 145 mmol/L Potassium 5.2 3.5 - 5.5 mmol/L Chloride 105 100 - 111 mmol/L  
 CO2 16 (L) 21 - 32 mmol/L Anion gap 18 3.0 - 18 mmol/L Glucose 72 (L) 74 - 99 mg/dL  (H) 7.0 - 18 MG/DL Creatinine 28.10 (H) 0.6 - 1.3 MG/DL  
 BUN/Creatinine ratio 4 (L) 12 - 20 GFR est AA 2 (L) >60 ml/min/1.73m2 GFR est non-AA 2 (L) >60 ml/min/1.73m2 Calcium 7.3 (L) 8.5 - 10.1 MG/DL Bilirubin, total 0.4 0.2 - 1.0 MG/DL  
 ALT (SGPT) 7 (L) 16 - 61 U/L  
 AST (SGOT) 5 (L) 10 - 38 U/L Alk. phosphatase 72 45 - 117 U/L Protein, total 6.3 (L) 6.4 - 8.2 g/dL Albumin 2.3 (L) 3.4 - 5.0 g/dL Globulin 4.0 2.0 - 4.0 g/dL A-G Ratio 0.6 (L) 0.8 - 1.7    
CBC WITH AUTOMATED DIFF Collection Time: 07/07/20  4:55 AM  
Result Value Ref Range WBC 6.8 4.6 - 13.2 K/uL  
 RBC 3.80 (L) 4.70 - 5.50 M/uL HGB 9.5 (L) 13.0 - 16.0 g/dL HCT 30.5 (L) 36.0 - 48.0 % MCV 80.3 74.0 - 97.0 FL  
 MCH 25.0 24.0 - 34.0 PG  
 MCHC 31.1 31.0 - 37.0 g/dL  
 RDW 17.5 (H) 11.6 - 14.5 % PLATELET 697 (L) 713 - 420 K/uL MPV 9.2 9.2 - 11.8 FL  
 NEUTROPHILS 77 (H) 40 - 73 % LYMPHOCYTES 11 (L) 21 - 52 % MONOCYTES 6 3 - 10 % EOSINOPHILS 6 (H) 0 - 5 % BASOPHILS 0 0 - 2 %  
 ABS. NEUTROPHILS 5.3 1.8 - 8.0 K/UL  
 ABS. LYMPHOCYTES 0.7 (L) 0.9 - 3.6 K/UL  
 ABS. MONOCYTES 0.4 0.05 - 1.2 K/UL  
 ABS. EOSINOPHILS 0.4 0.0 - 0.4 K/UL  
 ABS. BASOPHILS 0.0 0.0 - 0.1 K/UL  
 DF AUTOMATED MAGNESIUM Collection Time: 07/07/20  4:55 AM  
Result Value Ref Range Magnesium 2.4 1.6 - 2.6 mg/dL PHOSPHORUS Collection Time: 07/07/20  4:55 AM  
Result Value Ref Range Phosphorus 14.7 (H) 2.5 - 4.9 MG/DL  
PTH INTACT Collection Time: 07/07/20  4:55 AM  
Result Value Ref Range Calcium 7.2 (L) 8.5 - 10.1 MG/DL  
 PTH, Intact 22.4 18.4 - 88.0 pg/mL CALCIUM, IONIZED Collection Time: 07/07/20  4:55 AM  
Result Value Ref Range Ionized Calcium 0.88 (L) 1.12 - 1.32 MMOL/L  
CALCIUM, IONIZED Collection Time: 07/07/20 10:19 AM  
Result Value Ref Range Ionized Calcium 1.01 (L) 1.12 - 1.32 MMOL/L No results for input(s): FIO2I, IFO2, HCO3I, IHCO3, HCOPOC, PCO2I, PCOPOC, IPHI, PHI, PHPOC, PO2I, PO2POC in the last 72 hours. No lab exists for component: IPOC2 Telemetry: sinus bradycardia (mild) Imaging: No imaging this admission Camlia Rogers PA-C  
07/07/20 Pulmonary, Critical Care Medicine [facility]

## 2020-07-07 NOTE — PROGRESS NOTES
[facility]  Pulmonary, Critical Care, and Sleep Medicine    Name: Brennan Dalal MRN: [id number]   : 1970 Hospital: [facility]   Date: 2020        Critical Care Progress Note      IMPRESSION:   · Secondary hyperparathyroidism - 2/2 ESRD; s/p subtotal parathyroidectomy 20 with Dr. Mindy Moses  · Hypocalcemia - 2/2 above; requiring aggressive replacement  · ESRD - on PD 7 days/week, Dr. Briana Schultz following  · H/o HTN  · H/o HFrEF 46% ()      Patient Active Problem List   Diagnosis Code    HTN, goal below 130/80 I10    Urinary retention R33.9    UTI (urinary tract infection) N39.0    Hematuria R31.9    Acute kidney injury ([address] N17.9    Anemia D64.9    Hyperparathyroidism ([address] E21.3        RECOMMENDATIONS:   · Resp: Supp O2 PRN for SpO2 >90%, Pulmonary Hygiene, IS.  · I/D: Afebrile, aleukocytosis; trend WBCs and temp curve. Monitor surgical site and drain output. · Hem/Onc: Daily CBC; H/H, and plts are stable. · CVS: Patient started on home antihypertensives. Monitor hemodynamics. · Metabolic: Trend BMP, Mag, Phos. Continue ionized calcium check q4h. Replace lytes per protocol. · Renal: Trend Renal indices, strict I/Os. Nephro managing PD. · Endocrine: POC Glucose AC&HS. Avoid hypoglycemia. · GI: Start renal diet. Zofran PRN for N/V.   · Musc/Skin: Surgical incision care. HANNAH drain to low suction per surgery. · Neuro: PRN analgesics. · Code Status: FULL CODE     Best practice:  · Sepsis Bundle per Hospital Protocol: N/A  · Glycemic control; avoid Hypoglycemia  · IHI ICU Bundles: N/A    · Stress ulcer prophylaxis: N/A  · DVT prophylaxis: SCDs  · Need for Lines, Murillo assessed. Subjective/History:       Patient is a 44 y.o. male with PMH ESRD on PD, HTN, STEFANIE, gout, BPH, and HFrEF 46% (2019) who was admitted to ICU s/p subtotal parathyroidectomy with Dr. Mindy Moses for close monitoring of calcium levels. Dr. Briana Schultz is following and plans to start PD today. Patient is hemodynamically stable, doing well on 2L NC, sleepy but arousable and follows commands. Patient denies any complaints at this time. 7/7/2020:  - Remains stable on RA  - Pt hypertensive, home Amlodipine and Cozaar restarted  - Tolerating liquids, can advance diet today  - Pain is controlled      Past Medical History:   Diagnosis Date    Anemia     Chronic kidney disease     ESRD-   peritoneal dilaysis    Gout     Heart failure ([facility] 04/2019    History of blood transfusion 05/22/2018    Hypertension     Joint pain     Sleep apnea 07/03/2018    does not use c pap        Past Surgical History:   Procedure Laterality Date    COLONOSCOPY N/A 8/23/2019    COLONOSCOPY performed by Ceasar Barahona MD at [address]  10/23/2019    HX ORTHOPAEDIC Left     left knee sx     HX OTHER SURGICAL  2018    tenkoff catheter    HX UROLOGICAL  05/20/2018    prostate biopsy    HX WISDOM TEETH EXTRACTION      IR BX BONE MARROW DIAGNOSTIC  11/25/2019        Prior to Admission medications    Medication Sig Start Date End Date Taking? Authorizing Provider   cinacalcet (SENSIPAR) 30 mg tablet Take  by mouth daily. Yes Provider, Historical   ergocalciferol (ERGOCALCIFEROL) 50,000 unit capsule Take  by mouth every seven (7) days. Yes Provider, Historical   calcitRIOL (ROCALTROL) 0.5 mcg capsule Take  by mouth daily. 7/19/19  Yes Provider, Historical   losartan (COZAAR) 100 mg tablet Take 1 Tab by mouth daily. 4/24/19  Yes Odalys Prather NP   cloNIDine HCl (CATAPRES) 0.1 mg tablet Take 0.1 mg by mouth two (2) times a day. 10/30/17  Yes Provider, Historical   amLODIPine (NORVASC) 10 mg tablet Take 1 Tab by mouth daily. 3/22/17  Yes Sher Rangel NP   carvedilol (COREG) 25 mg tablet Take 1 Tab by mouth two (2) times daily (with meals). 1/2/20   Annabelle Oshea NP   allopurinol (ZYLOPRIM) 100 mg tablet Take 2 Tabs by mouth daily.  3/20/17   Sher Rangel NP       Current Facility-Administered Medications   Medication Dose Route Frequency    calcitRIOL (ROCALTROL) capsule 0.5 mcg  0.5 mcg Oral BID    calcium acetate(phosphat bind) (PHOSLO) capsule 2,668 mg  4 Cap Oral TID WITH MEALS    amLODIPine (NORVASC) tablet 10 mg  10 mg Oral DAILY    carvediloL (COREG) tablet 25 mg  25 mg Oral BID WITH MEALS    cloNIDine HCL (CATAPRES) tablet 0.1 mg  0.1 mg Oral BID    ergocalciferol capsule 50,000 Units  50,000 Units Oral Q7D    losartan (COZAAR) tablet 100 mg  100 mg Oral DAILY    sodium chloride (NS) flush 5-40 mL  5-40 mL IntraVENous Q8H    allopurinoL (ZYLOPRIM) tablet 100 mg  100 mg Oral Q TUE, THU & SAT    peritoneal dialysis DEXTROSE 2.5% (2.5 mEq/L low calcium) solution 12,000 mL  12,000 mL IntraPERitoneal DAILY       No Known Allergies     Social History     Tobacco Use    Smoking status: Never Smoker    Smokeless tobacco: Never Used   Substance Use Topics    Alcohol use: No        Family History   Problem Relation Age of Onset    Hypertension Mother     Heart Disease Father     Hypertension Father     Hypertension Child           Review of Systems:  Pertinent items are noted in HPI. Objective:   Vital Signs:    Visit Vitals  BP (!) 171/104   Pulse 72   Temp 97.6 °F (36.4 °C)   Resp 23   Ht 6' 2\" (1.88 m)   Wt 84 kg (185 lb 3 oz)   SpO2 96%   BMI 23.78 kg/m²       O2 Device: Room air   O2 Flow Rate (L/min): 2 l/min   Temp (24hrs), Av.6 °F (36.4 °C), Min:97.3 °F (36.3 °C), Max:98 °F (36.7 °C)       Intake/Output:   Last shift:      701 - 1900  In: -   Out: 10 [Drains:10]  Last 3 shifts: 1901 -  07  In: 646.3 [I.V.:646.3]  Out: -     Intake/Output Summary (Last 24 hours) at 2020 1201  Last data filed at 2020 0800  Gross per 24 hour   Intake 146.25 ml   Output 10 ml   Net 136.25 ml       Physical Exam:     General:  Sleepy, but cooperative, NAD. Head:  Normocephalic, without obvious abnormality, atraumatic.    Eyes: Conjunctivae/corneas clear. PERRL. Nose: Nares normal. Septum midline. Mucosa normal.   Throat: Lips, mucosa, and tongue normal. Teeth and gums normal.   Neck: Supple, symmetrical, trachea midline, healing incision to anterior lower neck, no surrounding erythema, HANNAH drain in place to low suction w/ small amount of serosanguineous output   Lungs:   Symmetrical chest rise; good AE bilat; CTAB   Heart:  Borderline bradycardic, regular rhythm, S1, S2 normal   Abdomen:   Soft, non-tender. Bowel sounds normal.   Extremities: Extremities normal, atraumatic, no cyanosis or edema. Pulses: 2+ and symmetric all extremities. Skin: Skin color, texture, turgor normal. No rashes or lesions. Neurologic: Grossly non-focal, follows commands, A&Ox4         Data:     Recent Results (from the past 24 hour(s))   CALCIUM, IONIZED    Collection Time: 07/06/20  1:20 PM   Result Value Ref Range    Ionized Calcium 0.90 (L) 1.12 - 4.84 MMOL/L   METABOLIC PANEL, COMPREHENSIVE    Collection Time: 07/06/20  1:20 PM   Result Value Ref Range    Sodium 140 136 - 145 mmol/L    Potassium 4.8 3.5 - 5.5 mmol/L    Chloride 104 100 - 111 mmol/L    CO2 21 21 - 32 mmol/L    Anion gap 15 3.0 - 18 mmol/L    Glucose 79 74 - 99 mg/dL     (H) 7.0 - 18 MG/DL    Creatinine 26.40 (H) 0.6 - 1.3 MG/DL    BUN/Creatinine ratio 4 (L) 12 - 20      GFR est AA 2 (L) >60 ml/min/1.73m2    GFR est non-AA 2 (L) >60 ml/min/1.73m2    Calcium 6.8 (L) 8.5 - 10.1 MG/DL    Bilirubin, total 0.6 0.2 - 1.0 MG/DL    ALT (SGPT) 9 (L) 16 - 61 U/L    AST (SGOT) 7 (L) 10 - 38 U/L    Alk.  phosphatase 74 45 - 117 U/L    Protein, total 6.1 (L) 6.4 - 8.2 g/dL    Albumin 2.4 (L) 3.4 - 5.0 g/dL    Globulin 3.7 2.0 - 4.0 g/dL    A-G Ratio 0.6 (L) 0.8 - 1.7     CBC WITH AUTOMATED DIFF    Collection Time: 07/06/20  1:20 PM   Result Value Ref Range    WBC 4.8 4.6 - 13.2 K/uL    RBC 3.73 (L) 4.70 - 5.50 M/uL    HGB 9.3 (L) 13.0 - 16.0 g/dL    HCT 30.2 (L) 36.0 - 48.0 %    MCV 81.0 74.0 - 97.0 FL    MCH 24.9 24.0 - 34.0 PG    MCHC 30.8 (L) 31.0 - 37.0 g/dL    RDW 17.5 (H) 11.6 - 14.5 %    PLATELET 234 392 - 119 K/uL    MPV 9.2 9.2 - 11.8 FL    NEUTROPHILS 79 (H) 40 - 73 %    LYMPHOCYTES 10 (L) 21 - 52 %    MONOCYTES 6 3 - 10 %    EOSINOPHILS 5 0 - 5 %    BASOPHILS 0 0 - 2 %    ABS. NEUTROPHILS 3.8 1.8 - 8.0 K/UL    ABS. LYMPHOCYTES 0.5 (L) 0.9 - 3.6 K/UL    ABS. MONOCYTES 0.3 0.05 - 1.2 K/UL    ABS. EOSINOPHILS 0.2 0.0 - 0.4 K/UL    ABS. BASOPHILS 0.0 0.0 - 0.1 K/UL    DF AUTOMATED     MAGNESIUM    Collection Time: 07/06/20  1:20 PM   Result Value Ref Range    Magnesium 2.2 1.6 - 2.6 mg/dL   PHOSPHORUS    Collection Time: 07/06/20  1:20 PM   Result Value Ref Range    Phosphorus 14.3 (H) 2.5 - 4.9 MG/DL   PTH INTACT    Collection Time: 07/06/20  1:20 PM   Result Value Ref Range    Calcium 7.1 (L) 8.5 - 10.1 MG/DL    PTH, Intact 171.3 (H) 18.4 - 88.0 pg/mL   CALCIUM    Collection Time: 07/06/20  1:30 PM   Result Value Ref Range    Calcium 7.0 (L) 8.5 - 10.1 MG/DL   CALCIUM, IONIZED    Collection Time: 07/06/20  9:00 PM   Result Value Ref Range    Ionized Calcium 0.90 (L) 1.12 - 1.32 MMOL/L   CALCIUM, IONIZED    Collection Time: 07/07/20  2:30 AM   Result Value Ref Range    Ionized Calcium 0.74 (LL) 1.12 - 5.44 MMOL/L   METABOLIC PANEL, COMPREHENSIVE    Collection Time: 07/07/20  4:55 AM   Result Value Ref Range    Sodium 139 136 - 145 mmol/L    Potassium 5.2 3.5 - 5.5 mmol/L    Chloride 105 100 - 111 mmol/L    CO2 16 (L) 21 - 32 mmol/L    Anion gap 18 3.0 - 18 mmol/L    Glucose 72 (L) 74 - 99 mg/dL     (H) 7.0 - 18 MG/DL    Creatinine 28.10 (H) 0.6 - 1.3 MG/DL    BUN/Creatinine ratio 4 (L) 12 - 20      GFR est AA 2 (L) >60 ml/min/1.73m2    GFR est non-AA 2 (L) >60 ml/min/1.73m2    Calcium 7.3 (L) 8.5 - 10.1 MG/DL    Bilirubin, total 0.4 0.2 - 1.0 MG/DL    ALT (SGPT) 7 (L) 16 - 61 U/L    AST (SGOT) 5 (L) 10 - 38 U/L    Alk.  phosphatase 72 45 - 117 U/L    Protein, total 6.3 (L) 6.4 - 8.2 g/dL Albumin 2.3 (L) 3.4 - 5.0 g/dL    Globulin 4.0 2.0 - 4.0 g/dL    A-G Ratio 0.6 (L) 0.8 - 1.7     CBC WITH AUTOMATED DIFF    Collection Time: 07/07/20  4:55 AM   Result Value Ref Range    WBC 6.8 4.6 - 13.2 K/uL    RBC 3.80 (L) 4.70 - 5.50 M/uL    HGB 9.5 (L) 13.0 - 16.0 g/dL    HCT 30.5 (L) 36.0 - 48.0 %    MCV 80.3 74.0 - 97.0 FL    MCH 25.0 24.0 - 34.0 PG    MCHC 31.1 31.0 - 37.0 g/dL    RDW 17.5 (H) 11.6 - 14.5 %    PLATELET 106 (L) 013 - 420 K/uL    MPV 9.2 9.2 - 11.8 FL    NEUTROPHILS 77 (H) 40 - 73 %    LYMPHOCYTES 11 (L) 21 - 52 %    MONOCYTES 6 3 - 10 %    EOSINOPHILS 6 (H) 0 - 5 %    BASOPHILS 0 0 - 2 %    ABS. NEUTROPHILS 5.3 1.8 - 8.0 K/UL    ABS. LYMPHOCYTES 0.7 (L) 0.9 - 3.6 K/UL    ABS. MONOCYTES 0.4 0.05 - 1.2 K/UL    ABS. EOSINOPHILS 0.4 0.0 - 0.4 K/UL    ABS. BASOPHILS 0.0 0.0 - 0.1 K/UL    DF AUTOMATED     MAGNESIUM    Collection Time: 07/07/20  4:55 AM   Result Value Ref Range    Magnesium 2.4 1.6 - 2.6 mg/dL   PHOSPHORUS    Collection Time: 07/07/20  4:55 AM   Result Value Ref Range    Phosphorus 14.7 (H) 2.5 - 4.9 MG/DL   PTH INTACT    Collection Time: 07/07/20  4:55 AM   Result Value Ref Range    Calcium 7.2 (L) 8.5 - 10.1 MG/DL    PTH, Intact 22.4 18.4 - 88.0 pg/mL   CALCIUM, IONIZED    Collection Time: 07/07/20  4:55 AM   Result Value Ref Range    Ionized Calcium 0.88 (L) 1.12 - 1.32 MMOL/L   CALCIUM, IONIZED    Collection Time: 07/07/20 10:19 AM   Result Value Ref Range    Ionized Calcium 1.01 (L) 1.12 - 1.32 MMOL/L           No results for input(s): FIO2I, IFO2, HCO3I, IHCO3, HCOPOC, PCO2I, PCOPOC, IPHI, PHI, PHPOC, PO2I, PO2POC in the last 72 hours.     No lab exists for component: IPOC2    Telemetry: sinus bradycardia (mild)    Imaging:  No imaging this admission              Windy Molina PA-C   07/07/20  Pulmonary, Critical Care Medicine  [facility]          [employer] [facility] Staff Addendum     I have independently evaluated the patient and reviewed the patient's chart. I have discussed the findings and care plan with ICU Care Team. Care Plan reviewed on ICU milti-D rounds. I agree with the above evaluation, assessment and recommendations along with the following comments and observations. Please also review my orders. Patient recovering well from yesterday's surgery. Still has in drain at low- continuous suction- less sanguinous today. Wound care per ENT    Calcium continues to drop and has been requiring aggressive replacement. Continue ICU status at this time due to this issue    Plan for PD today per Nephrology. Complex decision making was made in the evaluation and management plans during this consultation. More than 50% of time was spent in counseling and coordination of care including review of data and discussion with other team members. Further recommendations and therapies pending clinical course.     Critical Care and time spent coordinating care, minus procedure time:  30 min    Noy Langford DO, FCCP  Pulmonary, Sleep and Critical Care Medicine  12:52 PM

## 2020-07-08 ENCOUNTER — APPOINTMENT (OUTPATIENT)
Dept: GENERAL RADIOLOGY | Age: 50
DRG: 674 | End: 2020-07-08
Attending: INTERNAL MEDICINE
Payer: COMMERCIAL

## 2020-07-08 LAB
ALBUMIN SERPL-MCNC: 2.2 G/DL (ref 3.4–5)
ALBUMIN/GLOB SERPL: 0.5 {RATIO} (ref 0.8–1.7)
ALP SERPL-CCNC: 81 U/L (ref 45–117)
ALT SERPL-CCNC: 7 U/L (ref 16–61)
ANION GAP SERPL CALC-SCNC: 17 MMOL/L (ref 3–18)
AST SERPL-CCNC: 9 U/L (ref 10–38)
BASOPHILS # BLD: 0 K/UL (ref 0–0.1)
BASOPHILS NFR BLD: 0 % (ref 0–2)
BILIRUB SERPL-MCNC: 0.5 MG/DL (ref 0.2–1)
BUN SERPL-MCNC: 104 MG/DL (ref 7–18)
BUN/CREAT SERPL: 4 (ref 12–20)
CA-I SERPL-SCNC: 0.9 MMOL/L (ref 1.12–1.32)
CA-I SERPL-SCNC: 0.93 MMOL/L (ref 1.12–1.32)
CA-I SERPL-SCNC: 0.96 MMOL/L (ref 1.12–1.32)
CA-I SERPL-SCNC: 2.4 MMOL/L (ref 1.12–1.32)
CALCIUM SERPL-MCNC: 8 MG/DL (ref 8.5–10.1)
CHLORIDE SERPL-SCNC: 105 MMOL/L (ref 100–111)
CO2 SERPL-SCNC: 20 MMOL/L (ref 21–32)
CREAT SERPL-MCNC: 26.2 MG/DL (ref 0.6–1.3)
DIFFERENTIAL METHOD BLD: ABNORMAL
EOSINOPHIL # BLD: 0.4 K/UL (ref 0–0.4)
EOSINOPHIL NFR BLD: 5 % (ref 0–5)
ERYTHROCYTE [DISTWIDTH] IN BLOOD BY AUTOMATED COUNT: 17.4 % (ref 11.6–14.5)
GLOBULIN SER CALC-MCNC: 4.2 G/DL (ref 2–4)
GLUCOSE BLD STRIP.AUTO-MCNC: 117 MG/DL (ref 70–110)
GLUCOSE BLD STRIP.AUTO-MCNC: 146 MG/DL (ref 70–110)
GLUCOSE SERPL-MCNC: 94 MG/DL (ref 74–99)
HCT VFR BLD AUTO: 31.5 % (ref 36–48)
HGB BLD-MCNC: 10 G/DL (ref 13–16)
LYMPHOCYTES # BLD: 0.7 K/UL (ref 0.9–3.6)
LYMPHOCYTES NFR BLD: 9 % (ref 21–52)
MAGNESIUM SERPL-MCNC: 2.2 MG/DL (ref 1.6–2.6)
MCH RBC QN AUTO: 25.4 PG (ref 24–34)
MCHC RBC AUTO-ENTMCNC: 31.7 G/DL (ref 31–37)
MCV RBC AUTO: 79.9 FL (ref 74–97)
MONOCYTES # BLD: 0.4 K/UL (ref 0.05–1.2)
MONOCYTES NFR BLD: 5 % (ref 3–10)
NEUTS SEG # BLD: 6.2 K/UL (ref 1.8–8)
NEUTS SEG NFR BLD: 81 % (ref 40–73)
PHOSPHATE SERPL-MCNC: 10.8 MG/DL (ref 2.5–4.9)
PLATELET # BLD AUTO: 140 K/UL (ref 135–420)
PMV BLD AUTO: 9.8 FL (ref 9.2–11.8)
POTASSIUM SERPL-SCNC: 4.9 MMOL/L (ref 3.5–5.5)
PROT SERPL-MCNC: 6.4 G/DL (ref 6.4–8.2)
RBC # BLD AUTO: 3.94 M/UL (ref 4.7–5.5)
SODIUM SERPL-SCNC: 142 MMOL/L (ref 136–145)
WBC # BLD AUTO: 7.7 K/UL (ref 4.6–13.2)

## 2020-07-08 PROCEDURE — 80053 COMPREHEN METABOLIC PANEL: CPT

## 2020-07-08 PROCEDURE — 94762 N-INVAS EAR/PLS OXIMTRY CONT: CPT

## 2020-07-08 PROCEDURE — 74011250636 HC RX REV CODE- 250/636: Performed by: NURSE PRACTITIONER

## 2020-07-08 PROCEDURE — 74011250636 HC RX REV CODE- 250/636: Performed by: INTERNAL MEDICINE

## 2020-07-08 PROCEDURE — 82962 GLUCOSE BLOOD TEST: CPT

## 2020-07-08 PROCEDURE — 74011250637 HC RX REV CODE- 250/637: Performed by: OTOLARYNGOLOGY

## 2020-07-08 PROCEDURE — 82330 ASSAY OF CALCIUM: CPT

## 2020-07-08 PROCEDURE — 74011000258 HC RX REV CODE- 258: Performed by: INTERNAL MEDICINE

## 2020-07-08 PROCEDURE — 36415 COLL VENOUS BLD VENIPUNCTURE: CPT

## 2020-07-08 PROCEDURE — 71045 X-RAY EXAM CHEST 1 VIEW: CPT

## 2020-07-08 PROCEDURE — 74011250637 HC RX REV CODE- 250/637: Performed by: INTERNAL MEDICINE

## 2020-07-08 PROCEDURE — 65610000006 HC RM INTENSIVE CARE

## 2020-07-08 PROCEDURE — 74011000258 HC RX REV CODE- 258: Performed by: PHYSICIAN ASSISTANT

## 2020-07-08 PROCEDURE — 84100 ASSAY OF PHOSPHORUS: CPT

## 2020-07-08 PROCEDURE — 74011250636 HC RX REV CODE- 250/636: Performed by: PHYSICIAN ASSISTANT

## 2020-07-08 PROCEDURE — A4726 DIALYS SOL FLD VOL > 5999CC: HCPCS | Performed by: INTERNAL MEDICINE

## 2020-07-08 PROCEDURE — 85025 COMPLETE CBC W/AUTO DIFF WBC: CPT

## 2020-07-08 PROCEDURE — 83735 ASSAY OF MAGNESIUM: CPT

## 2020-07-08 RX ORDER — HEPARIN SODIUM 5000 [USP'U]/ML
5000 INJECTION, SOLUTION INTRAVENOUS; SUBCUTANEOUS EVERY 8 HOURS
Status: DISCONTINUED | OUTPATIENT
Start: 2020-07-08 | End: 2020-07-17 | Stop reason: HOSPADM

## 2020-07-08 RX ADMIN — CALCIUM ACETATE 2668 MG: 667 CAPSULE ORAL at 09:00

## 2020-07-08 RX ADMIN — HEPARIN SODIUM 5000 UNITS: 5000 INJECTION INTRAVENOUS; SUBCUTANEOUS at 19:00

## 2020-07-08 RX ADMIN — CALCITRIOL CAPSULES 0.25 MCG 0.5 MCG: 0.25 CAPSULE ORAL at 18:00

## 2020-07-08 RX ADMIN — LOSARTAN POTASSIUM 100 MG: 50 TABLET, FILM COATED ORAL at 09:00

## 2020-07-08 RX ADMIN — Medication 10 ML: at 06:09

## 2020-07-08 RX ADMIN — CLONIDINE HYDROCHLORIDE 0.1 MG: 0.1 TABLET ORAL at 18:00

## 2020-07-08 RX ADMIN — CALCIUM ACETATE 2668 MG: 667 CAPSULE ORAL at 12:00

## 2020-07-08 RX ADMIN — CALCIUM GLUCONATE 4 G: 98 INJECTION, SOLUTION INTRAVENOUS at 01:00

## 2020-07-08 RX ADMIN — Medication 10 ML: at 22:00

## 2020-07-08 RX ADMIN — CARVEDILOL 25 MG: 25 TABLET, FILM COATED ORAL at 18:00

## 2020-07-08 RX ADMIN — CALCITRIOL CAPSULES 0.25 MCG 0.5 MCG: 0.25 CAPSULE ORAL at 09:00

## 2020-07-08 RX ADMIN — CARVEDILOL 25 MG: 25 TABLET, FILM COATED ORAL at 09:00

## 2020-07-08 RX ADMIN — CALCIUM GLUCONATE 24 G: 98 INJECTION, SOLUTION INTRAVENOUS at 18:00

## 2020-07-08 RX ADMIN — HYDRALAZINE HYDROCHLORIDE 10 MG: 20 INJECTION INTRAMUSCULAR; INTRAVENOUS at 16:00

## 2020-07-08 RX ADMIN — HEPARIN SODIUM 5000 UNITS: 5000 INJECTION INTRAVENOUS; SUBCUTANEOUS at 11:00

## 2020-07-08 RX ADMIN — Medication 10 ML: at 14:00

## 2020-07-08 RX ADMIN — CLONIDINE HYDROCHLORIDE 0.1 MG: 0.1 TABLET ORAL at 09:00

## 2020-07-08 RX ADMIN — AMLODIPINE BESYLATE 10 MG: 10 TABLET ORAL at 09:00

## 2020-07-08 RX ADMIN — CALCIUM ACETATE 2668 MG: 667 CAPSULE ORAL at 18:14

## 2020-07-08 RX ADMIN — SODIUM CHLORIDE, SODIUM LACTATE, CALCIUM CHLORIDE, MAGNESIUM CHLORIDE AND DEXTROSE 12000 ML: 2.5; 538; 448; 18.3; 5.08 INJECTION, SOLUTION INTRAPERITONEAL at 10:00

## 2020-07-08 RX ADMIN — HYDROCODONE BITARTRATE AND ACETAMINOPHEN 1 TABLET: 5; 325 TABLET ORAL at 14:00

## 2020-07-08 NOTE — PROGRESS NOTES
Called Jean Camarillo to assist with CM assessment at [phone]. No answer, left message to call this writer back.     Jesse Marsh, RN BSN  Care Manager  [phone]

## 2020-07-08 NOTE — PROGRESS NOTES
1450: Spoke with Dr. Tani Alamo to clarify patient's modified order for peritoneal dialysis. Order clarified - dialysis time will change from day shift to night shift with the addition of midday manual exchanges to occur during day shift. New schedule will begin 07/08/2020 at 2100.

## 2020-07-08 NOTE — PROGRESS NOTES
0700: Bedside and Verbal shift change report given to [facility], RN (oncoming nurse) by Mary Ellen Shahid RN (offgoing nurse). Report included the following information SBAR, Intake/Output, MAR, Recent Results and Med Rec Status. 0730: Patient very irritated this morning, wants second IV taken out of arm and is refusing IV calcium. Patient wants to go home. 0800: Nursing Practioner notified of patient wanting to leave and refusing treatment. Currently at bedside discussing plan of care with patient. Patient agreeable to have another IV placed if other one is taken out as he states its painful. Still refusing IV calcium, just wants to take PO as replacement. 0900: Patient took oral calcium mixed with vanilla pudding. 1200: Dr Geovanni Waddell paged about anticoagulation therapy to be started, Dr Geovanni Waddell agreed to starting heparin sub-q. Order placed by NP.    1912: Critical lab report for Ion Ca @ 2.40, previous labs were 0.93 and 0.90 4 hours apart. Order placed for a redraw to confirm result. ROLANDO Dumont notified and agrees with plan and to continue Ca drip @ 25mL/hr until redraw resulted. 1915: Bedside and Verbal shift change report given to [employer] (oncoming nurse) by [facility], RN and Julio Hinds RN (offgoing nurse). Report included the following information SBAR, Intake/Output, MAR, Recent Results and Med Rec Status.

## 2020-07-08 NOTE — PROGRESS NOTES
[facility]  Pulmonary, Critical Care, and Sleep Medicine    Name: Shant Wilson MRN: [id number]   : 1970 Hospital: [address] [place] [address]    Date: 2020        IMPRESSION:   · Secondary hyperparathyroidism - 2/2 ESRD; s/p subtotal parathyroidectomy 20 with Dr. Polly Killian  · Hypocalcemia - 2/2 above; requiring aggressive replacement. Ionized CA today 0.93  · ESRD - on PD 7 days/week, Dr. Calixto Moreno following  · H/o HTN  · H/o HFrEF 46% ()      Patient Active Problem List   Diagnosis Code    HTN, goal below 130/80 I10    Urinary retention R33.9    UTI (urinary tract infection) N39.0    Hematuria R31.9    Acute kidney injury ([facility] [address] N17.9    Anemia D64.9    Hyperparathyroidism ([facility] [address] E21.3      PLAN:   · Resp: Supp O2 PRN for SpO2 >90%, Pulmonary Hygiene, IS. Keep HOB elevated > 30 degrees, aspiration precautions. · I/D: Afebrile, aleukocytosis; trend WBCs and temp curve. Monitor surgical site and drain output. · Hem/Onc: Daily CBC; H/H, and plts are stable. SQ heparin for DVT ppx  · CVS: Patient started on home antihypertensives. Monitor hemodynamics. · Metabolic: Trend BMP, Mag, Phos. Continue ionized calcium check q4h. Replace lytes per protocol. · Renal: Trend Renal indices, strict I/Os. PD management per nephrology  · Endocrine: POC Glucose AC&HS. Avoid hypoglycemia. · GI: Start renal diet. Zofran PRN for N/V.   · Musc/Skin: Surgical incision care. HANNAH drain to low suction per surgery. · Neuro: PRN analgesics.   · Code Status: FULL CODE   Best practice:  · Sepsis Bundle per Hospital Protocol: N/A  · Glycemic control; avoid Hypoglycemia  · IHI ICU Bundles: N/A    · Stress ulcer prophylaxis: N/A  · DVT prophylaxis: SCDs  · Need for Lines, Murillo assessed.     Subjective/Interval History:     Patient is a 44 y.o. male with PMH ESRD on PD, HTN, STEFANIE, gout, BPH, and HFrEF 46% (2019) who was admitted to ICU s/p subtotal parathyroidectomy with Dr. Polly Killian for close monitoring of calcium levels. Dr. Matilde Ro is following, PD started yesterday. Patient is hemodynamically stable, doing well on RA, alert and oriented x 3, in NAD Patient denies any complaints at this time.     2020:  - Remains stable on RA  - Pt hypertensive, home Amlodipine and Cozaar restarted yesterday  - Tolerating renal diet today  - Pain is controlled    ROS:A comprehensive review of systems was negative except for that written in the HPI. Objective:   Vital Signs:    Visit Vitals  BP (!) 170/106 (BP 1 Location: Right arm, BP Patient Position: At rest) Comment: Simultaneous filing. User may not have seen previous data. Pulse 71   Temp 97.7 °F (36.5 °C)   Resp 24   Ht 6' 2\" (1.88 m)   Wt 90.8 kg (200 lb 2.8 oz)   SpO2 97%   BMI 25.70 kg/m²       O2 Device: Room air   O2 Flow Rate (L/min): 0 l/min   Temp (24hrs), Av.9 °F (36.6 °C), Min:97.7 °F (36.5 °C), Max:98.4 °F (36.9 °C)       Intake/Output:   Last shift:      No intake/output data recorded. Last 3 shifts:  1901 -  0700  In: 516.3 [I.V.:516.3]  Out: 330 [Urine:30; Drains:15]    Intake/Output Summary (Last 24 hours) at 2020 1421  Last data filed at 2020 0600  Gross per 24 hour   Intake 370 ml   Output 315 ml   Net 55 ml      Physical Exam:      General:  Alert and oriented x 3 , in NAD   Head:  Normocephalic, without obvious abnormality, atraumatic. Eyes:  Conjunctivae/corneas clear. PERRL. Nose: Nares normal. Septum midline. Mucosa normal.   Throat: Lips, mucosa, and tongue normal. Teeth and gums normal.   Neck: Supple, symmetrical, trachea midline, healing incision to anterior lower neck, no surrounding erythema, HANNAH drain in place to low suction w/ small amount of serosanguineous output   Lungs:   Symmetrical chest rise; good AE bilat; CTAB   Heart:  Borderline bradycardic, regular rhythm, S1, S2 normal   Abdomen:   Soft, non-tender. Bowel sounds normal.   Extremities: Extremities normal, atraumatic, no cyanosis or edema.    Pulses: 2+ and symmetric all extremities. Skin: Skin color, texture, turgor normal. No rashes or lesions. Neurologic: Grossly non-focal, follows commands, A&Ox4              DATA:  Labs:  Recent Labs     07/08/20  0200 07/07/20  0455 07/06/20  1320   WBC 7.7 6.8 4.8   HGB 10.0* 9.5* 9.3*   HCT 31.5* 30.5* 30.2*    132* 142     Recent Labs     07/08/20  0200 07/07/20  0455 07/06/20  1330 07/06/20  1320    139  --  140   K 4.9 5.2  --  4.8    105  --  104   CO2 20* 16*  --  21   GLU 94 72*  --  79   * 122*  --  106*   CREA 26.20* 28.10*  --  26.40*   CA 8.0* 7.2*  7.3* 7.0* 7.1*  6.8*   MG 2.2 2.4  --  2.2   PHOS 10.8* 14.7*  --  14.3*   ALB 2.2* 2.3*  --  2.4*   ALT 7* 7*  --  9*     No results for input(s): PH, PCO2, PO2, HCO3, FIO2 in the last 72 hours. PFT:                                                     Echo:    Imaging:  [x]I have personally reviewed the patients radiographs  []Radiographs reviewed with radiologist   [x]No change from prior, tubes and lines in adequate position  []Improved   []Worsening    High complexity decision making was performed during the evaluation of this patient at high risk for decompensation with multiple organ involvement     Above mentioned total time spent on reviewing the case/medical record/data/notes/EMR/patient examination/documentation/coordinating care with nurse/consultants, exclusive of procedures with complex decision making performed and > 50% time spent in face to face evaluation. Capo Ayala, NP       Late entry for date of service 7/8/2020 due to IT  issues  I saw and evaluated the patient, performing the key elements of the service. I discussed the findings, assessment and plan with the NP and agree with the NP's findings and plan as documented in the NP's note. All edits and changes made above or are mentioned in my addendum.     Total of 34min critical care time spent at bedside during the course of care providing evaluation,management and care decisions and ordering appropriate treatment related to critical care problems exclusive of procedures. The reason for providing this level of medical care for this critically ill patient was due a critical illness that impaired one or more vital organ systems such that there was a high probability of imminent or life threatening deterioration in the patients condition. This care involved high complexity decision making to assess, manipulate, and support vital system functions, to treat this degree vital organ system failure and to prevent further life threatening deterioration of the patients condition. 45 y/o male with PMH of ESRD on PD, hx of noncompliance, HTN, STEFANIE, gout, CHF, presented to [facility] for subtotal parathyroidectomy done by ENT - Dr. Robe Christopher. Post procedure, pt found to be severely hypocalcemic from parathyroidectomy. Patient required multiple infusions of IV calcium, then started on IV drip. Nephrology consulted, assisting with electrolytes, managing PD. Will keep patient on telemetry given high risk for arrhythmia while hypocalcemic. ENT following, managing surgical incision, wound looks good. No ventilatory difficulty post procedure.       Miguel Encinas MD/MPH     Pulmonary, Critical Care Medicine  [facility]

## 2020-07-08 NOTE — PROGRESS NOTES
attended the interdisciplinary rounds for Yunier Murray, who is a 44 y. o.,male. Patients Primary Language is: [place]. According to the patients EMR Hoahaoism Affiliation is: No preference. The reason the Patient came to the hospital is:   Patient Active Problem List    Diagnosis Date Noted    [facility] ([facility] 07/06/2020    Anemia 11/06/2019    Urinary retention 04/21/2018    UTI (urinary tract infection) 04/21/2018    Hematuria 04/21/2018    Acute kidney injury ([facility] [address] 04/21/2018    HTN, goal below 130/80 02/10/2017        Plan:  Marisol Blair will continue to follow and will provide pastoral care on an as needed/requested basis.  recommends bedside caregivers page  on duty if patient shows signs of acute spiritual or emotional distress.     [address]  Board Certified [address] [place] [address]   [phone]

## 2020-07-08 NOTE — PROGRESS NOTES
RENAL DAILY PROGRESS NOTE    Patient: Jessi Horne               Sex: male          DOA: 7/6/2020  7:22 AM        YOB: 1970      Age:  44 y.o.        LOS:  LOS: 2 days     Subjective: Jessi Horne is a 44 y.o.  who presents with Hyperparathyroidism ([facility] [address] [facility] [E21.3]. Asked to evaluate for esrd,on peritoneal dialysis. hx of hyperparathyroidism,htn.has parathyroidectomy ,3.5 glands removed  Chief complains:  - Reviewed last 24 hrs events     Current Facility-Administered Medications   Medication Dose Route Frequency    calcium gluconate 4 g in 0.9% sodium chloride 100 mL IVPB  4 g IntraVENous ONCE    heparin (porcine) injection 5,000 Units  5,000 Units SubCUTAneous Q8H    peritoneal dialysis DEXTROSE 2.5% (2.5 mEq/L low calcium) solution 2,000 mL  2,000 mL IntraPERitoneal BID    calcitRIOL (ROCALTROL) capsule 0.5 mcg  0.5 mcg Oral BID    calcium acetate(phosphat bind) (PHOSLO) capsule 2,668 mg  4 Cap Oral TID WITH MEALS    hydrALAZINE (APRESOLINE) 20 mg/mL injection 10 mg  10 mg IntraVENous Q6H PRN    amLODIPine (NORVASC) tablet 10 mg  10 mg Oral DAILY    carvediloL (COREG) tablet 25 mg  25 mg Oral BID WITH MEALS    cloNIDine HCL (CATAPRES) tablet 0.1 mg  0.1 mg Oral BID    ergocalciferol capsule 50,000 Units  50,000 Units Oral Q7D    losartan (COZAAR) tablet 100 mg  100 mg Oral DAILY    sodium chloride (NS) flush 5-40 mL  5-40 mL IntraVENous Q8H    sodium chloride (NS) flush 5-40 mL  5-40 mL IntraVENous PRN    HYDROcodone-acetaminophen (NORCO) 5-325 mg per tablet 1 Tab  1 Tab Oral Q4H PRN    HYDROmorphone (DILAUDID) injection 0.5 mg  0.5 mg IntraVENous Q4H PRN    naloxone (NARCAN) injection 0.4 mg  0.4 mg IntraVENous PRN    ondansetron (ZOFRAN ODT) tablet 4 mg  4 mg Oral Q8H PRN    ELECTROLYTE REPLACEMENT PROTOCOL - Calcium  1 Each Other PRN    allopurinoL (ZYLOPRIM) tablet 100 mg  100 mg Oral Q TUE, THU & SAT    peritoneal dialysis DEXTROSE 2.5% (2.5 mEq/L low calcium) solution 12,000 mL  12,000 mL IntraPERitoneal DAILY    ELECTROLYTE REPLACEMENT PROTOCOL - Calcium   1 Each Other PRN       Objective:     Visit Vitals  BP (!) 170/106 (BP 1 Location: Right arm, BP Patient Position: At rest)   Pulse 75   Temp 97.7 °F (36.5 °C)   Resp 22   Ht 6' 2\" (1.88 m)   Wt 90.8 kg (200 lb 2.8 oz)   SpO2 97%   BMI 25.70 kg/m²       Intake/Output Summary (Last 24 hours) at 7/8/2020 1234  Last data filed at 7/8/2020 0600  Gross per 24 hour   Intake 370 ml   Output 315 ml   Net 55 ml       Physical Examination:     GEN: alert  RS: Chest is bilateral equal, no wheezing / rales / crackles  CVS: S1-S2 heard, RRR, No S3 / murmur  Abdomen: Soft,  Extremities: No edema, no cyanosis, skin is warm on touch  HEENT: Head is atraumatic, PERRLA, conjunctiva pink & non icteric. No JVD or carotid bruit       Data Review:      Labs:     Hematology:   Recent Labs     07/08/20  0200 07/07/20  0455 07/06/20  1320   WBC 7.7 6.8 4.8   HGB 10.0* 9.5* 9.3*   HCT 31.5* 30.5* 30.2*     Chemistry:   Recent Labs     07/08/20  0200 07/07/20  0455 07/06/20  1330 07/06/20  1320   * 122*  --  106*   CREA 26.20* 28.10*  --  26.40*   CA 8.0* 7.2*  7.3* 7.0* 7.1*  6.8*   ALB 2.2* 2.3*  --  2.4*   K 4.9 5.2  --  4.8    139  --  140    105  --  104   CO2 20* 16*  --  21   PHOS 10.8* 14.7*  --  14.3*   GLU 94 72*  --  79        Images:    XR (Most Recent). CXR reviewed by me and compared with previous CXR Results from Hospital Encounter encounter on 07/06/20   XR CHEST PORT    Narrative EXAM: XR CHEST PORT    INDICATION: provider order pleural effusion    COMPARISON: 9/11/2018    FINDINGS: A portable AP radiograph of the chest was obtained at 1042 hours. The  patient has a large left pleural effusion and a moderate to large right pleural  effusion. Atelectasis noted associated the lung bases. The aerated lungs reveal  no infiltrate or mass. There is no pneumothorax.       Impression IMPRESSION: Prominent bilateral pleural effusions. Correlation with CAT scan may  be considered        CT (Most Recent) Results from Hospital Encounter encounter on 02/29/20   CT ABD PELV WO CONT    Narrative Indication: evaluate PD cuff. Peritoneal dialysis tube malpositioned. Impression IMPRESSION: Left lower quadrant peritoneal dialysis catheter looped in pelvis. Bilateral pleural effusions. Cardiomegaly. Gallbladder wall thickening,  ultrasound follow-up recommended. Bilateral renal cysts. Diverticulosis. Left  inguinal canal fluid collection. Comment: CT images of the abdomen and pelvis were obtained without oral or  intravenous contrast. The initial report was rendered [facility]. Small bilateral pleural effusions are present with atelectasis in the underlying  lungs. The heart is enlarged. The gallbladder is contracted and thickening of the fundus wall is noted with  suspicion of tiny stone. Nonemergent ultrasound recommended for further  evaluation. The liver spleen pancreas and adrenal glands are unremarkable. The kidneys are mildly atrophic and harbor cysts. Calculus or hydronephrosis. The ureters and bladder are unremarkable. Peritoneal dialysis catheter is looped in the pelvis entering the peritoneum in  the left lower quadrant. Fluid collection is noted in the left inguinal canal.    Mild colonic diverticulosis is noted without diverticulitis. Pelvic viscera is unremarkable. No bowel obstruction, free intraperitoneal air, free fluid or appendicitis. DICOM format imaged data is available to non-affiliated external healthcare  facilities or entities on a secure, media free, reciprocally searchable basis  with patient authorization  for 12 months following the date of the study. EKG No results found for this or any previous visit.      I have personally reviewed the old medical records and patient's labs    Plan / Recommendation:      1. esrd ,on ccpd,has collette pleural effusions,increase pd,add 2 midday exchanges  2.s/p subtotoal parathyroidectomy,replace calcium,continue calcitriol  3.htn,adjust meds . increase ultrafiltration with pd  4-hyperphosphatemia,give calcium acetate with meals  5-epo,will give epo when bp is better    D/w Chirag Chavez MD  Nephrology  7/8/2020

## 2020-07-08 NOTE — PROGRESS NOTES
MARI  Drain removed   still necessitating IV calcium  If possible would try to wean IV calcium and increase oral supplementation   ?  Increase rocatrol?   will defer to intensivist/renal   once hypocalcemia resolved, ok for D/C from ENT standpoint   Berta Brownlee

## 2020-07-08 NOTE — PROGRESS NOTES
Reason for Admission:  [facility] [E21.[facility]]                 RRAT Score:    14%            Plan for utilizing home health:    TBD                      Likelihood of Readmission:   LOW                         Transition of Care Plan:              Initial assessment completed with relative(s). Cognitive status of patient: oriented to time, place, person and situation. Face sheet information confirmed:  yes. The patient's daughter Miguel Ángel Barone [phone]  agrees to participate in his discharge plan and to receive any needed information. This patient lives in a single family home with roommates, with no steps to enter. Patient is able to navigate steps as needed. Prior to hospitalization, patient was considered to be independent with ADLs/IADLS : yes . Patient has a current ACP document on file: no  The patient's  daughter will be available to transport patient home upon discharge. The patient already has none reported, medical equipment available in the home. Patient is not currently active with home health. Patient has not stayed in a skilled nursing facility or rehab. This patient is on dialysis :yes    If yes, peritoneal dialysis patient and receives treatment daily. Currently, the discharge plan is Home. The patient states that he can obtain his medications from the pharmacy, and take his medications as directed. Patient's current insurance is [employer] and [employer]. Care Management Interventions  PCP Verified by CM: Yes  Mode of Transport at Discharge: Self(Patient's daughter will be picking the patient up at time of discharge. )  Transition of Care Consult (CM Consult): Discharge Planning  Discharge Durable Medical Equipment: No  Physical Therapy Consult: No  Occupational Therapy Consult: No  Speech Therapy Consult: No  Current Support Network:  Other(Patient lives with roommates. )  Confirm Follow Up Transport: Family  Discharge Location  Discharge Placement: 92 Radha Miller RN  Case Management [phone]

## 2020-07-08 NOTE — ROUTINE PROCESS
1950:  Rec'd Adriana Sanchez  from AIDEE Bermudez RN. SBAR reviewed patient-side with two-nurse check-offs done of meds, dressings, wounds, LDA's & revelant assessment findings including neuro status, vent settings, rhythm & pulses, diet. Bed in lowest position with noted SR up, wheels locked and exit alarm on. Tonight:  Alert. Declines analgesic. Comfortable on RA. Outright refusing to all BP to be checked: \"It hurts,\" pull ff cuff. Consistent overnight, now won't wear Pox. Requested Charge RN. 0715:  Verbal Patient-side shift change report given to FLORECITA Vicente RN, RN, (oncoming nurse) by Carl Doran RN  (offgoing nurse). Report included the following information SBAR, Kardex, ED Summary, Procedure Summary, Intake/Output, MAR, Recent Results and Med Rec Status. Included:  Intro, hx, two-RN eval of relevant assessment findings, LDAs, skin, diagnostics and infusions.

## 2020-07-09 PROBLEM — I50.22 SYSTOLIC CHF, CHRONIC (HCC): Status: ACTIVE | Noted: 2020-07-09

## 2020-07-09 PROBLEM — E83.51 HYPOCALCEMIA: Status: ACTIVE | Noted: 2020-07-09

## 2020-07-09 PROBLEM — Z99.2 ESRD ON PERITONEAL DIALYSIS (HCC): Status: ACTIVE | Noted: 2020-07-09

## 2020-07-09 PROBLEM — E21.0 PARATHYROID HYPERPLASIA (HCC): Status: ACTIVE | Noted: 2020-07-09

## 2020-07-09 PROBLEM — N18.6 ESRD ON PERITONEAL DIALYSIS (HCC): Status: ACTIVE | Noted: 2020-07-09

## 2020-07-09 LAB
ALBUMIN SERPL-MCNC: 2.2 G/DL (ref 3.4–5)
ALBUMIN/GLOB SERPL: 0.5 {RATIO} (ref 0.8–1.7)
ALP SERPL-CCNC: 77 U/L (ref 45–117)
ALT SERPL-CCNC: 8 U/L (ref 16–61)
ANION GAP SERPL CALC-SCNC: 16 MMOL/L (ref 3–18)
AST SERPL-CCNC: 8 U/L (ref 10–38)
BASOPHILS # BLD: 0 K/UL (ref 0–0.1)
BASOPHILS NFR BLD: 0 % (ref 0–2)
BILIRUB SERPL-MCNC: 0.5 MG/DL (ref 0.2–1)
BUN SERPL-MCNC: 97 MG/DL (ref 7–18)
BUN/CREAT SERPL: 4 (ref 12–20)
CA-I SERPL-SCNC: 0.84 MMOL/L (ref 1.12–1.32)
CA-I SERPL-SCNC: 0.85 MMOL/L (ref 1.12–1.32)
CA-I SERPL-SCNC: 0.88 MMOL/L (ref 1.12–1.32)
CA-I SERPL-SCNC: 1.07 MMOL/L (ref 1.12–1.32)
CALCIUM SERPL-MCNC: 6.8 MG/DL (ref 8.5–10.1)
CALCIUM SERPL-MCNC: 7.1 MG/DL (ref 8.5–10.1)
CHLORIDE SERPL-SCNC: 104 MMOL/L (ref 100–111)
CO2 SERPL-SCNC: 21 MMOL/L (ref 21–32)
CREAT SERPL-MCNC: 23.7 MG/DL (ref 0.6–1.3)
DIFFERENTIAL METHOD BLD: ABNORMAL
EOSINOPHIL # BLD: 0.5 K/UL (ref 0–0.4)
EOSINOPHIL NFR BLD: 6 % (ref 0–5)
ERYTHROCYTE [DISTWIDTH] IN BLOOD BY AUTOMATED COUNT: 17.3 % (ref 11.6–14.5)
GLOBULIN SER CALC-MCNC: 4.1 G/DL (ref 2–4)
GLUCOSE BLD STRIP.AUTO-MCNC: 114 MG/DL (ref 70–110)
GLUCOSE BLD STRIP.AUTO-MCNC: 163 MG/DL (ref 70–110)
GLUCOSE SERPL-MCNC: 151 MG/DL (ref 74–99)
HCT VFR BLD AUTO: 30.3 % (ref 36–48)
HGB BLD-MCNC: 9.8 G/DL (ref 13–16)
LYMPHOCYTES # BLD: 0.4 K/UL (ref 0.9–3.6)
LYMPHOCYTES NFR BLD: 5 % (ref 21–52)
MAGNESIUM SERPL-MCNC: 2 MG/DL (ref 1.6–2.6)
MCH RBC QN AUTO: 25.7 PG (ref 24–34)
MCHC RBC AUTO-ENTMCNC: 32.3 G/DL (ref 31–37)
MCV RBC AUTO: 79.3 FL (ref 74–97)
MONOCYTES # BLD: 0.3 K/UL (ref 0.05–1.2)
MONOCYTES NFR BLD: 4 % (ref 3–10)
NEUTS SEG # BLD: 6.4 K/UL (ref 1.8–8)
NEUTS SEG NFR BLD: 85 % (ref 40–73)
PHOSPHATE SERPL-MCNC: 8.6 MG/DL (ref 2.5–4.9)
PLATELET # BLD AUTO: 126 K/UL (ref 135–420)
PMV BLD AUTO: 10.7 FL (ref 9.2–11.8)
POTASSIUM SERPL-SCNC: 3.8 MMOL/L (ref 3.5–5.5)
PROT SERPL-MCNC: 6.3 G/DL (ref 6.4–8.2)
PTH-INTACT SERPL-MCNC: <6.3 PG/ML (ref 18.4–88)
RBC # BLD AUTO: 3.82 M/UL (ref 4.7–5.5)
SODIUM SERPL-SCNC: 141 MMOL/L (ref 136–145)
WBC # BLD AUTO: 7.5 K/UL (ref 4.6–13.2)

## 2020-07-09 PROCEDURE — 84100 ASSAY OF PHOSPHORUS: CPT

## 2020-07-09 PROCEDURE — 80053 COMPREHEN METABOLIC PANEL: CPT

## 2020-07-09 PROCEDURE — 74011250636 HC RX REV CODE- 250/636: Performed by: NURSE PRACTITIONER

## 2020-07-09 PROCEDURE — 82962 GLUCOSE BLOOD TEST: CPT

## 2020-07-09 PROCEDURE — 74011250637 HC RX REV CODE- 250/637: Performed by: OTOLARYNGOLOGY

## 2020-07-09 PROCEDURE — 74011250637 HC RX REV CODE- 250/637: Performed by: INTERNAL MEDICINE

## 2020-07-09 PROCEDURE — 82330 ASSAY OF CALCIUM: CPT

## 2020-07-09 PROCEDURE — 36415 COLL VENOUS BLD VENIPUNCTURE: CPT

## 2020-07-09 PROCEDURE — 94762 N-INVAS EAR/PLS OXIMTRY CONT: CPT

## 2020-07-09 PROCEDURE — 83735 ASSAY OF MAGNESIUM: CPT

## 2020-07-09 PROCEDURE — 65660000000 HC RM CCU STEPDOWN

## 2020-07-09 PROCEDURE — 85025 COMPLETE CBC W/AUTO DIFF WBC: CPT

## 2020-07-09 PROCEDURE — 74011250637 HC RX REV CODE- 250/637

## 2020-07-09 PROCEDURE — 83970 ASSAY OF PARATHORMONE: CPT

## 2020-07-09 RX ORDER — CALCIUM CARBONATE 500(1250)
1000 TABLET ORAL 3 TIMES DAILY
Status: DISCONTINUED | OUTPATIENT
Start: 2020-07-09 | End: 2020-07-09

## 2020-07-09 RX ORDER — ACETAMINOPHEN 325 MG/1
650 TABLET ORAL ONCE
Status: COMPLETED | OUTPATIENT
Start: 2020-07-09 | End: 2020-07-09

## 2020-07-09 RX ORDER — ACETAMINOPHEN 325 MG/1
TABLET ORAL
Status: COMPLETED
Start: 2020-07-09 | End: 2020-07-09

## 2020-07-09 RX ORDER — DIPHENHYDRAMINE HCL 25 MG
25 CAPSULE ORAL ONCE
Status: COMPLETED | OUTPATIENT
Start: 2020-07-09 | End: 2020-07-09

## 2020-07-09 RX ORDER — CALCITRIOL 0.25 UG/1
0.5 CAPSULE ORAL 3 TIMES DAILY
Status: DISCONTINUED | OUTPATIENT
Start: 2020-07-09 | End: 2020-07-17 | Stop reason: HOSPADM

## 2020-07-09 RX ORDER — CALCIUM CARBONATE 500(1250)
3000 TABLET ORAL 3 TIMES DAILY
Status: DISCONTINUED | OUTPATIENT
Start: 2020-07-09 | End: 2020-07-10

## 2020-07-09 RX ORDER — AMOXICILLIN 250 MG
2 CAPSULE ORAL
Status: DISCONTINUED | OUTPATIENT
Start: 2020-07-09 | End: 2020-07-17 | Stop reason: HOSPADM

## 2020-07-09 RX ORDER — DIPHENHYDRAMINE HCL 25 MG
CAPSULE ORAL
Status: COMPLETED
Start: 2020-07-09 | End: 2020-07-09

## 2020-07-09 RX ADMIN — CALCITRIOL CAPSULES 0.25 MCG 0.5 MCG: 0.25 CAPSULE ORAL at 09:00

## 2020-07-09 RX ADMIN — Medication 10 ML: at 14:00

## 2020-07-09 RX ADMIN — CARVEDILOL 25 MG: 25 TABLET, FILM COATED ORAL at 09:00

## 2020-07-09 RX ADMIN — Medication 25 MG: at 02:20

## 2020-07-09 RX ADMIN — CALCIUM ACETATE 2668 MG: 667 CAPSULE ORAL at 08:00

## 2020-07-09 RX ADMIN — CALCIUM 1000 MG: 500 TABLET ORAL at 16:00

## 2020-07-09 RX ADMIN — LOSARTAN POTASSIUM 100 MG: 50 TABLET, FILM COATED ORAL at 09:00

## 2020-07-09 RX ADMIN — CLONIDINE HYDROCHLORIDE 0.1 MG: 0.1 TABLET ORAL at 18:00

## 2020-07-09 RX ADMIN — DOCUSATE SODIUM 50 MG AND SENNOSIDES 8.6 MG 2 TABLET: 8.6; 5 TABLET, FILM COATED ORAL at 21:46

## 2020-07-09 RX ADMIN — CALCITRIOL CAPSULES 0.25 MCG 0.5 MCG: 0.25 CAPSULE ORAL at 21:44

## 2020-07-09 RX ADMIN — DIPHENHYDRAMINE HYDROCHLORIDE 25 MG: 25 CAPSULE ORAL at 02:20

## 2020-07-09 RX ADMIN — CALCIUM 3000 MG: 500 TABLET ORAL at 21:56

## 2020-07-09 RX ADMIN — CLONIDINE HYDROCHLORIDE 0.1 MG: 0.1 TABLET ORAL at 09:00

## 2020-07-09 RX ADMIN — CALCIUM ACETATE 2668 MG: 667 CAPSULE ORAL at 17:00

## 2020-07-09 RX ADMIN — CARVEDILOL 25 MG: 25 TABLET, FILM COATED ORAL at 17:00

## 2020-07-09 RX ADMIN — AMLODIPINE BESYLATE 10 MG: 10 TABLET ORAL at 09:00

## 2020-07-09 RX ADMIN — ACETAMINOPHEN 650 MG: 325 TABLET ORAL at 02:20

## 2020-07-09 RX ADMIN — CALCIUM ACETATE 2668 MG: 667 CAPSULE ORAL at 12:00

## 2020-07-09 RX ADMIN — CALCITRIOL CAPSULES 0.25 MCG 0.5 MCG: 0.25 CAPSULE ORAL at 18:00

## 2020-07-09 RX ADMIN — HEPARIN SODIUM 5000 UNITS: 5000 INJECTION INTRAVENOUS; SUBCUTANEOUS at 11:00

## 2020-07-09 RX ADMIN — LACTULOSE 30 ML: 20 SOLUTION ORAL at 18:00

## 2020-07-09 NOTE — PROGRESS NOTES
0100 Pt asleep upon RN entering room. Pt co right hand discomfort around IV site. No swelling redness or abnormalities noted. Pulses intact pt denies loss of feeling. Pt states they had to take out two other IV from irritation from the Calcium Gluconate titration. 0110 This RN spoke with Monet ADAN confirmed we are unable to add lidocaine into Calcium Gluconate. Roberta Simon RN informed this RN that the pt was threatening to take out IV at this time. 0115 After thorough education r/t the function of calcium in the body and consequences of it being too low, pt verbalized understanding and instructed this RN to remove IV. IV removed from hand cath intact, no abnormalities. 0120 Pt in bed lights off eyes closed upon staff entering room. Rhonda MARSHALL at bedside, spoke with pt about importance of Calcium Gluconate titration needed. Pt requested to call family to discuss refusing further treatment. Pt states, \"I'm just tired. \"     2804 Pt provided heating pad per request. Pt states needs 10 minutes alone. 0145 Pt declines IV insertion at this time. Pt declines starting PD at this time. 0200 Pt requesting pain medication    0220 Pt provided pudding per request to take Tylenol and Benadryl at this time    0230 Pt refuses IV insertion or PD start at this time. Pt stated he is going to take a shower. Pt education on risks and falls reviewed pt verbalized understanding. 0300 Pt provided heating pad. Pt refusing IV insertion and PD start    0310 Pt provided bucket of warm water for hand per request. No change in assessment    0320 Pt resting with eyes closed in no apparent distress upon RN entrance. Pt provided heating pad. Pt refuses IV insertion or PD start    0335 Pt refuses to allow phlebotomy blood draw. Pt verbalized understanding risks. Pt confirmed he wants to be a Full Code in event of Cardiopulmonary arrest    0400 Pt asleep upon RN entrance.  Pt refusing IV insertion and PD start pt instructed to use call rhianna when ready for IV insertion or PD start

## 2020-07-09 NOTE — PROGRESS NOTES
Hospitalist Progress Note    Patient: Delfino Theodore Age: 44 y.o. : 1970 MR#: [id number] SSN: [id number]  Date/Time: 2020 1:43 PM    DOA: 2020  PCP: Danny Stroud MD    Subjective:     He is stable to transition out of ICU today. His Ionized calcium 0.88, he is agreeing to IV calcium replacement. He is on calcitriol, nephrology follows. He feels constipated today. Feels depressed due to his medical illness but does not want to have new medications  He is not suicidal.       Interval Hospital Course:  44 y.o male with ESRD on PD, h/o hyperparathyroidism, hypertension, presented for schedule surgery on 20. He underwent subtotal parathyroidectomy due to parathyroid hyperplasia secondary to secondary hyperparathyroidism by Dr. Herb Martin. He was admitted to ICU for close monitoring of his calcium level and potential arrhythmia. His IV calcium has been replaced but remains low. Nursing reported that he took himself off his IV replacement and refuse labs monitoring. He is currently having PD and tolerated well. His Calcitriol has been increased. ROS: No current fever/chills, no headache, no dizziness, no facial pain, no sinus congestion,   No swallowing pain, No chest pain, no palpitation, no shortness of breath, no abd pain,  No diarrhea, no urinary complaint, no leg pain or swelling      Assessment/Plan:   1. Hypocalcemia status post subtotal parathyroidectomy  2. Parathyroid hyperplasia with secondary hyperparathyroidism  3. ESRD on PD        Hyperphosphatemia   4. Hypertension   5. Chronic heart failure with reduced EF, last Echo with EF 76%, GIII diastolic dysfunction   6. Normocytic anemia due chronic illness and ESRD   7. Thrombocytopenia today   8. Depressed mood due to chronic illness    9. Gout       Education provided to the needs for IV calcium replacement and the need for compliance at this time. He agree to continue with current treatment.    Continue Calcitriol, might need further increase frequency. Continue calcium replacement   On oral OS-flor for phosphate binding   Continue with PD for volume management per nephrology   Continue with home antihypertensive medications   Added bowel regimen including lactulose for BM   Close monitor his electrolytes and arrhythmia   ICS  Pepcid   Closely monitor his platelet. Note he is on hep sq  Wound care per nursing. Full code   Disposition: home in 1-2 days when calcium is normalized       Additional Notes:   Time spent >30 minutes     Case discussed with:  [x]Patient  []Family  [x]Nursing  [x]Case Management  DVT Prophylaxis:  []Lovenox  [x]Hep SQ  [x]SCDs  []Coumadin   []On Heparin gtt    Signed By: Erick Moritz, MD     2020 1:43 PM              Objective:   VS:   Visit Vitals  /82 (BP 1 Location: Left arm, BP Patient Position: At rest)   Pulse 68   Temp 98.2 °F (36.8 °C)   Resp 20   Ht 6' 2\" (1.88 m)   Wt 90.8 kg (200 lb 2.8 oz)   SpO2 97%   BMI 25.70 kg/m²      Tmax/24hrs: Temp (24hrs), Av.8 °F (36.6 °C), Min:97.4 °F (36.3 °C), Max:98.2 °F (36.8 °C)      Intake/Output Summary (Last 24 hours) at 2020 1343  Last data filed at 2020 2320  Gross per 24 hour   Intake --   Output 2000 ml   Net -2000 ml       Tele: sinus  General:  Cooperative, Not in acute distress, speaks in full sentence while in bed  HEENT: PERRL, EOMI, supple neck, no JVD, dry oral mucosa, neck wound clean/dry/intact   Cardiovascular: S1S2 regular, no rub/gallop   Pulmonary: Clear air entry bilaterally, no wheezing, no crackle  GI:  Soft, non tender, non distended, +bs, no guarding   Extremities:  No pedal edema, +distal pulses appreciated   Neuro: AOx3, moving all extremities, no gross deficit.      Additional:       Current Facility-Administered Medications   Medication Dose Route Frequency    calcium carbonate (OS-FLOR) tablet 1,000 mg [elemental]  1,000 mg Oral TID    senna-docusate (PERICOLACE) 8.6-50 mg per tablet 2 Tab  2 Tab Oral QHS  lactulose (CHRONULAC) 10 gram/15 mL solution 30 mL  30 mL Oral BID    heparin (porcine) injection 5,000 Units  5,000 Units SubCUTAneous Q8H    peritoneal dialysis DEXTROSE 2.5% (2.5 mEq/L low calcium) solution 2,000 mL  2,000 mL IntraPERitoneal BID    calcium gluconate 24 g in 500 ml NS IVPB  24 g IntraVENous TITRATE    calcitRIOL (ROCALTROL) capsule 0.5 mcg  0.5 mcg Oral BID    calcium acetate(phosphat bind) (PHOSLO) capsule 2,668 mg  4 Cap Oral TID WITH MEALS    hydrALAZINE (APRESOLINE) 20 mg/mL injection 10 mg  10 mg IntraVENous Q6H PRN    amLODIPine (NORVASC) tablet 10 mg  10 mg Oral DAILY    carvediloL (COREG) tablet 25 mg  25 mg Oral BID WITH MEALS    cloNIDine HCL (CATAPRES) tablet 0.1 mg  0.1 mg Oral BID    ergocalciferol capsule 50,000 Units  50,000 Units Oral Q7D    losartan (COZAAR) tablet 100 mg  100 mg Oral DAILY    sodium chloride (NS) flush 5-40 mL  5-40 mL IntraVENous Q8H    sodium chloride (NS) flush 5-40 mL  5-40 mL IntraVENous PRN    HYDROcodone-acetaminophen (NORCO) 5-325 mg per tablet 1 Tab  1 Tab Oral Q4H PRN    HYDROmorphone (DILAUDID) injection 0.5 mg  0.5 mg IntraVENous Q4H PRN    naloxone (NARCAN) injection 0.4 mg  0.4 mg IntraVENous PRN    ondansetron (ZOFRAN ODT) tablet 4 mg  4 mg Oral Q8H PRN    ELECTROLYTE REPLACEMENT PROTOCOL - Calcium  1 Each Other PRN    allopurinoL (ZYLOPRIM) tablet 100 mg  100 mg Oral Q TUE, THU & SAT    peritoneal dialysis DEXTROSE 2.5% (2.5 mEq/L low calcium) solution 12,000 mL  12,000 mL IntraPERitoneal DAILY    ELECTROLYTE REPLACEMENT PROTOCOL - Calcium   1 Each Other PRN            Lab/Data Review:  Labs: Results:       Chemistry Recent Labs     07/09/20  0951 07/08/20  0200 07/07/20  0455   * 94 72*    142 139   K 3.8 4.9 5.2    105 105   CO2 21 20* 16*   BUN 97* 104* 122*   CREA 23.70* 26.20* 28.10*   BUCR 4* 4* 4*   AGAP 16 17 18   CA 7.1*  6.8* 8.0* 7.2*  7.3*   PHOS 8.6* 10.8* 14.7*     Recent Labs 07/09/20  0951 07/08/20  0200 07/07/20  0455   ALT 8* 7* 7*   TP 6.3* 6.4 6.3*   ALB 2.2* 2.2* 2.3*   GLOB 4.1* 4.2* 4.0   AGRAT 0.5* 0.5* 0.6*      CBC w/Diff Recent Labs     07/09/20  0951 07/08/20  0200 07/07/20  0455   WBC 7.5 7.7 6.8   RBC 3.82* 3.94* 3.80*   HGB 9.8* 10.0* 9.5*   HCT 30.3* 31.5* 30.5*   MCV 79.3 79.9 80.3   MCH 25.7 25.4 25.0   MCHC 32.3 31.7 31.1   RDW 17.3* 17.4* 17.5*   * 140 132*   GRANS 85* 81* 77*   LYMPH 5* 9* 11*   EOS 6* 5 6*      Coagulation No results for input(s): PTP, INR, APTT, INREXT in the last 72 hours. Iron/Ferritin Lab Results   Component Value Date/Time    Iron 14 (L) 04/15/2019 05:45 AM    TIBC 214 (L) 04/15/2019 05:45 AM    Iron % saturation 7 (L) 04/15/2019 05:45 AM    Ferritin 235.4 04/15/2019 05:45 AM       BNP    Cardiac Enzymes Lab Results   Component Value Date/Time    Troponin-I 0.064 (H) 04/15/2019 05:45 AM        Lactic Acid    Thyroid Studies          All Micro Results     None            Images:    CT (Most Recent). XRAY (Most Recent)      EKG No results found for this or any previous visit.      2D ECHO

## 2020-07-09 NOTE — PROGRESS NOTES
RENAL DAILY PROGRESS NOTE    Patient: Yunier Murray               Sex: male          DOA: 7/6/2020  7:22 AM        YOB: 1970      Age:  44 y.o.        LOS:  LOS: 3 days     Subjective: Yunier Murray is a 44 y.o.  who presents with Hyperparathyroidism ([facility] [address] [E21.3]. Asked to evaluate for esrd,on peritoneal dialysis. hx of hyperparathyroidism,htn.has parathyroidectomy ,3.5 glands removed  Chief complains:had refused dialysis ,blood draw last night  - Reviewed last 24 hrs events     Current Facility-Administered Medications   Medication Dose Route Frequency    calcium carbonate (OS-VIRAL) tablet 1,000 mg [elemental]  1,000 mg Oral TID    senna-docusate (PERICOLACE) 8.6-50 mg per tablet 2 Tab  2 Tab Oral QHS    lactulose (CHRONULAC) 10 gram/15 mL solution 30 mL  30 mL Oral BID    heparin (porcine) injection 5,000 Units  5,000 Units SubCUTAneous Q8H    peritoneal dialysis DEXTROSE 2.5% (2.5 mEq/L low calcium) solution 2,000 mL  2,000 mL IntraPERitoneal BID    calcium gluconate 24 g in 500 ml NS IVPB  24 g IntraVENous TITRATE    calcitRIOL (ROCALTROL) capsule 0.5 mcg  0.5 mcg Oral BID    calcium acetate(phosphat bind) (PHOSLO) capsule 2,668 mg  4 Cap Oral TID WITH MEALS    hydrALAZINE (APRESOLINE) 20 mg/mL injection 10 mg  10 mg IntraVENous Q6H PRN    amLODIPine (NORVASC) tablet 10 mg  10 mg Oral DAILY    carvediloL (COREG) tablet 25 mg  25 mg Oral BID WITH MEALS    cloNIDine HCL (CATAPRES) tablet 0.1 mg  0.1 mg Oral BID    ergocalciferol capsule 50,000 Units  50,000 Units Oral Q7D    losartan (COZAAR) tablet 100 mg  100 mg Oral DAILY    sodium chloride (NS) flush 5-40 mL  5-40 mL IntraVENous Q8H    sodium chloride (NS) flush 5-40 mL  5-40 mL IntraVENous PRN    HYDROcodone-acetaminophen (NORCO) 5-325 mg per tablet 1 Tab  1 Tab Oral Q4H PRN    HYDROmorphone (DILAUDID) injection 0.5 mg  0.5 mg IntraVENous Q4H PRN    naloxone (NARCAN) injection 0.4 mg  0.4 mg IntraVENous PRN  ondansetron (ZOFRAN ODT) tablet 4 mg  4 mg Oral Q8H PRN    ELECTROLYTE REPLACEMENT PROTOCOL - Calcium  1 Each Other PRN    allopurinoL (ZYLOPRIM) tablet 100 mg  100 mg Oral Q TUE, THU & SAT    peritoneal dialysis DEXTROSE 2.5% (2.5 mEq/L low calcium) solution 12,000 mL  12,000 mL IntraPERitoneal DAILY    ELECTROLYTE REPLACEMENT PROTOCOL - Calcium   1 Each Other PRN       Objective:     Visit Vitals  /82 (BP 1 Location: Left arm, BP Patient Position: At rest)   Pulse 68   Temp 98.2 °F (36.8 °C)   Resp 20   Ht 6' 2\" (1.88 m)   Wt 90.8 kg (200 lb 2.8 oz)   SpO2 97%   BMI 25.70 kg/m²       Intake/Output Summary (Last 24 hours) at 7/9/2020 1414  Last data filed at 7/8/2020 2320  Gross per 24 hour   Intake --   Output 2000 ml   Net -2000 ml       Physical Examination:     GEN: alert  RS: Chest is bilateral equal, no wheezing / rales / crackles  CVS: S1-S2 heard, RRR, No S3 / murmur  Abdomen: Soft,  Extremities: No edema, no cyanosis, skin is warm on touch  HEENT: Head is atraumatic, PERRLA, conjunctiva pink & non icteric. No JVD or carotid bruit       Data Review:      Labs:     Hematology:   Recent Labs     07/09/20  0951 07/08/20  0200 07/07/20  0455   WBC 7.5 7.7 6.8   HGB 9.8* 10.0* 9.5*   HCT 30.3* 31.5* 30.5*     Chemistry:   Recent Labs     07/09/20  0951 07/08/20  0200 07/07/20  0455   BUN 97* 104* 122*   CREA 23.70* 26.20* 28.10*   CA 7.1*  6.8* 8.0* 7.2*  7.3*   ALB 2.2* 2.2* 2.3*   K 3.8 4.9 5.2    142 139    105 105   CO2 21 20* 16*   PHOS 8.6* 10.8* 14.7*   * 94 72*        Images:    XR (Most Recent). CXR reviewed by me and compared with previous CXR Results from Hospital Encounter encounter on 07/06/20   XR CHEST PORT    Narrative EXAM: XR CHEST PORT    INDICATION: provider order pleural effusion    COMPARISON: 9/11/2018    FINDINGS: A portable AP radiograph of the chest was obtained at 1042 hours.  The  patient has a large left pleural effusion and a moderate to large right pleural  effusion. Atelectasis noted associated the lung bases. The aerated lungs reveal  no infiltrate or mass. There is no pneumothorax. Impression IMPRESSION: Prominent bilateral pleural effusions. Correlation with CAT scan may  be considered        CT (Most Recent) Results from Hospital Encounter encounter on 02/29/20   CT ABD PELV WO CONT    Narrative Indication: evaluate PD cuff. Peritoneal dialysis tube malpositioned. Impression IMPRESSION: Left lower quadrant peritoneal dialysis catheter looped in pelvis. Bilateral pleural effusions. Cardiomegaly. Gallbladder wall thickening,  ultrasound follow-up recommended. Bilateral renal cysts. Diverticulosis. Left  inguinal canal fluid collection. Comment: CT images of the abdomen and pelvis were obtained without oral or  intravenous contrast. The initial report was rendered by [facility]  radiology services. Small bilateral pleural effusions are present with atelectasis in the underlying  lungs. The heart is enlarged. The gallbladder is contracted and thickening of the fundus wall is noted with  suspicion of tiny stone. Nonemergent ultrasound recommended for further  evaluation. The liver spleen pancreas and adrenal glands are unremarkable. The kidneys are mildly atrophic and harbor cysts. Calculus or hydronephrosis. The ureters and bladder are unremarkable. Peritoneal dialysis catheter is looped in the pelvis entering the peritoneum in  the left lower quadrant. Fluid collection is noted in the left inguinal canal.    Mild colonic diverticulosis is noted without diverticulitis. Pelvic viscera is unremarkable. No bowel obstruction, free intraperitoneal air, free fluid or appendicitis. DICOM format imaged data is available to non-affiliated external healthcare  facilities or entities on a secure, media free, reciprocally searchable basis  with patient authorization  for 12 months following the date of the study. EKG No results found for this or any previous visit. I have personally reviewed the old medical records and patient's labs    Plan / Recommendation:      1. esrd ,on ccpd,has collette pleural effusions,increase pd,add 2 midday exchanges. had refused dialysis last night,but he is cycler now,discussed compliance and doing extra dialysis he is agreable  2.s/p subtotoal parathyroidectomy,add po calcium carbonate,continue calcitriol  3.htn,adjust meds . increase ultrafiltration with pd  4-hyperphosphatemia,improving on calcium acetate with meals  5-epo,will give epo when bp is better    D/w Dr danita aDrling MD  Nephrology  7/9/2020

## 2020-07-09 NOTE — PROGRESS NOTES
MARI-HNS   pt seen and examined   no new complaints--seems in better spirits this evening  Labs reviewed   will increase Rocaltrol and po calcium in hopes of being able to wean IV calcium  Once calcium stabilizes, can send home from ENT standpoint    Fitz Agosto

## 2020-07-09 NOTE — PROGRESS NOTES
[facility]  Pulmonary, Critical Care, and Sleep Medicine  Progress / Transfer Summary    Name: Roland Salazar MRN: [id number]   : 1970 Hospital: [facility]   Date: 2020        IMPRESSION:   · Secondary hyperparathyroidism - 2/2 ESRD; s/p subtotal parathyroidectomy 20 with Dr. Adalberto Matthews  · Hypocalcemia - 2/2 above; requiring aggressive replacement. Ionized CA yesterday 1.07  · ESRD - on PD 7 days/week, Dr. Eleazar Salinas following  · H/o HTN  · H/o HFrEF 46% ()      Patient Active Problem List   Diagnosis Code    HTN, goal below 130/80 I10    Urinary retention R33.9    UTI (urinary tract infection) N39.0    Hematuria R31.9    Acute kidney injury ([facility] [address] N17.9    Anemia D64.9    Hyperparathyroidism ([facility] [address] E21.3      PLAN:   · Resp: Supp O2 PRN for SpO2 >90%, Pulmonary Hygiene, IS. Keep HOB elevated > 30 degrees, aspiration precautions. · I/D: Afebrile, aleukocytosis; trend WBCs and temp curve. Monitor surgical site and drain output. · Hem/Onc: Daily CBC; H/H, and plts are stable. SQ heparin for DVT ppx  · CVS: Patient started on home antihypertensives. Monitor hemodynamics. · Metabolic: Trend BMP, Mag, Phos. Continue ionized calcium check q4h. Replace lytes per protocol. · Renal: Trend Renal indices, strict I/Os. PD management per nephrology  · Endocrine: POC Glucose AC&HS. Avoid hypoglycemia. · GI: Start renal diet. Zofran PRN for N/V.   · Musc/Skin: Surgical incision care. HANNAH drain to low suction per surgery. · Neuro: PRN analgesics. · Code Status: FULL CODE     Subjective/Interval History:   Patient is a 51 y. o. male with PMH ESRD on PD, HTN, STEFANIE, gout, BPH, and HFrEF 46% (2019) who was admitted to ICU s/p subtotal parathyroidectomy with Dr. Adalberto Matthews for close monitoring of calcium levels. Dr. Eleazar Salinas is following, PD started on 20.   Patient is hemodynamically stable, doing well on RA, alert and oriented x 3, in NAD     2020:  - refusing all treatment overnight including CA replacement, blood draws, PD and IV medications. Patient took out IV  -Patient informed this morning of risks involved with refusing treatment, including severe illness and death. Patient agreed to blood draw and PO medication for now. Patient advised of treatment goals and informed of consequences of leaving AMA   - HD stable, SpO2 stable on RA  - Tolerating renal diet today  -plan to transfer to telemetry          ROS:A comprehensive review of systems was negative except for that written in the HPI. Objective:   Vital Signs:    Visit Vitals  BP (!) 168/106 (BP 1 Location: Left arm, BP Patient Position: At rest)   Pulse 70   Temp 98.2 °F (36.8 °C)   Resp 27   Ht 6' 2\" (1.88 m)   Wt 90.8 kg (200 lb 2.8 oz)   SpO2 97%   BMI 25.70 kg/m²       O2 Device: Room air   O2 Flow Rate (L/min): 0 l/min   Temp (24hrs), Av.8 °F (36.6 °C), Min:97.4 °F (36.3 °C), Max:98.2 °F (36.8 °C)       Intake/Output:   Last shift:      No intake/output data recorded. Last 3 shifts:  1901 -  0700  In: 250 [I.V.:250]  Out: 2315 [Urine:30]    Intake/Output Summary (Last 24 hours) at 2020 0849  Last data filed at 2020 2320  Gross per 24 hour   Intake --   Output 2000 ml   Net -2000 ml      Physical Exam:      General:  Alert and oriented x 3 , in NAD   Head:  Normocephalic, without obvious abnormality, atraumatic. Eyes:  Conjunctivae/corneas clear. PERRL. Nose: Nares normal. Septum midline. Mucosa normal.   Throat: Lips, mucosa, and tongue normal. Teeth and gums normal.   Neck: Supple, symmetrical, trachea midline, healing incision to anterior lower neck, no surrounding erythema, HANNAH drain in place to low suction w/ small amount of serosanguineous output   Lungs:   Symmetrical chest rise; good AE bilat; CTAB   Heart:  Borderline bradycardic, regular rhythm, S1, S2 normal   Abdomen:   Soft, non-tender. Bowel sounds normal.   Extremities: Extremities normal, atraumatic, no cyanosis or edema. Pulses: 2+ and symmetric all extremities. Skin: Skin color, texture, turgor normal. No rashes or lesions. Neurologic: Grossly non-focal, follows commands, A&Ox4                  DATA:  Labs:  Recent Labs     07/08/20  0200 07/07/20  0455 07/06/20  1320   WBC 7.7 6.8 4.8   HGB 10.0* 9.5* 9.3*   HCT 31.5* 30.5* 30.2*    132* 142     Recent Labs     07/08/20  0200 07/07/20  0455 07/06/20  1330 07/06/20  1320    139  --  140   K 4.9 5.2  --  4.8    105  --  104   CO2 20* 16*  --  21   GLU 94 72*  --  79   * 122*  --  106*   CREA 26.20* 28.10*  --  26.40*   CA 8.0* 7.2*  7.3* 7.0* 7.1*  6.8*   MG 2.2 2.4  --  2.2   PHOS 10.8* 14.7*  --  14.3*   ALB 2.2* 2.3*  --  2.4*   ALT 7* 7*  --  9*     No results for input(s): PH, PCO2, PO2, HCO3, FIO2 in the last 72 hours. PFT:                                                     Echo:  aging:  [x]  Oliverio have personally reviewed the patients radiographs  []Radiographs reviewed with radiologist   [x]No change from prior, tubes and lines in adequate position  []Improved   []Worsening    High complexity decision making was performed during the evaluation of this patient at high risk for decompensation with multiple organ involvement     Above mentioned total time spent on reviewing the case/medical record/data/notes/EMR/patient examination/documentation/coordinating care with nurse/consultants, exclusive of procedures with complex decision making performed and > 50% time spent in face to face evaluation. Gertrude Mckeon NP       Late entry for date of service 7/9/2020: I saw and evaluated the patient, performing the key elements of the service. I discussed the findings, assessment and plan with the NP and agree with the NP's findings and plan as documented in the NP's note. All edits and changes made above or are mentioned in my addendum.     Total of 35 min critical care time spent at bedside during the course of care providing evaluation,management and care decisions and ordering appropriate treatment related to critical care problems exclusive of procedures. The reason for providing this level of medical care for this critically ill patient was due a critical illness that impaired one or more vital organ systems such that there was a high probability of imminent or life threatening deterioration in the patients condition. This care involved high complexity decision making to assess, manipulate, and support vital system functions, to treat this degree vital organ system failure and to prevent further life threatening deterioration of the patients condition. 45 y/o male with PMH of ESRD on PD, hx of noncompliance, HTN, STEFANIE, gout, CHF, presented to [facility] for subtotal parathyroidectomy done by ENT - Dr. Raissa Zavala. Post procedure, pt found to be severely hypocalcemic from parathyroidectomy. Patient required multiple infusions of IV calcium, then started on IV drip yesterday. Nephrology consulted, assisting with electrolytes, managing PD. Unfortunately overnight, patient became noncompliant, refused all IV calcium, reporting pain over multiple IV sites despite slowing rate of infusion and moving site. Patient also refused all blood draws. It was explained to the patient that he may die for arrhythmia, patient still declined therapy. This morning, I discussed the case with nephrology, Dr. Katherine García, he spoke with the patient, patient agreeable to receiving PD this morning and receiving therapy. Given recheck of calcium, titrated up p.o. calcium, also discussed with ENT, Dr. Raissa Zavala, will increase p.o. calcium and vitamin D. Will keep patient on telemetry given high risk for arrhythmia while hypocalcemic. ENT following, managing surgical incision, wound looks good. No ventilatory difficulty post procedure.     Franco Bailey MD/MPH     Pulmonary, Critical Care Medicine  [facility]

## 2020-07-09 NOTE — PROGRESS NOTES
0700: Bedside and Verbal shift change report given to [facility], RN and Victorina Ceballos RN (oncoming nurse) by BOOGIE Mckeon (offgoing nurse). Report included the following information Intake/Output, MAR and Recent Results. 1900: Bedside and Verbal shift change report given to RN (oncoming nurse) by [facility], RN and Victorina Ceballos RN (offgoing nurse). Report included the following information Intake/Output, MAR and Recent Results.

## 2020-07-10 ENCOUNTER — HOSPITAL ENCOUNTER (INPATIENT)
Dept: INTERVENTIONAL RADIOLOGY/VASCULAR | Age: 50
Discharge: HOME | DRG: 674 | End: 2020-07-10
Attending: PHYSICIAN ASSISTANT
Payer: COMMERCIAL

## 2020-07-10 LAB
ALBUMIN SERPL-MCNC: 2.2 G/DL (ref 3.4–5)
ALBUMIN/GLOB SERPL: 0.5 {RATIO} (ref 0.8–1.7)
ALP SERPL-CCNC: 80 U/L (ref 45–117)
ALT SERPL-CCNC: 8 U/L (ref 16–61)
ANION GAP SERPL CALC-SCNC: 13 MMOL/L (ref 3–18)
AST SERPL-CCNC: 8 U/L (ref 10–38)
BASOPHILS # BLD: 0 K/UL (ref 0–0.1)
BASOPHILS NFR BLD: 0 % (ref 0–2)
BILIRUB SERPL-MCNC: 0.4 MG/DL (ref 0.2–1)
BUN SERPL-MCNC: 95 MG/DL (ref 7–18)
BUN/CREAT SERPL: 4 (ref 12–20)
CA-I SERPL-SCNC: 0.75 MMOL/L (ref 1.12–1.32)
CA-I SERPL-SCNC: 0.75 MMOL/L (ref 1.12–1.32)
CA-I SERPL-SCNC: 0.79 MMOL/L (ref 1.12–1.32)
CALCIUM SERPL-MCNC: 6.5 MG/DL (ref 8.5–10.1)
CHLORIDE SERPL-SCNC: 103 MMOL/L (ref 100–111)
CO2 SERPL-SCNC: 24 MMOL/L (ref 21–32)
CREAT SERPL-MCNC: 22.8 MG/DL (ref 0.6–1.3)
DIFFERENTIAL METHOD BLD: ABNORMAL
EOSINOPHIL # BLD: 0.6 K/UL (ref 0–0.4)
EOSINOPHIL NFR BLD: 8 % (ref 0–5)
ERYTHROCYTE [DISTWIDTH] IN BLOOD BY AUTOMATED COUNT: 17.4 % (ref 11.6–14.5)
GLOBULIN SER CALC-MCNC: 4.4 G/DL (ref 2–4)
GLUCOSE BLD STRIP.AUTO-MCNC: 110 MG/DL (ref 70–110)
GLUCOSE BLD STRIP.AUTO-MCNC: 99 MG/DL (ref 70–110)
GLUCOSE SERPL-MCNC: 104 MG/DL (ref 74–99)
HCT VFR BLD AUTO: 31.4 % (ref 36–48)
HGB BLD-MCNC: 9.9 G/DL (ref 13–16)
LYMPHOCYTES # BLD: 0.4 K/UL (ref 0.9–3.6)
LYMPHOCYTES NFR BLD: 5 % (ref 21–52)
MAGNESIUM SERPL-MCNC: 1.9 MG/DL (ref 1.6–2.6)
MCH RBC QN AUTO: 25.3 PG (ref 24–34)
MCHC RBC AUTO-ENTMCNC: 31.5 G/DL (ref 31–37)
MCV RBC AUTO: 80.1 FL (ref 74–97)
MONOCYTES # BLD: 0.8 K/UL (ref 0.05–1.2)
MONOCYTES NFR BLD: 10 % (ref 3–10)
NEUTS SEG # BLD: 6.2 K/UL (ref 1.8–8)
NEUTS SEG NFR BLD: 77 % (ref 40–73)
PHOSPHATE SERPL-MCNC: 8.2 MG/DL (ref 2.5–4.9)
PLATELET # BLD AUTO: 121 K/UL (ref 135–420)
PLATELET COMMENTS,PCOM: ABNORMAL
PMV BLD AUTO: 10.5 FL (ref 9.2–11.8)
POTASSIUM SERPL-SCNC: 3.9 MMOL/L (ref 3.5–5.5)
PROT SERPL-MCNC: 6.6 G/DL (ref 6.4–8.2)
RBC # BLD AUTO: 3.92 M/UL (ref 4.7–5.5)
RBC MORPH BLD: ABNORMAL
RBC MORPH BLD: ABNORMAL
SODIUM SERPL-SCNC: 140 MMOL/L (ref 136–145)
WBC # BLD AUTO: 8 K/UL (ref 4.6–13.2)

## 2020-07-10 PROCEDURE — 02HV33Z INSERTION OF INFUSION DEVICE INTO SUPERIOR VENA CAVA, PERCUTANEOUS APPROACH: ICD-10-PCS | Performed by: RADIOLOGY

## 2020-07-10 PROCEDURE — 85025 COMPLETE CBC W/AUTO DIFF WBC: CPT

## 2020-07-10 PROCEDURE — 74011250637 HC RX REV CODE- 250/637: Performed by: INTERNAL MEDICINE

## 2020-07-10 PROCEDURE — 36556 INSERT NON-TUNNEL CV CATH: CPT

## 2020-07-10 PROCEDURE — 74011250637 HC RX REV CODE- 250/637: Performed by: OTOLARYNGOLOGY

## 2020-07-10 PROCEDURE — 82330 ASSAY OF CALCIUM: CPT

## 2020-07-10 PROCEDURE — 36415 COLL VENOUS BLD VENIPUNCTURE: CPT

## 2020-07-10 PROCEDURE — 84100 ASSAY OF PHOSPHORUS: CPT

## 2020-07-10 PROCEDURE — 82962 GLUCOSE BLOOD TEST: CPT

## 2020-07-10 PROCEDURE — 74011000258 HC RX REV CODE- 258: Performed by: INTERNAL MEDICINE

## 2020-07-10 PROCEDURE — 83735 ASSAY OF MAGNESIUM: CPT

## 2020-07-10 PROCEDURE — 74011250636 HC RX REV CODE- 250/636: Performed by: INTERNAL MEDICINE

## 2020-07-10 PROCEDURE — 65660000000 HC RM CCU STEPDOWN

## 2020-07-10 PROCEDURE — 80053 COMPREHEN METABOLIC PANEL: CPT

## 2020-07-10 RX ORDER — CALCIUM CARBONATE 500(1250)
2000 TABLET ORAL 4 TIMES DAILY
Status: DISCONTINUED | OUTPATIENT
Start: 2020-07-10 | End: 2020-07-12

## 2020-07-10 RX ORDER — CALCIUM CARBONATE 500(1250)
1000 TABLET ORAL 4 TIMES DAILY
Status: DISCONTINUED | OUTPATIENT
Start: 2020-07-10 | End: 2020-07-10

## 2020-07-10 RX ORDER — CALCIUM GLUCONATE 94 MG/ML
5 INJECTION, SOLUTION INTRAVENOUS ONCE
Status: DISCONTINUED | OUTPATIENT
Start: 2020-07-10 | End: 2020-07-10 | Stop reason: CLARIF

## 2020-07-10 RX ORDER — CALCIUM ACETATE 667 MG/1
5 CAPSULE ORAL
Status: DISCONTINUED | OUTPATIENT
Start: 2020-07-10 | End: 2020-07-17 | Stop reason: HOSPADM

## 2020-07-10 RX ADMIN — CALCIUM GLUCONATE 5 G: 94 INJECTION, SOLUTION INTRAVENOUS at 21:40

## 2020-07-10 RX ADMIN — LOSARTAN POTASSIUM 100 MG: 50 TABLET, FILM COATED ORAL at 09:55

## 2020-07-10 RX ADMIN — DOCUSATE SODIUM 50 MG AND SENNOSIDES 8.6 MG 2 TABLET: 8.6; 5 TABLET, FILM COATED ORAL at 21:41

## 2020-07-10 RX ADMIN — CALCIUM ACETATE 3335 MG: 667 CAPSULE ORAL at 18:25

## 2020-07-10 RX ADMIN — CARVEDILOL 25 MG: 25 TABLET, FILM COATED ORAL at 18:26

## 2020-07-10 RX ADMIN — CLONIDINE HYDROCHLORIDE 0.1 MG: 0.1 TABLET ORAL at 09:55

## 2020-07-10 RX ADMIN — Medication 10 ML: at 21:44

## 2020-07-10 RX ADMIN — AMLODIPINE BESYLATE 10 MG: 10 TABLET ORAL at 09:55

## 2020-07-10 RX ADMIN — CALCITRIOL CAPSULES 0.25 MCG 0.5 MCG: 0.25 CAPSULE ORAL at 09:54

## 2020-07-10 RX ADMIN — CALCIUM 3000 MG: 500 TABLET ORAL at 09:54

## 2020-07-10 RX ADMIN — CALCIUM ACETATE 3335 MG: 667 CAPSULE ORAL at 12:31

## 2020-07-10 RX ADMIN — CALCIUM 2000 MG: 500 TABLET ORAL at 21:41

## 2020-07-10 RX ADMIN — CALCITRIOL CAPSULES 0.25 MCG 0.5 MCG: 0.25 CAPSULE ORAL at 18:25

## 2020-07-10 RX ADMIN — CALCIUM 1000 MG: 500 TABLET ORAL at 18:25

## 2020-07-10 RX ADMIN — CLONIDINE HYDROCHLORIDE 0.1 MG: 0.1 TABLET ORAL at 18:25

## 2020-07-10 RX ADMIN — CARVEDILOL 25 MG: 25 TABLET, FILM COATED ORAL at 09:55

## 2020-07-10 RX ADMIN — Medication 10 ML: at 18:00

## 2020-07-10 NOTE — PROGRESS NOTES
CM Chart Review:  Discharge disposition is Home at this time. Daughter will be transporting the patient home at time of discharge. CM will continue to monitor the patient, and remain available for discharge planning needs.        Emilio Lozano RN  Case Management [phone]

## 2020-07-10 NOTE — ROUTINE PROCESS
Bedside and Verbal shift change report given to Alejandro Lynch RN (oncoming nurse) by Fredis Mcdaniel RN (offgoing nurse). Report included the following information SBAR, Kardex and Recent Results.

## 2020-07-10 NOTE — ROUTINE PROCESS
Bedside shift change report given to Carmen HYMAN (oncoming nurse) by Charles Oneal (offgoing nurse). Report included the following information SBAR, Kardex, Intake/Output and MAR. Patient Ionized Calcium level low to have repeat labs q 6 hours. Patient currently taking po calcium supplements due to no IV access. Patient refuses IV access for  IV calcium administration, states that he understands the risks  and complications involved.

## 2020-07-10 NOTE — PROGRESS NOTES
Nurse reported patient has no IV access  Patient's calcium level low  Advised nurse to talk to primary team in the next hour  Report again if patient's condition changes

## 2020-07-10 NOTE — PROGRESS NOTES
RENAL DAILY PROGRESS NOTE    Patient: Roland Salazar               Sex: male          DOA: 7/6/2020  7:22 AM        YOB: 1970      Age:  44 y.o.        LOS:  LOS: 4 days     Subjective: Roland Salazar is a 44 y.o.  who presents with Hyperparathyroidism ([facility] [address] [E21.3]. Asked to evaluate for esrd,on peritoneal dialysis. hx of hyperparathyroidism,htn.has parathyroidectomy ,3.5 glands removed  Chief complains:none  - Reviewed last 24 hrs events     Current Facility-Administered Medications   Medication Dose Route Frequency    calcium acetate(phosphat bind) (PHOSLO) capsule 3,335 mg  5 Cap Oral TID WITH MEALS    calcium gluconate 5 g in 0.9% sodium chloride 100 mL IVPB  5 g IntraVENous ONCE    senna-docusate (PERICOLACE) 8.6-50 mg per tablet 2 Tab  2 Tab Oral QHS    lactulose (CHRONULAC) 10 gram/15 mL solution 30 mL  30 mL Oral BID    calcitRIOL (ROCALTROL) capsule 0.5 mcg  0.5 mcg Oral TID    calcium carbonate (OS-VIRAL) tablet 3,000 mg [elemental]  3,000 mg Oral TID    heparin (porcine) injection 5,000 Units  5,000 Units SubCUTAneous Q8H    peritoneal dialysis DEXTROSE 2.5% (2.5 mEq/L low calcium) solution 2,000 mL  2,000 mL IntraPERitoneal BID    calcium gluconate 24 g in 500 ml NS IVPB  24 g IntraVENous TITRATE    hydrALAZINE (APRESOLINE) 20 mg/mL injection 10 mg  10 mg IntraVENous Q6H PRN    amLODIPine (NORVASC) tablet 10 mg  10 mg Oral DAILY    carvediloL (COREG) tablet 25 mg  25 mg Oral BID WITH MEALS    cloNIDine HCL (CATAPRES) tablet 0.1 mg  0.1 mg Oral BID    ergocalciferol capsule 50,000 Units  50,000 Units Oral Q7D    losartan (COZAAR) tablet 100 mg  100 mg Oral DAILY    sodium chloride (NS) flush 5-40 mL  5-40 mL IntraVENous Q8H    sodium chloride (NS) flush 5-40 mL  5-40 mL IntraVENous PRN    HYDROcodone-acetaminophen (NORCO) 5-325 mg per tablet 1 Tab  1 Tab Oral Q4H PRN    HYDROmorphone (DILAUDID) injection 0.5 mg  0.5 mg IntraVENous Q4H PRN    naloxone [facility]) injection 0.4 mg  0.4 mg IntraVENous PRN    ondansetron (ZOFRAN ODT) tablet 4 mg  4 mg Oral Q8H PRN    ELECTROLYTE REPLACEMENT PROTOCOL - Calcium  1 Each Other PRN    allopurinoL (ZYLOPRIM) tablet 100 mg  100 mg Oral Q TUE, THU & SAT    peritoneal dialysis DEXTROSE 2.5% (2.5 mEq/L low calcium) solution 12,000 mL  12,000 mL IntraPERitoneal DAILY    ELECTROLYTE REPLACEMENT PROTOCOL - Calcium   1 Each Other PRN       Objective:     Visit Vitals  /84 (BP Patient Position: At rest)   Pulse 87   Temp 98.2 °F (36.8 °C)   Resp 14   Ht 6' 2\" (1.88 m)   Wt 90.8 kg (200 lb 2.8 oz)   SpO2 97%   BMI 25.70 kg/m²     No intake or output data in the 24 hours ending 07/10/20 1436    Physical Examination:     GEN: alert  RS: Chest is bilateral equal, no wheezing / rales / crackles  CVS: S1-S2 heard, RRR, No S3 / murmur  Abdomen: Soft,  Extremities: No edema, no cyanosis, skin is warm on touch  HEENT: Head is atraumatic, PERRLA, conjunctiva pink & non icteric. No JVD or carotid bruit       Data Review:      Labs:     Hematology:   Recent Labs     07/10/20  0200 07/09/20  0951 07/08/20  0200   WBC 8.0 7.5 7.7   HGB 9.9* 9.8* 10.0*   HCT 31.4* 30.3* 31.5*     Chemistry:   Recent Labs     07/10/20  0200 07/09/20  0951 07/08/20  0200   BUN 95* 97* 104*   CREA 22.80* 23.70* 26.20*   CA 6.5* 7.1*  6.8* 8.0*   ALB 2.2* 2.2* 2.2*   K 3.9 3.8 4.9    141 142    104 105   CO2 24 21 20*   PHOS 8.2* 8.6* 10.8*   * 151* 94        Images:    XR (Most Recent). CXR reviewed by me and compared with previous CXR Results from Hospital Encounter encounter on 07/06/20   XR CHEST PORT    Narrative EXAM: XR CHEST PORT    INDICATION: provider order pleural effusion    COMPARISON: 9/11/2018    FINDINGS: A portable AP radiograph of the chest was obtained at 1042 hours. The  patient has a large left pleural effusion and a moderate to large right pleural  effusion. Atelectasis noted associated the lung bases.  The aerated lungs reveal  no infiltrate or mass. There is no pneumothorax. Impression IMPRESSION: Prominent bilateral pleural effusions. Correlation with CAT scan may  be considered        CT (Most Recent) Results from Hospital Encounter encounter on 02/29/20   CT ABD PELV WO CONT    Narrative Indication: evaluate PD cuff. Peritoneal dialysis tube malpositioned. Impression IMPRESSION: Left lower quadrant peritoneal dialysis catheter looped in pelvis. Bilateral pleural effusions. Cardiomegaly. Gallbladder wall thickening,  ultrasound follow-up recommended. Bilateral renal cysts. Diverticulosis. Left  inguinal canal fluid collection. Comment: CT images of the abdomen and pelvis were obtained without oral or  intravenous contrast. The initial report was rendered by [facility]  radiology services. Small bilateral pleural effusions are present with atelectasis in the underlying  lungs. The heart is enlarged. The gallbladder is contracted and thickening of the fundus wall is noted with  suspicion of tiny stone. Nonemergent ultrasound recommended for further  evaluation. The liver spleen pancreas and adrenal glands are unremarkable. The kidneys are mildly atrophic and harbor cysts. Calculus or hydronephrosis. The ureters and bladder are unremarkable. Peritoneal dialysis catheter is looped in the pelvis entering the peritoneum in  the left lower quadrant. Fluid collection is noted in the left inguinal canal.    Mild colonic diverticulosis is noted without diverticulitis. Pelvic viscera is unremarkable. No bowel obstruction, free intraperitoneal air, free fluid or appendicitis. DICOM format imaged data is available to non-affiliated external healthcare  facilities or entities on a secure, media free, reciprocally searchable basis  with patient authorization  for 12 months following the date of the study. EKG No results found for this or any previous visit.      I have personally reviewed the old medical records and patient's labs    Plan / Recommendation:      1. esrd ,on ccpd,not compliant . encouraged compliance with dialysis  2.s/p subtotoal parathyroidectomy,severe hypocalcemia. at [facility]. no iv access . agreed with central line. consulted int radiology. start iv calcium when central line is inserted  3.htn,adjust meds . increase ultrafiltration with pd  4-hyperphosphatemia,improving on calcium acetate with meals  5-epo,will give epo when bp is better    D/w Dr sinai Flood MD  Nephrology  7/10/2020

## 2020-07-10 NOTE — PROGRESS NOTES
SUBJECTIVE:    Patient was somewhat upset that he has to go for central line placement. Denies any headaches or dizziness. Denies any chest pain or shortness of breath or cough. No nausea or vomiting. No bowel bladder disturbances. Denies any tingling or numbness currently. OBJECTIVE:    /84 (BP Patient Position: At rest)   Pulse 87   Temp 98.2 °F (36.8 °C)   Resp 14   Ht 6' 2\" (1.88 m)   Wt 90.8 kg (200 lb 2.8 oz)   SpO2 97%   BMI 25.70 kg/m²     General appearance - alert, well appearing, and in no distress  Eyes - sclera anicteric, no pallor  Nose - no obvious nasal discharge. Neck - supple, no JVD, trachea is midline, surgical scar is in situ without any erythema or discharge from it. Chest -clear air entry noted in bases, no wheezes  Heart - S1 and S2 normal  Abdomen - soft, nontender, nondistended, Bowel sounds present, peritoneal dialysis catheter in situ  Neurological - alert, oriented, normal speech, no focal findings noted  Musculoskeletal - no joint tenderness or swelling of knees bilaterally  Extremities - no pedal edema noted, right hand edema present    ASSESSMENT:    1. Parathyroid hyperplasia with secondary hyperparathyroidism status post subtotal parathyroidectomy  2. Hypocalcemia due to subtotal parathyroidectomy  3. ESRD on peritoneal dialysis  4. Hypertension  5. Chronic diastolic CHF with EF of 46 percentage, compensated  6. Anemia of chronic medical disease and ESRD, stable  7. Gout  8. Low albumin    PLAN:    Continue current management  Discussed with Dr. Mackenzie Acevedo: He wants to give IV calcium through central line and he asked me to put the order for central line placement. Dr. Mackenzie Acevedo talked to interventional neurology about putting central line. Disclaimer: Sections of this note are dictated using utilizing voice recognition software, which may have resulted in some phonetic based errors in grammar and contents.  Even though attempts were made to correct all the mistakes, some may have been missed, and remained in the body of the document. If questions arise, please contact our department. Recent Results (from the past 24 hour(s))   CALCIUM, IONIZED    Collection Time: 07/09/20  3:17 PM   Result Value Ref Range    Ionized Calcium 0.85 (L) 1.12 - 1.32 MMOL/L   GLUCOSE, POC    Collection Time: 07/09/20  4:25 PM   Result Value Ref Range    Glucose (POC) 114 (H) 70 - 110 mg/dL   CALCIUM, IONIZED    Collection Time: 07/09/20  9:31 PM   Result Value Ref Range    Ionized Calcium 0.84 (L) 1.12 - 1.32 MMOL/L   CBC WITH AUTOMATED DIFF    Collection Time: 07/10/20  2:00 AM   Result Value Ref Range    WBC 8.0 4.6 - 13.2 K/uL    RBC 3.92 (L) 4.70 - 5.50 M/uL    HGB 9.9 (L) 13.0 - 16.0 g/dL    HCT 31.4 (L) 36.0 - 48.0 %    MCV 80.1 74.0 - 97.0 FL    MCH 25.3 24.0 - 34.0 PG    MCHC 31.5 31.0 - 37.0 g/dL    RDW 17.4 (H) 11.6 - 14.5 %    PLATELET 538 (L) 229 - 420 K/uL    MPV 10.5 9.2 - 11.8 FL    NEUTROPHILS 77 (H) 40 - 73 %    LYMPHOCYTES 5 (L) 21 - 52 %    MONOCYTES 10 3 - 10 %    EOSINOPHILS 8 (H) 0 - 5 %    BASOPHILS 0 0 - 2 %    ABS. NEUTROPHILS 6.2 1.8 - 8.0 K/UL    ABS. LYMPHOCYTES 0.4 (L) 0.9 - 3.6 K/UL    ABS. MONOCYTES 0.8 0.05 - 1.2 K/UL    ABS. EOSINOPHILS 0.6 (H) 0.0 - 0.4 K/UL    ABS.  BASOPHILS 0.0 0.0 - 0.1 K/UL    DF AUTOMATED      PLATELET COMMENTS DECREASED PLATELETS      RBC COMMENTS ANISOCYTOSIS  1+        RBC COMMENTS POLYCHROMASIA  1+       MAGNESIUM    Collection Time: 07/10/20  2:00 AM   Result Value Ref Range    Magnesium 1.9 1.6 - 2.6 mg/dL   METABOLIC PANEL, COMPREHENSIVE    Collection Time: 07/10/20  2:00 AM   Result Value Ref Range    Sodium 140 136 - 145 mmol/L    Potassium 3.9 3.5 - 5.5 mmol/L    Chloride 103 100 - 111 mmol/L    CO2 24 21 - 32 mmol/L    Anion gap 13 3.0 - 18 mmol/L    Glucose 104 (H) 74 - 99 mg/dL    BUN 95 (H) 7.0 - 18 MG/DL    Creatinine 22.80 (H) 0.6 - 1.3 MG/DL    BUN/Creatinine ratio 4 (L) 12 - 20      GFR est AA 3 (L) >60 ml/min/1.73m2    GFR est non-AA 2 (L) >60 ml/min/1.73m2    Calcium 6.5 (L) 8.5 - 10.1 MG/DL    Bilirubin, total 0.4 0.2 - 1.0 MG/DL    ALT (SGPT) 8 (L) 16 - 61 U/L    AST (SGOT) 8 (L) 10 - 38 U/L    Alk.  phosphatase 80 45 - 117 U/L    Protein, total 6.6 6.4 - 8.2 g/dL    Albumin 2.2 (L) 3.4 - 5.0 g/dL    Globulin 4.4 (H) 2.0 - 4.0 g/dL    A-G Ratio 0.5 (L) 0.8 - 1.7     PHOSPHORUS    Collection Time: 07/10/20  2:00 AM   Result Value Ref Range    Phosphorus 8.2 (H) 2.5 - 4.9 MG/DL   CALCIUM, IONIZED    Collection Time: 07/10/20  2:00 AM   Result Value Ref Range    Ionized Calcium 0.79 (LL) 1.12 - 1.32 MMOL/L   CALCIUM, IONIZED    Collection Time: 07/10/20 10:15 AM   Result Value Ref Range    Ionized Calcium 0.75 (LL) 1.12 - 1.32 MMOL/L   GLUCOSE, POC    Collection Time: 07/10/20 11:13 AM   Result Value Ref Range    Glucose (POC) 99 70 - 110 mg/dL

## 2020-07-10 NOTE — PROCEDURES
Interventional Radiology Brief Procedure Note    Patient: Juanita Templeton MRN: [id number]  SSN: [id number]    YOB: 1970  Age: 44 y.o. Sex: male      Date of Procedure: 7/10/2020    Procedure(s): Temporary Central Venous Catheter    Performed By: ROLANDO Burden    Anesthesia: Lidocaine    Estimated Blood Loss: None    Specimens: None    Findings:     - Written and verbal informed consent was obtained. - Time Out was performed. - Permanent image storage performed on PACS. - Image-guided right IJ 19cm dual lumen central catheter placement performed using maximum barrier precautions and sterile technique. - Please refer to report on PACS for full details. Implants: None    Plan: Okay for immediate use.     Signed By: Rebecca Tate, [address]     July 10, 2020

## 2020-07-10 NOTE — CONSULTS
Consult Note    Patient: Diane Butler               Sex: male          DOA: 7/6/2020         YOB: 1970      Age:  44 y.o.        LOS:  LOS: 4 days              HPI:     Diane Butler is a 44 y.o. male who has been seen in evaluation of hypocalcemia at the request of Dr. Mickey Licona. Patient has a history of ESRD on peritoneal dialysis, HTN, CHF, anemia, gout and parathyroid hyperplasia with secondary hyperthyroidism who underwent subtotal parathyroidectomy on 7/6. He is being managed for post-operative hypocalcemia at the direction of the primary team and Nephrology. Past Medical History:   Diagnosis Date    Anemia     Chronic kidney disease     ESRD-   peritoneal dilaysis    Gout     Heart failure ([facility] [address] 04/2019    History of blood transfusion 05/22/2018    Hypertension     Joint pain     Sleep apnea 07/03/2018    does not use c pap       Past Surgical History:   Procedure Laterality Date    COLONOSCOPY N/A 8/23/2019    COLONOSCOPY performed by Asmita Lam MD at [address]  10/23/2019    HX ORTHOPAEDIC Left     left knee sx     HX OTHER SURGICAL  2018    tenkoff catheter    HX UROLOGICAL  05/20/2018    prostate biopsy    HX WISDOM TEETH EXTRACTION      IR BX BONE MARROW DIAGNOSTIC  11/25/2019       Family History   Problem Relation Age of Onset    Hypertension Mother     Heart Disease Father     Hypertension Father     Hypertension Child        Social History     Socioeconomic History    Marital status:      Spouse name: Not on file    Number of children: Not on file    Years of education: Not on file    Highest education level: Not on file   Tobacco Use    Smoking status: Never Smoker    Smokeless tobacco: Never Used   Substance and Sexual Activity    Alcohol use: No    Drug use: No    Sexual activity: Yes     Partners: Female       Prior to Admission medications    Medication Sig Start Date End Date Taking?  Authorizing Provider cinacalcet (SENSIPAR) 30 mg tablet Take  by mouth daily. Yes Provider, Historical   ergocalciferol (ERGOCALCIFEROL) 50,000 unit capsule Take  by mouth every seven (7) days. Yes Provider, Historical   calcitRIOL (ROCALTROL) 0.5 mcg capsule Take  by mouth daily. 7/19/19  Yes Provider, Historical   losartan (COZAAR) 100 mg tablet Take 1 Tab by mouth daily. 4/24/19  Yes Odalys Prather NP   cloNIDine HCl (CATAPRES) 0.1 mg tablet Take 0.1 mg by mouth two (2) times a day. 10/30/17  Yes Provider, Historical   amLODIPine (NORVASC) 10 mg tablet Take 1 Tab by mouth daily. 3/22/17  Yes Megan Simon, NP   carvedilol (COREG) 25 mg tablet Take 1 Tab by mouth two (2) times daily (with meals). 1/2/20   Annabelle Oshea NP   allopurinol (ZYLOPRIM) 100 mg tablet Take 2 Tabs by mouth daily. 3/20/17   Megan Wallaceing, NP       No Known Allergies    Review of Systems  Pertinent items are noted in the History of Present Illness. Physical Exam:      Visit Vitals  /84 (BP Patient Position: At rest)   Pulse 87   Temp 98.2 °F (36.8 °C)   Resp 14   Ht 6' 2\" (1.88 m)   Wt 90.8 kg (200 lb 2.8 oz)   SpO2 97%   BMI 25.70 kg/m²       Physical Exam:  Constitutional: NAD. Resting comfortably in bed. A&Ox4. Respiratory: Normal respiratory effort. Symmetrical rise and fall of chest.   Cardiovascular: Regular rate. Gastrointestinal: Soft, NT, ND.      Labs Reviewed:  CMP:   Lab Results   Component Value Date/Time     07/10/2020 02:00 AM    K 3.9 07/10/2020 02:00 AM     07/10/2020 02:00 AM    CO2 24 07/10/2020 02:00 AM    AGAP 13 07/10/2020 02:00 AM     (H) 07/10/2020 02:00 AM    BUN 95 (H) 07/10/2020 02:00 AM    CREA 22.80 (H) 07/10/2020 02:00 AM    GFRAA 3 (L) 07/10/2020 02:00 AM    GFRNA 2 (L) 07/10/2020 02:00 AM    CA 6.5 (L) 07/10/2020 02:00 AM    MG 1.9 07/10/2020 02:00 AM    PHOS 8.2 (H) 07/10/2020 02:00 AM    ALB 2.2 (L) 07/10/2020 02:00 AM    TP 6.6 07/10/2020 02:00 AM    GLOB 4.4 (H) 07/10/2020 02:00 AM    AGRAT 0.5 (L) 07/10/2020 02:00 AM    ALT 8 (L) 07/10/2020 02:00 AM     CBC:   Lab Results   Component Value Date/Time    WBC 8.0 07/10/2020 02:00 AM    HGB 9.9 (L) 07/10/2020 02:00 AM    HCT 31.4 (L) 07/10/2020 02:00 AM     (L) 07/10/2020 02:00 AM     COAGS: No results found for: APTT, PTP, INR, INREXT, INREXT    Assessment/Plan   Active Problems:    Hyperparathyroidism ([address] (7/6/2020)      Parathyroid hyperplasia ([address] (7/9/2020)      Hypocalcemia (7/9/2020)      ESRD on peritoneal dialysis ([address] (1/6/6494)      Systolic CHF, chronic ([address] (7/9/2020)        Kate Majano is a 44 y.o. male with a history of ESRD on PD, HTN, parathyroid hyperplasia secondary to hyperthyroidism and hypocalcemia requiring calcium replacement. Plan     Case and images reviewed by Dr. Jerrica Holcomb. Discussions held between Dr. Eboni Vasquez and myself regarding management options. Given that he has a history of ESRD, difficult IV access and requires calcium supplementation, will plan for image-guided central catheter placement. Consent to be obtained prior to procedure.

## 2020-07-10 NOTE — ROUTINE PROCESS
TRANSFER - IN REPORT:    Verbal report received from Ashish Covarrubias RN(name) on [employer]  being received from ICU(unit) for routine progression of care      Report consisted of patients Situation, Background, Assessment and   Recommendations(SBAR). Information from the following report(s) SBAR, Kardex and Recent Results was reviewed with the receiving nurse. Opportunity for questions and clarification was provided. Assessment completed upon patients arrival to unit and care assumed.

## 2020-07-10 NOTE — PROGRESS NOTES
MARI-HNS   wound clean   calcium remains low   have added /increased PO calcium   will however defer to renal/medicine e: any adjustments  [facility]

## 2020-07-11 LAB
ALBUMIN SERPL-MCNC: 2.4 G/DL (ref 3.4–5)
ALBUMIN SERPL-MCNC: 2.5 G/DL (ref 3.4–5)
ALBUMIN/GLOB SERPL: 0.6 {RATIO} (ref 0.8–1.7)
ALP SERPL-CCNC: 90 U/L (ref 45–117)
ALT SERPL-CCNC: 8 U/L (ref 16–61)
ANION GAP SERPL CALC-SCNC: 15 MMOL/L (ref 3–18)
AST SERPL-CCNC: 6 U/L (ref 10–38)
BILIRUB SERPL-MCNC: 0.6 MG/DL (ref 0.2–1)
BUN SERPL-MCNC: 96 MG/DL (ref 7–18)
BUN/CREAT SERPL: 4 (ref 12–20)
CA-I SERPL-SCNC: 0.8 MMOL/L (ref 1.12–1.32)
CALCIUM SERPL-MCNC: 6.3 MG/DL (ref 8.5–10.1)
CALCIUM SERPL-MCNC: 6.4 MG/DL (ref 8.5–10.1)
CHLORIDE SERPL-SCNC: 102 MMOL/L (ref 100–111)
CO2 SERPL-SCNC: 23 MMOL/L (ref 21–32)
CREAT SERPL-MCNC: 23.7 MG/DL (ref 0.6–1.3)
GLOBULIN SER CALC-MCNC: 4.5 G/DL (ref 2–4)
GLUCOSE BLD STRIP.AUTO-MCNC: 100 MG/DL (ref 70–110)
GLUCOSE BLD STRIP.AUTO-MCNC: 104 MG/DL (ref 70–110)
GLUCOSE BLD STRIP.AUTO-MCNC: 115 MG/DL (ref 70–110)
GLUCOSE SERPL-MCNC: 87 MG/DL (ref 74–99)
MAGNESIUM SERPL-MCNC: 1.9 MG/DL (ref 1.6–2.6)
PHOSPHATE SERPL-MCNC: 7.6 MG/DL (ref 2.5–4.9)
POTASSIUM SERPL-SCNC: 4 MMOL/L (ref 3.5–5.5)
PROT SERPL-MCNC: 7 G/DL (ref 6.4–8.2)
SODIUM SERPL-SCNC: 140 MMOL/L (ref 136–145)

## 2020-07-11 PROCEDURE — 82040 ASSAY OF SERUM ALBUMIN: CPT

## 2020-07-11 PROCEDURE — 82330 ASSAY OF CALCIUM: CPT

## 2020-07-11 PROCEDURE — 74011250637 HC RX REV CODE- 250/637: Performed by: OTOLARYNGOLOGY

## 2020-07-11 PROCEDURE — 74011250637 HC RX REV CODE- 250/637: Performed by: INTERNAL MEDICINE

## 2020-07-11 PROCEDURE — 83735 ASSAY OF MAGNESIUM: CPT

## 2020-07-11 PROCEDURE — 82962 GLUCOSE BLOOD TEST: CPT

## 2020-07-11 PROCEDURE — 82310 ASSAY OF CALCIUM: CPT

## 2020-07-11 PROCEDURE — 74011250636 HC RX REV CODE- 250/636: Performed by: INTERNAL MEDICINE

## 2020-07-11 PROCEDURE — 84100 ASSAY OF PHOSPHORUS: CPT

## 2020-07-11 PROCEDURE — 65660000000 HC RM CCU STEPDOWN

## 2020-07-11 PROCEDURE — 80053 COMPREHEN METABOLIC PANEL: CPT

## 2020-07-11 RX ORDER — CALCIUM GLUCONATE 94 MG/ML
10 INJECTION, SOLUTION INTRAVENOUS ONCE
Status: DISCONTINUED | OUTPATIENT
Start: 2020-07-11 | End: 2020-07-11

## 2020-07-11 RX ORDER — HYDRALAZINE HYDROCHLORIDE 25 MG/1
25 TABLET, FILM COATED ORAL 3 TIMES DAILY
Status: DISCONTINUED | OUTPATIENT
Start: 2020-07-11 | End: 2020-07-12

## 2020-07-11 RX ORDER — LANOLIN ALCOHOL/MO/W.PET/CERES
3 CREAM (GRAM) TOPICAL ONCE
Status: COMPLETED | OUTPATIENT
Start: 2020-07-11 | End: 2020-07-11

## 2020-07-11 RX ORDER — LANOLIN ALCOHOL/MO/W.PET/CERES
3 CREAM (GRAM) TOPICAL ONCE
Status: DISCONTINUED | OUTPATIENT
Start: 2020-07-11 | End: 2020-07-11

## 2020-07-11 RX ORDER — CLONIDINE HYDROCHLORIDE 0.1 MG/1
0.1 TABLET ORAL 3 TIMES DAILY
Status: DISCONTINUED | OUTPATIENT
Start: 2020-07-11 | End: 2020-07-17 | Stop reason: HOSPADM

## 2020-07-11 RX ADMIN — CLONIDINE HYDROCHLORIDE 0.1 MG: 0.1 TABLET ORAL at 09:23

## 2020-07-11 RX ADMIN — DOCUSATE SODIUM 50 MG AND SENNOSIDES 8.6 MG 2 TABLET: 8.6; 5 TABLET, FILM COATED ORAL at 21:46

## 2020-07-11 RX ADMIN — CALCIUM 2000 MG: 500 TABLET ORAL at 21:45

## 2020-07-11 RX ADMIN — Medication 10 ML: at 18:27

## 2020-07-11 RX ADMIN — HYDRALAZINE HYDROCHLORIDE 25 MG: 25 TABLET, FILM COATED ORAL at 21:46

## 2020-07-11 RX ADMIN — CALCIUM 2000 MG: 500 TABLET ORAL at 18:21

## 2020-07-11 RX ADMIN — MELATONIN 3 MG: at 21:46

## 2020-07-11 RX ADMIN — AMLODIPINE BESYLATE 10 MG: 10 TABLET ORAL at 09:19

## 2020-07-11 RX ADMIN — LOSARTAN POTASSIUM 100 MG: 50 TABLET, FILM COATED ORAL at 09:17

## 2020-07-11 RX ADMIN — CALCIUM ACETATE 3335 MG: 667 CAPSULE ORAL at 18:21

## 2020-07-11 RX ADMIN — CALCITRIOL CAPSULES 0.25 MCG 0.5 MCG: 0.25 CAPSULE ORAL at 09:18

## 2020-07-11 RX ADMIN — CALCITRIOL CAPSULES 0.25 MCG 0.5 MCG: 0.25 CAPSULE ORAL at 18:22

## 2020-07-11 RX ADMIN — CLONIDINE HYDROCHLORIDE 0.1 MG: 0.1 TABLET ORAL at 18:23

## 2020-07-11 RX ADMIN — CLONIDINE HYDROCHLORIDE 0.1 MG: 0.1 TABLET ORAL at 21:46

## 2020-07-11 RX ADMIN — CARVEDILOL 25 MG: 25 TABLET, FILM COATED ORAL at 18:23

## 2020-07-11 RX ADMIN — Medication 10 ML: at 21:53

## 2020-07-11 RX ADMIN — CALCIUM 2000 MG: 500 TABLET ORAL at 09:18

## 2020-07-11 RX ADMIN — CALCITRIOL CAPSULES 0.25 MCG 0.5 MCG: 0.25 CAPSULE ORAL at 01:34

## 2020-07-11 RX ADMIN — CALCIUM GLUCONATE 10 G: 98 INJECTION, SOLUTION INTRAVENOUS at 12:23

## 2020-07-11 RX ADMIN — CALCIUM ACETATE 3335 MG: 667 CAPSULE ORAL at 09:17

## 2020-07-11 RX ADMIN — CARVEDILOL 25 MG: 25 TABLET, FILM COATED ORAL at 09:24

## 2020-07-11 RX ADMIN — Medication 10 ML: at 07:45

## 2020-07-11 RX ADMIN — HYDRALAZINE HYDROCHLORIDE 25 MG: 25 TABLET, FILM COATED ORAL at 18:23

## 2020-07-11 RX ADMIN — CALCITRIOL CAPSULES 0.25 MCG 0.5 MCG: 0.25 CAPSULE ORAL at 21:46

## 2020-07-11 NOTE — PROGRESS NOTES
SUBJECTIVE:    Patient is sitting up in chair. No nausea vomiting. No neck pain. Had a bowel movement. No nausea or vomiting. OBJECTIVE:    BP (!) 162/108   Pulse 84   Temp 97.9 °F (36.6 °C)   Resp 19   Ht 6' 2\" (1.88 m)   Wt 90.8 kg (200 lb 2.8 oz)   SpO2 99%   BMI 25.70 kg/m²     General appearance - alert, well appearing, and in no distress  Chest -clear air entry noted in bases, no wheezes, right neck PICC line catheter in situ  Heart - S1 and S2 normal  Abdomen - soft, nontender, nondistended, Bowel sounds present, peritoneal dialysis catheter in situ  Neurological - alert, oriented, normal speech, no focal findings noted  Extremities - no pedal edema noted, right hand edema present    ASSESSMENT:    1. Parathyroid hyperplasia with secondary hyperparathyroidism status post subtotal parathyroidectomy  2. Hypocalcemia due to subtotal parathyroidectomy, slowly improving  3. ESRD on peritoneal dialysis  4. Hypertension  5. Chronic diastolic CHF with EF of 46 percentage, compensated  6. Anemia of chronic medical disease and ESRD, stable  7. Gout  8. Low albumin, improving    PLAN:    Continue current management  ENT and nephrology to follow this patient  Add hydralazine for better blood pressure control    Disclaimer: Sections of this note are dictated using utilizing voice recognition software, which may have resulted in some phonetic based errors in grammar and contents. Even though attempts were made to correct all the mistakes, some may have been missed, and remained in the body of the document. If questions arise, please contact our department.     Recent Results (from the past 24 hour(s))   CALCIUM, IONIZED    Collection Time: 07/10/20 10:15 AM   Result Value Ref Range    Ionized Calcium 0.75 (LL) 1.12 - 1.32 MMOL/L   GLUCOSE, POC    Collection Time: 07/10/20 11:13 AM   Result Value Ref Range    Glucose (POC) 99 70 - 110 mg/dL   GLUCOSE, POC    Collection Time: 07/10/20  4:12 PM   Result Value Ref Range    Glucose (POC) 110 70 - 110 mg/dL   CALCIUM, IONIZED    Collection Time: 07/10/20  5:10 PM   Result Value Ref Range    Ionized Calcium 0.75 (LL) 1.12 - 1.32 MMOL/L   MAGNESIUM    Collection Time: 07/11/20  8:00 AM   Result Value Ref Range    Magnesium 1.9 1.6 - 2.6 mg/dL   METABOLIC PANEL, COMPREHENSIVE    Collection Time: 07/11/20  8:00 AM   Result Value Ref Range    Sodium 140 136 - 145 mmol/L    Potassium 4.0 3.5 - 5.5 mmol/L    Chloride 102 100 - 111 mmol/L    CO2 23 21 - 32 mmol/L    Anion gap 15 3.0 - 18 mmol/L    Glucose 87 74 - 99 mg/dL    BUN 96 (H) 7.0 - 18 MG/DL    Creatinine 23.70 (H) 0.6 - 1.3 MG/DL    BUN/Creatinine ratio 4 (L) 12 - 20      GFR est AA 2 (L) >60 ml/min/1.73m2    GFR est non-AA 2 (L) >60 ml/min/1.73m2    Calcium 6.4 (L) 8.5 - 10.1 MG/DL    Bilirubin, total 0.6 0.2 - 1.0 MG/DL    ALT (SGPT) 8 (L) 16 - 61 U/L    AST (SGOT) 6 (L) 10 - 38 U/L    Alk.  phosphatase 90 45 - 117 U/L    Protein, total 7.0 6.4 - 8.2 g/dL    Albumin 2.5 (L) 3.4 - 5.0 g/dL    Globulin 4.5 (H) 2.0 - 4.0 g/dL    A-G Ratio 0.6 (L) 0.8 - 1.7     PHOSPHORUS    Collection Time: 07/11/20  8:00 AM   Result Value Ref Range    Phosphorus 7.6 (H) 2.5 - 4.9 MG/DL   CALCIUM, IONIZED    Collection Time: 07/11/20  8:00 AM   Result Value Ref Range    Ionized Calcium 0.80 (L) 1.12 - 1.32 MMOL/L   GLUCOSE, POC    Collection Time: 07/11/20  8:18 AM   Result Value Ref Range    Glucose (POC) 100 70 - 110 mg/dL

## 2020-07-11 NOTE — PROGRESS NOTES
1915: Assumed patient care from [place] [id number]. Patient is alert and oriented to person, place, time and situation. Respiratory status is stable on room air. Vital signs are stable. MEWS score is a one. Patient denies any pain, discomfort, nausea vomiting dizziness or anxiety. White board and fall card is updated. Bed is locked and in lowest position. Call bell, water and personal belongings are within reach. Patient has no questions, comments or concerns after bedside shift report. 0700: Patient had an uneventful shift. Respiratory status, vital signs and MEWS score remained stable. Patient was resting quietly with no signs of distress noted. Bed locked and in lowest position. Call bell water and personal belongings were within reach. Patient had no questions, comments or concerns after bedside shift report.  [facility]

## 2020-07-11 NOTE — PROGRESS NOTES
Assumed care for the pt, AXO 4, follows command, does not complain of pain will continue to monitor pt

## 2020-07-11 NOTE — PROGRESS NOTES
TRANSFER - OUT REPORT:    Verbal report given to [address] [place] [address] Street, RN(name) on [employer] [facility]  being transferred to  (unit) for routine progression of care       Report consisted of patients Situation, Background, Assessment and   Recommendations(SBAR). Information from the following report(s) SBAR, Kardex and MAR was reviewed with the receiving nurse. Lines:   Double Lumen BARD POWER PICC [id number] 07/10/20 Right;Upper Internal jugular (Active)   Central Line Being Utilized Yes 07/10/20 1915   Criteria for Appropriate Use Long term IV/antibiotic administration 07/10/20 1915   Site Assessment Clean, dry, & intact 07/10/20 1915   Infiltration Assessment 0 07/10/20 1915   Affected Extremity/Extremities Color distal to insertion site pink (or appropriate for race) 07/10/20 1915   Date of Last Dressing Change 07/10/20 07/10/20 1915   Dressing Status Clean, dry, & intact 07/10/20 1915   Dressing Type Disk with Chlorhexadine gluconate (CHG) 07/10/20 1915   Action Taken Open ports on tubing capped 07/10/20 1915   Proximal Hub Color/Line Status Purple;Flushed;Patent;Capped 07/10/20 1915   Positive Blood Return (Medial Site) Yes 07/10/20 1915   Distal Hub Color/Line Status Red 07/10/20 1915   Positive Blood Return (Lateral Site) Yes 07/10/20 1915   Alcohol Cap Used Yes 07/10/20 1704        Opportunity for questions and clarification was provided.       Patient transported with:   [employer]

## 2020-07-11 NOTE — PROGRESS NOTES
Problem: Falls - Risk of  Goal: *Absence of Falls  Description: Document Verona Parker Fall Risk and appropriate interventions in the flowsheet.   Outcome: Progressing Towards Goal  Note: Fall Risk Interventions:  Mobility Interventions: Assess mobility with egress test, Bed/chair exit alarm, OT consult for ADLs, Patient to call before getting OOB, PT Consult for mobility concerns, PT Consult for assist device competence, Strengthening exercises (ROM-active/passive)         Medication Interventions: Patient to call before getting OOB    Elimination Interventions: Bed/chair exit alarm, Call light in reach, Patient to call for help with toileting needs              Problem: Nutrition Deficit  Goal: *Optimize nutritional status  Outcome: Progressing Towards Goal

## 2020-07-11 NOTE — PROGRESS NOTES
RENAL DAILY PROGRESS NOTE    Patient: Cory De La Rosa               Sex: male          DOA: 7/6/2020  7:22 AM        YOB: 1970      Age:  44 y.o.        LOS:  LOS: 5 days     Subjective: Cory De La Rosa is a 44 y.o.  who presents with Hyperparathyroidism ([facility] [address] [E21.3]. Asked to evaluate for esrd,on peritoneal dialysis. hx of hyperparathyroidism,htn.had subtotal parathyroidectomy parathyroidectomy ,3.5 glands removed  Chief complains:none  - Reviewed last 24 hrs events     Current Facility-Administered Medications   Medication Dose Route Frequency    calcium gluconate 10 g in 0.9% sodium chloride 250 mL IVPB  10 g IntraVENous ONCE    cloNIDine HCL (CATAPRES) tablet 0.1 mg  0.1 mg Oral TID    hydrALAZINE (APRESOLINE) tablet 25 mg  25 mg Oral TID    calcium acetate(phosphat bind) (PHOSLO) capsule 3,335 mg  5 Cap Oral TID WITH MEALS    calcium carbonate (OS-VIRAL) tablet 2,000 mg [elemental]  2,000 mg Oral QID    senna-docusate (PERICOLACE) 8.6-50 mg per tablet 2 Tab  2 Tab Oral QHS    lactulose (CHRONULAC) 10 gram/15 mL solution 30 mL  30 mL Oral BID    calcitRIOL (ROCALTROL) capsule 0.5 mcg  0.5 mcg Oral TID    heparin (porcine) injection 5,000 Units  5,000 Units SubCUTAneous Q8H    peritoneal dialysis DEXTROSE 2.5% (2.5 mEq/L low calcium) solution 2,000 mL  2,000 mL IntraPERitoneal BID    hydrALAZINE (APRESOLINE) 20 mg/mL injection 10 mg  10 mg IntraVENous Q6H PRN    amLODIPine (NORVASC) tablet 10 mg  10 mg Oral DAILY    carvediloL (COREG) tablet 25 mg  25 mg Oral BID WITH MEALS    ergocalciferol capsule 50,000 Units  50,000 Units Oral Q7D    losartan (COZAAR) tablet 100 mg  100 mg Oral DAILY    sodium chloride (NS) flush 5-40 mL  5-40 mL IntraVENous Q8H    sodium chloride (NS) flush 5-40 mL  5-40 mL IntraVENous PRN    HYDROcodone-acetaminophen (NORCO) 5-325 mg per tablet 1 Tab  1 Tab Oral Q4H PRN    naloxone (NARCAN) injection 0.4 mg  0.4 mg IntraVENous PRN    ondansetron (ZOFRAN ODT) tablet 4 mg  4 mg Oral Q8H PRN    ELECTROLYTE REPLACEMENT PROTOCOL - Calcium  1 Each Other PRN    allopurinoL (ZYLOPRIM) tablet 100 mg  100 mg Oral Q TUE, THU & SAT    peritoneal dialysis DEXTROSE 2.5% (2.5 mEq/L low calcium) solution 12,000 mL  12,000 mL IntraPERitoneal DAILY    ELECTROLYTE REPLACEMENT PROTOCOL - Calcium   1 Each Other PRN       Objective:     Visit Vitals  BP (!) 162/108   Pulse 84   Temp 97.9 °F (36.6 °C)   Resp 19   Ht 6' 2\" (1.88 m)   Wt 90.8 kg (200 lb 2.8 oz)   SpO2 99%   BMI 25.70 kg/m²       Intake/Output Summary (Last 24 hours) at 7/11/2020 1503  Last data filed at 7/10/2020 1915  Gross per 24 hour   Intake 240 ml   Output 0 ml   Net 240 ml       Physical Examination:     GEN: alert  RS: Chest is bilateral equal, no wheezing / rales / crackles  CVS: S1-S2 heard, RRR, No S3 / murmur  Abdomen: Soft,  Extremities: No edema, no cyanosis, skin is warm on touch  HEENT: Head is atraumatic, PERRLA, conjunctiva pink & non icteric. No JVD or carotid bruit       Data Review:      Labs:     Hematology:   Recent Labs     07/10/20  0200 07/09/20  0951   WBC 8.0 7.5   HGB 9.9* 9.8*   HCT 31.4* 30.3*     Chemistry:   Recent Labs     07/11/20  1050 07/11/20  0800 07/10/20  0200 07/09/20  0951   BUN  --  96* 95* 97*   CREA  --  23.70* 22.80* 23.70*   CA 6.3* 6.4* 6.5* 7.1*  6.8*   ALB 2.4* 2.5* 2.2* 2.2*   K  --  4.0 3.9 3.8   NA  --  140 140 141   CL  --  102 103 104   CO2  --  23 24 21   PHOS  --  7.6* 8.2* 8.6*   GLU  --  87 104* 151*        Images:    XR (Most Recent). CXR reviewed by me and compared with previous CXR Results from Hospital Encounter encounter on 07/06/20   XR CHEST PORT    Narrative EXAM: XR CHEST PORT    INDICATION: provider order pleural effusion    COMPARISON: 9/11/2018    FINDINGS: A portable AP radiograph of the chest was obtained at 1042 hours. The  patient has a large left pleural effusion and a moderate to large right pleural  effusion.  Atelectasis noted associated the lung bases. The aerated lungs reveal  no infiltrate or mass. There is no pneumothorax. Impression IMPRESSION: Prominent bilateral pleural effusions. Correlation with CAT scan may  be considered        CT (Most Recent) Results from Hospital Encounter encounter on 02/29/20   CT ABD PELV WO CONT    Narrative Indication: evaluate PD cuff. Peritoneal dialysis tube malpositioned. Impression IMPRESSION: Left lower quadrant peritoneal dialysis catheter looped in pelvis. Bilateral pleural effusions. Cardiomegaly. Gallbladder wall thickening,  ultrasound follow-up recommended. Bilateral renal cysts. Diverticulosis. Left  inguinal canal fluid collection. Comment: CT images of the abdomen and pelvis were obtained without oral or  intravenous contrast. [facility]. Small bilateral pleural effusions are present with atelectasis in the underlying  lungs. The heart is enlarged. The gallbladder is contracted and thickening of the fundus wall is noted with  suspicion of tiny stone. Nonemergent ultrasound recommended for further  evaluation. The liver spleen pancreas and adrenal glands are unremarkable. The kidneys are mildly atrophic and harbor cysts. Calculus or hydronephrosis. The ureters and bladder are unremarkable. Peritoneal dialysis catheter is looped in the pelvis entering the peritoneum in  the left lower quadrant. Fluid collection is noted in the left inguinal canal.    Mild colonic diverticulosis is noted without diverticulitis. Pelvic viscera is unremarkable. No bowel obstruction, free intraperitoneal air, free fluid or appendicitis. DICOM format imaged data is available to non-affiliated external healthcare  facilities or entities on a secure, media free, reciprocally searchable basis  with patient authorization  for 12 months following the date of the study.           EKG No results found for this or any previous visit. I have personally reviewed the old medical records and patient's labs    Plan / Recommendation:      1. esrd ,on ccpd,not compliant . doing 8 hours of ccpd only. refused miday exchanges.high calcium dialysate is not available at [facility]  2.s/p subtotoal parathyroidectomy,severe hypocalcemia. at [facility]. give iv calcium gluconate 10 g today and repeat ionized calcium. continue po calcium and calcitriol  3.htn,adjust meds . increase ultrafiltration with pd if he agrees   4-hyperphosphatemia,improving on calcium acetate with meals  5-epo,will give epo when bp is better    D/w Dr sinai Nino MD  Nephrology  7/11/2020

## 2020-07-12 LAB
ALBUMIN SERPL-MCNC: 2.5 G/DL (ref 3.4–5)
ALBUMIN/GLOB SERPL: 0.6 {RATIO} (ref 0.8–1.7)
ALP SERPL-CCNC: 91 U/L (ref 45–117)
ALT SERPL-CCNC: 7 U/L (ref 16–61)
ANION GAP SERPL CALC-SCNC: 16 MMOL/L (ref 3–18)
AST SERPL-CCNC: 7 U/L (ref 10–38)
BILIRUB SERPL-MCNC: 0.5 MG/DL (ref 0.2–1)
BUN SERPL-MCNC: 102 MG/DL (ref 7–18)
BUN/CREAT SERPL: 4 (ref 12–20)
CA-I SERPL-SCNC: 0.79 MMOL/L (ref 1.12–1.32)
CALCIUM SERPL-MCNC: 6.5 MG/DL (ref 8.5–10.1)
CHLORIDE SERPL-SCNC: 100 MMOL/L (ref 100–111)
CO2 SERPL-SCNC: 22 MMOL/L (ref 21–32)
CREAT SERPL-MCNC: 24.7 MG/DL (ref 0.6–1.3)
GLOBULIN SER CALC-MCNC: 4.4 G/DL (ref 2–4)
GLUCOSE SERPL-MCNC: 85 MG/DL (ref 74–99)
MAGNESIUM SERPL-MCNC: 2 MG/DL (ref 1.6–2.6)
PHOSPHATE SERPL-MCNC: 7.4 MG/DL (ref 2.5–4.9)
POTASSIUM SERPL-SCNC: 4.2 MMOL/L (ref 3.5–5.5)
PROT SERPL-MCNC: 6.9 G/DL (ref 6.4–8.2)
SODIUM SERPL-SCNC: 138 MMOL/L (ref 136–145)

## 2020-07-12 PROCEDURE — 65660000000 HC RM CCU STEPDOWN

## 2020-07-12 PROCEDURE — 74011250636 HC RX REV CODE- 250/636: Performed by: INTERNAL MEDICINE

## 2020-07-12 PROCEDURE — 82330 ASSAY OF CALCIUM: CPT

## 2020-07-12 PROCEDURE — 74011250637 HC RX REV CODE- 250/637: Performed by: INTERNAL MEDICINE

## 2020-07-12 PROCEDURE — 84100 ASSAY OF PHOSPHORUS: CPT

## 2020-07-12 PROCEDURE — 36592 COLLECT BLOOD FROM PICC: CPT

## 2020-07-12 PROCEDURE — 80053 COMPREHEN METABOLIC PANEL: CPT

## 2020-07-12 PROCEDURE — 83735 ASSAY OF MAGNESIUM: CPT

## 2020-07-12 PROCEDURE — 36415 COLL VENOUS BLD VENIPUNCTURE: CPT

## 2020-07-12 PROCEDURE — 74011250637 HC RX REV CODE- 250/637: Performed by: OTOLARYNGOLOGY

## 2020-07-12 RX ORDER — HYDRALAZINE HYDROCHLORIDE 20 MG/ML
10 INJECTION INTRAMUSCULAR; INTRAVENOUS
Status: DISCONTINUED | OUTPATIENT
Start: 2020-07-12 | End: 2020-07-17 | Stop reason: HOSPADM

## 2020-07-12 RX ORDER — HYDRALAZINE HYDROCHLORIDE 50 MG/1
100 TABLET, FILM COATED ORAL 3 TIMES DAILY
Status: DISCONTINUED | OUTPATIENT
Start: 2020-07-12 | End: 2020-07-17 | Stop reason: HOSPADM

## 2020-07-12 RX ORDER — CALCIUM CARBONATE 500(1250)
3000 TABLET ORAL 4 TIMES DAILY
Status: DISCONTINUED | OUTPATIENT
Start: 2020-07-12 | End: 2020-07-17 | Stop reason: HOSPADM

## 2020-07-12 RX ADMIN — DOCUSATE SODIUM 50 MG AND SENNOSIDES 8.6 MG 2 TABLET: 8.6; 5 TABLET, FILM COATED ORAL at 21:24

## 2020-07-12 RX ADMIN — CALCIUM 3000 MG: 500 TABLET ORAL at 08:27

## 2020-07-12 RX ADMIN — CALCIUM ACETATE 3335 MG: 667 CAPSULE ORAL at 17:57

## 2020-07-12 RX ADMIN — CALCITRIOL CAPSULES 0.25 MCG 0.5 MCG: 0.25 CAPSULE ORAL at 21:24

## 2020-07-12 RX ADMIN — AMLODIPINE BESYLATE 10 MG: 10 TABLET ORAL at 08:28

## 2020-07-12 RX ADMIN — CALCIUM 3000 MG: 500 TABLET ORAL at 17:58

## 2020-07-12 RX ADMIN — LOSARTAN POTASSIUM 100 MG: 50 TABLET, FILM COATED ORAL at 08:28

## 2020-07-12 RX ADMIN — HYDRALAZINE HYDROCHLORIDE 100 MG: 50 TABLET, FILM COATED ORAL at 21:24

## 2020-07-12 RX ADMIN — CLONIDINE HYDROCHLORIDE 0.1 MG: 0.1 TABLET ORAL at 08:28

## 2020-07-12 RX ADMIN — CALCITRIOL CAPSULES 0.25 MCG 0.5 MCG: 0.25 CAPSULE ORAL at 17:57

## 2020-07-12 RX ADMIN — CALCIUM ACETATE 3335 MG: 667 CAPSULE ORAL at 08:28

## 2020-07-12 RX ADMIN — HYDRALAZINE HYDROCHLORIDE 10 MG: 20 INJECTION INTRAMUSCULAR; INTRAVENOUS at 04:38

## 2020-07-12 RX ADMIN — CARVEDILOL 25 MG: 25 TABLET, FILM COATED ORAL at 08:29

## 2020-07-12 RX ADMIN — HYDRALAZINE HYDROCHLORIDE 10 MG: 20 INJECTION INTRAMUSCULAR; INTRAVENOUS at 12:00

## 2020-07-12 RX ADMIN — Medication 5 ML: at 14:00

## 2020-07-12 RX ADMIN — HYDRALAZINE HYDROCHLORIDE 100 MG: 50 TABLET, FILM COATED ORAL at 17:57

## 2020-07-12 RX ADMIN — Medication 10 ML: at 06:29

## 2020-07-12 RX ADMIN — CALCIUM 3000 MG: 500 TABLET ORAL at 21:24

## 2020-07-12 RX ADMIN — Medication 10 ML: at 21:30

## 2020-07-12 RX ADMIN — CARVEDILOL 25 MG: 25 TABLET, FILM COATED ORAL at 17:58

## 2020-07-12 RX ADMIN — CLONIDINE HYDROCHLORIDE 0.1 MG: 0.1 TABLET ORAL at 21:24

## 2020-07-12 RX ADMIN — CALCIUM ACETATE 3335 MG: 667 CAPSULE ORAL at 11:54

## 2020-07-12 RX ADMIN — CALCITRIOL CAPSULES 0.25 MCG 0.5 MCG: 0.25 CAPSULE ORAL at 08:27

## 2020-07-12 RX ADMIN — CALCIUM GLUCONATE 10 G: 98 INJECTION, SOLUTION INTRAVENOUS at 06:29

## 2020-07-12 RX ADMIN — CALCIUM 3000 MG: 500 TABLET ORAL at 12:00

## 2020-07-12 RX ADMIN — HYDRALAZINE HYDROCHLORIDE 10 MG: 20 INJECTION INTRAMUSCULAR; INTRAVENOUS at 21:24

## 2020-07-12 RX ADMIN — CLONIDINE HYDROCHLORIDE 0.1 MG: 0.1 TABLET ORAL at 17:57

## 2020-07-12 NOTE — PROGRESS NOTES
Pt is refusing to complete CCPD tonight despite multiple attempts by this RN to encourage and educate pt on the need for dialysis.

## 2020-07-12 NOTE — PROGRESS NOTES
2015: /94    2146: scheduled BP meds given    0019: /94    0035: Dr Carl Miller paged, he states only treat systolic BP, PRN hydralazine parameters changed to SBP > 150. Notified him that pt is refusing CCPD tonight.

## 2020-07-12 NOTE — ROUTINE PROCESS
Bedside and Verbal shift change report given to Thom Duron and Derrick Tan (oncoming nurse) by Khari Lyons (offgoing nurse). Report included the following information SBAR, Kardex, Intake/Output, MAR and Recent Results.

## 2020-07-12 NOTE — PROGRESS NOTES
SUBJECTIVE:    Patient denies any headaches or dizziness. No chest pain or shortness of breath or cough. No nausea or vomiting. No abdominal pain. Patient did not want to do dialysis especially peritoneal dialysis last night per nursing. Patient is visibly upset that he has to stay here for IV calcium infusion. OBJECTIVE:    BP (!) 173/101 (BP 1 Location: Left arm, BP Patient Position: At rest)   Pulse 88   Temp 97.9 °F (36.6 °C)   Resp 18   Ht 6' 2\" (1.88 m)   Wt 90.8 kg (200 lb 2.8 oz)   SpO2 98%   BMI 25.70 kg/m²     General appearance - alert, well appearing, and in no distress  Chest -clear air entry noted in bases, no wheezes, right neck PICC line catheter in situ  Heart - S1 and S2 normal  Abdomen - soft, nontender, nondistended, Bowel sounds present, peritoneal dialysis catheter in situ  Neurological - alert, oriented, normal speech, no focal findings noted  Extremities - no pedal edema noted, right hand edema present    ASSESSMENT:    1. Parathyroid hyperplasia with secondary hyperparathyroidism status post subtotal parathyroidectomy  2. Hypocalcemia due to subtotal parathyroidectomy, slowly improving  3. ESRD on peritoneal dialysis  4. Hypertension  5. Chronic diastolic CHF with EF of 46 percentage, compensated  6. Anemia of chronic medical disease and ESRD, stable  7. Gout  8. Low albumin, improving    PLAN:    Continue current management  ENT and nephrology to follow this patient  Patient can be discharged home tomorrow if remains stable and okay with nephrology service. Disclaimer: Sections of this note are dictated using utilizing voice recognition software, which may have resulted in some phonetic based errors in grammar and contents. Even though attempts were made to correct all the mistakes, some may have been missed, and remained in the body of the document. If questions arise, please contact our department.     Recent Results (from the past 24 hour(s))   CALCIUM Collection Time: 07/11/20 10:50 AM   Result Value Ref Range    Calcium 6.3 (L) 8.5 - 10.1 MG/DL   ALBUMIN    Collection Time: 07/11/20 10:50 AM   Result Value Ref Range    Albumin 2.4 (L) 3.4 - 5.0 g/dL   GLUCOSE, POC    Collection Time: 07/11/20 11:29 AM   Result Value Ref Range    Glucose (POC) 104 70 - 110 mg/dL   GLUCOSE, POC    Collection Time: 07/11/20  3:43 PM   Result Value Ref Range    Glucose (POC) 115 (H) 70 - 110 mg/dL   CALCIUM, IONIZED    Collection Time: 07/12/20  3:30 AM   Result Value Ref Range    Ionized Calcium 0.79 (LL) 1.12 - 1.32 MMOL/L   MAGNESIUM    Collection Time: 07/12/20  3:30 AM   Result Value Ref Range    Magnesium 2.0 1.6 - 2.6 mg/dL   METABOLIC PANEL, COMPREHENSIVE    Collection Time: 07/12/20  3:30 AM   Result Value Ref Range    Sodium 138 136 - 145 mmol/L    Potassium 4.2 3.5 - 5.5 mmol/L    Chloride 100 100 - 111 mmol/L    CO2 22 21 - 32 mmol/L    Anion gap 16 3.0 - 18 mmol/L    Glucose 85 74 - 99 mg/dL     (H) 7.0 - 18 MG/DL    Creatinine 24.70 (H) 0.6 - 1.3 MG/DL    BUN/Creatinine ratio 4 (L) 12 - 20      GFR est AA 2 (L) >60 ml/min/1.73m2    GFR est non-AA 2 (L) >60 ml/min/1.73m2    Calcium 6.5 (L) 8.5 - 10.1 MG/DL    Bilirubin, total 0.5 0.2 - 1.0 MG/DL    ALT (SGPT) 7 (L) 16 - 61 U/L    AST (SGOT) 7 (L) 10 - 38 U/L    Alk.  phosphatase 91 45 - 117 U/L    Protein, total 6.9 6.4 - 8.2 g/dL    Albumin 2.5 (L) 3.4 - 5.0 g/dL    Globulin 4.4 (H) 2.0 - 4.0 g/dL    A-G Ratio 0.6 (L) 0.8 - 1.7     PHOSPHORUS    Collection Time: 07/12/20  3:30 AM   Result Value Ref Range    Phosphorus 7.4 (H) 2.5 - 4.9 MG/DL

## 2020-07-12 NOTE — PROGRESS NOTES
MARI-HNS   vss afeb   pt discouraged   calcium remains stubbornly low   wounds clean  Cont to follow calcium-defer management to med and renal    Jamilah Party

## 2020-07-12 NOTE — PROGRESS NOTES
RENAL DAILY PROGRESS NOTE    Patient: Shant Wilson               Sex: male          DOA: 7/6/2020  7:22 AM        YOB: 1970      Age:  44 y.o.        LOS:  LOS: 6 days     Subjective: Shant Wilson is a 44 y.o.  who presents with Hyperparathyroidism ([facility] [address] [E21.3]. Asked to evaluate for esrd,on peritoneal dialysis. hx of hyperparathyroidism,htn.had subtotal parathyroidectomy parathyroidectomy ,3.5 glands removed  Chief complains:none  - Reviewed last 24 hrs events     Current Facility-Administered Medications   Medication Dose Route Frequency    hydrALAZINE (APRESOLINE) 20 mg/mL injection 10 mg  10 mg IntraVENous Q6H PRN    calcium carbonate (OS-VIRAL) tablet 3,000 mg [elemental]  3,000 mg Oral QID    hydrALAZINE (APRESOLINE) tablet 100 mg  100 mg Oral TID    cloNIDine HCL (CATAPRES) tablet 0.1 mg  0.1 mg Oral TID    calcium acetate(phosphat bind) (PHOSLO) capsule 3,335 mg  5 Cap Oral TID WITH MEALS    senna-docusate (PERICOLACE) 8.6-50 mg per tablet 2 Tab  2 Tab Oral QHS    calcitRIOL (ROCALTROL) capsule 0.5 mcg  0.5 mcg Oral TID    heparin (porcine) injection 5,000 Units  5,000 Units SubCUTAneous Q8H    peritoneal dialysis DEXTROSE 2.5% (2.5 mEq/L low calcium) solution 2,000 mL  2,000 mL IntraPERitoneal BID    amLODIPine (NORVASC) tablet 10 mg  10 mg Oral DAILY    carvediloL (COREG) tablet 25 mg  25 mg Oral BID WITH MEALS    ergocalciferol capsule 50,000 Units  50,000 Units Oral Q7D    losartan (COZAAR) tablet 100 mg  100 mg Oral DAILY    sodium chloride (NS) flush 5-40 mL  5-40 mL IntraVENous Q8H    sodium chloride (NS) flush 5-40 mL  5-40 mL IntraVENous PRN    HYDROcodone-acetaminophen (NORCO) 5-325 mg per tablet 1 Tab  1 Tab Oral Q4H PRN    naloxone (NARCAN) injection 0.4 mg  0.4 mg IntraVENous PRN    ondansetron (ZOFRAN ODT) tablet 4 mg  4 mg Oral Q8H PRN    ELECTROLYTE REPLACEMENT PROTOCOL - Calcium  1 Each Other PRN    allopurinoL (ZYLOPRIM) tablet 100 mg  100 mg Oral Q TUE, THU & SAT    peritoneal dialysis DEXTROSE 2.5% (2.5 mEq/L low calcium) solution 12,000 mL  12,000 mL IntraPERitoneal DAILY    ELECTROLYTE REPLACEMENT PROTOCOL - Calcium   1 Each Other PRN       Objective:     Visit Vitals  BP (!) 171/100 (BP 1 Location: Left arm, BP Patient Position: At rest)   Pulse 74   Temp 98 °F (36.7 °C)   Resp 17   Ht 6' 2\" (1.88 m)   Wt 90.8 kg (200 lb 2.8 oz)   SpO2 99%   BMI 25.70 kg/m²       Intake/Output Summary (Last 24 hours) at 7/12/2020 1359  Last data filed at 7/12/2020 1236  Gross per 24 hour   Intake 360 ml   Output --   Net 360 ml       Physical Examination:     GEN: alert  RS: Chest is bilateral equal, no wheezing / rales / crackles  CVS: S1-S2 heard, RRR, No S3 / murmur  Abdomen: Soft,  Extremities: No edema, no cyanosis, skin is warm on touch  HEENT: Head is atraumatic, PERRLA, conjunctiva pink & non icteric. No JVD or carotid bruit       Data Review:      Labs:     Hematology:   Recent Labs     07/10/20  0200   WBC 8.0   HGB 9.9*   HCT 31.4*     Chemistry:   Recent Labs     07/12/20  0330 07/11/20  1050 07/11/20  0800 07/10/20  0200   *  --  96* 95*   CREA 24.70*  --  23.70* 22.80*   CA 6.5* 6.3* 6.4* 6.5*   ALB 2.5* 2.4* 2.5* 2.2*   K 4.2  --  4.0 3.9     --  140 140     --  102 103   CO2 22  --  23 24   PHOS 7.4*  --  7.6* 8.2*   GLU 85  --  87 104*        Images:    XR (Most Recent). CXR reviewed by me and compared with previous CXR Results from Hospital Encounter encounter on 07/06/20   XR CHEST PORT    Narrative EXAM: XR CHEST PORT    INDICATION: provider order pleural effusion    COMPARISON: 9/11/2018    FINDINGS: A portable AP radiograph of the chest was obtained at 1042 hours. The  patient has a large left pleural effusion and a moderate to large right pleural  effusion. Atelectasis noted associated the lung bases. The aerated lungs reveal  no infiltrate or mass. There is no pneumothorax.       Impression IMPRESSION: Prominent bilateral pleural effusions. Correlation with CAT scan may  be considered        CT (Most Recent) Results from Hospital Encounter encounter on 02/29/20   CT ABD PELV WO CONT    Narrative Indication: evaluate PD cuff. Peritoneal dialysis tube malpositioned. Impression IMPRESSION: Left lower quadrant peritoneal dialysis catheter looped in pelvis. Bilateral pleural effusions. Cardiomegaly. Gallbladder wall thickening,  ultrasound follow-up recommended. Bilateral renal cysts. Diverticulosis. Left  inguinal canal fluid collection. Comment: CT images of the abdomen and pelvis were obtained without oral or  intravenous contrast. The initial report was rendered by [facility]  [facility] services. Small bilateral pleural effusions are present with atelectasis in the underlying  lungs. The heart is enlarged. The gallbladder is contracted and thickening of the fundus wall is noted with  suspicion of tiny stone. Nonemergent ultrasound recommended for further  evaluation. The liver spleen pancreas and adrenal glands are unremarkable. The kidneys are mildly atrophic and harbor cysts. Calculus or hydronephrosis. The ureters and bladder are unremarkable. Peritoneal dialysis catheter is looped in the pelvis entering the peritoneum in  the left lower quadrant. Fluid collection is noted in the left inguinal canal.    Mild colonic diverticulosis is noted without diverticulitis. Pelvic viscera is unremarkable. No bowel obstruction, free intraperitoneal air, free fluid or appendicitis. DICOM format imaged data is available to non-affiliated external healthcare  facilities or entities on a secure, media free, reciprocally searchable basis  with patient authorization  for 12 months following the date of the study. EKG No results found for this or any previous visit.      I have personally reviewed the old medical records and patient's labs    Plan / Recommendation:      1. esrd ,on ccpd,not compliant,didn't do ccpd last night.suggested changing to hemodialysis,can give iv calcium with hemodialysis  2.s/p subtotoal parathyroidectomy,severe hypocalcemia. at risk of seizures. give iv calcium gluconate 10 g today and repeat ionized calcium. continue po calcium and calcitriol  3.htn,increase hydralazine . increase ultrafiltration with pd if he agrees   4-hyperphosphatemia,improving on calcium acetate with meals  5-epo,will give epo when bp is better  6-depression,consider psych consult    D/w Dr sinai Farmer MD  Nephrology  7/12/2020

## 2020-07-12 NOTE — ROUTINE PROCESS
Bedside and Verbal shift change report given to Linda Armstrong RN (oncoming nurse) by Alvin Mckenna RN (offgoing nurse). Report included the following information SBAR, Kardex and Recent Results.

## 2020-07-13 LAB
ALBUMIN SERPL-MCNC: 2.2 G/DL (ref 3.4–5)
ALBUMIN/GLOB SERPL: 0.5 {RATIO} (ref 0.8–1.7)
ALP SERPL-CCNC: 87 U/L (ref 45–117)
ALT SERPL-CCNC: 7 U/L (ref 16–61)
ANION GAP SERPL CALC-SCNC: 14 MMOL/L (ref 3–18)
AST SERPL-CCNC: 5 U/L (ref 10–38)
BILIRUB SERPL-MCNC: 0.3 MG/DL (ref 0.2–1)
BUN SERPL-MCNC: 105 MG/DL (ref 7–18)
BUN/CREAT SERPL: 4 (ref 12–20)
CA-I SERPL-SCNC: 0.82 MMOL/L (ref 1.12–1.32)
CALCIUM SERPL-MCNC: 6.5 MG/DL (ref 8.5–10.1)
CHLORIDE SERPL-SCNC: 103 MMOL/L (ref 100–111)
CO2 SERPL-SCNC: 22 MMOL/L (ref 21–32)
CREAT SERPL-MCNC: 26.3 MG/DL (ref 0.6–1.3)
GLOBULIN SER CALC-MCNC: 4.2 G/DL (ref 2–4)
GLUCOSE SERPL-MCNC: 160 MG/DL (ref 74–99)
MAGNESIUM SERPL-MCNC: 2 MG/DL (ref 1.6–2.6)
PHOSPHATE SERPL-MCNC: 6.6 MG/DL (ref 2.5–4.9)
POTASSIUM SERPL-SCNC: 4.1 MMOL/L (ref 3.5–5.5)
PROT SERPL-MCNC: 6.4 G/DL (ref 6.4–8.2)
SODIUM SERPL-SCNC: 139 MMOL/L (ref 136–145)

## 2020-07-13 PROCEDURE — 80053 COMPREHEN METABOLIC PANEL: CPT

## 2020-07-13 PROCEDURE — 94762 N-INVAS EAR/PLS OXIMTRY CONT: CPT

## 2020-07-13 PROCEDURE — 84100 ASSAY OF PHOSPHORUS: CPT

## 2020-07-13 PROCEDURE — 74011250637 HC RX REV CODE- 250/637: Performed by: INTERNAL MEDICINE

## 2020-07-13 PROCEDURE — 74011250636 HC RX REV CODE- 250/636: Performed by: INTERNAL MEDICINE

## 2020-07-13 PROCEDURE — 65660000000 HC RM CCU STEPDOWN

## 2020-07-13 PROCEDURE — 36592 COLLECT BLOOD FROM PICC: CPT

## 2020-07-13 PROCEDURE — 82330 ASSAY OF CALCIUM: CPT

## 2020-07-13 PROCEDURE — 83735 ASSAY OF MAGNESIUM: CPT

## 2020-07-13 PROCEDURE — 74011250637 HC RX REV CODE- 250/637: Performed by: OTOLARYNGOLOGY

## 2020-07-13 PROCEDURE — 74011000258 HC RX REV CODE- 258: Performed by: INTERNAL MEDICINE

## 2020-07-13 RX ADMIN — Medication 10 ML: at 23:02

## 2020-07-13 RX ADMIN — CLONIDINE HYDROCHLORIDE 0.1 MG: 0.1 TABLET ORAL at 09:45

## 2020-07-13 RX ADMIN — HYDRALAZINE HYDROCHLORIDE 100 MG: 50 TABLET, FILM COATED ORAL at 17:57

## 2020-07-13 RX ADMIN — CLONIDINE HYDROCHLORIDE 0.1 MG: 0.1 TABLET ORAL at 22:33

## 2020-07-13 RX ADMIN — HYDRALAZINE HYDROCHLORIDE 100 MG: 50 TABLET, FILM COATED ORAL at 09:48

## 2020-07-13 RX ADMIN — LOSARTAN POTASSIUM 100 MG: 50 TABLET, FILM COATED ORAL at 09:45

## 2020-07-13 RX ADMIN — CALCIUM 3000 MG: 500 TABLET ORAL at 09:45

## 2020-07-13 RX ADMIN — CALCIUM 3000 MG: 500 TABLET ORAL at 22:31

## 2020-07-13 RX ADMIN — CALCIUM ACETATE 3335 MG: 667 CAPSULE ORAL at 12:50

## 2020-07-13 RX ADMIN — CALCIUM 3000 MG: 500 TABLET ORAL at 12:49

## 2020-07-13 RX ADMIN — CALCIUM 3000 MG: 500 TABLET ORAL at 17:56

## 2020-07-13 RX ADMIN — HYDRALAZINE HYDROCHLORIDE 100 MG: 50 TABLET, FILM COATED ORAL at 22:33

## 2020-07-13 RX ADMIN — CALCITRIOL CAPSULES 0.25 MCG 0.5 MCG: 0.25 CAPSULE ORAL at 22:33

## 2020-07-13 RX ADMIN — CARVEDILOL 25 MG: 25 TABLET, FILM COATED ORAL at 09:47

## 2020-07-13 RX ADMIN — CALCIUM ACETATE 3335 MG: 667 CAPSULE ORAL at 09:47

## 2020-07-13 RX ADMIN — CALCIUM GLUCONATE 2 G: 98 INJECTION, SOLUTION INTRAVENOUS at 10:41

## 2020-07-13 RX ADMIN — Medication 10 ML: at 06:23

## 2020-07-13 RX ADMIN — CARVEDILOL 25 MG: 25 TABLET, FILM COATED ORAL at 17:56

## 2020-07-13 RX ADMIN — CLONIDINE HYDROCHLORIDE 0.1 MG: 0.1 TABLET ORAL at 17:57

## 2020-07-13 RX ADMIN — CALCITRIOL CAPSULES 0.25 MCG 0.5 MCG: 0.25 CAPSULE ORAL at 17:57

## 2020-07-13 RX ADMIN — CALCIUM GLUCONATE 2 G: 98 INJECTION, SOLUTION INTRAVENOUS at 17:55

## 2020-07-13 RX ADMIN — CALCIUM GLUCONATE 2 G: 98 INJECTION, SOLUTION INTRAVENOUS at 22:34

## 2020-07-13 RX ADMIN — ERGOCALCIFEROL 50000 UNITS: 1.25 CAPSULE ORAL at 12:50

## 2020-07-13 RX ADMIN — AMLODIPINE BESYLATE 10 MG: 10 TABLET ORAL at 09:47

## 2020-07-13 RX ADMIN — CALCITRIOL CAPSULES 0.25 MCG 0.5 MCG: 0.25 CAPSULE ORAL at 09:47

## 2020-07-13 RX ADMIN — CALCIUM ACETATE 3335 MG: 667 CAPSULE ORAL at 17:56

## 2020-07-13 NOTE — PROGRESS NOTES
Otolaryngology  Postop day #7  Vital signs stable afebrile  Patient indeed somewhat depressed regarding multitude of medical issues  Calcium with some slight increase however still diminished  Wound healing well  Uncertain whether patient may be discharged at this time. Would tend to doubt.   Patient has been seen by medicine  We will continue supplementation  We will increased to 4 times daily calcium supplementation    Ori Lopez

## 2020-07-13 NOTE — PROGRESS NOTES
Chart reviewed. No d/c needs at this time. Discharge plan is home. Case Management will continue to follow.       ZAN BarlowN RN  Care Management  Pager: [phone]

## 2020-07-13 NOTE — PROGRESS NOTES
RENAL DAILY PROGRESS NOTE            53-year-old male with past medical history of hypertension, ESRD admitted for parathyroid surgery, following for dialysis management  Subjective:       Complaint:   Overnight events noted  Tolerating peritoneal dialysis well last night, ultrafiltrate about 1 L  no nausea, vomiting, chest pain, short of breath, cough, seizure. IMPRESSION:   ESRD, on peritoneal dialysis  Hypertension  Status post parathyroid surgery  Hypocalcemia  Anemia   PLAN:   Mr. Orlando Crooks has a elevated BUN, he is not getting adequate peritoneal dialysis recommend extra PD treatment today he agreed for that. Continue oral calcitriol and calcium supplement. Agree with IV calcium supplement today. Agree with discharge plan tomorrow.            Current Facility-Administered Medications   Medication Dose Route Frequency    calcium gluconate 2 g in 0.9% sodium chloride 100 mL IVPB  2 g IntraVENous TID    hydrALAZINE (APRESOLINE) 20 mg/mL injection 10 mg  10 mg IntraVENous Q6H PRN    calcium carbonate (OS-VIRAL) tablet 3,000 mg [elemental]  3,000 mg Oral QID    hydrALAZINE (APRESOLINE) tablet 100 mg  100 mg Oral TID    cloNIDine HCL (CATAPRES) tablet 0.1 mg  0.1 mg Oral TID    calcium acetate(phosphat bind) (PHOSLO) capsule 3,335 mg  5 Cap Oral TID WITH MEALS    senna-docusate (PERICOLACE) 8.6-50 mg per tablet 2 Tab  2 Tab Oral QHS    calcitRIOL (ROCALTROL) capsule 0.5 mcg  0.5 mcg Oral TID    heparin (porcine) injection 5,000 Units  5,000 Units SubCUTAneous Q8H    peritoneal dialysis DEXTROSE 2.5% (2.5 mEq/L low calcium) solution 2,000 mL  2,000 mL IntraPERitoneal BID    amLODIPine (NORVASC) tablet 10 mg  10 mg Oral DAILY    carvediloL (COREG) tablet 25 mg  25 mg Oral BID WITH MEALS    ergocalciferol capsule 50,000 Units  50,000 Units Oral Q7D    losartan (COZAAR) tablet 100 mg  100 mg Oral DAILY    sodium chloride (NS) flush 5-40 mL  5-40 mL IntraVENous Q8H    sodium chloride (NS) flush 5-40 mL  5-40 mL IntraVENous PRN    HYDROcodone-acetaminophen (NORCO) 5-325 mg per tablet 1 Tab  1 Tab Oral Q4H PRN    naloxone (NARCAN) injection 0.4 mg  0.4 mg IntraVENous PRN    ondansetron (ZOFRAN ODT) tablet 4 mg  4 mg Oral Q8H PRN    ELECTROLYTE REPLACEMENT PROTOCOL - Calcium  1 Each Other PRN    allopurinoL (ZYLOPRIM) tablet 100 mg  100 mg Oral Q TUE, THU & SAT    peritoneal dialysis DEXTROSE 2.5% (2.5 mEq/L low calcium) solution 12,000 mL  12,000 mL IntraPERitoneal DAILY    ELECTROLYTE REPLACEMENT PROTOCOL - Calcium   1 Each Other PRN       Review of Symptoms: comprehensive ROS negative except above. Objective:     Patient Vitals for the past 24 hrs:   Temp Pulse Resp BP SpO2   07/13/20 1241 97.6 °F (36.4 °C) 76 17 127/83 98 %   07/13/20 0753 97.8 °F (36.6 °C) 78 19 (!) 160/91 98 %   07/13/20 0405 97.9 °F (36.6 °C) 80 18 136/83 98 %   07/13/20 0024 98.4 °F (36.9 °C) 78 18 123/81 99 %   07/12/20 2010 98.3 °F (36.8 °C) 85 18 (!) 162/92 99 %   07/12/20 1600 98 °F (36.7 °C) 79 18 (!) 151/95 99 %        Weight change:      07/11 1901 - 07/13 0700  In: 120 [P.O.:120]  Out: -   No intake or output data in the 24 hours ending 07/13/20 1412  Physical Exam:   General: comfortable, no acute distress   HEENT sclera anicteric, supple neck, no thyromegaly  CVS: S1S2 heard,  no rub  RS: + air entry b/l,   Abd: Soft, Non tender,   Neuro: non focal, awake, alert , CN II-XII are grossly intact  Extrm: + edema, no cyanosis, clubbing   Skin: no visible  Rash  Musculoskeletal: No gross joints or bone deformities         Data Review:     LABS:   Hematology: No results for input(s): WBC, HGB, HCT, HGBEXT, HCTEXT in the last 72 hours.   Chemistry:   Recent Labs     07/13/20  0320 07/12/20  0330 07/11/20  1050 07/11/20  0800   * 102*  --  96*   CREA 26.30* 24.70*  --  23.70*   CA 6.5* 6.5* 6.3* 6.4*   ALB 2.2* 2.5* 2.4* 2.5*   K 4.1 4.2  --  4.0    138  --  140    100  --  102   CO2 22 22  --  23 PHOS 6.6* 7.4*  --  7.6*   * 85  --  87            Procedures/imaging: see electronic medical records for all procedures, Xrays and details which were not copied into this note but were reviewed prior to creation of Plan          Assessment & Plan:     As above         Sukumar Ziegler MD  7/13/2020  2:12 PM

## 2020-07-13 NOTE — PROGRESS NOTES
Hospitalist Progress Note    Patient: Janine Lundberg Age: 44 y.o. : 1970 MR#: [id number] SSN: [id number]  Date/Time: 2020 12:42 PM    DOA: 2020  PCP: Patrick Reid MD    Subjective:     He expressed being lonely because he could not have visitor. Feels depressed about his overall health, BUT he declined to see psychiatric consult or taking SSRI  His ionized Calcium remains low despite replacement. He is on PD but per nursing, he is not very compliant       Interval Hospital Course:  44 y.o male w/ ESRD on PD, h/o hyperparathyroidism, hypertension, presented for schedule surgery on 20. He underwent subtotal parathyroidectomy due to parathyroid hyperplasia secondary to secondary hyperparathyroidism by Dr. Carissa García. He was admitted to ICU for close monitoring of his calcium level and potential arrhythmia. His IV calcium has been replaced but remains low. Nursing reported that he took himself off his IV replacement and refuse labs monitoring. He is currently having PD and tolerated well. His Calcitriol has been increased. He has been refusing complete IV calcium replacement, central venous catheter was place to assist in replacement compliant. ROS:  No current fever/chills, no headache, no dizziness, no facial pain, no sinus congestion,   No swallowing pain, No chest pain, no palpitation, no shortness of breath, no abd pain,  No diarrhea, no urinary complaint, no leg pain or swelling      Assessment/Plan:     1. Parathyroid hyperplasia with secondary hyperparathyroidism status post subtotal parathyroidectomy  2. Hypocalcemia due to subtotal parathyroidectomy, slowly improving  3. ESRD on peritoneal dialysis  4. Hypertension  5. Chronic diastolic CHF with EF of 46 percentage, compensated  6. Anemia of chronic medical disease and ESRD, stable  7. Gout  8. Low albumin, improving  9. Hyperphosphatemia with ESRD  10.   Depressed mood, pt refused additional medical management or psychiatric evaluation     IV calcium w2akpoi, oral replacement, oral Calcitriol. Spoke with Nephrology for further care. Continue on PD, advocate for compliance with treatment   Close monitor on telemetry   ICS  Pepcid     Full code   Additional Notes:    Time spent >30 minutes    Case discussed with:  [x]Patient  []Family  [x]Nursing  [x]Case Management  DVT Prophylaxis:  []Lovenox  []Hep SQ  [x]SCDs  []Coumadin   []On Heparin gtt    Signed By: German Melgoza MD     2020 12:42 PM              Objective:   VS:   Visit Vitals  BP (!) 160/91 (BP 1 Location: Left arm, BP Patient Position: Sitting)   Pulse 78   Temp 97.8 °F (36.6 °C)   Resp 19   Ht 6' 2\" (1.88 m)   Wt 90.8 kg (200 lb 2.8 oz)   SpO2 98%   BMI 25.70 kg/m²      Tmax/24hrs: Temp (24hrs), Av.1 °F (36.7 °C), Min:97.8 °F (36.6 °C), Max:98.4 °F (36.9 °C)  No intake or output data in the 24 hours ending 20 1242    Tele:   General:  Cooperative, Not in acute distress, speaks in full sentence while in bed  HEENT: PERRL, EOMI, supple neck, no JVD, dry oral mucosa  Cardiovascular: S1S2 regular, no rub/gallop   Pulmonary: Clear air entry bilaterally, no wheezing, no crackle  GI:  Soft, non tender, non distended, +bs, no guarding   Extremities:  No pedal edema, +distal pulses appreciated   Neuro: AOx3, moving all extremities, no gross deficit.      Additional:       Current Facility-Administered Medications   Medication Dose Route Frequency    calcium gluconate 2 g in 0.9% sodium chloride 100 mL IVPB  2 g IntraVENous TID    hydrALAZINE (APRESOLINE) 20 mg/mL injection 10 mg  10 mg IntraVENous Q6H PRN    calcium carbonate (OS-VIRAL) tablet 3,000 mg [elemental]  3,000 mg Oral QID    hydrALAZINE (APRESOLINE) tablet 100 mg  100 mg Oral TID    cloNIDine HCL (CATAPRES) tablet 0.1 mg  0.1 mg Oral TID    calcium acetate(phosphat bind) (PHOSLO) capsule 3,335 mg  5 Cap Oral TID WITH MEALS    senna-docusate (PERICOLACE) 8.6-50 mg per tablet 2 Tab  2 Tab Oral QHS    calcitRIOL (ROCALTROL) capsule 0.5 mcg  0.5 mcg Oral TID    heparin (porcine) injection 5,000 Units  5,000 Units SubCUTAneous Q8H    peritoneal dialysis DEXTROSE 2.5% (2.5 mEq/L low calcium) solution 2,000 mL  2,000 mL IntraPERitoneal BID    amLODIPine (NORVASC) tablet 10 mg  10 mg Oral DAILY    carvediloL (COREG) tablet 25 mg  25 mg Oral BID WITH MEALS    ergocalciferol capsule 50,000 Units  50,000 Units Oral Q7D    losartan (COZAAR) tablet 100 mg  100 mg Oral DAILY    sodium chloride (NS) flush 5-40 mL  5-40 mL IntraVENous Q8H    sodium chloride (NS) flush 5-40 mL  5-40 mL IntraVENous PRN    HYDROcodone-acetaminophen (NORCO) 5-325 mg per tablet 1 Tab  1 Tab Oral Q4H PRN    naloxone (NARCAN) injection 0.4 mg  0.4 mg IntraVENous PRN    ondansetron (ZOFRAN ODT) tablet 4 mg  4 mg Oral Q8H PRN    ELECTROLYTE REPLACEMENT PROTOCOL - Calcium  1 Each Other PRN    allopurinoL (ZYLOPRIM) tablet 100 mg  100 mg Oral Q TUE, THU & SAT    peritoneal dialysis DEXTROSE 2.5% (2.5 mEq/L low calcium) solution 12,000 mL  12,000 mL IntraPERitoneal DAILY    ELECTROLYTE REPLACEMENT PROTOCOL - Calcium   1 Each Other PRN            Lab/Data Review:  Labs: Results:       Chemistry Recent Labs     07/13/20 0320 07/12/20  0330 07/11/20  1050 07/11/20  0800   * 85  --  87    138  --  140   K 4.1 4.2  --  4.0    100  --  102   CO2 22 22  --  23   * 102*  --  96*   CREA 26.30* 24.70*  --  23.70*   BUCR 4* 4*  --  4*   AGAP 14 16  --  15   CA 6.5* 6.5* 6.3* 6.4*   PHOS 6.6* 7.4*  --  7.6*     Recent Labs     07/13/20 0320 07/12/20  0330 07/11/20  1050 07/11/20  0800   ALT 7* 7*  --  8*   TP 6.4 6.9  --  7.0   ALB 2.2* 2.5* 2.4* 2.5*   GLOB 4.2* 4.4*  --  4.5*   AGRAT 0.5* 0.6*  --  0.6*      CBC w/Diff No results for input(s): WBC, RBC, HGB, HCT, MCV, MCH, MCHC, RDW, PLT, BANDS, GRANS, LYMPH, EOS, HGBEXT, HCTEXT, PLTEXT in the last 72 hours.    Coagulation No results for input(s): PTP, INR, APTT, INREXT in the last 72 hours. Iron/Ferritin Lab Results   Component Value Date/Time    Iron 14 (L) 04/15/2019 05:45 AM    TIBC 214 (L) 04/15/2019 05:45 AM    Iron % saturation 7 (L) 04/15/2019 05:45 AM    Ferritin 235.4 04/15/2019 05:45 AM       BNP    Cardiac Enzymes Lab Results   Component Value Date/Time    Troponin-I 0.064 (H) 04/15/2019 05:45 AM        Lactic Acid    Thyroid Studies          All Micro Results     Procedure Component Value Units Date/Time    CULTURE, MRSA [[id number]]     Order Status:  Canceled Specimen:  Nasal from Nares             Images:    CT (Most Recent). XRAY (Most Recent)      EKG No results found for this or any previous visit.      2D ECHO

## 2020-07-13 NOTE — ROUTINE PROCESS
Bedside and Verbal shift change report given to Priscilla Mike (oncoming nurse) by Anneliese Florence (offgoing nurse). Report included the following information SBAR, Kardex and Recent Results.

## 2020-07-14 ENCOUNTER — APPOINTMENT (OUTPATIENT)
Dept: GENERAL RADIOLOGY | Age: 50
DRG: 674 | End: 2020-07-14
Attending: FAMILY MEDICINE
Payer: COMMERCIAL

## 2020-07-14 LAB
ALBUMIN SERPL-MCNC: 2.5 G/DL (ref 3.4–5)
ALBUMIN/GLOB SERPL: 0.6 {RATIO} (ref 0.8–1.7)
ALP SERPL-CCNC: 96 U/L (ref 45–117)
ALT SERPL-CCNC: 7 U/L (ref 16–61)
ANION GAP SERPL CALC-SCNC: 15 MMOL/L (ref 3–18)
AST SERPL-CCNC: <3 U/L (ref 10–38)
BILIRUB SERPL-MCNC: 0.4 MG/DL (ref 0.2–1)
BUN SERPL-MCNC: 104 MG/DL (ref 7–18)
BUN/CREAT SERPL: 4 (ref 12–20)
CA-I SERPL-SCNC: 0.83 MMOL/L (ref 1.12–1.32)
CALCIUM SERPL-MCNC: 6.9 MG/DL (ref 8.5–10.1)
CHLORIDE SERPL-SCNC: 102 MMOL/L (ref 100–111)
CO2 SERPL-SCNC: 22 MMOL/L (ref 21–32)
CREAT SERPL-MCNC: 26.8 MG/DL (ref 0.6–1.3)
ERYTHROCYTE [DISTWIDTH] IN BLOOD BY AUTOMATED COUNT: 17 % (ref 11.6–14.5)
GLOBULIN SER CALC-MCNC: 4.4 G/DL (ref 2–4)
GLUCOSE SERPL-MCNC: 79 MG/DL (ref 74–99)
HCT VFR BLD AUTO: 30.4 % (ref 36–48)
HGB BLD-MCNC: 9.4 G/DL (ref 13–16)
MAGNESIUM SERPL-MCNC: 2.1 MG/DL (ref 1.6–2.6)
MCH RBC QN AUTO: 24.9 PG (ref 24–34)
MCHC RBC AUTO-ENTMCNC: 30.9 G/DL (ref 31–37)
MCV RBC AUTO: 80.4 FL (ref 74–97)
PHOSPHATE SERPL-MCNC: 6.9 MG/DL (ref 2.5–4.9)
PLATELET # BLD AUTO: 177 K/UL (ref 135–420)
PMV BLD AUTO: 9.6 FL (ref 9.2–11.8)
POTASSIUM SERPL-SCNC: 4.4 MMOL/L (ref 3.5–5.5)
PROT SERPL-MCNC: 6.9 G/DL (ref 6.4–8.2)
RBC # BLD AUTO: 3.78 M/UL (ref 4.7–5.5)
SODIUM SERPL-SCNC: 139 MMOL/L (ref 136–145)
WBC # BLD AUTO: 7.5 K/UL (ref 4.6–13.2)

## 2020-07-14 PROCEDURE — 85027 COMPLETE CBC AUTOMATED: CPT

## 2020-07-14 PROCEDURE — 87040 BLOOD CULTURE FOR BACTERIA: CPT

## 2020-07-14 PROCEDURE — 74011250637 HC RX REV CODE- 250/637: Performed by: INTERNAL MEDICINE

## 2020-07-14 PROCEDURE — 74011250637 HC RX REV CODE- 250/637: Performed by: OTOLARYNGOLOGY

## 2020-07-14 PROCEDURE — 74011250636 HC RX REV CODE- 250/636: Performed by: INTERNAL MEDICINE

## 2020-07-14 PROCEDURE — 36592 COLLECT BLOOD FROM PICC: CPT

## 2020-07-14 PROCEDURE — 36415 COLL VENOUS BLD VENIPUNCTURE: CPT

## 2020-07-14 PROCEDURE — 65660000000 HC RM CCU STEPDOWN

## 2020-07-14 PROCEDURE — 80053 COMPREHEN METABOLIC PANEL: CPT

## 2020-07-14 PROCEDURE — 84100 ASSAY OF PHOSPHORUS: CPT

## 2020-07-14 PROCEDURE — 82330 ASSAY OF CALCIUM: CPT

## 2020-07-14 PROCEDURE — A4722 DIALYS SOL FLD VOL > 1999CC: HCPCS | Performed by: INTERNAL MEDICINE

## 2020-07-14 PROCEDURE — 83735 ASSAY OF MAGNESIUM: CPT

## 2020-07-14 PROCEDURE — 74011000258 HC RX REV CODE- 258: Performed by: INTERNAL MEDICINE

## 2020-07-14 RX ADMIN — CALCIUM 3000 MG: 500 TABLET ORAL at 12:00

## 2020-07-14 RX ADMIN — CLONIDINE HYDROCHLORIDE 0.1 MG: 0.1 TABLET ORAL at 09:33

## 2020-07-14 RX ADMIN — HYDRALAZINE HYDROCHLORIDE 100 MG: 50 TABLET, FILM COATED ORAL at 22:25

## 2020-07-14 RX ADMIN — CARVEDILOL 25 MG: 25 TABLET, FILM COATED ORAL at 17:26

## 2020-07-14 RX ADMIN — CALCIUM GLUCONATE 2 G: 98 INJECTION, SOLUTION INTRAVENOUS at 14:44

## 2020-07-14 RX ADMIN — HYDRALAZINE HYDROCHLORIDE 100 MG: 50 TABLET, FILM COATED ORAL at 17:22

## 2020-07-14 RX ADMIN — DOCUSATE SODIUM 50 MG AND SENNOSIDES 8.6 MG 2 TABLET: 8.6; 5 TABLET, FILM COATED ORAL at 22:25

## 2020-07-14 RX ADMIN — CALCIUM ACETATE 3335 MG: 667 CAPSULE ORAL at 17:18

## 2020-07-14 RX ADMIN — CALCIUM GLUCONATE 2 G: 98 INJECTION, SOLUTION INTRAVENOUS at 09:42

## 2020-07-14 RX ADMIN — HYDRALAZINE HYDROCHLORIDE 100 MG: 50 TABLET, FILM COATED ORAL at 09:36

## 2020-07-14 RX ADMIN — ALLOPURINOL 100 MG: 100 TABLET ORAL at 22:27

## 2020-07-14 RX ADMIN — CALCITRIOL CAPSULES 0.25 MCG 0.5 MCG: 0.25 CAPSULE ORAL at 17:22

## 2020-07-14 RX ADMIN — CALCIUM 3000 MG: 500 TABLET ORAL at 22:25

## 2020-07-14 RX ADMIN — CALCIUM GLUCONATE 2 G: 98 INJECTION, SOLUTION INTRAVENOUS at 02:32

## 2020-07-14 RX ADMIN — CALCIUM ACETATE 3335 MG: 667 CAPSULE ORAL at 11:51

## 2020-07-14 RX ADMIN — CALCIUM 3000 MG: 500 TABLET ORAL at 09:33

## 2020-07-14 RX ADMIN — Medication 10 ML: at 18:22

## 2020-07-14 RX ADMIN — CALCITRIOL CAPSULES 0.25 MCG 0.5 MCG: 0.25 CAPSULE ORAL at 09:36

## 2020-07-14 RX ADMIN — LOSARTAN POTASSIUM 100 MG: 50 TABLET, FILM COATED ORAL at 09:36

## 2020-07-14 RX ADMIN — CLONIDINE HYDROCHLORIDE 0.1 MG: 0.1 TABLET ORAL at 22:26

## 2020-07-14 RX ADMIN — SODIUM CHLORIDE, SODIUM LACTATE, CALCIUM CHLORIDE, MAGNESIUM CHLORIDE AND DEXTROSE 2000 ML: 2.5; 538; 448; 18.3; 5.08 INJECTION, SOLUTION INTRAPERITONEAL at 19:04

## 2020-07-14 RX ADMIN — Medication 10 ML: at 22:27

## 2020-07-14 RX ADMIN — CALCIUM GLUCONATE 2 G: 98 INJECTION, SOLUTION INTRAVENOUS at 06:17

## 2020-07-14 RX ADMIN — CLONIDINE HYDROCHLORIDE 0.1 MG: 0.1 TABLET ORAL at 17:26

## 2020-07-14 RX ADMIN — CALCITRIOL CAPSULES 0.25 MCG 0.5 MCG: 0.25 CAPSULE ORAL at 22:26

## 2020-07-14 RX ADMIN — AMLODIPINE BESYLATE 10 MG: 10 TABLET ORAL at 09:36

## 2020-07-14 RX ADMIN — CALCIUM ACETATE 3335 MG: 667 CAPSULE ORAL at 09:35

## 2020-07-14 RX ADMIN — CARVEDILOL 25 MG: 25 TABLET, FILM COATED ORAL at 09:37

## 2020-07-14 RX ADMIN — CALCIUM 3000 MG: 500 TABLET ORAL at 17:20

## 2020-07-14 RX ADMIN — Medication 10 ML: at 06:17

## 2020-07-14 RX ADMIN — HYDRALAZINE HYDROCHLORIDE 10 MG: 20 INJECTION INTRAMUSCULAR; INTRAVENOUS at 18:50

## 2020-07-14 NOTE — ROUTINE PROCESS
Bedside and Verbal shift change report given to Sherrie RN (oncoming nurse) by Chiquis Dorado RN (offgoing nurse). Report included the following information SBAR, Kardex, Intake/Output, MAR and Recent Results.

## 2020-07-14 NOTE — PROGRESS NOTES
Hospitalist Progress Note    Patient: Kilo Hamilton Age: 44 y.o. : 1970 MR#: [id number] SSN: [id number]  Date/Time: 2020 12:42 PM    DOA: 2020  PCP: Marquise Falcon MD    Subjective:     Tearful today during IDR's   Reports depression   Discussed medications, lifestyle interventions, counseling   Denies SI or HI   Pt will consider medication trial     Low-grade fever this afternoon       Interval Hospital Course:  44 y.o male w/ ESRD on PD, h/o hyperparathyroidism, hypertension, who presented for scheduled surgery on 20. He underwent subtotal parathyroidectomy due to parathyroid hyperplasia secondary to secondary hyperparathyroidism by Dr. Lucius Matias. He was admitted to ICU for close monitoring of his calcium level and potential arrhythmia. His IV calcium has been replaced but remains low. Nursing reported that he took himself off his IV replacement and refuse labs monitoring. Getting PD in room and tolerated well. His Calcitriol has been increased. He has been refusing complete IV calcium replacement, central venous catheter was place to assist in replacement compliant and now he insists it be removed but it is his only IV line. Assessment/Plan:     1. Parathyroid hyperplasia with secondary hyperparathyroidism status post subtotal parathyroidectomy on 20   2. Hypocalcemia due to subtotal parathyroidectomy, slowly improving  3. ESRD on peritoneal dialysis  4. Hypertension  5. Chronic diastolic CHF with EF of 04% in 2019   6. Anemia of chronic medical disease and ESRD, stable  7. Gout  8. Low albumin- improving  9. Hyperphosphatemia with ESRD  10. Depression   11.  Fever     ENT is primary, hospitalist group is consulted   Nephrology following  Pt has agreed to PD daily, if he misses treatment he will likely need HD    Check ionized calcium q12 hours  Cont IV Ca++ and oral replacement, oral Calcitriol  Check CXR, blood cx's, UA and urine cx   Cont amlodipine, carvedilol, clonidine, hydralazine, losartan   Cont allopurinol  ICS   Pt will consider medication for depression and psych eval, will continue discussion tomorrow     Case discussed with:  [x]Patient  []Family  [x]Nursing  [x]Case Management  DVT Prophylaxis:  []Lovenox  []Hep SQ  [x]SCDs  []Coumadin   []On Heparin gtt  Diet: Renal   Code Status: FULL   Disposition: likely discharge to home per ENT in 2-3 days     AMANDA Tanner,    2020         Objective:   VS:   Visit Vitals  /90   Pulse 76   Temp 98.4 °F (36.9 °C)   Resp 18   Ht 6' 2\" (1.88 m)   Wt 100.9 kg (222 lb 6.4 oz)   SpO2 99%   BMI 28.55 kg/m²      Tmax/24hrs: Temp (24hrs), Av.6 °F (37 °C), Min:98.3 °F (36.8 °C), Max:100.1 °F (37.8 °C)      Intake/Output Summary (Last 24 hours) at 20209  Last data filed at 2020 1742  Gross per 24 hour   Intake 360 ml   Output --   Net 360 ml         General:  Cooperative, Not in acute distress, speaks in full sentence while in bed  HEENT: EOMI, supple neck, no JVD, dry oral mucosa  Cardiovascular: S1S2 regular, no rub/gallop   Pulmonary: Clear air entry bilaterally, no wheezing, no crackle  GI:  Soft, non tender, non distended, +bs, no guarding   Extremities:  No pedal edema, +distal pulses appreciated   Neuro: AOx3, moving all extremities, no gross deficit.      Additional:       Current Facility-Administered Medications   Medication Dose Route Frequency    peritoneal dialysis DEXTROSE 2.5% (2.5 mEq/L low calcium) solution 2,000 mL  2,000 mL IntraPERitoneal ONCE    hydrALAZINE (APRESOLINE) 20 mg/mL injection 10 mg  10 mg IntraVENous Q6H PRN    calcium carbonate (OS-VIRAL) tablet 3,000 mg [elemental]  3,000 mg Oral QID    hydrALAZINE (APRESOLINE) tablet 100 mg  100 mg Oral TID    cloNIDine HCL (CATAPRES) tablet 0.1 mg  0.1 mg Oral TID    calcium acetate(phosphat bind) (PHOSLO) capsule 3,335 mg  5 Cap Oral TID WITH MEALS    senna-docusate (PERICOLACE) 8.6-50 mg per tablet 2 Tab  2 Tab Oral QHS  calcitRIOL (ROCALTROL) capsule 0.5 mcg  0.5 mcg Oral TID    heparin (porcine) injection 5,000 Units  5,000 Units SubCUTAneous Q8H    peritoneal dialysis DEXTROSE 2.5% (2.5 mEq/L low calcium) solution 2,000 mL  2,000 mL IntraPERitoneal BID    amLODIPine (NORVASC) tablet 10 mg  10 mg Oral DAILY    carvediloL (COREG) tablet 25 mg  25 mg Oral BID WITH MEALS    ergocalciferol capsule 50,000 Units  50,000 Units Oral Q7D    losartan (COZAAR) tablet 100 mg  100 mg Oral DAILY    sodium chloride (NS) flush 5-40 mL  5-40 mL IntraVENous Q8H    sodium chloride (NS) flush 5-40 mL  5-40 mL IntraVENous PRN    HYDROcodone-acetaminophen (NORCO) 5-325 mg per tablet 1 Tab  1 Tab Oral Q4H PRN    naloxone (NARCAN) injection 0.4 mg  0.4 mg IntraVENous PRN    ondansetron (ZOFRAN ODT) tablet 4 mg  4 mg Oral Q8H PRN    ELECTROLYTE REPLACEMENT PROTOCOL - Calcium  1 Each Other PRN    allopurinoL (ZYLOPRIM) tablet 100 mg  100 mg Oral Q TUE, THU & SAT    peritoneal dialysis DEXTROSE 2.5% (2.5 mEq/L low calcium) solution 12,000 mL  12,000 mL IntraPERitoneal DAILY    ELECTROLYTE REPLACEMENT PROTOCOL - Calcium   1 Each Other PRN            Lab/Data Review:  Labs: Results:       Chemistry Recent Labs     07/14/20 0430 07/13/20  0320 07/12/20  0330   GLU 79 160* 85    139 138   K 4.4 4.1 4.2    103 100   CO2 22 22 22   * 105* 102*   CREA 26.80* 26.30* 24.70*   BUCR 4* 4* 4*   AGAP 15 14 16   CA 6.9* 6.5* 6.5*   PHOS 6.9* 6.6* 7.4*     Recent Labs     07/14/20  0430 07/13/20  0320 07/12/20  0330   ALT 7* 7* 7*   TP 6.9 6.4 6.9   ALB 2.5* 2.2* 2.5*   GLOB 4.4* 4.2* 4.4*   AGRAT 0.6* 0.5* 0.6*      CBC w/Diff Recent Labs     07/14/20  0430   WBC 7.5   RBC 3.78*   HGB 9.4*   HCT 30.4*   MCV 80.4   MCH 24.9   MCHC 30.9*   RDW 17.0*         Coagulation No results for input(s): PTP, INR, APTT, INREXT, INREXT in the last 72 hours.     Iron/Ferritin Lab Results   Component Value Date/Time    Iron 14 (L) 04/15/2019 05:45 AM    TIBC 214 (L) 04/15/2019 05:45 AM    Iron % saturation 7 (L) 04/15/2019 05:45 AM    Ferritin 235.4 04/15/2019 05:45 AM       BNP    Cardiac Enzymes Lab Results   Component Value Date/Time    Troponin-I 0.064 (H) 04/15/2019 05:45 AM        Lactic Acid    Thyroid Studies          All Micro Results     Procedure Component Value Units Date/Time    CULTURE, BLOOD [[id number]] Collected:  07/14/20 1820    Order Status:  Completed Specimen:  Blood Updated:  07/14/20 1920    CULTURE, URINE [[id number]]     Order Status:  Sent Specimen:  Urine from Clean catch     CULTURE, BLOOD [[id number]]     Order Status:  Sent Specimen:  Blood     CULTURE, MRSA [[id number]]     Order Status:  Canceled Specimen:  Nasal from Nares             Images:    CT (Most Recent). XRAY (Most Recent)      EKG No results found for this or any previous visit.      2D ECHO

## 2020-07-14 NOTE — PROGRESS NOTES
NUTRITION    Nursing Referral: [facility]     RECOMMENDATIONS / PLAN:     - Continue current nutrition interventions   - Continue RD inpatient monitoring and evaluation. NUTRITION INTERVENTIONS & DIAGNOSIS:     - Meals/snacks: modified composition  - Medical food supplement therapy: continue     Nutrition Diagnosis: Increased nutrient needs protein & energy related to increased expenditure as evidenced by pt with ESRD on PD    ASSESSMENT:     7/14: Pt reported good appetite and meal intake, eating % of meals. Tolerating diet. Consuming Nepro supplements, 100% po intake. Refused PD this morning. 7/7: S/p subtotal parathyroidectomy 7/6, ESRD on PD. Excellent intake of clear liquid diet & diet advanced today for lunch. Pt denies painful swallowing, encouraged pt to ask RN for softer foods if difficulty noted with lunch meal. Ca replaced.     Nutritional intake adequate to meet patients estimated nutritional needs:  Yes    Diet: DIET RENAL Regular  DIET NUTRITIONAL SUPPLEMENTS Lunch, Dinner; Nepro      Food Allergies: NKFA  Current Appetite: Good  Appetite/meal intake prior to admission: Good  Feeding Limitations:  [] Swallowing difficulty    [] Chewing difficulty    [] Other:  Current Meal Intake:   Patient Vitals for the past 100 hrs:   % Diet Eaten   07/14/20 0850 90 %   07/12/20 1236 50 %   07/11/20 1615 75 %   07/10/20 1915 100 %       BM: 7/13  Skin Integrity: surgical incision to neck with HANNAH drain  Edema:   [x] No     [] Yes   Pertinent Medications: Reviewed: calcitrol, phoslo, os-flor, calcium gluconate, electrolyte replaecment (Ca), ergocalciferol once a week, ondansetron prn, pericolace, PD solution dextrose 2.5% (12 L) - 459 average kcal/day    Recent Labs     07/14/20  0430 07/13/20  0320 07/12/20  0330    139 138   K 4.4 4.1 4.2    103 100   CO2 22 22 22   GLU 79 160* 85   * 105* 102*   CREA 26.80* 26.30* 24.70*   CA 6.9* 6.5* 6.5*   MG 2.1 2.0 2.0   PHOS 6.9* 6.6* 7.4*   ALB 2.5* 2.2* 2.5*   ALT 7* 7* 7*       Intake/Output Summary (Last 24 hours) at 7/14/2020 1241  Last data filed at 7/14/2020 0850  Gross per 24 hour   Intake 240 ml   Output --   Net 240 ml       Anthropometrics:  Ht Readings from Last 1 Encounters:   07/10/20 6' 2\" (1.88 m)     Last 3 Recorded Weights in this Encounter    07/08/20 0600 07/10/20 1915 07/13/20 1605   Weight: 90.8 kg (200 lb 2.8 oz) 90.8 kg (200 lb 2.8 oz) 100.9 kg (222 lb 6.4 oz)     Body mass index is 28.55 kg/m². Weight History:  Pt denies recent changes in wt with UBW around 190#    Weight Metrics 7/13/2020 7/6/2020 4/30/2020 3/16/2020 2/29/2020 2/28/2020 11/25/2019   Weight 222 lb 6.4 oz - 195 lb 187 lb 6.3 oz - 195 lb 195 lb   BMI - 28.55 kg/m2 25.04 kg/m2 24.06 kg/m2 25.04 kg/m2 - 25.04 kg/m2        Admitting Diagnosis: Hyperparathyroidism ([facility] [address] [E21.3]  Pertinent PMHx: ESRD on PD, heart failure, HTN    Education Needs:        [x] None identified  [] Identified - Not appropriate at this time  []  Identified and addressed - refer to education log  Learning Limitations:   [x] None identified  [] Identified    Cultural, Alevism & ethnic food preferences:  [x] None identified    [] Identified and addressed     ESTIMATED NUTRITION NEEDS:     Calories: 1005-4691 kcal (30-35 kcal/kg) based on  [] Actual BW      [x] SBW: 88 kg   Protein: 106-114 gm (1.2-1.3 gm/kg) based on  [] Actual BW      [x] SBW   Fluid: 1 mL/kcal     MONITORING & EVALUATION:     Nutrition Goal(s):   - PO nutrition intake will meet >75% of patient estimated nutritional needs within the next 7 days. Outcome: Met/Continue   - PO nutrition intake will continue to meet >75% of patients estimated nutritional needs over the next 7 days.      Outcome: New/initial goal       Monitoring:   [x] Food and nutrient intake   [x] Food and nutrient administration  [x] Comparative standards   [x] Nutrition-focused physical findings   [x] Anthropometric Measurements   [x] Treatment/therapy   [x] Biochemical data, medical tests, and procedures        Previous Recommendations (for follow-up assessments only): Implemented     Discharge Planning: No nutritional discharge needs at this time. Participated in care planning, discharge planning, & interdisciplinary rounds as appropriate.       Corinne Spindle, LI  Pager: [phone]

## 2020-07-14 NOTE — ADT AUTH CERT NOTES
Parathyroidectomy - Care Day 5 (7/10/2020) by Oneil Robledo         Review Entered  Review Status    7/14/2020 15:42  Completed        Criteria Review       Care Day: 5 Care Date: 7/10/2020 Level of Care: ICU    Guideline Day 1    Level Of Care    (X) Early AM OR to recovery to discharge [B]    Clinical Status    (X) * Successful uncomplicated parathyroidectomy    7/14/2020 15:42:08 EDT by Delinda Goldmann      POD 3 s/p subtotal parathyroidectomy    (X) * No evidence of airway compromise or postoperative bleeding    7/14/2020 15:42:08 EDT by Delinda Goldmann      NA    (X) * No evidence of recurrent laryngeal nerve injury    7/14/2020 15:42:08 EDT by Delinda Goldmann      NA    ( ) * Stable calcium levels and outpatient monitoring arranged    7/14/2020 15:42:08 EDT by Delinda Goldmann      Ion Ca levels 0.79 at 0200, 0.75 at 1015, 0.75 at 1710.  Pt cont on PD    ( ) * No evidence of hypocalcemia (eg, tetany, seizures)    ( ) * Discharge plans and education understood    Activity    (X) * Ambulatory postoperatively    7/14/2020 15:42:08 EDT by Delinda Goldmann      as tolerated    Routes    (X) IV fluids, medications for procedure    (X) * Oral hydration, medications, and diet as tolerated postoperatively    7/14/2020 15:42:08 EDT by Delinda Goldmann      renal reg diet with nepro tid    Medications    (X) Possible postoperative calcium supplements    7/14/2020 15:42:08 EDT by Delinda Goldmann      ca gluconate 5gram IV given after IV access established; phoslo increased to 3335mg po tid; rocaltrol 0.5mcg po tid; flor carbonate 3000mg po tid changed to 1000mg po qid and then changed again to 2000mg po qid    7/14/2020 15:42:08 EDT by Delinda Goldmann    Subject: Additional Clinical Information    * Gluc 104, bun 95, creat 22.80, phos 8.2, gfr 3, alb 2.2, glob 4.4, alt 8, ast 8    * rbc 3.92, hgb 9.9, hct 31.4, rdw 17.4    * Central line placed today for no IV access    * meds: catapres 0.1mg po bid, coreg 25mg po bid, norvasc 10mg po bid, pt refused chronulac 30cc po bid; pericolace 2tabs po qhs; cozaar 100mg po qd; pt refused heparin 5000    * Pt refusing PD both times today          * Milestone    Additional Notes    ASSESSMENT:         1.  Parathyroid hyperplasia with secondary hyperparathyroidism status post subtotal parathyroidectomy    2.  Hypocalcemia due to subtotal parathyroidectomy    3.  ESRD on peritoneal dialysis    4.  Hypertension    5.  Chronic diastolic CHF with EF of 46 percentage, compensated    6.  Anemia of chronic medical disease and ESRD, stable    7.  Gout    8.  Low albumin         PLAN:         Continue current management    Discussed with Dr. Orestes Mahmood: He wants to give IV calcium through central line and he asked me to put the order for central line placement.     Dr. Orestes Mahmood talked to interventional neurology about putting central line.        Parathyroidectomy - Care Day 4 (7/9/2020) by Jm Gallagher         Review Entered  Review Status    7/14/2020 15:15  Completed        Criteria Review       Care Day: 4 Care Date: 7/9/2020 Level of Care: ICU    Guideline Day 1    Level Of Care    (X) Early AM OR to recovery to discharge [B]    Clinical Status    (X) * Successful uncomplicated parathyroidectomy    7/14/2020 15:15:54 EDT by Andreas Monteiro      POD 3 s/p subtotal parathyroidectomy    (X) * No evidence of airway compromise or postoperative bleeding    7/14/2020 15:15:54 EDT by Andreas Monteiro      na    (X) * No evidence of recurrent laryngeal nerve injury    7/14/2020 15:15:54 EDT by Andreas LYONS    ( ) * Stable calcium levels and outpatient monitoring arranged    7/14/2020 15:15:54 EDT by Lenora Arcos ca level 6.8 at 0951; ion flor level 0.88 at 0951, 0.85 at 1517, 0.84 at 2131    ( ) * No evidence of hypocalcemia (eg, tetany, seizures)    7/14/2020 15:15:54 EDT by Andreas Monteiro      pt still requiring PD; refusing IV sticks    ( ) * Discharge plans and education understood Activity    (X) * Ambulatory postoperatively    7/14/2020 15:15:54 EDT by Eric Lin      ad loi    Routes    (X) IV fluids, medications for procedure    (X) * Oral hydration, medications, and diet as tolerated postoperatively    7/14/2020 15:15:54 EDT by Eric Lin      chronulac 30cc po bid (NEW); catapres 0.1mg po bid; benedryl 25mg po x1 at 0200; tylenol 650mg po x1 at 0200; pericolace 2tab po qhs; cozaar 100mg po qd; coretg 25mg po bid; norvasc 10mg po bid; heparin 5000un sq q8H x1 and refused rest; ref zyloprim    Medications    (X) Possible postoperative calcium supplements    7/14/2020 15:15:54 EDT by Eric Lin      rocaltrol 0.5mcg po increased from BID to TID today; phoslo 2668mg po tid with meals; Os flor 1000mg po tid increased to 3000mg po tid;    7/14/2020 15:15:54 EDT by Eric Lin    Subject: Additional Clinical Information    * 97.5, 168/106, 72, 21, RA    * Remains on PD    * Plan as per MD          * Milestone    Additional Notes    MD assessment:    · Resp: Supp O2 PRN for SpO2 >90%, Pulmonary Hygiene, IS.  Keep HOB elevated > 30 degrees, aspiration precautions. · I/D: Afebrile, aleukocytosis; trend WBCs and temp curve. Monitor surgical site and drain output. · Hem/Onc: Daily CBC; H/H, and plts are stable. SQ heparin for DVT ppx    · CVS: Patient started on home antihypertensives. Monitor hemodynamics. · Metabolic: Trend BMP, Mag, Phos. Continue ionized calcium check q4h. Replace lytes per protocol. · Renal: Trend Renal indices, strict I/Os. PD management per nephrology    · Endocrine: POC Glucose AC&HS. Avoid hypoglycemia. · GI: Start renal diet. Zofran PRN for N/V.    · Musc/Skin: Surgical incision care. HANNAH drain to low suction per surgery. · Neuro: PRN analgesics. · Code Status: FULL CODE       He is stable to transition out of ICU today. His Ionized calcium 0.88, he is agreeing to IV calcium replacement. He is on calcitriol, nephrology follows.      He feels constipated today. Feels depressed due to his medical illness but does not want to have new medications    He is not suicidal.              Interval Hospital Course:    44 y.o male with ESRD on PD, h/o hyperparathyroidism, hypertension, presented for schedule surgery on 7/6/20. He underwent subtotal parathyroidectomy due to parathyroid hyperplasia secondary to secondary hyperparathyroidism by Dr. Jesus Charlton. He was admitted to ICU for close monitoring of his calcium level and potential arrhythmia. His IV calcium has been replaced but remains low. Nursing reported that he took himself off his IV replacement and refuse labs monitoring. He is currently having PD and tolerated well. His Calcitriol has been increased.         ROS: No current fever/chills, no headache, no dizziness, no facial pain, no sinus congestion,    No swallowing pain, No chest pain, no palpitation, no shortness of breath, no abd pain,    No diarrhea, no urinary complaint, no leg pain or swelling              Assessment/Plan:    1. Hypocalcemia status post subtotal parathyroidectomy    2. Parathyroid hyperplasia with secondary hyperparathyroidism    3.  ESRD on PD         Hyperphosphatemia    4.  Hypertension    5.   Chronic heart failure with reduced EF, last Echo with EF 56%, GIII diastolic dysfunction    6.  Normocytic anemia due chronic illness and ESRD    7.  Thrombocytopenia today    8.  Depressed mood due to chronic illness      9.  Gout              Education provided to the needs for IV calcium replacement and the need for compliance at this time. He agree to continue with current treatment. Continue Calcitriol, might need further increase frequency.  Continue calcium replacement    On oral OS-flor for phosphate binding    Continue with PD for volume management per nephrology    Continue with home antihypertensive medications    Added bowel regimen including lactulose for BM    Close monitor his electrolytes and arrhythmia    ICS    Pepcid    Closely monitor his platelet.  Note he is on hep sq    Wound care per nursing.         Full code    Disposition: home in 1-2 days when calcium is normalized

## 2020-07-14 NOTE — PROGRESS NOTES
Spoke with pt. He stated he lives with room mates. He stated he is independent with his Peritoneal dialysis and ADLs/IADLs. He stated his daughter and other family members check up on him. He has no d/c needs at this time. Case Management will continue to follow.       ZAN RolandN RN  Care Management  Pager: [phone]

## 2020-07-14 NOTE — PROGRESS NOTES
RENAL DAILY PROGRESS NOTE            61-year-old male with past medical history of hypertension, ESRD admitted for parathyroid surgery, following for dialysis management  Subjective:       Complaint:   Overnight events noted  Mr. Antoine Andrews refused to do dialysis last night. He feels very depressed. I had a very lengthy conversation with him about his noncompliant behavior. I explained him his life is dialysis dependent. After surgery he will have persistent low calcium and he need to be compliant with  Oral calcium supplement, dialysis and close follow-up for lab monitoring. I suggested him to switch to hemodialysis for a few weeks. In my opinion during this time he can be seen by psychiatrist for his depression. Hemodialysis will help him relieve burden for doing dialysis at home by himself. He lives very close by to the dialysis center in [place]. Mr. Antoine Andrews refused to switch hemodialysis and reassures me about his compliance with dialysis treatment. IMPRESSION:   ESRD, on peritoneal dialysis  Hypertension  Status post parathyroid surgery  Hypocalcemia  Anemia   PLAN:   Continue current PD treatment for now. Agree with IV calcium supplement. Ordered extra manual treatment for today. Need psych evaluation. Agree with discharge plan tomorrow. Discussed with Dr. Mari Gould.          Current Facility-Administered Medications   Medication Dose Route Frequency    calcium gluconate 2 g in 0.9% sodium chloride 100 mL IVPB  2 g IntraVENous Q4H    hydrALAZINE (APRESOLINE) 20 mg/mL injection 10 mg  10 mg IntraVENous Q6H PRN    calcium carbonate (OS-VIRAL) tablet 3,000 mg [elemental]  3,000 mg Oral QID    hydrALAZINE (APRESOLINE) tablet 100 mg  100 mg Oral TID    cloNIDine HCL (CATAPRES) tablet 0.1 mg  0.1 mg Oral TID    calcium acetate(phosphat bind) (PHOSLO) capsule 3,335 mg  5 Cap Oral TID WITH MEALS    senna-docusate (PERICOLACE) 8.6-50 mg per tablet 2 Tab  2 Tab Oral QHS    calcitRIOL (ROCALTROL) capsule 0.5 mcg  0.5 mcg Oral TID    heparin (porcine) injection 5,000 Units  5,000 Units SubCUTAneous Q8H    peritoneal dialysis DEXTROSE 2.5% (2.5 mEq/L low calcium) solution 2,000 mL  2,000 mL IntraPERitoneal BID    amLODIPine (NORVASC) tablet 10 mg  10 mg Oral DAILY    carvediloL (COREG) tablet 25 mg  25 mg Oral BID WITH MEALS    ergocalciferol capsule 50,000 Units  50,000 Units Oral Q7D    losartan (COZAAR) tablet 100 mg  100 mg Oral DAILY    sodium chloride (NS) flush 5-40 mL  5-40 mL IntraVENous Q8H    sodium chloride (NS) flush 5-40 mL  5-40 mL IntraVENous PRN    HYDROcodone-acetaminophen (NORCO) 5-325 mg per tablet 1 Tab  1 Tab Oral Q4H PRN    naloxone (NARCAN) injection 0.4 mg  0.4 mg IntraVENous PRN    ondansetron (ZOFRAN ODT) tablet 4 mg  4 mg Oral Q8H PRN    ELECTROLYTE REPLACEMENT PROTOCOL - Calcium  1 Each Other PRN    allopurinoL (ZYLOPRIM) tablet 100 mg  100 mg Oral Q TUE, THU & SAT    peritoneal dialysis DEXTROSE 2.5% (2.5 mEq/L low calcium) solution 12,000 mL  12,000 mL IntraPERitoneal DAILY    ELECTROLYTE REPLACEMENT PROTOCOL - Calcium   1 Each Other PRN       Review of Symptoms: comprehensive ROS negative except above.    Objective:     Patient Vitals for the past 24 hrs:   Temp Pulse Resp BP SpO2   07/14/20 1236 -- -- -- 159/87 --   07/14/20 1159 98.3 °F (36.8 °C) 90 21 (!) 186/114 99 %   07/14/20 0810 98.3 °F (36.8 °C) 93 18 162/90 97 %   07/14/20 0404 98.5 °F (36.9 °C) 72 20 (!) 147/91 98 %   07/14/20 0004 98.6 °F (37 °C) 90 18 145/80 96 %   07/13/20 2006 98.3 °F (36.8 °C) 72 19 142/86 99 %   07/13/20 1607 97.9 °F (36.6 °C) 76 19 127/78 98 %        Weight change:      07/12 1901 - 07/14 0700  In: 240 [I.V.:240]  Out: -     Intake/Output Summary (Last 24 hours) at 7/14/2020 1345  Last data filed at 7/14/2020 0850  Gross per 24 hour   Intake 240 ml   Output --   Net 240 ml     Physical Exam:   General: comfortable, no acute distress   HEENT sclera anicteric, supple neck, no thyromegaly  CVS: S1S2 heard,  no rub  RS: + air entry b/l,   Abd: Soft, Non tender,   Neuro: non focal, awake, alert , CN II-XII are grossly intact  Extrm: + edema, no cyanosis, clubbing   Skin: no visible  Rash  Musculoskeletal: No gross joints or bone deformities         Data Review:     LABS:   Hematology:   Recent Labs     07/14/20  0430   WBC 7.5   HGB 9.4*   HCT 30.4*     Chemistry:   Recent Labs     07/14/20  0430 07/13/20  0320 07/12/20  0330   * 105* 102*   CREA 26.80* 26.30* 24.70*   CA 6.9* 6.5* 6.5*   ALB 2.5* 2.2* 2.5*   K 4.4 4.1 4.2    139 138    103 100   CO2 22 22 22   PHOS 6.9* 6.6* 7.4*   GLU 79 160* 85            Procedures/imaging: see electronic medical records for all procedures, Xrays and details which were not copied into this note but were reviewed prior to creation of Plan          Assessment & Plan:     As above         Vera Romero MD  7/14/2020  2:12 PM

## 2020-07-14 NOTE — PROGRESS NOTES
Bedside and Verbal shift change report given to Suzanne (oncoming nurse) by Geovanni Waddell RN   (offgoing nurse). Report given with ABHISHEK, Taisha, MAR and Recent Results.

## 2020-07-15 ENCOUNTER — APPOINTMENT (OUTPATIENT)
Dept: GENERAL RADIOLOGY | Age: 50
DRG: 674 | End: 2020-07-15
Attending: FAMILY MEDICINE
Payer: COMMERCIAL

## 2020-07-15 LAB
ALBUMIN SERPL-MCNC: 2.5 G/DL (ref 3.4–5)
ALBUMIN/GLOB SERPL: 0.6 {RATIO} (ref 0.8–1.7)
ALP SERPL-CCNC: 96 U/L (ref 45–117)
ALT SERPL-CCNC: 9 U/L (ref 16–61)
ANION GAP SERPL CALC-SCNC: 14 MMOL/L (ref 3–18)
APPEARANCE UR: CLEAR
AST SERPL-CCNC: 4 U/L (ref 10–38)
BACTERIA URNS QL MICRO: ABNORMAL /HPF
BILIRUB SERPL-MCNC: 0.3 MG/DL (ref 0.2–1)
BILIRUB UR QL: NEGATIVE
BUN SERPL-MCNC: 104 MG/DL (ref 7–18)
BUN/CREAT SERPL: 4 (ref 12–20)
CA-I SERPL-SCNC: 0.74 MMOL/L (ref 1.12–1.32)
CALCIUM SERPL-MCNC: 6.1 MG/DL (ref 8.5–10.1)
CHLORIDE SERPL-SCNC: 101 MMOL/L (ref 100–111)
CO2 SERPL-SCNC: 22 MMOL/L (ref 21–32)
COLOR UR: YELLOW
CREAT SERPL-MCNC: 26.1 MG/DL (ref 0.6–1.3)
EPITH CASTS URNS QL MICRO: NEGATIVE /LPF (ref 0–5)
GLOBULIN SER CALC-MCNC: 4.4 G/DL (ref 2–4)
GLUCOSE SERPL-MCNC: 125 MG/DL (ref 74–99)
GLUCOSE UR STRIP.AUTO-MCNC: 100 MG/DL
HGB UR QL STRIP: ABNORMAL
KETONES UR QL STRIP.AUTO: NEGATIVE MG/DL
LEUKOCYTE ESTERASE UR QL STRIP.AUTO: ABNORMAL
MAGNESIUM SERPL-MCNC: 2.2 MG/DL (ref 1.6–2.6)
NITRITE UR QL STRIP.AUTO: NEGATIVE
PH UR STRIP: 6.5 [PH] (ref 5–8)
PHOSPHATE SERPL-MCNC: 6.6 MG/DL (ref 2.5–4.9)
POTASSIUM SERPL-SCNC: 4.3 MMOL/L (ref 3.5–5.5)
PROT SERPL-MCNC: 6.9 G/DL (ref 6.4–8.2)
PROT UR STRIP-MCNC: 100 MG/DL
RBC #/AREA URNS HPF: ABNORMAL /HPF (ref 0–5)
SODIUM SERPL-SCNC: 137 MMOL/L (ref 136–145)
SP GR UR REFRACTOMETRY: 1.01 (ref 1–1.03)
UROBILINOGEN UR QL STRIP.AUTO: 0.2 EU/DL (ref 0.2–1)
WBC URNS QL MICRO: ABNORMAL /HPF (ref 0–4)

## 2020-07-15 PROCEDURE — 74011250637 HC RX REV CODE- 250/637: Performed by: OTOLARYNGOLOGY

## 2020-07-15 PROCEDURE — 74011250636 HC RX REV CODE- 250/636: Performed by: HOSPITALIST

## 2020-07-15 PROCEDURE — 82330 ASSAY OF CALCIUM: CPT

## 2020-07-15 PROCEDURE — 84100 ASSAY OF PHOSPHORUS: CPT

## 2020-07-15 PROCEDURE — 74011000258 HC RX REV CODE- 258: Performed by: HOSPITALIST

## 2020-07-15 PROCEDURE — 74011250637 HC RX REV CODE- 250/637: Performed by: INTERNAL MEDICINE

## 2020-07-15 PROCEDURE — 87086 URINE CULTURE/COLONY COUNT: CPT

## 2020-07-15 PROCEDURE — 65660000000 HC RM CCU STEPDOWN

## 2020-07-15 PROCEDURE — 81001 URINALYSIS AUTO W/SCOPE: CPT

## 2020-07-15 PROCEDURE — 83735 ASSAY OF MAGNESIUM: CPT

## 2020-07-15 PROCEDURE — 80053 COMPREHEN METABOLIC PANEL: CPT

## 2020-07-15 RX ADMIN — CALCIUM 3000 MG: 500 TABLET ORAL at 09:06

## 2020-07-15 RX ADMIN — DOCUSATE SODIUM 50 MG AND SENNOSIDES 8.6 MG 2 TABLET: 8.6; 5 TABLET, FILM COATED ORAL at 22:09

## 2020-07-15 RX ADMIN — LOSARTAN POTASSIUM 100 MG: 50 TABLET, FILM COATED ORAL at 09:06

## 2020-07-15 RX ADMIN — CLONIDINE HYDROCHLORIDE 0.1 MG: 0.1 TABLET ORAL at 16:10

## 2020-07-15 RX ADMIN — HYDRALAZINE HYDROCHLORIDE 100 MG: 50 TABLET, FILM COATED ORAL at 22:09

## 2020-07-15 RX ADMIN — Medication 10 ML: at 06:38

## 2020-07-15 RX ADMIN — SODIUM CHLORIDE 4 G: 9 INJECTION, SOLUTION INTRAVENOUS at 09:08

## 2020-07-15 RX ADMIN — CLONIDINE HYDROCHLORIDE 0.1 MG: 0.1 TABLET ORAL at 22:09

## 2020-07-15 RX ADMIN — CARVEDILOL 25 MG: 25 TABLET, FILM COATED ORAL at 07:54

## 2020-07-15 RX ADMIN — CALCITRIOL CAPSULES 0.25 MCG 0.5 MCG: 0.25 CAPSULE ORAL at 16:12

## 2020-07-15 RX ADMIN — CALCITRIOL CAPSULES 0.25 MCG 0.5 MCG: 0.25 CAPSULE ORAL at 09:06

## 2020-07-15 RX ADMIN — AMLODIPINE BESYLATE 10 MG: 10 TABLET ORAL at 09:00

## 2020-07-15 RX ADMIN — CALCIUM ACETATE 3335 MG: 667 CAPSULE ORAL at 12:28

## 2020-07-15 RX ADMIN — CALCIUM ACETATE 3335 MG: 667 CAPSULE ORAL at 07:50

## 2020-07-15 RX ADMIN — HYDRALAZINE HYDROCHLORIDE 100 MG: 50 TABLET, FILM COATED ORAL at 16:11

## 2020-07-15 RX ADMIN — Medication 10 ML: at 22:10

## 2020-07-15 RX ADMIN — CLONIDINE HYDROCHLORIDE 0.1 MG: 0.1 TABLET ORAL at 09:06

## 2020-07-15 RX ADMIN — CALCIUM 3000 MG: 500 TABLET ORAL at 22:09

## 2020-07-15 RX ADMIN — HYDRALAZINE HYDROCHLORIDE 100 MG: 50 TABLET, FILM COATED ORAL at 09:06

## 2020-07-15 RX ADMIN — CALCIUM 3000 MG: 500 TABLET ORAL at 12:27

## 2020-07-15 RX ADMIN — CALCIUM 3000 MG: 500 TABLET ORAL at 18:39

## 2020-07-15 RX ADMIN — CALCITRIOL CAPSULES 0.25 MCG 0.5 MCG: 0.25 CAPSULE ORAL at 22:09

## 2020-07-15 RX ADMIN — CALCIUM ACETATE 3335 MG: 667 CAPSULE ORAL at 16:08

## 2020-07-15 RX ADMIN — CARVEDILOL 25 MG: 25 TABLET, FILM COATED ORAL at 16:10

## 2020-07-15 NOTE — PROGRESS NOTES
Postop day #9  Patient with some complaints of swelling right hand  Wound healing well  Sutures removed  No difficulties  Patient's calcium however  has once again diminished  Patient on significant p.o. IV calcium as well as Rocaltrol  Unfortunately as long as patient is receiving significant amounts of extraneous calcium, parathyroid function will be diminished due to negative feedback patient however needing significant calcium to maintain levels even at a marketed diminished calcium level     Impression status post subtotal parathyroidectomy patient with significant diminishment of serum calciums poorly responsive at this time  Plan will defer to medicine and renal  We will ask medicine to assume care for patient since from a surgical standpoint, patient doing well with sutures removed    Zana Ochoa

## 2020-07-15 NOTE — PROGRESS NOTES
1920: Assumed patient care from [address] Patient is alert and oriented to person, place, time and situation. Respiratory status Is stable on room air. Vital signs are stable. MEWS score is a one. Patient denies any pain, discomfort, nausea vomiting dizziness or anxiety. White board and fall card is updated. Bed is locked and in lowest position. Call bell, water and personal belongings are within reach. Patient has no questions, comments or concerns after bedside shift report. 0700: Patient had an uneventful shift. Respiratory status, vital signs and MEWS score remained stable. Patient was resting quietly with no signs of distress noted. Bed locked and in lowest position. Call bell water and personal belongings were within reach. Patient had no questions, comments or concerns after bedside shift report.  Bedside report given to Ernst Lewis R.N.

## 2020-07-15 NOTE — PROGRESS NOTES
RENAL DAILY PROGRESS NOTE            69-year-old male with past medical history of hypertension, ESRD admitted for parathyroid surgery, following for dialysis management  Subjective:       Complaint:   Overnight events noted  He did peritoneal dialysis last night. Receiving higher dose iv calcium. 7/14/20  I had a very lengthy conversation with him about his noncompliant behavior. I explained him his life is dialysis dependent. After surgery he will have persistent low calcium and he need to be compliant with  Oral calcium supplement, dialysis and close follow-up for lab monitoring. I suggested him to switch to hemodialysis for a few weeks. In my opinion during this time he can be seen by psychiatrist for his depression. Hemodialysis will help him relieve burden for doing dialysis at home by himself. He lives very close by to the dialysis center in [place]. Mr. Paris Nunez refused to switch hemodialysis and reassures me about his compliance with dialysis treatment. IMPRESSION:   ESRD, on peritoneal dialysis  Hypertension  Status post parathyroid surgery  Hypocalcemia, Hungry bone syndrome. Hyperphosphatemia   Anemia   PLAN:   His calcium level is still remain low, higher phos level. One gram Phoslo has approx 250mg of elemental calcium and  One gram clacium carbonate has about 400mg calcium. With current dose    He is receiving about 5gm of elemental calcium daily  Still there is no desired achievement of calcium level. I suspect non compliance with oral medications. Again I recommend to switch hemodialysis which he continues to refuse. Continue to monitor in hospital for now as calcium remain lower side. Discussed with Dr. Sally Khanna.          Current Facility-Administered Medications   Medication Dose Route Frequency    calcium gluconate 4 g in 0.9% sodium chloride 100 mL IVPB  4 g IntraVENous ONCE    hydrALAZINE (APRESOLINE) 20 mg/mL injection 10 mg  10 mg IntraVENous Q6H PRN    calcium carbonate (OS-VIRAL) tablet 3,000 mg [elemental]  3,000 mg Oral QID    hydrALAZINE (APRESOLINE) tablet 100 mg  100 mg Oral TID    cloNIDine HCL (CATAPRES) tablet 0.1 mg  0.1 mg Oral TID    calcium acetate(phosphat bind) (PHOSLO) capsule 3,335 mg  5 Cap Oral TID WITH MEALS    senna-docusate (PERICOLACE) 8.6-50 mg per tablet 2 Tab  2 Tab Oral QHS    calcitRIOL (ROCALTROL) capsule 0.5 mcg  0.5 mcg Oral TID    heparin (porcine) injection 5,000 Units  5,000 Units SubCUTAneous Q8H    peritoneal dialysis DEXTROSE 2.5% (2.5 mEq/L low calcium) solution 2,000 mL  2,000 mL IntraPERitoneal BID    amLODIPine (NORVASC) tablet 10 mg  10 mg Oral DAILY    carvediloL (COREG) tablet 25 mg  25 mg Oral BID WITH MEALS    ergocalciferol capsule 50,000 Units  50,000 Units Oral Q7D    losartan (COZAAR) tablet 100 mg  100 mg Oral DAILY    sodium chloride (NS) flush 5-40 mL  5-40 mL IntraVENous Q8H    sodium chloride (NS) flush 5-40 mL  5-40 mL IntraVENous PRN    HYDROcodone-acetaminophen (NORCO) 5-325 mg per tablet 1 Tab  1 Tab Oral Q4H PRN    naloxone (NARCAN) injection 0.4 mg  0.4 mg IntraVENous PRN    ondansetron (ZOFRAN ODT) tablet 4 mg  4 mg Oral Q8H PRN    ELECTROLYTE REPLACEMENT PROTOCOL - Calcium  1 Each Other PRN    allopurinoL (ZYLOPRIM) tablet 100 mg  100 mg Oral Q TUE, THU & SAT    peritoneal dialysis DEXTROSE 2.5% (2.5 mEq/L low calcium) solution 12,000 mL  12,000 mL IntraPERitoneal DAILY    ELECTROLYTE REPLACEMENT PROTOCOL - Calcium   1 Each Other PRN       Review of Symptoms: comprehensive ROS negative except above.    Objective:     Patient Vitals for the past 24 hrs:   Temp Pulse Resp BP SpO2   07/15/20 0750 -- 76 -- (!) 160/98 --   07/15/20 0000 97.9 °F (36.6 °C) 76 17 140/87 99 %   07/14/20 2000 98 °F (36.7 °C) 71 18 111/73 98 %   07/14/20 1920 -- -- -- -- 98 %   07/14/20 1849 -- -- -- 156/90 --   07/14/20 1826 98.4 °F (36.9 °C) -- -- -- --   07/14/20 1629 100.1 °F (37.8 °C) 76 18 (!) 156/91 99 % 07/14/20 1236 -- -- -- 159/87 --   07/14/20 1159 98.3 °F (36.8 °C) 90 21 (!) 186/114 99 %        Weight change: 0.02 kg (0.7 oz)     07/13 1901 - 07/15 0700  In: 600 [P.O.:600]  Out: 0     Intake/Output Summary (Last 24 hours) at 7/15/2020 0842  Last data filed at 7/14/2020 1920  Gross per 24 hour   Intake 600 ml   Output 0 ml   Net 600 ml     Physical Exam:   General: comfortable, no acute distress   HEENT sclera anicteric, supple neck, no thyromegaly  CVS: S1S2 heard,  no rub  RS: + air entry b/l,   Abd: Soft, Non tender,   Neuro: non focal, awake, alert , CN II-XII are grossly intact  Extrm: + edema, no cyanosis, clubbing   Skin: no visible  Rash  Musculoskeletal: No gross joints or bone deformities         Data Review:     LABS:   Hematology:   Recent Labs     07/14/20  0430   WBC 7.5   HGB 9.4*   HCT 30.4*     Chemistry:   Recent Labs     07/15/20  0540 07/14/20  0430 07/13/20  0320   * 104* 105*   CREA 26.10* 26.80* 26.30*   CA 6.1* 6.9* 6.5*   ALB 2.5* 2.5* 2.2*   K 4.3 4.4 4.1    139 139    102 103   CO2 22 22 22   PHOS 6.6* 6.9* 6.6*   * 79 160*            Procedures/imaging: see electronic medical records for all procedures, Xrays and details which were not copied into this note but were reviewed prior to creation of Plan          Assessment & Plan:     As above         Deana Pena MD  7/15/2020  2:12 PM

## 2020-07-15 NOTE — ROUTINE PROCESS
I notified the nurse Charley Yanes and the telemetry dept. MsLynda Andrey Revering about pt. In 409 will not wear his heart monitor box.

## 2020-07-15 NOTE — PROGRESS NOTES
Hospitalist Progress Note    Patient: Yunier Murray Age: 44 y.o. : 1970 MR#: [id number] SSN: [id number]  Date/Time: 7/15/2020 12:42 PM    DOA: 2020  PCP: Adam Staley MD    Subjective:     Pt very upset today about numerous things   Reports he was never told hypocalcemia was a possible risk after parathyroidectomy   Reports he keeps being told by different providers he is going home tomorrow and then the same the next day   States that unless his peripheral IJ IV is removed, he is walking out with it and going to another hospital to have it removed   Pt has no other IV access   Refused CXR yesterday   Has a long history of non-compliance     Discussed with nephrology, pt's Ca++ will take significant time to recover, plan to check Ca++ later today and in am, if stable he can be discharged home     Discussed with pt, he has agreed to stay until tomorrow for Ca++ checks, but says he is then leaving either [facility] or [facility] Course:  44 y.o male w/ ESRD on PD, h/o hyperparathyroidism, hypertension, who presented for scheduled surgery on 20. He underwent subtotal parathyroidectomy due to parathyroid hyperplasia secondary to secondary hyperparathyroidism by Dr. Zabrina Alex. He was admitted to ICU for close monitoring of his calcium level and potential arrhythmia. His IV calcium has been replaced but remains low. Nursing reported that he took himself off his IV replacement and refuse labs monitoring. Getting PD in room and tolerated well. His Calcitriol has been increased. He has been refusing complete IV calcium replacement, R IJ was placed to assist in replacement, and now he insists it be removed but it is his only IV line. Assessment/Plan:     1. Parathyroid hyperplasia with secondary hyperparathyroidism status post subtotal parathyroidectomy on 20   2. Hypocalcemia due to subtotal parathyroidectomy, slowly improving  3. ESRD on peritoneal dialysis  4. Hypertension  5. Chronic diastolic CHF with EF of 25% in 2019   6. Anemia of chronic medical disease and ESRD, stable  7. Gout  8. Low albumin- improving  9. Hyperphosphatemia with ESRD  10. Depression   11. Fever     Stop IV Ca++ replacement, Cont oral Calcitriol   ENT has signed off    Nephrology following  Pt has agreed to PD daily, if he misses treatment he will likely need HD    Check ionized calcium q12 hours  FU blood cx's, UA and urine cx  Pt refused CXR    Cont amlodipine, carvedilol, clonidine, hydralazine, losartan   Cont allopurinol   ICS   Pt will consider medication for depression and psych eval, will continue discussion tomorrow     Case discussed with:  [x]Patient  []Family  [x]Nursing  [x]Case Management  DVT Prophylaxis:  []Lovenox  []Hep SQ  [x]SCDs  []Coumadin   []On Heparin gtt  Diet: Renal   Code Status: FULL   Disposition: Will likely discharge home tomorrow     AMANDA Andrade Tee, DO   July 15, 2020         Objective:   VS:   Visit Vitals  BP (!) 149/95 (BP 1 Location: Left arm, BP Patient Position: Sitting)   Pulse 77   Temp 98.4 °F (36.9 °C)   Resp 20   Ht 6' 2\" (1.88 m)   Wt 100.9 kg (222 lb 7.1 oz)   SpO2 98%   BMI 28.56 kg/m²      Tmax/24hrs: Temp (24hrs), Av.1 °F (36.7 °C), Min:97.8 °F (36.6 °C), Max:98.4 °F (36.9 °C)      Intake/Output Summary (Last 24 hours) at 7/15/2020 1635  Last data filed at 7/15/2020 1254  Gross per 24 hour   Intake 720 ml   Output 0 ml   Net 720 ml         General:  Uncooperative, Not in acute distress  HEENT: EOMI, supple neck, no JVD, dry oral mucosa  Cardiovascular: S1S2 regular, no rub/gallop   Pulmonary: Clear air entry bilaterally, no wheezing, no crackle  GI:  Soft, non tender, non distended, +bs, no guarding   Extremities:  No pedal edema, +distal pulses appreciated   Neuro: AOx3, moving all extremities, no gross deficit.    Psych: Uncooperative, upset, depressed demeanor     Additional:       Current Facility-Administered Medications   Medication Dose Route Frequency    hydrALAZINE (APRESOLINE) 20 mg/mL injection 10 mg  10 mg IntraVENous Q6H PRN    calcium carbonate (OS-VIRAL) tablet 3,000 mg [elemental]  3,000 mg Oral QID    hydrALAZINE (APRESOLINE) tablet 100 mg  100 mg Oral TID    cloNIDine HCL (CATAPRES) tablet 0.1 mg  0.1 mg Oral TID    calcium acetate(phosphat bind) (PHOSLO) capsule 3,335 mg  5 Cap Oral TID WITH MEALS    senna-docusate (PERICOLACE) 8.6-50 mg per tablet 2 Tab  2 Tab Oral QHS    calcitRIOL (ROCALTROL) capsule 0.5 mcg  0.5 mcg Oral TID    heparin (porcine) injection 5,000 Units  5,000 Units SubCUTAneous Q8H    peritoneal dialysis DEXTROSE 2.5% (2.5 mEq/L low calcium) solution 2,000 mL  2,000 mL IntraPERitoneal BID    amLODIPine (NORVASC) tablet 10 mg  10 mg Oral DAILY    carvediloL (COREG) tablet 25 mg  25 mg Oral BID WITH MEALS    ergocalciferol capsule 50,000 Units  50,000 Units Oral Q7D    losartan (COZAAR) tablet 100 mg  100 mg Oral DAILY    sodium chloride (NS) flush 5-40 mL  5-40 mL IntraVENous Q8H    sodium chloride (NS) flush 5-40 mL  5-40 mL IntraVENous PRN    HYDROcodone-acetaminophen (NORCO) 5-325 mg per tablet 1 Tab  1 Tab Oral Q4H PRN    naloxone (NARCAN) injection 0.4 mg  0.4 mg IntraVENous PRN    ondansetron (ZOFRAN ODT) tablet 4 mg  4 mg Oral Q8H PRN    ELECTROLYTE REPLACEMENT PROTOCOL - Calcium  1 Each Other PRN    allopurinoL (ZYLOPRIM) tablet 100 mg  100 mg Oral Q TUE, THU & SAT    peritoneal dialysis DEXTROSE 2.5% (2.5 mEq/L low calcium) solution 12,000 mL  12,000 mL IntraPERitoneal DAILY            Lab/Data Review:  Labs: Results:       Chemistry Recent Labs     07/15/20  0540 07/14/20  0430 07/13/20  0320   * 79 160*    139 139   K 4.3 4.4 4.1    102 103   CO2 22 22 22   * 104* 105*   CREA 26.10* 26.80* 26.30*   BUCR 4* 4* 4*   AGAP 14 15 14   CA 6.1* 6.9* 6.5*   PHOS 6.6* 6.9* 6.6*     Recent Labs     07/15/20  0540 07/14/20  0430 07/13/20  0320   ALT 9* 7* 7*   TP 6.9 6.9 6.4   ALB 2.5* 2.5* 2.2*   GLOB 4.4* 4.4* 4.2*   AGRAT 0.6* 0.6* 0.5*      CBC w/Diff Recent Labs     07/14/20  0430   WBC 7.5   RBC 3.78*   HGB 9.4*   HCT 30.4*   MCV 80.4   MCH 24.9   MCHC 30.9*   RDW 17.0*         Coagulation No results for input(s): PTP, INR, APTT, INREXT, INREXT in the last 72 hours. Iron/Ferritin Lab Results   Component Value Date/Time    Iron 14 (L) 04/15/2019 05:45 AM    TIBC 214 (L) 04/15/2019 05:45 AM    Iron % saturation 7 (L) 04/15/2019 05:45 AM    Ferritin 235.4 04/15/2019 05:45 AM       BNP    Cardiac Enzymes Lab Results   Component Value Date/Time    Troponin-I 0.064 (H) 04/15/2019 05:45 AM        Lactic Acid    Thyroid Studies          All Micro Results     Procedure Component Value Units Date/Time    CULTURE, URINE [[id number]] Collected:  07/15/20 1330    Order Status:  Completed Specimen:  Urine from Clean catch Updated:  07/15/20 1402    CULTURE, BLOOD [[id number]] Collected:  07/14/20 1859    Order Status:  Completed Specimen:  Blood Updated:  07/15/20 0712     Special Requests: NO SPECIAL REQUESTS        Culture result: NO GROWTH AFTER 11 HOURS       CULTURE, BLOOD [[id number]] Collected:  07/14/20 1820    Order Status:  Completed Specimen:  Blood Updated:  07/15/20 0712     Special Requests: NO SPECIAL REQUESTS        Culture result: NO GROWTH AFTER 11 HOURS       CULTURE, MRSA [[id number]]     Order Status:  Canceled Specimen:  Nasal from Nares             Images:    CT (Most Recent). XRAY (Most Recent)      EKG No results found for this or any previous visit.      2D ECHO

## 2020-07-16 LAB
ALBUMIN SERPL-MCNC: 2.4 G/DL (ref 3.4–5)
ALBUMIN/GLOB SERPL: 0.6 {RATIO} (ref 0.8–1.7)
ALP SERPL-CCNC: 93 U/L (ref 45–117)
ALT SERPL-CCNC: 8 U/L (ref 16–61)
ANION GAP SERPL CALC-SCNC: 16 MMOL/L (ref 3–18)
AST SERPL-CCNC: 6 U/L (ref 10–38)
BACTERIA SPEC CULT: NORMAL
BILIRUB SERPL-MCNC: 0.3 MG/DL (ref 0.2–1)
BUN SERPL-MCNC: 93 MG/DL (ref 7–18)
BUN/CREAT SERPL: 4 (ref 12–20)
CALCIUM SERPL-MCNC: 5.7 MG/DL (ref 8.5–10.1)
CC UR VC: NORMAL
CHLORIDE SERPL-SCNC: 102 MMOL/L (ref 100–111)
CO2 SERPL-SCNC: 23 MMOL/L (ref 21–32)
CREAT SERPL-MCNC: 25.6 MG/DL (ref 0.6–1.3)
GLOBULIN SER CALC-MCNC: 4.1 G/DL (ref 2–4)
GLUCOSE SERPL-MCNC: 115 MG/DL (ref 74–99)
MAGNESIUM SERPL-MCNC: 2.2 MG/DL (ref 1.6–2.6)
PHOSPHATE SERPL-MCNC: 6 MG/DL (ref 2.5–4.9)
POTASSIUM SERPL-SCNC: 4.1 MMOL/L (ref 3.5–5.5)
PROT SERPL-MCNC: 6.5 G/DL (ref 6.4–8.2)
SERVICE CMNT-IMP: NORMAL
SODIUM SERPL-SCNC: 141 MMOL/L (ref 136–145)

## 2020-07-16 PROCEDURE — 84100 ASSAY OF PHOSPHORUS: CPT

## 2020-07-16 PROCEDURE — 74011250637 HC RX REV CODE- 250/637: Performed by: OTOLARYNGOLOGY

## 2020-07-16 PROCEDURE — 83735 ASSAY OF MAGNESIUM: CPT

## 2020-07-16 PROCEDURE — 80053 COMPREHEN METABOLIC PANEL: CPT

## 2020-07-16 PROCEDURE — 65660000000 HC RM CCU STEPDOWN

## 2020-07-16 PROCEDURE — 36415 COLL VENOUS BLD VENIPUNCTURE: CPT

## 2020-07-16 PROCEDURE — 74011250637 HC RX REV CODE- 250/637: Performed by: INTERNAL MEDICINE

## 2020-07-16 RX ADMIN — CALCITRIOL CAPSULES 0.25 MCG 0.5 MCG: 0.25 CAPSULE ORAL at 22:06

## 2020-07-16 RX ADMIN — LOSARTAN POTASSIUM 100 MG: 50 TABLET, FILM COATED ORAL at 09:37

## 2020-07-16 RX ADMIN — CARVEDILOL 25 MG: 25 TABLET, FILM COATED ORAL at 09:37

## 2020-07-16 RX ADMIN — HYDRALAZINE HYDROCHLORIDE 100 MG: 50 TABLET, FILM COATED ORAL at 09:36

## 2020-07-16 RX ADMIN — CALCIUM 3000 MG: 500 TABLET ORAL at 12:35

## 2020-07-16 RX ADMIN — CALCIUM 2000 MG: 500 TABLET ORAL at 17:16

## 2020-07-16 RX ADMIN — CALCITRIOL CAPSULES 0.25 MCG 0.5 MCG: 0.25 CAPSULE ORAL at 17:16

## 2020-07-16 RX ADMIN — CARVEDILOL 25 MG: 25 TABLET, FILM COATED ORAL at 17:16

## 2020-07-16 RX ADMIN — HYDRALAZINE HYDROCHLORIDE 100 MG: 50 TABLET, FILM COATED ORAL at 22:05

## 2020-07-16 RX ADMIN — CALCIUM 3000 MG: 500 TABLET ORAL at 22:05

## 2020-07-16 RX ADMIN — HYDROCODONE BITARTRATE AND ACETAMINOPHEN 1 TABLET: 5; 325 TABLET ORAL at 09:40

## 2020-07-16 RX ADMIN — HYDRALAZINE HYDROCHLORIDE 100 MG: 50 TABLET, FILM COATED ORAL at 17:16

## 2020-07-16 RX ADMIN — CLONIDINE HYDROCHLORIDE 0.1 MG: 0.1 TABLET ORAL at 17:16

## 2020-07-16 RX ADMIN — CALCITRIOL CAPSULES 0.25 MCG 0.5 MCG: 0.25 CAPSULE ORAL at 09:36

## 2020-07-16 RX ADMIN — AMLODIPINE BESYLATE 10 MG: 10 TABLET ORAL at 09:37

## 2020-07-16 RX ADMIN — CALCIUM ACETATE 3335 MG: 667 CAPSULE ORAL at 12:35

## 2020-07-16 RX ADMIN — CLONIDINE HYDROCHLORIDE 0.1 MG: 0.1 TABLET ORAL at 22:06

## 2020-07-16 RX ADMIN — CALCIUM ACETATE 1334 MG: 667 CAPSULE ORAL at 09:36

## 2020-07-16 RX ADMIN — CALCIUM ACETATE 1334 MG: 667 CAPSULE ORAL at 17:16

## 2020-07-16 RX ADMIN — CALCIUM 1000 MG: 500 TABLET ORAL at 09:37

## 2020-07-16 RX ADMIN — CLONIDINE HYDROCHLORIDE 0.1 MG: 0.1 TABLET ORAL at 09:36

## 2020-07-16 NOTE — ROUTINE PROCESS
Bedside shift change report given to LOCO Maldonado RN (oncoming nurse) by RANJITH Lyn RN (offgoing nurse). Report included the following information SBAR, Kardex, Intake/Output, MAR and Recent Results.

## 2020-07-16 NOTE — PROGRESS NOTES
RENAL DAILY PROGRESS NOTE            23-year-old male with past medical history of hypertension, ESRD admitted for parathyroid surgery, following for dialysis management  Subjective:       Complaint:   Overnight events noted  His central line is out, not receiving any IV calcium   According to nursing staff, Mr. Rajiv Yebaoh is not taking his all oral calcium supplement. 7/14/20  I had a very lengthy conversation with him about his noncompliant behavior. I explained him his life is dialysis dependent. After surgery he will have persistent low calcium and he need to be compliant with  Oral calcium supplement, dialysis and close follow-up for lab monitoring. I suggested him to switch to hemodialysis for a few weeks. In my opinion during this time he can be seen by psychiatrist for his depression. Hemodialysis will help him relieve burden for doing dialysis at home by himself. He lives very close by to the dialysis center in [place]. Mr. Rajiv Yeboah refused to switch hemodialysis and reassures me about his compliance with dialysis treatment. IMPRESSION:   ESRD, on peritoneal dialysis  Hypertension  Status post parathyroid surgery  Hypocalcemia, Hungry bone syndrome. His calcium level is still remain low, higher phos level. One gram Phoslo has approx 250mg of elemental calcium and  One gram clacium carbonate has about 400mg calcium. With current dose    He is receiving about 5gm of elemental calcium daily  Still there is no desired achievement of calcium level. Hyperphosphatemia   Anemia   PLAN:    His calcium repletion can take longer times, need better compliance. I had mention post operative hypocalcemia prior surgery and had discussed importance of compliance with medication. Plan to continue current calcium supplement and monitor calcium. Discussed with Dr. Ivanna Tiwari.          Current Facility-Administered Medications   Medication Dose Route Frequency    calcium gluconate 1 g in 0.9% sodium chloride 100 mL IVPB  1 g IntraVENous ONCE    hydrALAZINE (APRESOLINE) 20 mg/mL injection 10 mg  10 mg IntraVENous Q6H PRN    calcium carbonate (OS-VIRAL) tablet 3,000 mg [elemental]  3,000 mg Oral QID    hydrALAZINE (APRESOLINE) tablet 100 mg  100 mg Oral TID    cloNIDine HCL (CATAPRES) tablet 0.1 mg  0.1 mg Oral TID    calcium acetate(phosphat bind) (PHOSLO) capsule 3,335 mg  5 Cap Oral TID WITH MEALS    senna-docusate (PERICOLACE) 8.6-50 mg per tablet 2 Tab  2 Tab Oral QHS    calcitRIOL (ROCALTROL) capsule 0.5 mcg  0.5 mcg Oral TID    heparin (porcine) injection 5,000 Units  5,000 Units SubCUTAneous Q8H    peritoneal dialysis DEXTROSE 2.5% (2.5 mEq/L low calcium) solution 2,000 mL  2,000 mL IntraPERitoneal BID    amLODIPine (NORVASC) tablet 10 mg  10 mg Oral DAILY    carvediloL (COREG) tablet 25 mg  25 mg Oral BID WITH MEALS    ergocalciferol capsule 50,000 Units  50,000 Units Oral Q7D    losartan (COZAAR) tablet 100 mg  100 mg Oral DAILY    sodium chloride (NS) flush 5-40 mL  5-40 mL IntraVENous Q8H    sodium chloride (NS) flush 5-40 mL  5-40 mL IntraVENous PRN    HYDROcodone-acetaminophen (NORCO) 5-325 mg per tablet 1 Tab  1 Tab Oral Q4H PRN    naloxone (NARCAN) injection 0.4 mg  0.4 mg IntraVENous PRN    ondansetron (ZOFRAN ODT) tablet 4 mg  4 mg Oral Q8H PRN    ELECTROLYTE REPLACEMENT PROTOCOL - Calcium  1 Each Other PRN    allopurinoL (ZYLOPRIM) tablet 100 mg  100 mg Oral Q TUE, THU & SAT    peritoneal dialysis DEXTROSE 2.5% (2.5 mEq/L low calcium) solution 12,000 mL  12,000 mL IntraPERitoneal DAILY       Review of Symptoms: comprehensive ROS negative except above.    Objective:     Patient Vitals for the past 24 hrs:   Temp Pulse Resp BP SpO2   07/16/20 1147 98.1 °F (36.7 °C) 71 18 131/85 99 %   07/16/20 0755 98 °F (36.7 °C) 73 19 (!) 153/106 100 %   07/15/20 2000 97.9 °F (36.6 °C) 73 19 155/87 98 %   07/15/20 1930 -- -- -- -- 98 %   07/15/20 1817 -- -- -- 132/85 --   07/15/20 1606 98.4 °F (36.9 °C) 77 20 (!) 149/95 98 %        Weight change: 0 kg (0 lb)     07/14 1901 - 07/16 0700  In: 960 [P.O.:960]  Out: 0     Intake/Output Summary (Last 24 hours) at 7/16/2020 1335  Last data filed at 7/15/2020 1930  Gross per 24 hour   Intake 480 ml   Output 0 ml   Net 480 ml     Physical Exam:   General: comfortable, no acute distress   HEENT sclera anicteric, supple neck, no thyromegaly  CVS: S1S2 heard,  no rub  RS: + air entry b/l,   Abd: Soft, Non tender,   Neuro: non focal, awake, alert , CN II-XII are grossly intact  Extrm: + edema, no cyanosis, clubbing   Skin: no visible  Rash  Musculoskeletal: No gross joints or bone deformities         Data Review:     LABS:   Hematology:   Recent Labs     07/14/20  0430   WBC 7.5   HGB 9.4*   HCT 30.4*     Chemistry:   Recent Labs     07/16/20  0144 07/15/20  0540 07/14/20  0430   BUN 93* 104* 104*   CREA 25.60* 26.10* 26.80*   CA 5.7* 6.1* 6.9*   ALB 2.4* 2.5* 2.5*   K 4.1 4.3 4.4    137 139    101 102   CO2 23 22 22   PHOS 6.0* 6.6* 6.9*   * 125* 79            Procedures/imaging: see electronic medical records for all procedures, Xrays and details which were not copied into this note but were reviewed prior to creation of Plan          Assessment & Plan:     As above         Kia Hardin MD  7/16/2020  2:12 PM

## 2020-07-16 NOTE — PROGRESS NOTES
1925: Assumed patient care from [facility]. Patient is alert and oriented to person, place, time and situation. Respiratory status Is stable on room air. Vital signs are stable. MEWS score is a one. Patient denies any pain, discomfort, nausea vomiting dizziness or anxiety. White board and fall card is updated. Bed is locked and in lowest position. Call bell, water and personal belongings are within reach. Patient has no questions, comments or concerns after bedside shift report. 0700: Patient had an uneventful shift. Respiratory status, vital signs and MEWS score remained stable. Patient was resting quietly with no signs of distress noted. Bed locked and in lowest position. Call bell water and personal belongings were within reach. Patient had no questions, comments or concerns after bedside shift report.  Bedside report given to [facility] ELIF

## 2020-07-16 NOTE — PROGRESS NOTES
Hospitalist Progress Note    Patient: Veronica Fleming Age: 44 y.o. : 1970 MR#: [id number] SSN: [id number]  Date/Time: 2020 12:42 PM    DOA: 2020  PCP: Michelle Vega MD    Subjective:     Per nursing, pt is only taking approx 1/2 his medications, is refusing the others or 1/2 his pills   Has no IV access   Has a long history of non-compliance, has refused HD   Had discussion with nephrology today, afterwards agrees to stay until tomorrow for monitoring as Ca continues to drop     7/15/20   Insisted his IJ IV line be removed or he was waking out with it and going to another hospital to have it removed  Now with no IV access   Has refused CXR     Interval Hospital Course:  44 y.o male w/ ESRD on PD, h/o hyperparathyroidism, hypertension, who presented for scheduled surgery on 20. He underwent subtotal parathyroidectomy due to parathyroid hyperplasia secondary to secondary hyperparathyroidism by Dr. Doroteo Lema. He was admitted to ICU for close monitoring of his calcium level and potential arrhythmia. His IV calcium has been replaced but remains low. Nursing reported that he took himself off his IV replacement and refuse labs monitoring. Getting PD in room and tolerated well. His Calcitriol has been increased. He has been refusing complete IV calcium replacement, R IJ was placed to assist in replacement, and now he insists it be removed but it is his only IV line. Assessment/Plan:     1. Parathyroid hyperplasia with secondary hyperparathyroidism status post subtotal parathyroidectomy on 20   2. Hypocalcemia due to subtotal parathyroidectomy, slowly improving  3. ESRD on peritoneal dialysis  4. Hypertension  5. Chronic diastolic CHF with EF of 41% in 2019   6. Anemia of chronic medical disease and ESRD, stable  7. Gout  8. Hypoalbuminemia   9. Hyperphosphatemia with ESRD  10. Depression   11.  Fever     ENT has signed off, s/p subtotal parathyroidectomy on 20   Nephrology following  Pt has agreed to PD daily, if he misses treatment he will likely need HD    Cont oral Calcitriol   Check ionized calcium q12 hours  FU blood cx's, UA and urine cx  Pt refused CXR    Cont amlodipine, carvedilol, clonidine, hydralazine, losartan   Cont allopurinol   ICS   Pt will consider medication for depression and psych eval, will continue discussion tomorrow     Case discussed with:  [x]Patient  []Family  [x]Nursing  [x]Case Management  DVT Prophylaxis:  []Lovenox  []Hep SQ  [x]SCDs  []Coumadin   []On Heparin gtt  Diet: Renal   Code Status: FULL   Disposition: Will likely discharge home tomorrow     AMANDA Guanakito Jimenez DO   2020         Objective:   VS:   Visit Vitals  /75 (BP 1 Location: Left arm, BP Patient Position: At rest)   Pulse 74   Temp 98 °F (36.7 °C)   Resp 20   Ht 6' 2\" (1.88 m)   Wt 100.9 kg (222 lb 7.1 oz)   SpO2 99%   BMI 28.56 kg/m²      Tmax/24hrs: Temp (24hrs), Av °F (36.7 °C), Min:97.9 °F (36.6 °C), Max:98.1 °F (36.7 °C)      Intake/Output Summary (Last 24 hours) at 2020 1640  Last data filed at 2020 1030  Gross per 24 hour   Intake 1080 ml   Output 0 ml   Net 1080 ml         General:  Uncooperative, Not in acute distress  HEENT: EOMI, supple neck, no JVD, dry oral mucosa  Cardiovascular: S1S2 regular, no rub/gallop   Pulmonary: Clear air entry bilaterally, no wheezing, no crackle  GI:  Soft, non tender, non distended, +bs, no guarding   Extremities:  No pedal edema, +distal pulses appreciated   Neuro: AOx3, moving all extremities, no gross deficit.    Psych: Uncooperative, upset, depressed demeanor     Additional:       Current Facility-Administered Medications   Medication Dose Route Frequency    calcium gluconate 1 g in 0.9% sodium chloride 100 mL IVPB  1 g IntraVENous ONCE    hydrALAZINE (APRESOLINE) 20 mg/mL injection 10 mg  10 mg IntraVENous Q6H PRN    calcium carbonate (OS-VIRAL) tablet 3,000 mg [elemental]  3,000 mg Oral QID    hydrALAZINE (APRESOLINE) tablet 100 mg  100 mg Oral TID    cloNIDine HCL (CATAPRES) tablet 0.1 mg  0.1 mg Oral TID    calcium acetate(phosphat bind) (PHOSLO) capsule 3,335 mg  5 Cap Oral TID WITH MEALS    senna-docusate (PERICOLACE) 8.6-50 mg per tablet 2 Tab  2 Tab Oral QHS    calcitRIOL (ROCALTROL) capsule 0.5 mcg  0.5 mcg Oral TID    heparin (porcine) injection 5,000 Units  5,000 Units SubCUTAneous Q8H    peritoneal dialysis DEXTROSE 2.5% (2.5 mEq/L low calcium) solution 2,000 mL  2,000 mL IntraPERitoneal BID    amLODIPine (NORVASC) tablet 10 mg  10 mg Oral DAILY    carvediloL (COREG) tablet 25 mg  25 mg Oral BID WITH MEALS    ergocalciferol capsule 50,000 Units  50,000 Units Oral Q7D    losartan (COZAAR) tablet 100 mg  100 mg Oral DAILY    sodium chloride (NS) flush 5-40 mL  5-40 mL IntraVENous Q8H    sodium chloride (NS) flush 5-40 mL  5-40 mL IntraVENous PRN    HYDROcodone-acetaminophen (NORCO) 5-325 mg per tablet 1 Tab  1 Tab Oral Q4H PRN    naloxone (NARCAN) injection 0.4 mg  0.4 mg IntraVENous PRN    ondansetron (ZOFRAN ODT) tablet 4 mg  4 mg Oral Q8H PRN    ELECTROLYTE REPLACEMENT PROTOCOL - Calcium  1 Each Other PRN    allopurinoL (ZYLOPRIM) tablet 100 mg  100 mg Oral Q TUE, THU & SAT    peritoneal dialysis DEXTROSE 2.5% (2.5 mEq/L low calcium) solution 12,000 mL  12,000 mL IntraPERitoneal DAILY            Lab/Data Review:  Labs: Results:       Chemistry Recent Labs     07/16/20  0144 07/15/20  0540 07/14/20  0430   * 125* 79    137 139   K 4.1 4.3 4.4    101 102   CO2 23 22 22   BUN 93* 104* 104*   CREA 25.60* 26.10* 26.80*   BUCR 4* 4* 4*   AGAP 16 14 15   CA 5.7* 6.1* 6.9*   PHOS 6.0* 6.6* 6.9*     Recent Labs     07/16/20  0144 07/15/20  0540 07/14/20  0430   ALT 8* 9* 7*   TP 6.5 6.9 6.9   ALB 2.4* 2.5* 2.5*   GLOB 4.1* 4.4* 4.4*   AGRAT 0.6* 0.6* 0.6*      CBC w/Diff Recent Labs     07/14/20  0430   WBC 7.5   RBC 3.78*   HGB 9.4*   HCT 30.4*   MCV 80.4   MCH 24.9   MCHC 30.9*   RDW 17.0*         Coagulation No results for input(s): PTP, INR, APTT, INREXT, INREXT in the last 72 hours. Iron/Ferritin Lab Results   Component Value Date/Time    Iron 14 (L) 04/15/2019 05:45 AM    TIBC 214 (L) 04/15/2019 05:45 AM    Iron % saturation 7 (L) 04/15/2019 05:45 AM    Ferritin 235.4 04/15/2019 05:45 AM       BNP    Cardiac Enzymes Lab Results   Component Value Date/Time    Troponin-I 0.064 (H) 04/15/2019 05:45 AM        Lactic Acid    Thyroid Studies          All Micro Results     Procedure Component Value Units Date/Time    CULTURE, BLOOD [[id number]] Collected:  07/14/20 1859    Order Status:  Completed Specimen:  Blood Updated:  07/16/20 0737     Special Requests: NO SPECIAL REQUESTS        Culture result: NO GROWTH 2 DAYS       CULTURE, BLOOD [[id number]] Collected:  07/14/20 1820    Order Status:  Completed Specimen:  Blood Updated:  07/16/20 0737     Special Requests: NO SPECIAL REQUESTS        Culture result: NO GROWTH 2 DAYS       CULTURE, URINE [[id number]] Collected:  07/15/20 1330    Order Status:  Completed Specimen:  Urine from Clean catch Updated:  07/16/20 0003    CULTURE, MRSA [[id number]]     Order Status:  Canceled Specimen:  Nasal from Nares             Images:    CT (Most Recent). XRAY (Most Recent)      EKG No results found for this or any previous visit.      2D ECHO

## 2020-07-17 VITALS
RESPIRATION RATE: 18 BRPM | DIASTOLIC BLOOD PRESSURE: 96 MMHG | HEIGHT: 74 IN | HEART RATE: 80 BPM | OXYGEN SATURATION: 99 % | BODY MASS INDEX: 28.55 KG/M2 | TEMPERATURE: 98.2 F | SYSTOLIC BLOOD PRESSURE: 158 MMHG | WEIGHT: 222.44 LBS

## 2020-07-17 PROCEDURE — 74011250637 HC RX REV CODE- 250/637: Performed by: INTERNAL MEDICINE

## 2020-07-17 PROCEDURE — 74011250637 HC RX REV CODE- 250/637: Performed by: OTOLARYNGOLOGY

## 2020-07-17 RX ADMIN — CALCITRIOL CAPSULES 0.25 MCG 0.5 MCG: 0.25 CAPSULE ORAL at 08:54

## 2020-07-17 RX ADMIN — CARVEDILOL 25 MG: 25 TABLET, FILM COATED ORAL at 08:54

## 2020-07-17 RX ADMIN — CLONIDINE HYDROCHLORIDE 0.1 MG: 0.1 TABLET ORAL at 08:54

## 2020-07-17 RX ADMIN — AMLODIPINE BESYLATE 10 MG: 10 TABLET ORAL at 08:54

## 2020-07-17 RX ADMIN — CALCIUM ACETATE 3335 MG: 667 CAPSULE ORAL at 08:54

## 2020-07-17 RX ADMIN — CALCIUM 3000 MG: 500 TABLET ORAL at 08:54

## 2020-07-17 RX ADMIN — LOSARTAN POTASSIUM 100 MG: 50 TABLET, FILM COATED ORAL at 08:54

## 2020-07-17 RX ADMIN — HYDRALAZINE HYDROCHLORIDE 100 MG: 50 TABLET, FILM COATED ORAL at 08:54

## 2020-07-17 NOTE — PROGRESS NOTES
Noted- Pt refusing further medical treatment. Stated \"I am leaving and I don't need anymore help\". Patient educated on risk of leaving AMA. Patient verbalized understanding and signed AMA forms, collected personal Peritoneal Dialysis equipment and walked off floor. Dr. Salud Mendoza paged and aware.

## 2020-07-17 NOTE — ADT AUTH CERT NOTES
Parathyroidectomy - Care Day 8 (7/13/2020) by Lzi Morrison         Review Entered  Review Status    7/17/2020 15:52  Completed        Criteria Review       Care Day: 8 Care Date: 7/13/2020 Level of Care: Telemetry    Guideline Day 1    Level Of Care    (X) Early AM OR to recovery to discharge [B]    Clinical Status    (X) * Successful uncomplicated parathyroidectomy    7/17/2020 15:52:17 EDT by Donis Panchal      several days ago    (X) * No evidence of airway compromise or postoperative bleeding    7/17/2020 15:52:17 EDT by Donis Panchal      na    (X) * No evidence of recurrent laryngeal nerve injury    7/17/2020 15:52:17 EDT by Donis Panchal      na    ( ) * Stable calcium levels and outpatient monitoring arranged    7/17/2020 15:52:17 EDT by Jordana Quinn ca level 6.5 and ion ca 0.82    ( ) * No evidence of hypocalcemia (eg, tetany, seizures)    ( ) * Discharge plans and education understood    Activity    (X) * Ambulatory postoperatively    7/17/2020 15:52:17 EDT by Donis Panchal      ad loi    Routes    (X) * Oral hydration, medications, and diet as tolerated postoperatively    7/17/2020 15:52:17 EDT by Donis Panchal      norvasc 10mg po qd, coreg 25mg po bid; catapres 0.1mg po tid, ergocalciferol 50,000units sq wkly; apresoline 100mg po tid, cozaar 100mg po daily,    Medications    (X) Possible postoperative calcium supplements    7/17/2020 15:52:17 EDT by Donis Panchal      Ca gluconate 2gram IV started TID and then changed to Q4H started at 2200, rocaltrol 0.5mcg po tid; phoslo 3335mg po tid, os flor 3000mg po qid,    7/17/2020 15:52:17 EDT by Donis Panchal    Subject: Additional Clinical Information    * Pt remains refusing PD and needs extra treatment today per nephrology.  Pt initially agreed to this but then refused    * 97.9, 127/83, 76, 19, RA O2 sat 99%    * Plan: as per MD          * Milestone    Additional Notes    MD assessment and plan:       1.  Parathyroid hyperplasia with secondary hyperparathyroidism status post subtotal parathyroidectomy    2.  Hypocalcemia due to subtotal parathyroidectomy, slowly improving    3.  ESRD on peritoneal dialysis    4.  Hypertension    5.  Chronic diastolic CHF with EF of 46 percentage, compensated    6.  Anemia of chronic medical disease and ESRD, stable    7.  Gout    8.  Low albumin, improving    9.  Hyperphosphatemia with ESRD    10.  Depressed mood, pt refused additional medical management or psychiatric evaluation         IV calcium k2ocfna, oral replacement, oral Calcitriol. Spoke with Nephrology for further care.     Continue on PD, advocate for compliance with treatment       Physical Exam:    Tele:    General:  Cooperative, Not in acute distress, speaks in full sentence while in bed    HEENT: PERRL, EOMI, supple neck, no JVD, dry oral mucosa    Cardiovascular: S1S2 regular, no rub/gallop     Pulmonary: Clear air entry bilaterally, no wheezing, no crackle    GI:  Soft, non tender, non distended, +bs, no guarding    Extremities:  No pedal edema, +distal pulses appreciated    Neuro: AOx3, moving all extremities, no gross deficit        Parathyroidectomy - Care Day 7 (7/12/2020) by Salo Durham         Review Entered  Review Status    7/17/2020 15:40  Completed        Criteria Review       Care Day: 7 Care Date: 7/12/2020 Level of Care: Telemetry    Guideline Day 1    Level Of Care    (X) Early AM OR to recovery to discharge [B]    Clinical Status    (X) * Successful uncomplicated parathyroidectomy    7/17/2020 15:40:36 EDT by Michael Hussein      several days ago    (X) * No evidence of airway compromise or postoperative bleeding    7/17/2020 15:40:36 EDT by Michael bradford    (X) * No evidence of recurrent laryngeal nerve injury    7/17/2020 15:40:36 EDT by Michael bradford    ( ) * Stable calcium levels and outpatient monitoring arranged    7/17/2020 15:40:36 EDT by Michael Hussein      ca level 6.5 and ion flor critical at 0.79    ( ) * No evidence of hypocalcemia (eg, tetany, seizures)    ( ) * Discharge plans and education understood    Activity    (X) * Ambulatory postoperatively    7/17/2020 15:40:36 EDT by Donis Panchal      up ad loi    Routes    (X) * Oral hydration, medications, and diet as tolerated postoperatively    7/17/2020 15:40:36 EDT by Donis Panchal      pericolace 2tab po qhs; cozaar 100mg po qd, po apresoline increased to 100mg po tid; IV apresoline 10mg q6H PRN x3, catapres 0.1mg po tid; norvasc 10mg po qd, coreg 25mg po bid, pt still refusing sq heparin    Medications    (X) Possible postoperative calcium supplements    7/17/2020 15:40:36 EDT by Donis Panchal      ca gluconate 10gram IV x1, rocaltrol 0.5mg po tid; poslo 3335mg po tid, oscal 3000mg po qid (increased today)    7/17/2020 15:40:36 EDT by Donis Panchal    Subject: Additional Clinical Information    * Pt remains with orders for PD but still refusing    * requiring intensive flor replacements as documented    * Nephrology rec swtich to HD    * Plan: as per MD          * Milestone    Additional Notes    Nephrology assessment and plan:    1. esrd ,on ccpd,not compliant,didn't do ccpd last night.suggested changing to hemodialysis,can give iv calcium with hemodialysis    2.s/p subtotoal parathyroidectomy,severe hypocalcemia. at risk of seizures. give iv calcium gluconate 10 g today and repeat ionized calcium. continue po calcium and calcitriol    3.htn,increase hydralazine . increase ultrafiltration with pd if he agrees    4-hyperphosphatemia,improving on calcium acetate with meals    5-epo,will give epo when bp is better    6-depression,consider psych consult       Physical Exam:    GEN: alert    RS: Chest is bilateral equal, no wheezing / rales / crackles    CVS: S1-S2 heard, RRR, No S3 / murmur    Abdomen: Soft,    Extremities: No edema, no cyanosis, skin is warm on touch    HEENT: Head is atraumatic, PERRLA, conjunctiva pink & non icteric. No JVD or carotid bruit       98.3, 171/100, 79, 18, RA O2 sat 99%        Parathyroidectomy - Care Day 6 (7/11/2020) by Barbara Moralez Entered  Review Status    7/16/2020 16:27  Completed        Criteria Review       Care Day: 6 Care Date: 7/11/2020 Level of Care: Telemetry    Guideline Day 1    Level Of Care    (X) Early AM OR to recovery to discharge [B]    Clinical Status    (X) * Successful uncomplicated parathyroidectomy    7/16/2020 16:27:20 EDT by Giovanna Galvan      completed    (X) * No evidence of airway compromise or postoperative bleeding    7/16/2020 16:27:20 EDT by Giovanna Galvan      na    (X) * No evidence of recurrent laryngeal nerve injury    7/16/2020 16:27:20 EDT by Giovanna bradford    ( ) * Stable calcium levels and outpatient monitoring arranged    7/16/2020 16:27:20 EDT by Giovanna Galvan      flor levels 6.4 at 0800 and 6.3 at 1050; ion flor 0.80 at 0800    ( ) * No evidence of hypocalcemia (eg, tetany, seizures)    7/16/2020 16:27:20 EDT by Kodi Goldberg still with severe hypocalcemia    ( ) * Discharge plans and education understood    Activity    (X) * Ambulatory postoperatively    7/16/2020 16:27:20 EDT by Giovanna Galvan      ad loi    Routes    ( ) IV fluids, medications for procedure    7/16/2020 16:27:20 EDT by Giovanna Galvan      na    (X) * Oral hydration, medications, and diet as tolerated postoperatively    7/16/2020 16:27:20 EDT by Giovanna Galvan      apresoline 25mg po tid (NEW); catapres 0.1mg po bid x1 and then increase to tid; melatonin 3mg po x1, cozaar 100mg po qd, pericolace 2tabs po qhs, coreg 25mg po bid, norvasc 10mg po qd; refused chronulac, zyloprim, and heparin sq    Medications    (X) Possible postoperative calcium supplements    7/16/2020 16:27:20 EDT by Giovanna Galvan      flor gluconate 10gram IV x1; Rocaltrol 0.5mcg po tid; phos low 5caps at 3335mg po tid, os flor 2000mg po qid    7/16/2020 16:27:20 EDT by Giovanna Galvan Subject: Additional Clinical Information    * MDs still intensively repleting Calcium    * Pt remains on PD but refusing some doses    * Increasing bp meds for htn    * add labs: bun 96, creat 23.70, phos 7.6, gfr 2, alb 2.5, glob 4.5, alt 8, ast 6    * 97.9, 162/108, 84, 19, RA O2 sat 99%    * Plan: cont repletion Ca          * Milestone

## 2020-07-17 NOTE — PROGRESS NOTES
Paged by nursing to inform me that patient wants to leave AMA. Patient had already left when I came to evaluate.

## 2020-07-17 NOTE — DISCHARGE SUMMARY
[facility]  Discharge Summary       Patient: Veronica Fleming Age: 44 y.o. : 1970 MR#: [id number] SSN: [id number]  PCP on record: Michelle Vega MD  Admit date: 2020  Discharge date: 2020    Consults:    - ENT- Dr. Doroteo Lema  - Nephrology: Dr. Shelly Meng  [facility]    Procedures:  -    subtotal parathyroidectomy with Dr. Doroteo Lema  Significant Diagnostic Studies:   -  Ir Insert Non Tunl Cvc Over 5 Yrs    Result Date: 7/10/2020  Impression: 1. Successful placement of non-cuffed power injectable central venous catheter, as described above      Diagnoses: :                                           Patient Active Problem List   Diagnosis Code    HTN, goal below 130/80 I10    Urinary retention R33.9    UTI (urinary tract infection) N39.0    Hematuria R31.9    Acute kidney injury ([address] N17.9    Anemia D64.9    Hyperparathyroidism ([address] E21.3    Parathyroid hyperplasia ([address] E21.0    Hypocalcemia E83.51    ESRD on peritoneal dialysis ([address] N18.6, T73.3    Systolic CHF, chronic ([address] I50.22       Hospital Course by Problem   44 y.o male w/ ESRD on PD, h/o hyperparathyroidism, hypertension, who presented for scheduled surgery on 20. He underwent subtotal parathyroidectomy due to parathyroid hyperplasia secondary to secondary hyperparathyroidism by Dr. Oliva Couch was admitted to ICU for close monitoring of his calcium level and potential arrhythmia. His calcium has been replaced but remains low. In the last 2-3 days, he has self-removed his IV access and refused am labs monitoring calcium as well as repeat CXR. Calcitrol has been increased but patient is refusing supplementation. He was to remain on daily PD as arranged by Nephrology.  Nephrology has had multiple conversations with the patient explaining that he is dialysis dependent and compliance with dialysis and oral supplements is imperative to his survival. It has been repeatedly discussed with the patient and documented by physicians and nursing staff that hypocalcemia may cause muscle spasms, seizures, heart failure and life-threatening arrhythmias, as well as worsening depression and anxiety. Disposition:   Patient left the hospital AMA.        Signed:  Kylie Villasenor MD  7/17/2020  10:12 AM

## 2020-07-17 NOTE — PROGRESS NOTES
Mr. Dennis Serrano left AMA this morning. No available lab for today. I have arranged his outpatient PD fluid with higher calcuim. Plan to check his calcium on Monday.

## 2020-07-17 NOTE — ROUTINE PROCESS
Patient refused care all night -- did ask for a manual PD exchange bag -- 2.5 dextrose -- gave it to him-- the bag sat on the cart for the entire night -- patient has his own PD machine and dose his own exchanges      Bedside and Verbal shift change report given to Santosh RN (oncoming nurse) by Edilberto Lyn RN (offgoing nurse). Report given with SBAR, Kardex, Intake/Output, MAR, Accordion and Recent Results.

## 2020-07-20 ENCOUNTER — HOSPITAL ENCOUNTER (INPATIENT)
Age: 50
LOS: 5 days | Discharge: HOME | DRG: 640 | End: 2020-07-25
Attending: EMERGENCY MEDICINE | Admitting: INTERNAL MEDICINE
Payer: COMMERCIAL

## 2020-07-20 ENCOUNTER — APPOINTMENT (OUTPATIENT)
Dept: GENERAL RADIOLOGY | Age: 50
DRG: 640 | End: 2020-07-20
Attending: INTERNAL MEDICINE
Payer: COMMERCIAL

## 2020-07-20 DIAGNOSIS — Z98.890 HISTORY OF PERITONEAL DIALYSIS: ICD-10-CM

## 2020-07-20 DIAGNOSIS — Z99.2 ESRD (END STAGE RENAL DISEASE) ON DIALYSIS (HCC): ICD-10-CM

## 2020-07-20 DIAGNOSIS — N18.6 ESRD (END STAGE RENAL DISEASE) ON DIALYSIS (HCC): ICD-10-CM

## 2020-07-20 DIAGNOSIS — R94.31 PROLONGED Q-T INTERVAL ON ECG: ICD-10-CM

## 2020-07-20 DIAGNOSIS — E83.51 HYPOCALCEMIA: Primary | ICD-10-CM

## 2020-07-20 PROBLEM — N17.9 ACUTE RENAL FAILURE SUPERIMPOSED ON CHRONIC KIDNEY DISEASE (HCC): Status: ACTIVE | Noted: 2020-07-20

## 2020-07-20 PROBLEM — N18.9 ACUTE RENAL FAILURE SUPERIMPOSED ON CHRONIC KIDNEY DISEASE (HCC): Status: ACTIVE | Noted: 2020-07-20

## 2020-07-20 LAB
ALBUMIN SERPL-MCNC: 3 G/DL (ref 3.4–5)
ALBUMIN/GLOB SERPL: 0.6 {RATIO} (ref 0.8–1.7)
ALP SERPL-CCNC: 130 U/L (ref 45–117)
ALT SERPL-CCNC: 11 U/L (ref 16–61)
ANION GAP SERPL CALC-SCNC: 17 MMOL/L (ref 3–18)
APTT PPP: 33.3 SEC (ref 23–36.4)
AST SERPL-CCNC: 12 U/L (ref 10–38)
ATRIAL RATE: 82 BPM
BACTERIA SPEC CULT: NORMAL
BACTERIA SPEC CULT: NORMAL
BASOPHILS # BLD: 0 K/UL (ref 0–0.1)
BASOPHILS NFR BLD: 0 % (ref 0–2)
BILIRUB SERPL-MCNC: 0.4 MG/DL (ref 0.2–1)
BUN SERPL-MCNC: 93 MG/DL (ref 7–18)
BUN/CREAT SERPL: 3 (ref 12–20)
CA-I SERPL-SCNC: 0.46 MMOL/L (ref 1.12–1.32)
CA-I SERPL-SCNC: 0.48 MMOL/L (ref 1.12–1.32)
CALCIUM SERPL-MCNC: <5 MG/DL (ref 8.5–10.1)
CALCULATED P AXIS, ECG09: 46 DEGREES
CALCULATED R AXIS, ECG10: -28 DEGREES
CALCULATED T AXIS, ECG11: 119 DEGREES
CHLORIDE SERPL-SCNC: 96 MMOL/L (ref 100–111)
CK MB CFR SERPL CALC: 0.3 % (ref 0–4)
CK MB SERPL-MCNC: 2.9 NG/ML (ref 5–25)
CK SERPL-CCNC: 1038 U/L (ref 39–308)
CO2 SERPL-SCNC: 23 MMOL/L (ref 21–32)
CREAT SERPL-MCNC: 27.3 MG/DL (ref 0.6–1.3)
DIAGNOSIS, 93000: NORMAL
DIFFERENTIAL METHOD BLD: ABNORMAL
EOSINOPHIL # BLD: 0 K/UL (ref 0–0.4)
EOSINOPHIL NFR BLD: 0 % (ref 0–5)
ERYTHROCYTE [DISTWIDTH] IN BLOOD BY AUTOMATED COUNT: 16.7 % (ref 11.6–14.5)
GLOBULIN SER CALC-MCNC: 5.1 G/DL (ref 2–4)
GLUCOSE SERPL-MCNC: 135 MG/DL (ref 74–99)
HBV SURFACE AG SER QL: <0.1 INDEX
HBV SURFACE AG SER QL: NEGATIVE
HCT VFR BLD AUTO: 32.8 % (ref 36–48)
HGB BLD-MCNC: 10.3 G/DL (ref 13–16)
INR PPP: 1.1 (ref 0.8–1.2)
LACTATE BLD-SCNC: 1.32 MMOL/L (ref 0.4–2)
LYMPHOCYTES # BLD: 0.4 K/UL (ref 0.9–3.6)
LYMPHOCYTES NFR BLD: 3 % (ref 21–52)
MAGNESIUM SERPL-MCNC: 2.1 MG/DL (ref 1.6–2.6)
MCH RBC QN AUTO: 24.9 PG (ref 24–34)
MCHC RBC AUTO-ENTMCNC: 31.4 G/DL (ref 31–37)
MCV RBC AUTO: 79.2 FL (ref 74–97)
MONOCYTES # BLD: 0.2 K/UL (ref 0.05–1.2)
MONOCYTES NFR BLD: 2 % (ref 3–10)
NEUTS SEG # BLD: 10.5 K/UL (ref 1.8–8)
NEUTS SEG NFR BLD: 95 % (ref 40–73)
P-R INTERVAL, ECG05: 156 MS
PHOSPHATE SERPL-MCNC: 7.2 MG/DL (ref 2.5–4.9)
PLATELET # BLD AUTO: 227 K/UL (ref 135–420)
PMV BLD AUTO: 9.7 FL (ref 9.2–11.8)
POTASSIUM SERPL-SCNC: 5.1 MMOL/L (ref 3.5–5.5)
PROT SERPL-MCNC: 8.1 G/DL (ref 6.4–8.2)
PROTHROMBIN TIME: 13.8 SEC (ref 11.5–15.2)
Q-T INTERVAL, ECG07: 478 MS
QRS DURATION, ECG06: 92 MS
QTC CALCULATION (BEZET), ECG08: 558 MS
RBC # BLD AUTO: 4.14 M/UL (ref 4.7–5.5)
SERVICE CMNT-IMP: NORMAL
SERVICE CMNT-IMP: NORMAL
SODIUM SERPL-SCNC: 136 MMOL/L (ref 136–145)
TROPONIN I SERPL-MCNC: 0.02 NG/ML (ref 0–0.04)
VENTRICULAR RATE, ECG03: 82 BPM
WBC # BLD AUTO: 11.1 K/UL (ref 4.6–13.2)

## 2020-07-20 PROCEDURE — 83735 ASSAY OF MAGNESIUM: CPT

## 2020-07-20 PROCEDURE — 65610000006 HC RM INTENSIVE CARE

## 2020-07-20 PROCEDURE — 85730 THROMBOPLASTIN TIME PARTIAL: CPT

## 2020-07-20 PROCEDURE — 86706 HEP B SURFACE ANTIBODY: CPT

## 2020-07-20 PROCEDURE — 74011250636 HC RX REV CODE- 250/636: Performed by: INTERNAL MEDICINE

## 2020-07-20 PROCEDURE — 83605 ASSAY OF LACTIC ACID: CPT

## 2020-07-20 PROCEDURE — 85610 PROTHROMBIN TIME: CPT

## 2020-07-20 PROCEDURE — 82330 ASSAY OF CALCIUM: CPT

## 2020-07-20 PROCEDURE — 82550 ASSAY OF CK (CPK): CPT

## 2020-07-20 PROCEDURE — 93005 ELECTROCARDIOGRAM TRACING: CPT

## 2020-07-20 PROCEDURE — 74011250636 HC RX REV CODE- 250/636: Performed by: STUDENT IN AN ORGANIZED HEALTH CARE EDUCATION/TRAINING PROGRAM

## 2020-07-20 PROCEDURE — 84100 ASSAY OF PHOSPHORUS: CPT

## 2020-07-20 PROCEDURE — 85025 COMPLETE CBC W/AUTO DIFF WBC: CPT

## 2020-07-20 PROCEDURE — 71045 X-RAY EXAM CHEST 1 VIEW: CPT

## 2020-07-20 PROCEDURE — 80053 COMPREHEN METABOLIC PANEL: CPT

## 2020-07-20 PROCEDURE — 87340 HEPATITIS B SURFACE AG IA: CPT

## 2020-07-20 PROCEDURE — 99284 EMERGENCY DEPT VISIT MOD MDM: CPT

## 2020-07-20 RX ORDER — CALCIUM GLUCONATE 94 MG/ML
10 INJECTION, SOLUTION INTRAVENOUS ONCE
Status: DISCONTINUED | OUTPATIENT
Start: 2020-07-20 | End: 2020-07-20

## 2020-07-20 RX ORDER — CLONIDINE HYDROCHLORIDE 0.1 MG/1
0.1 TABLET ORAL 2 TIMES DAILY
Status: DISCONTINUED | OUTPATIENT
Start: 2020-07-20 | End: 2020-07-22

## 2020-07-20 RX ORDER — CALCIUM GLUCONATE 94 MG/ML
1 INJECTION, SOLUTION INTRAVENOUS ONCE
Status: DISCONTINUED | OUTPATIENT
Start: 2020-07-20 | End: 2020-07-20

## 2020-07-20 RX ORDER — CALCIUM GLUCONATE 94 MG/ML
1 INJECTION, SOLUTION INTRAVENOUS ONCE
Status: DISCONTINUED | OUTPATIENT
Start: 2020-07-20 | End: 2020-07-20 | Stop reason: CLARIF

## 2020-07-20 RX ORDER — SODIUM CHLORIDE 0.9 % (FLUSH) 0.9 %
5-40 SYRINGE (ML) INJECTION EVERY 8 HOURS
Status: DISCONTINUED | OUTPATIENT
Start: 2020-07-20 | End: 2020-07-25 | Stop reason: HOSPADM

## 2020-07-20 RX ORDER — CALCIUM ACETATE 667 MG/1
4 CAPSULE ORAL
Status: DISCONTINUED | OUTPATIENT
Start: 2020-07-20 | End: 2020-07-22

## 2020-07-20 RX ORDER — CARVEDILOL 25 MG/1
25 TABLET ORAL EVERY 12 HOURS
Status: DISCONTINUED | OUTPATIENT
Start: 2020-07-20 | End: 2020-07-22

## 2020-07-20 RX ORDER — HYDRALAZINE HYDROCHLORIDE 20 MG/ML
20 INJECTION INTRAMUSCULAR; INTRAVENOUS
Status: DISCONTINUED | OUTPATIENT
Start: 2020-07-20 | End: 2020-07-25 | Stop reason: HOSPADM

## 2020-07-20 RX ORDER — SODIUM CHLORIDE 0.9 % (FLUSH) 0.9 %
5-40 SYRINGE (ML) INJECTION AS NEEDED
Status: DISCONTINUED | OUTPATIENT
Start: 2020-07-20 | End: 2020-07-25 | Stop reason: HOSPADM

## 2020-07-20 RX ORDER — CALCITRIOL 0.25 UG/1
0.5 CAPSULE ORAL 2 TIMES DAILY
Status: DISCONTINUED | OUTPATIENT
Start: 2020-07-20 | End: 2020-07-25 | Stop reason: HOSPADM

## 2020-07-20 RX ORDER — HEPARIN SODIUM 5000 [USP'U]/ML
5000 INJECTION, SOLUTION INTRAVENOUS; SUBCUTANEOUS EVERY 8 HOURS
Status: DISCONTINUED | OUTPATIENT
Start: 2020-07-21 | End: 2020-07-25 | Stop reason: HOSPADM

## 2020-07-20 RX ORDER — CALCIUM CARBONATE 500(1250)
1000 TABLET ORAL 4 TIMES DAILY
Status: DISCONTINUED | OUTPATIENT
Start: 2020-07-20 | End: 2020-07-25 | Stop reason: HOSPADM

## 2020-07-20 RX ORDER — CALCIUM GLUCONATE 94 MG/ML
4 INJECTION, SOLUTION INTRAVENOUS ONCE
Status: DISCONTINUED | OUTPATIENT
Start: 2020-07-20 | End: 2020-07-20

## 2020-07-20 RX ADMIN — CALCIUM GLUCONATE 1 G: 98 INJECTION, SOLUTION INTRAVENOUS at 14:37

## 2020-07-20 RX ADMIN — Medication 10 ML: at 15:47

## 2020-07-20 RX ADMIN — HYDRALAZINE HYDROCHLORIDE 20 MG: 20 INJECTION, SOLUTION INTRAMUSCULAR; INTRAVENOUS at 16:31

## 2020-07-20 NOTE — ED NOTES
Pt refusing any PO calcium.  Per pt \" I will not take big pills\" I then offered to place capsules into water and pt states \"No, that will make me throw up, I will not take them\"

## 2020-07-20 NOTE — H&P
[facility]  Pulmonary, Critical Care, and Sleep Medicine    Critical Care History and Physical    Name: Diamond Bloes MRN: [id number]   : 1970 Hospital: [facility]   Date: 2020        IMPRESSION:     · Hypocalcemia - 2/2 above; and patient leaving AMA  · Hyperphosphatemia- patient refusing PhosLo - states it causes nausea and patient already nauseated  · Secondary hyperparathyroidism - 2/2 ESRD; s/p subtotal parathyroidectomy 20 with Dr. Geovanni Waddell (Pathology notable for parathyroid hyperplasia)  · Hyperkalemia- mild  · Prolong QTC- suspect related to hypocalcemia and electrolyte derangements. · ESRD - on PD 7 days/week, Nephrology: Dr. Chanelle Lyn following, IR/ Vascular consult placed for HD cath - anticipate need for HD cath placement  · Pleural Effusions: most likely related to CHF and ESRD- last CXR prior to patient pulling out tunneled catheter last admission  · Hypertension  · HFrEF 46% (2019) with global hypokinesis and estimated RVSP of 45-50mmHg based on TR jet. · H/O Anxiety/ Depression     Patient Active Problem List   Diagnosis Code    HTN, goal below 130/80 I10    Urinary retention R33.9    UTI (urinary tract infection) N39.0    Hematuria R31.9    Acute kidney injury ([address] N17.9    Anemia D64.9    Hyperparathyroidism ([address] E21.3    Parathyroid hyperplasia ([address] E21.0    Hypocalcemia E83.51    ESRD on peritoneal dialysis ([facility] N18.6, B46.1    Systolic CHF, chronic (HCC) I50.22      PLAN:   Dispo: Admit to ICU for management of severe hypocalcemia and potentially life- threatening arrhythmias    Neuro  · Serial exams  · No acute pain or withdrawal issues at this time  Resp:   · Supp O2 PRN for SpO2 >90%- currently 100% on RA  · CXR   · Pulmonary Hygiene, IS.    · Keep HOB elevated > 30 degrees, aspiration precautions. I/D:   · Trend WBCs and temp curve. · Monitor surgical site and drain output.   Hem/Onc:   · Daily CBC; H/H, and plts are stable. · SQ heparin for DVT ppx  CVS:   · Telemetry: monitor QTC and avoid QTC prolonging agents  · Monitor hemodynamics. Metabolic/Yvonne;:   · Trend BMP, Mag, Phos. · Continue ionized calcium check q4h. · Trend renal indices  · PD/HD per nephrology  · Replace lytes per protocol. · Consult has been placed to IR and or Vascular for replacement of HD Cath  Endocrine:   · POC Glucose AC&HS. Avoid hypoglycemia. GI:   · NO ZOFRAN for nausea  · PO status: ok for clears and meds  Musc/Skin:   · Per protocol   · Surgical incision care- site is healing well    · Code Status: FULL CODE     Subjective/Interval History:     The patient was evaluated in the ED bed 2 for Dr Aracelis Zhao due to report of severe hypocalcemia    atient is a 51 y. o. male with PMH ESRD on PD, HTN, STEFANIE, gout, BPH, and HFrEF 46% (4/2019)         The patient was admitted to [address] [place] [address]: 7/6-/7/17/20  For parathyroidectomy. Patient [facility] 4/17/2020. Patient represents today to [address] [place] [address] \" I knew something was wrong\" with reports of profound weakness and nausea with emesis. He does not have an oxygen requirement at this time. No chest pain, wheezing. No hemoptysis or other bleeding concerns. He denies any fever or chills. No respiratory complaints. He denies any muscle cramps, spasms, paraesthesia or clementine-oral numbness    He denies smoking or taking any illicit substances. He reports that he has been staying at home and that he has been performing his PD at home    He is refusing his PhosLo tabs today- stating that it makes him nauseated. His adult daughter who had been working at a [employer] has been at home helping him but she was recently called back to work             ROS:A comprehensive review of systems was negative except for that written in the HPI.     Objective:   Vital Signs:    Visit Vitals  BP (!) 183/118 (BP 1 Location: Left arm, BP Patient Position: At rest)   Pulse 86   Temp 99.3 °F (37.4 °C)   Resp 22   Wt 88 kg (194 lb)   SpO2 98%   BMI 24.91 kg/m²       O2 Device: Room air                Physical Exam:      General:  Alert and oriented x 3 , appears tired, he is cooperative and answering questions for me   Head:  Normocephalic, without obvious abnormality, atraumatic. Eyes:  Conjunctivae/corneas clear. PERRL. Nose: Nares normal. Septum midline. Mucosa normal.   Throat: Lips, mucosa, and tongue normal. Teeth and gums normal.   Neck: Supple, symmetrical, trachea midline, healing incision to anterior lower neck, no surrounding erythema, swelling or induration   Lungs:   Symmetrical chest rise; good AE bilat; CTAB   Heart:  Borderline bradycardic, regular rhythm, S1, S2 normal   Abdomen:   Soft, non-tender. Bowel sounds normal. No distention   Extremities: Extremities normal, atraumatic, no cyanosis or edema. Pulses: 2+ and symmetric all extremities. Skin: Skin color, texture, turgor normal. No rashes or lesions. Neurologic: Grossly non-focal, except mild tongue fasciculation are present, follows commands, A&Ox4                  DATA:  Labs:    COAGS:    Lab Results   Component Value Date/Time    APTT 33.3 07/20/2020 11:50 AM    PTP 13.8 07/20/2020 11:50 AM    INR 1.1 07/20/2020 11:50 AM     Recent Labs     07/20/20  1150   WBC 11.1   HGB 10.3*   HCT 32.8*        Recent Labs     07/20/20  1150      K 5.1   CL 96*   CO2 23   *   BUN 93*   CREA 27.30*   CA <5.0*   ALB 3.0*   ALT 11*   INR 1.1     Lab Results   Component Value Date/Time    TSH 2.010 04/15/2019 05:45 AM    Free T4 1.12 04/15/2019 05:45 AM     No results found for: HBA1C, HGBE8, IOI4GNRX, UYD5YSWQ    No results for input(s): PH, PCO2, PO2, HCO3, FIO2 in the last 72 hours.     EKG Results     Procedure 720 Value Units Date/Time    EKG, 12 LEAD, INITIAL [[id number]] Collected:  07/20/20 1352    Order Status:  Completed Updated:  07/20/20 1354     Ventricular Rate 82 BPM      Atrial Rate 82 BPM      P-R Interval 156 ms      QRS Duration 92 ms      Q-T Interval 478 ms      QTC Calculation (Bezet) 558 ms      Calculated P Axis 46 degrees      Calculated R Axis -28 degrees      Calculated T Axis 119 degrees      Diagnosis --     Normal sinus rhythm  Possible Left atrial enlargement  Left ventricular hypertrophy  T wave abnormality, consider lateral ischemia  Prolonged QT  Abnormal ECG  No previous ECGs available             Echo: 4/16/201[facility])  Interpretation Summary  A complete two-dimensional transthoracic echocardiogram was performed (2D, M-  mode, Doppler and color flow Doppler). The study was technically adequate. 1. Mildly dilated LV cavity size with moderate concentric hypertrophy. 2. Mildly reduced LV systolic function with an EF of 46%. Mild global  hypokinesis. 3. Grade III diastolic dysfunction. 4. Normal RV size and function. 5. No hemodynamically significant valvular abnormalities. 6. Estimated RVSP of 45-50mmHg based on TR jet.     All other findings detailed below. Previous echocardiogram done at [facility]  dated 08/09/2018 showed an EF of 56%, mild to moderate TR, and an RVSP of  49mmHg.           Imaging:    CXR Results  (Last 48 hours)    None        4/8/2020      [x]  ImI have personally reviewed the patients radiographs  []Radiographs reviewed with radiologist   [x]No change from prior, tubes and lines in adequate position  []Improved   []Worsening    High complexity decision making was performed during the evaluation of this patient at high risk for decompensation with multiple organ involvement     Above mentioned total time spent on reviewing the case/medical record/data/notes/EMR/patient examination/documentation/coordinating care with nurse/consultants, exclusive of procedures with complex decision making performed and > 50% time spent in face to face evaluation.     Critical Care Time:  The services I provided to this patient were to treat and/or prevent clinically significant deterioration that could result in the failure of one or more body systems and/or organ systems due to respiratory distress, hypoxia, cardiac dysrhythmia. Services included the following:  -reviewing nursing notes and old charts  -direct patient care  -medication orders and management  -interpreting and reviewing diagnostic studies/labs  -re-evaluations  -documentation time     Aggregate critical care time was 45   minutes, which includes only time during which I was engaged in work directly related to the patient's care as described above. During this entire length of time I was immediately available to the patient. The reason for providing this level of medical care for this critically ill patient was due a critical illness that impaired one or more vital organ systems such that there was a high probability of imminent or life threatening deterioration in the patients condition. This care involved high complexity decision making to assess, manipulate, and support vital system functions, to treat this degreee vital organ system failure and to prevent further life threatening deterioration of the patients condition      Complex decision making was made in the evaluation and management plans during this consultation. More than 50% of time was spent in counseling and coordination of care including review of data and discussion with other team members.         Lubna Bassett DO, [facility]    [facility]  Pulmonary, Critical Care, and Sleep Medicine

## 2020-07-20 NOTE — CONSULTS
Consult Note  Consult requested by: dr Enrike Cervantes is a 44 y.o. male [address] who is being seen on consult for hypocalcemia  Chief Complaint   Patient presents with    Dizziness    Vascular Access Problem     Admission diagnosis: <principal problem not specified>     HPI: 44 y o  [place] male with hx of esrd,non compliance ,s/p subtotal parathyroidectomy,signed out from the hospital 3 days ago,demanded to remove central line which was used to infuse calcium. he stated that he was not taking po calcium and calcitriol as directed,but he did peritoneal dialysis last night. during last admission,I suggested changing to hemodialysis,but he refused  Past Medical History:   Diagnosis Date    Anemia     Chronic kidney disease     ESRD-   peritoneal dilaysis    Gout     Heart failure ([facility] [address] 04/2019    History of blood transfusion 05/22/2018    Hypertension     Joint pain     Sleep apnea 07/03/2018    does not use c pap      Past Surgical History:   Procedure Laterality Date    COLONOSCOPY N/A 8/23/2019    COLONOSCOPY performed by Esmer Frances MD at . [facility]  10/23/2019    HX ORTHOPAEDIC Left     left knee sx     HX OTHER SURGICAL  2018    tenkoff catheter    HX UROLOGICAL  05/20/2018    prostate biopsy    HX WISDOM TEETH EXTRACTION      IR BX BONE MARROW DIAGNOSTIC  11/25/2019    IR INSERT NON TUNL CVC OVER 5 YRS  7/10/2020       Social History     Socioeconomic History    Marital status:      Spouse name: Not on file    Number of children: Not on file    Years of education: Not on file    Highest education level: Not on file   Occupational History    Not on file   Social Needs    Financial resource strain: Not on file    Food insecurity     Worry: Not on file     Inability: Not on file    Transportation needs     Medical: Not on file     Non-medical: Not on file   Tobacco Use    Smoking status: Never Smoker    Smokeless tobacco: Never Used   Substance and Sexual Activity    Alcohol use: No    Drug use: No    Sexual activity: Yes     Partners: Female   Lifestyle    Physical activity     Days per week: Not on file     Minutes per session: Not on file    Stress: Not on file   Relationships    Social connections     Talks on phone: Not on file     Gets together: Not on file     Attends Jehovah's witness service: Not on file     Active member of club or organization: Not on file     Attends meetings of clubs or organizations: Not on file     Relationship status: Not on file    Intimate partner violence     Fear of current or ex partner: Not on file     Emotionally abused: Not on file     Physically abused: Not on file     Forced sexual activity: Not on file   Other Topics Concern    Not on file   Social History Narrative    Not on file       Family History   Problem Relation Age of Onset    Hypertension Mother     Heart Disease Father     Hypertension Father     Hypertension Child      No Known Allergies     Home Medications:     Prior to Admission Medications   Prescriptions Last Dose Informant Patient Reported? Taking?   allopurinol (ZYLOPRIM) 100 mg tablet   No No   Sig: Take 2 Tabs by mouth daily. amLODIPine (NORVASC) 10 mg tablet   No No   Sig: Take 1 Tab by mouth daily. calcitRIOL (ROCALTROL) 0.5 mcg capsule   Yes No   Sig: Take  by mouth daily. carvedilol (COREG) 25 mg tablet   No No   Sig: Take 1 Tab by mouth two (2) times daily (with meals). cinacalcet (SENSIPAR) 30 mg tablet   Yes No   Sig: Take  by mouth daily. cloNIDine HCl (CATAPRES) 0.1 mg tablet   Yes No   Sig: Take 0.1 mg by mouth two (2) times a day. ergocalciferol (ERGOCALCIFEROL) 50,000 unit capsule   Yes No   Sig: Take  by mouth every seven (7) days. losartan (COZAAR) 100 mg tablet   No No   Sig: Take 1 Tab by mouth daily.       Facility-Administered Medications: None       Current Facility-Administered Medications   Medication Dose Route Frequency    calcitRIOL (ROCALTROL) capsule 0.5 mcg  0.5 mcg Oral BID    calcium carbonate (OS-VIRAL) tablet 1,000 mg [elemental]  1,000 mg Oral QID    [Held by provider] calcium acetate(phosphat bind) (PHOSLO) capsule 2,668 mg  4 Cap Oral TID WITH MEALS    calcium chloride 1 g in 0.9% sodium chloride 250 mL IVPB  1 g IntraVENous ONCE     Current Outpatient Medications   Medication Sig    carvedilol (COREG) 25 mg tablet Take 1 Tab by mouth two (2) times daily (with meals).  cinacalcet (SENSIPAR) 30 mg tablet Take  by mouth daily.  ergocalciferol (ERGOCALCIFEROL) 50,000 unit capsule Take  by mouth every seven (7) days.  calcitRIOL (ROCALTROL) 0.5 mcg capsule Take  by mouth daily.  losartan (COZAAR) 100 mg tablet Take 1 Tab by mouth daily.  cloNIDine HCl (CATAPRES) 0.1 mg tablet Take 0.1 mg by mouth two (2) times a day.  amLODIPine (NORVASC) 10 mg tablet Take 1 Tab by mouth daily.  allopurinol (ZYLOPRIM) 100 mg tablet Take 2 Tabs by mouth daily. Review of Systems:   A comprehensive review of systems was negative except for that written in the HPI. Data Review:    Labs: Results:       Chemistry Recent Labs     07/20/20  1150   *      K 5.1   CL 96*   CO2 23   BUN 93*   CREA 27.30*   CA <5.0*   AGAP 17   BUCR 3*   *   TP 8.1   ALB 3.0*   GLOB 5.1*   AGRAT 0.6*      PTH  Lab Results   Component Value Date/Time    Calcium <5.0 (LL) 07/20/2020 11:50 AM    CALCIUM,IONIZED 3.60 (L) 10/23/2019 09:30 AM    Phosphorus 6.0 (H) 07/16/2020 01:44 AM    PTH, Intact <6.3 (L) 07/09/2020 09:51 AM      CBC w/Diff Recent Labs     07/20/20  1150   WBC 11.1   RBC 4.14*   HGB 10.3*   HCT 32.8*      GRANS 95*   LYMPH 3*   EOS 0      Coagulation Recent Labs     07/20/20  1150   PTP 13.8   INR 1.1   APTT 33.3       Iron/Ferritin No results for input(s): IRON in the last 72 hours. No lab exists for component: TIBCCALC   BNP No results for input(s): BNPP in the last 72 hours.    Cardiac Enzymes Recent Labs 07/20/20  1150   CPK 1,038*   CKND1 0.3      Liver Enzymes Recent Labs     07/20/20  1150   TP 8.1   ALB 3.0*   *      Thyroid Studies Lab Results   Component Value Date/Time    TSH 2.010 04/15/2019 05:45 AM        Urinalysis Lab Results   Component Value Date/Time    Color YELLOW 07/15/2020 01:30 PM    Appearance CLEAR 07/15/2020 01:30 PM    Specific gravity 1.012 07/15/2020 01:30 PM    pH (UA) 6.5 07/15/2020 01:30 PM    Protein 100 (A) 07/15/2020 01:30 PM    Glucose 100 (A) 07/15/2020 01:30 PM    Ketone Negative 07/15/2020 01:30 PM    Bilirubin Negative 07/15/2020 01:30 PM    Urobilinogen 0.2 07/15/2020 01:30 PM    Nitrites Negative 07/15/2020 01:30 PM    Leukocyte Esterase TRACE (A) 07/15/2020 01:30 PM    Epithelial cells Negative 07/15/2020 01:30 PM    Bacteria FEW (A) 07/15/2020 01:30 PM    WBC 5 to 7 07/15/2020 01:30 PM    RBC 0 to 2 07/15/2020 01:30 PM         IMAGES:   XR Results (maximum last 3): Results from [place] encounter on 07/06/20   XR CHEST PORT    Narrative EXAM: XR CHEST PORT    INDICATION: provider order pleural effusion    COMPARISON: 9/11/2018    FINDINGS: A portable AP radiograph of the chest was obtained at 1042 hours. The  patient has a large left pleural effusion and a moderate to large right pleural  effusion. Atelectasis noted associated the lung bases. The aerated lungs reveal  no infiltrate or mass. There is no pneumothorax. Impression IMPRESSION: Prominent bilateral pleural effusions. Correlation with CAT scan may  be considered   Results from Hospital Encounter encounter on 04/14/19   XR CHEST PA LAT    Narrative Indication: Short of breath, dialysis  PA and lateral chest     The heart is mildly enlarged. Interstitial and alveolar density seen throughout  the lungs with shallow inspiration and bilateral moderate size pleural  effusions. Impression IMPRESSION: Moderate CHF.      Results from [place] encounter on 09/11/18   XR CHEST PA LAT Narrative Chest PA and lateral    INDICATION: End-stage renal disease    COMPARISON: None    FINDINGS:    Two views of the chest were obtained. Cardiac silhouette is borderline prominent  in size. Pulmonary vascularity is within normal limits. No confluent  consolidation. No acute osseous abnormality. Impression IMPRESSION:    No acute cardiopulmonary disease. CT Results (maximum last 3): Results from [place] encounter on 02/29/20   CT ABD PELV WO CONT    Narrative Indication: evaluate PD cuff. Peritoneal dialysis tube malpositioned. Impression IMPRESSION: Left lower quadrant peritoneal dialysis catheter looped in pelvis. Bilateral pleural effusions. Cardiomegaly. Gallbladder wall thickening,  ultrasound follow-up recommended. Bilateral renal cysts. Diverticulosis. Left  inguinal canal fluid collection. Comment: CT images of the abdomen and pelvis were obtained without oral or  intravenous contrast. The initial report was rendered by [facility]. Small bilateral pleural effusions are present with atelectasis in the underlying  lungs. The heart is enlarged. The gallbladder is contracted and thickening of the fundus wall is noted with  suspicion of tiny stone. Nonemergent ultrasound recommended for further  evaluation. The liver spleen pancreas and adrenal glands are unremarkable. The kidneys are mildly atrophic and harbor cysts. Calculus or hydronephrosis. The ureters and bladder are unremarkable. Peritoneal dialysis catheter is looped in the pelvis entering the peritoneum in  the left lower quadrant. Fluid collection is noted in the left inguinal canal.    Mild colonic diverticulosis is noted without diverticulitis. Pelvic viscera is unremarkable. No bowel obstruction, free intraperitoneal air, free fluid or appendicitis.     DICOM format imaged data is available to non-affiliated external healthcare  facilities or entities on a secure, media free, reciprocally searchable basis  with patient authorization  for 12 months following the date of the study. Results from [facility] encounter on 04/21/18   CT ABD PELV WO CONT    Narrative CT SCAN OF THE ABDOMEN AND PELVIS, WITHOUT CONTRAST     INDICATION: Acute pelvic pain. Urinary retention    COMPARISON: None    TECHNIQUE: Serial axial CT images through the abdomen and pelvis were obtained  using helical technique, without use of IV contrast.  Additional coronal and  sagittal reformation images were also performed. All CT scans at this facility  are performed using dose optimization technique as appropriate to a performed  exam, to include automated exposure control, adjustment of the mA and/or kV  according to patient's size (including appropriate matching for site-specific  examinations), or use of iterative reconstruction technique. FINDINGS:  Abdomen findings:    Please note that evaluation of the solid abdominal viscera is limited without IV  contrast.    Mild atelectasis at the lung bases, left greater than right. Gallbladder sludge  present. The liver, spleen, pancreas, and adrenal glands are unremarkable. A  left renal cyst measures up to 4.5 cm. Subcentimeter hypodense lesion in the  right kidney is too small to characterize. No hydronephrosis. The abdominal aorta is nonaneurysmal. Mildly prominent distal periesophageal  lymph nodes noted, measuring 1.8 x 0.9 cm (image 19). Otherwise, no mesenteric  or retroperitoneal adenopathy. No free intraperitoneal air or fluid. Tiny  fat-containing umbilical hernia. Pelvis findings: The bowel is nonobstructed. Colonic diverticulosis, without evidence for  diverticulitis. The appendix is normal.    Murillo catheter present within the urinary bladder. There is diffuse dense  material noted throughout the urinary bladder. Small amount of air within the  bladder may be from Murillo instrumentation.  There is mild periureteral stranding  along the distal third of the left ureter. The prostate gland measures 5.4 x 4.7  x 5.5 cm. No pelvic adenopathy. No free pelvic fluid    Osseous findings: No acute osseous abnormalities identified. Degenerative  changes noted at the hips and sacroiliac joints right greater than left. Facet  hypertrophic changes noted at the lower lumbar spine. Impression IMPRESSION:     1. Urinary bladder is essentially filled with amorphous dense material, likely  blood products.  -Murillo catheter present. Small amount of air within the bladder may be from  Murillo instrumentation.  -Enlarged prostate gland. 2. Mild periureteral stranding at the distal left ureter    3. Left renal cyst. Subcentimeter hypodense lesion in the right kidney is too  small to characterize, statistically likely a cyst.    4. Gallbladder sludge. 5. Colonic diverticulosis. 6. Nonspecific mildly prominent distal periesophageal lymph node, of uncertain  significance or etiology. MRI Results (maximum last 3): No results found for this or any previous visit. Nuclear Medicine Results (maximum last 3): No results found for this or any previous visit. US Results (maximum last 3): Results from Hospital Encounter encounter on 02/13/17   US RETROPERITONEUM COMP    Narrative RETROPERITONEAL ULTRASOUND    INDICATION: Chronic kidney disease, stage IV.    COMPARISON: None    TECHNIQUE:   Sonographic interrogation of the abdomen was performed by the radiologic  technologist. Representative gray scale, color Doppler, and/or spectral waveform  images were submitted to the radiologist for interpretation. FINDINGS:  Right kidney 11.5 x 5.9 x 5.8 cm. Multiple cysts, largest of which is septated  and measures 3.8 x 3.9 x 4.0 cm in interpolar region. Renal morphology otherwise  unremarkable. No contour deforming solid mass. Parenchymal echogenicity  increased. No hydronephrosis or perinephric fluid collection.  Unremarkable flow  pattern upon color Doppler interrogation. Incidentally imaged portions of liver  grossly unremarkable. Left kidney 11.9 x 6.7 x 6.5 cm. Two simple-appearing cysts, each of which  measures approximately 1 cm. Renal morphology otherwise unremarkable. No contour  deforming solid mass. Parenchymal echogenicity increased. No hydronephrosis or  perinephric fluid collection. Unremarkable flow pattern upon color Doppler  interrogation. Incidentally imaged portions of spleen grossly unremarkable. Urinary bladder morphologically unremarkable without significant postvoid  residual volume. Prostate 4.5 x 4.6 x 4.3 cm. Impression IMPRESSION:    Increased renal parenchymal echogenicity bilaterally, suggesting medical renal  disease. Multiple bilateral renal cysts. Enlarged prostate. DEXA Results (maximum last 3): No results found for this or any previous visit. AMELIA Results (maximum last 3): No results found for this or any previous visit. IR Results (maximum last 3): Results from [place] encounter on 07/06/20   IR INSERT NON TUNL CVC OVER 5 YRS    Narrative This procedure was performed in its entirety by a Physician Assistant. Procedure: Central line placement with ultrasound and fluoroscopic guidance. Indications: IV Calcium    : Mitchell Crook PA-C    Assistant: None    Attending physician: Dr. Ken Lopez diagnosis: Hypocalcemia, ESRD    Postop diagnosis: Same    Specimens: None    Estimated blood loss: Minimal    Anesthesia: 1% Lidocaine locally     Complications: None    Fluoroscopy radiation dose: Cumulative Air KERMA = 4 mGy. Fluoroscopy time: 0.4 min    Number of fluoroscopic images: 1    Technique: After informed consent was obtained, the right neck was prepped and  draped in the usual sterile fashion and maximum sterile barrier technique were  utilized. Local anesthesia was achieved with 1% lidocaine locally.  Several gray  scale static US images of the patent internal jugular vein as well as proper  position of the access needle were obtained and recorded for documentation  purposes. Venous access into the right internal jugular vein was achieved using  ultrasound guidance via the Seldinger technique. A peel-away sheath was advanced  over the Glidewire, intravascular length of 19 cm was determined and the  guidewire was removed. Subsequently a non-cuffed power injectable central line  was inserted through the peel-away sheath. All ports aspirated and flushed well. The catheter was secured. The catheter tip is at the superior vena cava, right  atrial junction and is ready for use. Spot fluoroscopic image of the proper catheter position was obtained and  recorded for documentation purposes      Impression Impression:    1. Successful placement of non-cuffed power injectable central venous catheter,  as described above   Results from Hospital Encounter encounter on 11/25/19   IR BX BONE MARROW DIAGNOSTIC    Narrative PREOPERATIVE DIAGNOSIS: Anemia. POSTOPERATIVE DIAGNOSIS: Same    ATTENDING: Dr. Yazan Sutton M.D.    Seda Hammer: None. PROCEDURES: Fluoroscopically guided bone marrow biopsy. ANESTHESIA: Local 1% lidocaine as well as moderate intravenous sedation with  Versed and fentanyl given and monitored per independently trained interventional  radiology nurse under my direct supervision for 15 minutes. Please see nursing  records for detailed medication dosing. CONTRAST: None. COMPLICATIONS: None    DRAIN: No    CATHETER: None. EBL: Minimal.    SPECIMEN: 2 aspirates and single core biopsy was obtained. Fluoroscopy radiation dose: Cumulative Air KERMA = 6 mGy. TECHNIQUE: After detailed explanation of risks and benefits of the procedure  verbal and written consent were obtained. Patient was brought to the  interventional radiology room and placed prone on the table. Timeout was  performed.  view was obtained.  Target lesion was identified and skin was  marked overlying left iliac crest.    Left iliac crest region was prepped and draped in the usual sterile fashion. Maximum sterile there are technique was used. 1% lidocaine solution was instilled in the skin superficial and deep  subcutaneous soft tissues overlying the biopsy region. Under direct fluoroscopy guidance using 11-gauge OnControl biopsy needle was  advanced down through left iliac crest periosteum with drill power assistance. Single pass was made. 2 aspirates and single core biopsy were obtained. Given to  pathology on site. Postbiopsy imaging was obtained. FINDINGS: Fluoroscopic guidance demonstrated good position of the biopsy needle. Postbiopsy imaging shows no abnormality. Impression IMPRESSION:    Successful, uncomplicated fluoroscopically guided bone marrow biopsy. VAS/US Results (maximum last 3): Results from [place] encounter on 10/24/19   DUPLEX LOWER EXT VENOUS BILAT    Narrative                                                               Study ID:   [id number]                                                 [facility] [address]  [address]                                   Lower Extremity Venous Report    Name: Geovany Lala Date: 10/24/2019 01:55 PM  MRN: [id number]                      Patient Location: [id number]  : 1970                   Age: 44 yrs  Gender: Male                      Account #: [de-identified]  Reason For Study: Leg pain  Ordering Physician: Lavelle Wilde    Performed By: Keira Zhang RVT    Interpretation Summary  No evidence of deep vein thrombosis in the bilateral lower extremities. _____________________________________________________________________________  __      QUALITY/PROCEDURE  Complete bilateral venous duplex performed. ICD 10: R60.0.    HISTORY/SYMPTOMS  HTN. Chronic kidney disease, Anemia, STEFANIE. Bilateral leg pain. RIGHT LEG  The right common femoral, femoral, popliteal, posterior tibial and peroneal  veins were examined with duplex ultrasound. The deep veins were patent and  compressible with no evidence of intraluminal thrombus. Spontaneous, phasic  venous flow with normal augmentation was noted throughout the right leg. RIGHT SAPHENOUS VEINS  Spectral Doppler exam demonstrates normal venous flow in the right great  saphenous vein. Compression of the right great saphenous vein is complete. Filling defects in the right great saphenous vein are absent. LEFT LEG  The left common femoral, femoral, popliteal, posterior tibial and peroneal  veins were examined with duplex ultrasound. The deep veins were patent and  compressible with no evidence of intraluminal thrombus. Spontaneous, phasic  venous flow with normal augmentation was noted throughout the left leg. LEFT LEG SAPHENOUS VEINS  Spectral Doppler exam demonstrates normal venous flow in the left great  saphenous vein. Compression of the left great saphenous vein is complete. Filling defects in the left great saphenous vein are absent. Electronically signed byDr. Sheri Cleveland M.D   10/24/2019 03:34 PM   Results from [place] encounter on 04/21/18   DUPLEX LOWER EXT VENOUS LEFT       PET Results (maximum last 3): No results found for this or any previous visit. No results found for this or any previous visit. @LASTPROCAMB([id number])    CULTURE:   )No results for input(s): SDES, CULT in the last 72 hours. No results for input(s): CULT in the last 72 hours.     Physical Assessment:     Visit Vitals  BP (!) 183/118 (BP 1 Location: Left arm, BP Patient Position: At rest)   Pulse 86   Temp 99.3 °F (37.4 °C)   Resp 22   Wt 88 kg (194 lb)   SpO2 98%   BMI 24.91 kg/m²     Last 3 Recorded Weights in this Encounter    07/20/20 1123   Weight: 88 kg (194 lb)     No intake or output data in the 24 hours ending 07/20/20 1518    Physial Exam:  General appearance: alert  Skin: normal coloration and turgor, no rashes, no suspicious skin lesions noted. HEENT: Head; normocephalic, atraumatic. JOSÉ MIGUEL. ENT- ENT exam normal, no neck nodes or sinus tenderness. Lungs: clear to auscultation bilaterally  Heart: regular rate and rhythm, S1, S2 normal, no murmur, click, rub or gallop  Abdomen: soft  Extremities: extremities normal, atraumatic, no cyanosis or edema    PLAN / RECOMMENDATION:    Severe symptomatic hypocalcemia,related to subtotal parathyroidectomy and non  compliance with calcium,calcitriol. ordered iv calcium 10 g   Esrd,not compliant with peritoneal dialysis,he agreed to change to hemodialysis,consulted int radiology and vascular. will dialyze with high calcium bath and give iv calcium with dialysis three times weekly  Htn,not compliant     Thank you for the consultation to participate in patient's care. I have personally discussed my plan with the referring physician.      Shelley Morgan MD  July 20, 2020

## 2020-07-20 NOTE — ED TRIAGE NOTES
Pt states that he needs an emergency dialysis cathter in his neck. Pt's last dialysis was last night. Pt reports that he feels like he is going to pass out.

## 2020-07-20 NOTE — ED PROVIDER NOTES
EMERGENCY DEPARTMENT HISTORY AND PHYSICAL EXAM    12:18 PM      Date: 7/20/2020  Patient Name: Rahul Quiñones    History of Presenting Illness     Chief Complaint   Patient presents with    Dizziness    Vascular Access Problem         History Provided By: Patient  Location/Duration/Severity/Modifying factors   HPI 43yo M pmhx of parathyroidectomy - hypocalcemia; recent AMA from here on 7/17 after removing HD cath and central line in neck; presents with weakness, feeling bad, nausea, hiccups. States he has been doing his PD at home, but is feeling worse this AM. Patient had extensive discussion with nephrology prior to leaving about the need for HD to control his calcium - today he would like the HD lines back in his neck. He does not want anything given in his arms or groin. PCP: Kaleb Kearns MD    Current Facility-Administered Medications   Medication Dose Route Frequency Provider Last Rate Last Dose    calcitRIOL (ROCALTROL) capsule 0.5 mcg  0.5 mcg Oral BID Ariane Wills MD        calcium carbonate (OS-VIRAL) tablet 1,000 mg [elemental]  1,000 mg Oral QID Ariane Wills MD        calcium acetate(phosphat bind) (PHOSLO) capsule 2,668 mg  4 Cap Oral TID WITH MEALS Ariane Wills MD        calcium chloride 1 g in 0.9% sodium chloride 250 mL IVPB  1 g IntraVENous ONCE Selina Dye MD         Current Outpatient Medications   Medication Sig Dispense Refill    carvedilol (COREG) 25 mg tablet Take 1 Tab by mouth two (2) times daily (with meals). 90 Tab 1    cinacalcet (SENSIPAR) 30 mg tablet Take  by mouth daily.  ergocalciferol (ERGOCALCIFEROL) 50,000 unit capsule Take  by mouth every seven (7) days.  calcitRIOL (ROCALTROL) 0.5 mcg capsule Take  by mouth daily. 3    losartan (COZAAR) 100 mg tablet Take 1 Tab by mouth daily. 30 Tab 30    cloNIDine HCl (CATAPRES) 0.1 mg tablet Take 0.1 mg by mouth two (2) times a day.  amLODIPine (NORVASC) 10 mg tablet Take 1 Tab by mouth daily.  [id number] Srini Mcqueen Tab 3    allopurinol (ZYLOPRIM) 100 mg tablet Take 2 Tabs by mouth daily. 61 Tab 3       Past History     Past Medical History:  Past Medical History:   Diagnosis Date    Anemia     Chronic kidney disease     ESRD-   peritoneal dilaysis    Gout     Heart failure ([facility] [address] 04/2019    History of blood transfusion 05/22/2018    Hypertension     Joint pain     Sleep apnea 07/03/2018    does not use c pap       Past Surgical History:  Past Surgical History:   Procedure Laterality Date    COLONOSCOPY N/A 8/23/2019    COLONOSCOPY performed by Meagan Brooke MD at . [address]  10/23/2019    HX ORTHOPAEDIC Left     left knee sx     HX OTHER SURGICAL  2018    tenkoff catheter    HX UROLOGICAL  05/20/2018    prostate biopsy    HX WISDOM TEETH EXTRACTION      IR BX BONE MARROW DIAGNOSTIC  11/25/2019    IR INSERT NON TUNL CVC OVER 5 YRS  7/10/2020       Family History:  Family History   Problem Relation Age of Onset    Hypertension Mother     Heart Disease Father     Hypertension Father     Hypertension Child        Social History:  Social History     Tobacco Use    Smoking status: Never Smoker    Smokeless tobacco: Never Used   Substance Use Topics    Alcohol use: No    Drug use: No       Allergies:  No Known Allergies      Review of Systems       Review of Systems   Constitutional: Positive for fatigue. Negative for chills, diaphoresis and fever. HENT: Negative for congestion. Eyes: Negative for visual disturbance. Respiratory: Negative for shortness of breath. Cardiovascular: Negative for chest pain. Gastrointestinal: Positive for nausea and vomiting. Negative for abdominal distention, abdominal pain, constipation and diarrhea. Genitourinary: Negative for difficulty urinating. Musculoskeletal: Negative for back pain. Skin: Negative for rash and wound. Neurological: Positive for dizziness and weakness. Negative for light-headedness and headaches.          Physical Exam     Visit Vitals  BP (!) 183/118 (BP 1 Location: Left arm, BP Patient Position: At rest)   Pulse 86   Temp 99.3 °F (37.4 °C)   Resp 22   Wt 88 kg (194 lb)   SpO2 98%   BMI 24.91 kg/m²         Physical Exam  Vitals signs and nursing note reviewed. Exam conducted with a chaperone present. Constitutional:       General: He is not in acute distress. Appearance: He is normal weight. He is ill-appearing. He is not toxic-appearing or diaphoretic. HENT:      Head: Normocephalic. Mouth/Throat:      Mouth: Mucous membranes are dry. Eyes:      General:         Right eye: No discharge. Left eye: No discharge. Extraocular Movements: Extraocular movements intact. Pupils: Pupils are equal, round, and reactive to light. Neck:      Musculoskeletal: Normal range of motion. Cardiovascular:      Rate and Rhythm: Normal rate and regular rhythm. Pulses: Normal pulses. Pulmonary:      Effort: Pulmonary effort is normal.      Breath sounds: Normal breath sounds. Abdominal:      General: There is no distension. Tenderness: There is no abdominal tenderness. Musculoskeletal: Normal range of motion. General: No swelling. Right lower leg: No edema. Left lower leg: No edema. Skin:     General: Skin is warm and dry. Capillary Refill: Capillary refill takes less than 2 seconds. Coloration: Skin is not jaundiced. Findings: No bruising. Neurological:      Mental Status: He is alert and oriented to person, place, and time. Mental status is at baseline. Motor: Weakness present.       Comments: Pos Chvostek sign   Psychiatric:         Mood and Affect: Mood normal.           Diagnostic Study Results     Labs -  Recent Results (from the past 12 hour(s))   CBC WITH AUTOMATED DIFF    Collection Time: 07/20/20 11:50 AM   Result Value Ref Range    WBC 11.1 4.6 - 13.2 K/uL    RBC 4.14 (L) 4.70 - 5.50 M/uL    HGB 10.3 (L) 13.0 - 16.0 g/dL    HCT 32.8 (L) 36.0 - 48.0 %    MCV 79.2 74.0 - 97.0 FL    MCH 24.9 24.0 - 34.0 PG    MCHC 31.4 31.0 - 37.0 g/dL    RDW 16.7 (H) 11.6 - 14.5 %    PLATELET 412 380 - 141 K/uL    MPV 9.7 9.2 - 11.8 FL    NEUTROPHILS 95 (H) 40 - 73 %    LYMPHOCYTES 3 (L) 21 - 52 %    MONOCYTES 2 (L) 3 - 10 %    EOSINOPHILS 0 0 - 5 %    BASOPHILS 0 0 - 2 %    ABS. NEUTROPHILS 10.5 (H) 1.8 - 8.0 K/UL    ABS. LYMPHOCYTES 0.4 (L) 0.9 - 3.6 K/UL    ABS. MONOCYTES 0.2 0.05 - 1.2 K/UL    ABS. EOSINOPHILS 0.0 0.0 - 0.4 K/UL    ABS. BASOPHILS 0.0 0.0 - 0.1 K/UL    DF AUTOMATED     METABOLIC PANEL, COMPREHENSIVE    Collection Time: 07/20/20 11:50 AM   Result Value Ref Range    Sodium 136 136 - 145 mmol/L    Potassium 5.1 3.5 - 5.5 mmol/L    Chloride 96 (L) 100 - 111 mmol/L    CO2 23 21 - 32 mmol/L    Anion gap 17 3.0 - 18 mmol/L    Glucose 135 (H) 74 - 99 mg/dL    BUN 93 (H) 7.0 - 18 MG/DL    Creatinine 27.30 (H) 0.6 - 1.3 MG/DL    BUN/Creatinine ratio 3 (L) 12 - 20      GFR est AA 2 (L) >60 ml/min/1.73m2    GFR est non-AA 2 (L) >60 ml/min/1.73m2    Calcium <5.0 (LL) 8.5 - 10.1 MG/DL    Bilirubin, total 0.4 0.2 - 1.0 MG/DL    ALT (SGPT) 11 (L) 16 - 61 U/L    AST (SGOT) 12 10 - 38 U/L    Alk.  phosphatase 130 (H) 45 - 117 U/L    Protein, total 8.1 6.4 - 8.2 g/dL    Albumin 3.0 (L) 3.4 - 5.0 g/dL    Globulin 5.1 (H) 2.0 - 4.0 g/dL    A-G Ratio 0.6 (L) 0.8 - 1.7     CARDIAC PANEL,(CK, CKMB & TROPONIN)    Collection Time: 07/20/20 11:50 AM   Result Value Ref Range    CK - MB 2.9 <3.6 ng/ml    CK-MB Index 0.3 0.0 - 4.0 %    CK 1,038 (H) 39 - 308 U/L    Troponin-I, QT 0.02 0.0 - 0.045 NG/ML   CALCIUM, IONIZED    Collection Time: 07/20/20 11:50 AM   Result Value Ref Range    Ionized Calcium 0.48 (LL) 1.12 - 1.32 MMOL/L   PROTHROMBIN TIME + INR    Collection Time: 07/20/20 11:50 AM   Result Value Ref Range    Prothrombin time 13.8 11.5 - 15.2 sec    INR 1.1 0.8 - 1.2     PTT    Collection Time: 07/20/20 11:50 AM   Result Value Ref Range    aPTT 33.3 23.0 - 36.4 SEC   POC LACTIC ACID    Collection Time: 07/20/20 11:57 AM   Result Value Ref Range    Lactic Acid (POC) 1.32 0.40 - 2.00 mmol/L   EKG, 12 LEAD, INITIAL    Collection Time: 07/20/20  1:52 PM   Result Value Ref Range    Ventricular Rate 82 BPM    Atrial Rate 82 BPM    P-R Interval 156 ms    QRS Duration 92 ms    Q-T Interval 478 ms    QTC Calculation (Bezet) 558 ms    Calculated P Axis 46 degrees    Calculated R Axis -28 degrees    Calculated T Axis 119 degrees    Diagnosis       Normal sinus rhythm  Possible Left atrial enlargement  Left ventricular hypertrophy  T wave abnormality, consider lateral ischemia  Prolonged QT  Abnormal ECG  No previous ECGs available         Radiologic Studies -   IR INSERT TUNL CVC W/O PORT OVER 5 YR    (Results Pending)         Medical Decision Making   I am the first provider for this patient. I reviewed the vital signs, available nursing notes, past medical history, past surgical history, family history and social history. Vital Signs-Reviewed the patient's vital signs. EKG: NSR, rate 82, IL normal, QRS normal, QTc prolonged 558. Records Reviewed: Nursing Notes, Old Medical Records, Previous electrocardiograms, Previous Radiology Studies and Previous Laboratory Studies (Time of Review: 12:18 PM)    ED Course: Progress Notes, Reevaluation, and Consults:    ED Course as of Jul 20 1428   Mon Jul 20, 2020   1139 Temp: 99.3 °F (37.4 °C) [GW]   1139 Pulse (Heart Rate)(!): 104 [GW]   1139 BP(!): 183/118 [GW]   1139 MAP (Calculated): 140 [GW]   1139 Resp Rate: 25 [GW]   1139 Vitals reviewed including tachycardia, HTN   O2 Sat (%): 100 % [GW]   1153 Extensive discussion with Nephrologist - patient will most certainly need HD - but is extremely non-compliant with PD therapy. [GW]   4291 Extensive discussion with IR; we can place HD cath, please make patient NPO; also consult vascular to determine if patietn    [GW]   1209 Patient last had PO >12 hours ago.  (7pm 7/19)    [GW]   1215 H/H stable, wbc reviewed    [GW]   1215 Lactic Acid (POC): 1.32 [GW]   1225 Patient still awaiting bed secondary to availability in ED    [GW]   1248 Ionized Calcium(!!): 0.48 [GW]   1249 Calcium(!!): <5.0 [GW]   1250 Calcium gluconate ordered - however cannot administer without cardiac monitoring bed - currently all monitors are taken with critical patients - unable to assign monitored bed at this time. [GW]   60 Reconsulted IR - requesting RIJ for TDC. Vascular has not returned page. [GW]   26 Conversation with Dr Mirna Pelaez - Vascular surgeon - please put us on treatment team and well will see him if he has not already gone to IR. [GW]   [id number] Nephrology has seen patient and is ordering calcium in addition to what has been ordered in ED. [GW]   3693 Patient roomed and on monitor- EKG reviewed with Qtc 558- calcium in progress    [GW]   1424 Discussion with IR - we are ready to place TD cath and this is our window today - but patient has not received calcium - Nephrology reports emergent HD not necessary at this moment - prioritizing stabilization with calcium infusion prior to HD cath placement - discussion with IR will defer HD placement until IR available tomorrow.     [GW]      ED Course User Index  [GW] Omkar Osullivan MD       Provider Notes (Medical Decision Making):   MDM  Number of Diagnoses or Management Options  ESRD (end stage renal disease) on dialysis [facility]):   History of peritoneal dialysis:   Hypocalcemia:   Diagnosis management comments: Reviewed recent admission, AMA with forced removal of HD catheter - patient here requesting replacement of HD catheter  Likely low calcium - pos chvostek sign- labs reviewed with notable ical 0.48 -   Nephrology consulted see above  IR consulted see above  Vascular consulted see above  EKG with prolonged QTc changes subsequent to low calcium  - patient rooming delay due to extensive crowding in ED with >20 admitted patients and 5 nurses; patient placed on monitor. Extensive discussion with critical care - will admit to ICU. Procedures  Noemi Guardado MD    Critical Care Time:       Diagnosis     Clinical Impression:   1. Hypocalcemia    2. History of peritoneal dialysis    3. ESRD (end stage renal disease) on dialysis ([address]    4. Prolonged Q-T interval on ECG        Disposition: Admit ICU    Disclaimer: Sections of this note are dictated using utilizing voice recognition software. Minor typographical errors may be present. If questions arise, please do not hesitate to contact me or call our department.

## 2020-07-20 NOTE — CONSULTS
Consult Note    Patient: Peña Call               Sex: male          DOA: 7/20/2020         YOB: 1970      Age:  44 y.o.        LOS:  LOS: 0 days              HPI:     Peña Call is a 44 y.o. male who has been seen in evaluation of hypocalcemia at the request of Dr. Aristides Faustin. Patient has a recent history of parathyroidectomy complicated by hypocalcemia. He had a right non-tunneled central line placed by IR on 7/10 for supplemental calcium. During this admission, the patient left AMA and self-removed all IV lines/access. He presented to the ED this morning with weakness, nausea and hiccups. He stated that he wanted a dialysis catheter replaced back into his neck because \"he feels like he's going to pass out. \"    Nephrology was consulted for further recommendations.      Past Medical History:   Diagnosis Date    Anemia     Chronic kidney disease     ESRD-   peritoneal dilaysis    Gout     Heart failure ([facility] [address] 04/2019    History of blood transfusion 05/22/2018    Hypertension     Joint pain     Sleep apnea 07/03/2018    does not use c pap       Past Surgical History:   Procedure Laterality Date    COLONOSCOPY N/A 8/23/2019    COLONOSCOPY performed by Emil Avalos MD at . [facility]  10/23/2019    HX ORTHOPAEDIC Left     left knee sx     HX OTHER SURGICAL  2018    tenkoff catheter    HX UROLOGICAL  05/20/2018    prostate biopsy    HX WISDOM TEETH EXTRACTION      IR BX BONE MARROW DIAGNOSTIC  11/25/2019    IR INSERT NON TUNL CVC OVER 5 YRS  7/10/2020       Family History   Problem Relation Age of Onset    Hypertension Mother     Heart Disease Father     Hypertension Father     Hypertension Child        Social History     Socioeconomic History    Marital status:      Spouse name: Not on file    Number of children: Not on file    Years of education: Not on file    Highest education level: Not on file   Tobacco Use    Smoking status: Never Smoker    Smokeless tobacco: Never Used   Substance and Sexual Activity    Alcohol use: No    Drug use: No    Sexual activity: Yes     Partners: Female       Prior to Admission medications    Medication Sig Start Date End Date Taking? Authorizing Provider   carvedilol (COREG) 25 mg tablet Take 1 Tab by mouth two (2) times daily (with meals). 1/2/20   Annabelle Oshea NP   cinacalcet (SENSIPAR) 30 mg tablet Take  by mouth daily. Provider, Historical   ergocalciferol (ERGOCALCIFEROL) 50,000 unit capsule Take  by mouth every seven (7) days. Provider, Historical   calcitRIOL (ROCALTROL) 0.5 mcg capsule Take  by mouth daily. 7/19/19   Provider, Historical   losartan (COZAAR) 100 mg tablet Take 1 Tab by mouth daily. 4/24/19   Odalys Prather NP   cloNIDine HCl (CATAPRES) 0.1 mg tablet Take 0.1 mg by mouth two (2) times a day. 10/30/17   Provider, Historical   amLODIPine (NORVASC) 10 mg tablet Take 1 Tab by mouth daily. 3/22/17   Gaudencio Barrett NP   allopurinol (ZYLOPRIM) 100 mg tablet Take 2 Tabs by mouth daily. 3/20/17   Gaudencio Barrett NP       No Known Allergies    Review of Systems  Pertinent items are noted in the History of Present Illness. Physical Exam:      Visit Vitals  BP (!) 183/118 (BP 1 Location: Left arm, BP Patient Position: At rest)   Pulse (!) 104   Temp 99.3 °F (37.4 °C)   Resp 25   Wt 88 kg (194 lb)   SpO2 100%   BMI 24.91 kg/m²       Physical Exam:  Constitutional: Alert, cooperative, in no apparent distress  Respiratory: Normal respiratory effort. Symmetrical rise and fall of chest.   Cardiovascular: Tachycardic. Gastrointestinal: Soft, NT, ND.      Labs Reviewed:  CMP:   Lab Results   Component Value Date/Time     07/20/2020 11:50 AM    K 5.1 07/20/2020 11:50 AM    CL 96 (L) 07/20/2020 11:50 AM    CO2 23 07/20/2020 11:50 AM    AGAP 17 07/20/2020 11:50 AM     (H) 07/20/2020 11:50 AM    BUN 93 (H) 07/20/2020 11:50 AM    CREA 27.30 (H) 07/20/2020 11:50 AM    GFRAA 2 (L) 07/20/2020 11:50 AM    GFRNA 2 (L) 07/20/2020 11:50 AM    CA <5.0 (LL) 07/20/2020 11:50 AM    ALB 3.0 (L) 07/20/2020 11:50 AM    TP 8.1 07/20/2020 11:50 AM    GLOB 5.1 (H) 07/20/2020 11:50 AM    AGRAT 0.6 (L) 07/20/2020 11:50 AM    ALT 11 (L) 07/20/2020 11:50 AM     CBC:   Lab Results   Component Value Date/Time    WBC 11.1 07/20/2020 11:50 AM    HGB 10.3 (L) 07/20/2020 11:50 AM    HCT 32.8 (L) 07/20/2020 11:50 AM     07/20/2020 11:50 AM     COAGS:   Lab Results   Component Value Date/Time    APTT 33.3 07/20/2020 11:50 AM    PTP 13.8 07/20/2020 11:50 AM    INR 1.1 07/20/2020 11:50 AM       Assessment/Plan   Active Problems:    * No active hospital problems. *      Susanna Saravia is a 44 y.o. male with a history of hypocalcemia presenting with nausea, weakness and hiccups. Plan     Case and images reviewed by Dr. Narinder Quintana. Discussions held between Dr. Sahan Viveros and myself regarding management options. Given that the patient's hypocalcemia has worsened, he has been non-complaint with peritoneal dialysis and requires an adequate dialysis treatment, placement of a tunneled dialysis catheter was deemed the most appropriate. At 1215, Dr. Gabriele Gambino was called to confirm the request for a [facility] for HD access. He was agreeable to [facility] placement. At 1300, patient discussed with Dr. Shaan Viveros. Patient requires a central line for access/IV calcium and this will placed by him in the ER. Asked which side would be best and I stated that we would like to place HD catheter on the right side if possible. He was amenable and stated that he would place the central line on the left side. At 1400, patient was attempted to be picked up by transport to come to IR but the RN stated that the calcium was being prepared/sent from the pharmacy, he couldn't leave the floor without a RN if calcium not given, there weren't any nurses available to come with the patient and therefore he needed to wait for the calcium.      Immediately following this conversation, the patient was discussed with Dr. Aristides Faustin and the aforementioned concerns were addressed. He mentioned that the dialysis access wasn't necessarily emergent in comparison to the calcium administration and therefore the [facility] could wait until his calcium was addressed. He stated that performing the procedure on 7/21 would be sufficient to allow for his primary needs to be addressed. Will plan for tunneled dialysis catheter placement with moderate sedation on 7/21 as IR schedule allows. Patient to remain NPO with blood thinning medications held. Consent to be obtained prior to procedure.

## 2020-07-21 ENCOUNTER — HOSPITAL ENCOUNTER (OUTPATIENT)
Dept: INTERVENTIONAL RADIOLOGY/VASCULAR | Age: 50
Discharge: HOME | DRG: 640 | End: 2020-07-21
Attending: PHYSICIAN ASSISTANT
Payer: COMMERCIAL

## 2020-07-21 VITALS
HEART RATE: 88 BPM | RESPIRATION RATE: 24 BRPM | DIASTOLIC BLOOD PRESSURE: 116 MMHG | SYSTOLIC BLOOD PRESSURE: 188 MMHG | OXYGEN SATURATION: 99 %

## 2020-07-21 LAB
ALBUMIN SERPL-MCNC: 2.8 G/DL (ref 3.4–5)
ALBUMIN/GLOB SERPL: 0.7 {RATIO} (ref 0.8–1.7)
ALP SERPL-CCNC: 119 U/L (ref 45–117)
ALT SERPL-CCNC: 11 U/L (ref 16–61)
ANION GAP SERPL CALC-SCNC: 16 MMOL/L (ref 3–18)
AST SERPL-CCNC: 7 U/L (ref 10–38)
ATRIAL RATE: 72 BPM
BASOPHILS # BLD: 0 K/UL (ref 0–0.1)
BASOPHILS NFR BLD: 0 % (ref 0–2)
BILIRUB SERPL-MCNC: 0.3 MG/DL (ref 0.2–1)
BUN SERPL-MCNC: 101 MG/DL (ref 7–18)
BUN/CREAT SERPL: 3 (ref 12–20)
CA-I SERPL-SCNC: 0.45 MMOL/L (ref 1.12–1.32)
CA-I SERPL-SCNC: 0.59 MMOL/L (ref 1.12–1.32)
CA-I SERPL-SCNC: 1.14 MMOL/L (ref 1.12–1.32)
CALCIUM SERPL-MCNC: <5 MG/DL (ref 8.5–10.1)
CALCULATED P AXIS, ECG09: 54 DEGREES
CALCULATED R AXIS, ECG10: -12 DEGREES
CALCULATED T AXIS, ECG11: 125 DEGREES
CHLORIDE SERPL-SCNC: 99 MMOL/L (ref 100–111)
CO2 SERPL-SCNC: 22 MMOL/L (ref 21–32)
CREAT SERPL-MCNC: 29.2 MG/DL (ref 0.6–1.3)
DIAGNOSIS, 93000: NORMAL
DIFFERENTIAL METHOD BLD: ABNORMAL
EOSINOPHIL # BLD: 0.1 K/UL (ref 0–0.4)
EOSINOPHIL NFR BLD: 1 % (ref 0–5)
ERYTHROCYTE [DISTWIDTH] IN BLOOD BY AUTOMATED COUNT: 16.8 % (ref 11.6–14.5)
GLOBULIN SER CALC-MCNC: 4 G/DL (ref 2–4)
GLUCOSE SERPL-MCNC: 99 MG/DL (ref 74–99)
HBV SURFACE AB SER QL IA: POSITIVE
HBV SURFACE AB SERPL IA-ACNC: 259.23 MIU/ML
HCT VFR BLD AUTO: 33.3 % (ref 36–48)
HEP BS AB COMMENT,HBSAC: NORMAL
HGB BLD-MCNC: 10.4 G/DL (ref 13–16)
LYMPHOCYTES # BLD: 1 K/UL (ref 0.9–3.6)
LYMPHOCYTES NFR BLD: 7 % (ref 21–52)
MAGNESIUM SERPL-MCNC: 2 MG/DL (ref 1.6–2.6)
MCH RBC QN AUTO: 24.9 PG (ref 24–34)
MCHC RBC AUTO-ENTMCNC: 31.2 G/DL (ref 31–37)
MCV RBC AUTO: 79.7 FL (ref 74–97)
MONOCYTES # BLD: 0.5 K/UL (ref 0.05–1.2)
MONOCYTES NFR BLD: 4 % (ref 3–10)
NEUTS SEG # BLD: 12.3 K/UL (ref 1.8–8)
NEUTS SEG NFR BLD: 88 % (ref 40–73)
P-R INTERVAL, ECG05: 146 MS
PHOSPHATE SERPL-MCNC: 8.4 MG/DL (ref 2.5–4.9)
PLATELET # BLD AUTO: 237 K/UL (ref 135–420)
PMV BLD AUTO: 9.4 FL (ref 9.2–11.8)
POTASSIUM SERPL-SCNC: 5.2 MMOL/L (ref 3.5–5.5)
PROT SERPL-MCNC: 6.8 G/DL (ref 6.4–8.2)
Q-T INTERVAL, ECG07: 466 MS
QRS DURATION, ECG06: 102 MS
QTC CALCULATION (BEZET), ECG08: 510 MS
RBC # BLD AUTO: 4.18 M/UL (ref 4.7–5.5)
SODIUM SERPL-SCNC: 137 MMOL/L (ref 136–145)
VENTRICULAR RATE, ECG03: 72 BPM
WBC # BLD AUTO: 13.9 K/UL (ref 4.6–13.2)

## 2020-07-21 PROCEDURE — 74011250636 HC RX REV CODE- 250/636: Performed by: PHYSICIAN ASSISTANT

## 2020-07-21 PROCEDURE — 74011250636 HC RX REV CODE- 250/636: Performed by: INTERNAL MEDICINE

## 2020-07-21 PROCEDURE — 85025 COMPLETE CBC W/AUTO DIFF WBC: CPT

## 2020-07-21 PROCEDURE — 93005 ELECTROCARDIOGRAM TRACING: CPT

## 2020-07-21 PROCEDURE — C1893 INTRO/SHEATH, FIXED,NON-PEEL: HCPCS

## 2020-07-21 PROCEDURE — 90935 HEMODIALYSIS ONE EVALUATION: CPT

## 2020-07-21 PROCEDURE — 83735 ASSAY OF MAGNESIUM: CPT

## 2020-07-21 PROCEDURE — C1751 CATH, INF, PER/CENT/MIDLINE: HCPCS

## 2020-07-21 PROCEDURE — 74011250636 HC RX REV CODE- 250/636: Performed by: RADIOLOGY

## 2020-07-21 PROCEDURE — 74011250637 HC RX REV CODE- 250/637: Performed by: INTERNAL MEDICINE

## 2020-07-21 PROCEDURE — 74011000250 HC RX REV CODE- 250: Performed by: RADIOLOGY

## 2020-07-21 PROCEDURE — 82330 ASSAY OF CALCIUM: CPT

## 2020-07-21 PROCEDURE — 74011000258 HC RX REV CODE- 258: Performed by: INTERNAL MEDICINE

## 2020-07-21 PROCEDURE — 74011250636 HC RX REV CODE- 250/636

## 2020-07-21 PROCEDURE — 65610000006 HC RM INTENSIVE CARE

## 2020-07-21 PROCEDURE — 5A1D70Z PERFORMANCE OF URINARY FILTRATION, INTERMITTENT, LESS THAN 6 HOURS PER DAY: ICD-10-PCS | Performed by: INTERNAL MEDICINE

## 2020-07-21 PROCEDURE — 02H633Z INSERTION OF INFUSION DEVICE INTO RIGHT ATRIUM, PERCUTANEOUS APPROACH: ICD-10-PCS | Performed by: PHYSICIAN ASSISTANT

## 2020-07-21 PROCEDURE — C1769 GUIDE WIRE: HCPCS

## 2020-07-21 PROCEDURE — 74011250637 HC RX REV CODE- 250/637: Performed by: PHYSICIAN ASSISTANT

## 2020-07-21 PROCEDURE — 74011000258 HC RX REV CODE- 258: Performed by: PHYSICIAN ASSISTANT

## 2020-07-21 PROCEDURE — 84100 ASSAY OF PHOSPHORUS: CPT

## 2020-07-21 PROCEDURE — 36558 INSERT TUNNELED CV CATH: CPT

## 2020-07-21 PROCEDURE — 80053 COMPREHEN METABOLIC PANEL: CPT

## 2020-07-21 PROCEDURE — 0JH63XZ INSERTION OF TUNNELED VASCULAR ACCESS DEVICE INTO CHEST SUBCUTANEOUS TISSUE AND FASCIA, PERCUTANEOUS APPROACH: ICD-10-PCS | Performed by: PHYSICIAN ASSISTANT

## 2020-07-21 RX ORDER — ONDANSETRON 2 MG/ML
INJECTION INTRAMUSCULAR; INTRAVENOUS
Status: COMPLETED
Start: 2020-07-21 | End: 2020-07-21

## 2020-07-21 RX ORDER — LIDOCAINE HYDROCHLORIDE AND EPINEPHRINE 10; 10 MG/ML; UG/ML
1.5 INJECTION, SOLUTION INFILTRATION; PERINEURAL ONCE
Status: COMPLETED | OUTPATIENT
Start: 2020-07-21 | End: 2020-07-21

## 2020-07-21 RX ORDER — LABETALOL HCL 20 MG/4 ML
10 SYRINGE (ML) INTRAVENOUS
Status: DISCONTINUED | OUTPATIENT
Start: 2020-07-21 | End: 2020-07-25 | Stop reason: HOSPADM

## 2020-07-21 RX ORDER — LIDOCAINE HYDROCHLORIDE 10 MG/ML
30 INJECTION, SOLUTION EPIDURAL; INFILTRATION; INTRACAUDAL; PERINEURAL ONCE
Status: DISCONTINUED | OUTPATIENT
Start: 2020-07-21 | End: 2020-07-22 | Stop reason: HOSPADM

## 2020-07-21 RX ORDER — LIDOCAINE HYDROCHLORIDE AND EPINEPHRINE 10; 10 MG/ML; UG/ML
20 INJECTION, SOLUTION INFILTRATION; PERINEURAL ONCE
Status: DISCONTINUED | OUTPATIENT
Start: 2020-07-21 | End: 2020-07-22 | Stop reason: HOSPADM

## 2020-07-21 RX ORDER — CALCIUM CARBONATE 200(500)MG
200 TABLET,CHEWABLE ORAL ONCE
Status: DISPENSED | OUTPATIENT
Start: 2020-07-21 | End: 2020-07-21

## 2020-07-21 RX ORDER — LORAZEPAM 2 MG/ML
1 INJECTION INTRAMUSCULAR
Status: COMPLETED | OUTPATIENT
Start: 2020-07-21 | End: 2020-07-22

## 2020-07-21 RX ORDER — HEPARIN SODIUM 1000 [USP'U]/ML
5000 INJECTION, SOLUTION INTRAVENOUS; SUBCUTANEOUS ONCE
Status: DISCONTINUED | OUTPATIENT
Start: 2020-07-21 | End: 2020-07-22 | Stop reason: HOSPADM

## 2020-07-21 RX ORDER — CALCIUM GLUCONATE 94 MG/ML
INJECTION, SOLUTION INTRAVENOUS
Status: DISPENSED
Start: 2020-07-21 | End: 2020-07-21

## 2020-07-21 RX ORDER — CEFAZOLIN SODIUM 2 G/50ML
2 SOLUTION INTRAVENOUS ONCE
Status: DISCONTINUED | OUTPATIENT
Start: 2020-07-21 | End: 2020-07-22 | Stop reason: HOSPADM

## 2020-07-21 RX ORDER — HEPARIN SODIUM 1000 [USP'U]/ML
5000 INJECTION, SOLUTION INTRAVENOUS; SUBCUTANEOUS ONCE
Status: COMPLETED | OUTPATIENT
Start: 2020-07-21 | End: 2020-07-21

## 2020-07-21 RX ORDER — CLONIDINE HYDROCHLORIDE 0.1 MG/1
0.2 TABLET ORAL
Status: COMPLETED | OUTPATIENT
Start: 2020-07-21 | End: 2020-07-21

## 2020-07-21 RX ADMIN — CALCIUM GLUCONATE 3 G: 98 INJECTION, SOLUTION INTRAVENOUS at 17:55

## 2020-07-21 RX ADMIN — HYDRALAZINE HYDROCHLORIDE 20 MG: 20 INJECTION, SOLUTION INTRAMUSCULAR; INTRAVENOUS at 03:49

## 2020-07-21 RX ADMIN — Medication 10 ML: at 00:22

## 2020-07-21 RX ADMIN — CALCIUM GLUCONATE 3 G: 98 INJECTION, SOLUTION INTRAVENOUS at 09:04

## 2020-07-21 RX ADMIN — Medication 10 ML: at 22:47

## 2020-07-21 RX ADMIN — CALCITRIOL CAPSULES 0.25 MCG 0.5 MCG: 0.25 CAPSULE ORAL at 22:59

## 2020-07-21 RX ADMIN — HEPARIN SODIUM 5000 UNITS: 1000 INJECTION, SOLUTION INTRAVENOUS; SUBCUTANEOUS at 15:30

## 2020-07-21 RX ADMIN — LIDOCAINE HYDROCHLORIDE AND EPINEPHRINE 15 MG: 10; 10 INJECTION, SOLUTION INFILTRATION; PERINEURAL at 15:23

## 2020-07-21 RX ADMIN — LABETALOL 20 MG/4 ML (5 MG/ML) INTRAVENOUS SYRINGE 10 MG: at 22:58

## 2020-07-21 RX ADMIN — ACETAMINOPHEN ORAL SOLUTION 500 MG: 650 SOLUTION ORAL at 22:59

## 2020-07-21 RX ADMIN — ONDANSETRON 4 MG: 2 INJECTION, SOLUTION INTRAMUSCULAR; INTRAVENOUS at 15:22

## 2020-07-21 RX ADMIN — CALCIUM 1000 MG: 500 TABLET ORAL at 22:59

## 2020-07-21 RX ADMIN — CLONIDINE HYDROCHLORIDE 0.2 MG: 0.1 TABLET ORAL at 18:37

## 2020-07-21 RX ADMIN — HEPARIN SODIUM 5000 UNITS: 5000 INJECTION INTRAVENOUS; SUBCUTANEOUS at 00:21

## 2020-07-21 NOTE — H&P
[facility]  Pulmonary, Critical Care, and Sleep Medicine    Critical Care - Progress Note    Name: Veronica Fleming MRN: [id number]   : 1970 Hospital: [address] [place] [address]   Date: 2020        IMPRESSION:     · Hypocalcemia - 2/2 above; and patient leaving AMA  · Hyperphosphatemia- patient refusing PhosLo - states it causes nausea and patient already nauseated  · Secondary hyperparathyroidism - 2/2 ESRD; s/p subtotal parathyroidectomy 20 with Dr. Doroteo Lema (Pathology notable for parathyroid hyperplasia)  · Hyperkalemia- mild  · Prolonged QT interval:  Slightly improved today. suspect related to hypocalcemia and electrolyte derangements. · ESRD - on PD 7 days/week, Nephrology: Dr. Laly Bhatia following, IR/ Vascular consult placed for HD cath - anticipate need for HD cath placement  · Pleural Effusions: most likely related to CHF and ESRD- last CXR prior to patient pulling out tunneled catheter last admission  · Hypertension  · HFrEF 46% (2019) with global hypokinesis and estimated RVSP of 45-50mmHg based on TR jet. · H/O Anxiety/ Depression  · Patient refusing certain therapies- has capacity based on Psych exam 2020 but we will need to continue to reassess     Patient Active Problem List   Diagnosis Code    HTN, goal below 130/80 I10    Urinary retention R33.9    UTI (urinary tract infection) N39.0    Hematuria R31.9    Acute kidney injury ([address] N17.9    Anemia D64.9    Hyperparathyroidism ([address] E21.3    Parathyroid hyperplasia ([address] E21.0    Hypocalcemia E83.51    ESRD on peritoneal dialysis ([address] N18.6, C26.9    Systolic CHF, chronic (HCC) I50.22    Acute renal failure superimposed on chronic kidney disease ([address] N17.9, N18.9      PLAN:   Dispo:  Admit to ICU for management of severe hypocalcemia and potentially life- threatening arrhythmias once bed available    Neuro  · Serial exams  · No acute pain or withdrawal issues at this time  Resp:   · Supp O2 PRN for SpO2 >90%- currently 100%    · Pulmonary Hygiene, IS.    · Keep HOB elevated > 30 degrees, aspiration precautions. I/D:   · Trend WBCs and temp curve. · Monitor surgical site and drain output. Hem/Onc:   · Daily CBC; H/H, and plts are stable. · SQ heparin for DVT ppx  CVS:   · Telemetry: monitor QTC and avoid QTC prolonging agents  · Monitor hemodynamics. Metabolic/Yvonne;:   · Trend BMP, Mag, Phos. · Continue ionized calcium check q4h. · Trend renal indices  · PD/HD per nephrology  · Replace lytes per protocol. · Consult has been placed to IR and or Vascular for replacement of HD Cath- awaiting placememt  Endocrine:   · POC Glucose AC&HS. Avoid hypoglycemia. GI:   · NO ZOFRAN for nausea  · PO status: ok for clears and meds  Musc/Skin:   · Per protocol   · Surgical incision care- site is healing well    · Code Status: FULL CODE     Subjective/Interval History:     The patient was evaluated in the ED bed 2 for Dr [place] due to report of severe hypocalcemia    atient is a 51 y. o. male with PMH ESRD on PD, HTN, STFEANIE, gout, BPH, and HFrEF 46% (4/2019)         The patient was admitted to [facility]: 7/6-/7/17/20  For parathyroidectomy. Patient left [facility] 4/17/2020. Patient represents today to [facility] \" I knew something was wrong\" with reports of profound weakness and nausea with emesis. He does not have an oxygen requirement at this time. No chest pain, wheezing. No hemoptysis or other bleeding concerns. He denies any fever or chills. No respiratory complaints. He denies any muscle cramps, spasms, paraesthesia or clementine-oral numbness    He denies smoking or taking any illicit substances. He reports that he has been staying at home and that he has been performing his PD at home    He is refusing his PhosLo tabs today- stating that it makes him nauseated.     His adult daughter who had been working at a [employer] has been at home helping him but she was recently called back to work      07/21/20  · Patient remains down in ED - awaiting ICU bed  · IV access lost overnight- patient initially refusing IV and PO meds  · Ionized calcium critical low this morning 3- grams IVPB given once patient agreed to PIV- Awaiting follow up stat right now. 1 gram given yesterday. · Awaiting IR to assist in IV access and HD cath placement  · Psych saw patient and noted patient had capacity at time of evaluation but that critical illness could cause patient to loose capacity- will continue to reassess  · Discussed case with Nephrology, ED team and IR  · Patient refusing PO medications  · Tigan changed from PRN to scheduled- trying to encourage patient to take PO medications. Patient's selective refusal of certain therapies interfering with promptly delivery of medical care. Addendum: 7/21/20@ 2028  - HD and CVL placed by IR this afternoon  - Patient then went to HD ames- Discussed with Nephrology- Patient still with profound hypocalcemia  - ICU team awaiting patient's arrival to ICU for on going care and management        ROS:A comprehensive review of systems was negative except for that written in the HPI. Objective:   Vital Signs:    Visit Vitals  BP (!) 167/111   Pulse 79   Temp 99.3 °F (37.4 °C)   Resp 18   Wt 88 kg (194 lb)   SpO2 98%   BMI 24.91 kg/m²       O2 Device: Room air                Physical Exam:      General:  Alert and oriented x 3 , appears tired, he is cooperative and answering questions for me, Patient sitting up on edge of bed- ED team drawing blood from patient from IV- patient is cooperating   Head:  Normocephalic, without obvious abnormality, atraumatic. Eyes:  Conjunctivae/corneas clear. PERRL. Nose: Nares normal. Septum midline.  Mucosa normal.   Throat: Lips, mucosa, and tongue normal. Teeth and gums normal.   Neck: Supple, symmetrical, trachea midline, healing incision to anterior lower neck, no surrounding erythema, swelling or induration   Lungs:   Symmetrical chest rise; good AE bilat; CTAB Heart: Borderline bradycardic, regular rhythm, S1, S2 normal   Abdomen:   Soft, non-tender. Bowel sounds normal. No distention   Extremities: Extremities normal, atraumatic, no cyanosis or edema. Pulses: 2+ and symmetric all extremities. Skin: Skin color, texture, turgor normal. No rashes or lesions. Neurologic: Grossly non-focal, except mild tongue fasciculation are present- still present, follows commands, A&Ox4                  DATA:  Labs:    COAGS:    No results found for: APTT, PTP, INR, INREXT, INREXT  Recent Labs     07/21/20  0850 07/20/20  1150   WBC 13.9* 11.1   HGB 10.4* 10.3*   HCT 33.3* 32.8*    227     Recent Labs     07/21/20  0850 07/20/20  1150    136   K 5.2 5.1   CL 99* 96*   CO2 22 23   GLU 99 135*   * 93*   CREA 29.20* 27.30*   CA <5.0* <5.0*   MG 2.0 2.1   PHOS 8.4* 7.2*   ALB 2.8* 3.0*   ALT 11* 11*   INR  --  1.1     Lab Results   Component Value Date/Time    TSH 2.010 04/15/2019 05:45 AM    Free T4 1.12 04/15/2019 05:45 AM     No results found for: HBA1C, HGBE8, OME7NPQD, BFU3JYJU, PFX2GBDX    No results for input(s): PH, PCO2, PO2, HCO3, FIO2 in the last 72 hours.     EKG Results     Procedure 720 Value Units Date/Time    EKG, 12 LEAD, INITIAL [[id number]] Collected:  07/21/20 1201    Order Status:  Completed Updated:  07/21/20 1343     Ventricular Rate 72 BPM      Atrial Rate 72 BPM      P-R Interval 146 ms      QRS Duration 102 ms      Q-T Interval 466 ms      QTC Calculation (Bezet) 510 ms      Calculated P Axis 54 degrees      Calculated R Axis -12 degrees      Calculated T Axis 125 degrees      Diagnosis --     Normal sinus rhythm  Possible Left atrial enlargement  Left ventricular hypertrophy  T wave abnormality, consider lateral ischemia  Prolonged QT  Abnormal ECG  When compared with ECG of 20-JUL-2020 13:52,  No significant change was found  Confirmed by Saman Michaels MD, Juan Gardner ([id number]) on 7/21/2020 1:43:08 PM      EKG, 12 LEAD, INITIAL [[id number]] Collected: 07/20/20 1352    Order Status:  Completed Updated:  07/20/20 1805     Ventricular Rate 82 BPM      Atrial Rate 82 BPM      P-R Interval 156 ms      QRS Duration 92 ms      Q-T Interval 478 ms      QTC Calculation (Bezet) 558 ms      Calculated P Axis 46 degrees      Calculated R Axis -28 degrees      Calculated T Axis 119 degrees      Diagnosis --     Normal sinus rhythm  Possible Left atrial enlargement  Left ventricular hypertrophy  T wave abnormality, consider lateral ischemia  Prolonged QT  Abnormal ECG  No previous ECGs available  Confirmed by Julita Erickson ([id number]) on 7/20/2020 6:05:23 PM      EKG, 12 LEAD, INITIAL [[id number]]     Order Status:  Canceled            Echo: 4/16/201[facility])  Interpretation Summary  A complete two-dimensional transthoracic echocardiogram was performed (2D, M-  mode, Doppler and color flow Doppler). The study was technically adequate. 1. Mildly dilated LV cavity size with moderate concentric hypertrophy. 2. Mildly reduced LV systolic function with an EF of 46%. Mild global  hypokinesis. 3. Grade III diastolic dysfunction. 4. Normal RV size and function. 5. No hemodynamically significant valvular abnormalities. 6. Estimated RVSP of 45-50mmHg based on TR jet.     All other findings detailed below. Previous echocardiogram done at [facility]  dated 08/09/2018 showed an EF of 56%, mild to moderate TR, and an RVSP of  49mmHg.           Imaging:    CXR Results  (Last 48 hours)               07/20/20 1602  XR CHEST PORT Final result    Impression:  IMPRESSION: Stable bilateral pleural effusion, loculation on the left. Parenchymal disease in the lung bases not excluded. No vascular congestion. Narrative:  Portable CXR:       HISTORY: Pleural effusion. Hypocalcemia. Dialysis patient. Comparison July 8, 2020       Mild cardiomegaly with no significant vascular congestion. Loculated collection   in the left lower chest is unchanged.  Pleural effusion and right chest also   stable. Parenchymal disease in the lung bases not completely excluded. [x]  Imaging  I have personally reviewed the patients radiographs  []Radiographs reviewed with radiologist   [x]No change from prior, tubes and lines in adequate position  []Improved   []Worsening    High complexity decision making was performed during the evaluation of this patient at high risk for decompensation with multiple organ involvement     Above mentioned total time spent on reviewing the case/medical record/data/notes/EMR/patient examination/documentation/coordinating care with nurse/consultants, exclusive of procedures with complex decision making performed and > 50% time spent in face to face evaluation. Critical Care Time:  The services I provided to this patient were to treat and/or prevent clinically significant deterioration that could result in the failure of one or more body systems and/or organ systems due to respiratory distress, hypoxia, cardiac dysrhythmia. Services included the following:  -reviewing nursing notes and old charts  -direct patient care  -medication orders and management  -interpreting and reviewing diagnostic studies/labs  -re-evaluations  -documentation time     Aggregate critical care time was 40  minutes, which includes only time during which I was engaged in work directly related to the patient's care as described above. During this entire length of time I was immediately available to the patient. The reason for providing this level of medical care for this critically ill patient was due a critical illness that impaired one or more vital organ systems such that there was a high probability of imminent or life threatening deterioration in the patients condition.  This care involved high complexity decision making to assess, manipulate, and support vital system functions, to treat this degreee vital organ system failure and to prevent further life threatening deterioration of the patients condition      Complex decision making was made in the evaluation and management plans during this consultation. More than 50% of time was spent in counseling and coordination of care including review of data and discussion with other team members.         Andrei Dykes DO, [facility]    [facility]  Pulmonary, Critical Care, and Sleep Medicine

## 2020-07-21 NOTE — PROCEDURES
Interventional Radiology Brief Procedure Note    Patient: Sindy Ragyoza MRN: [id number]  SSN: [id number]    YOB: 1970  Age: 44 y.o. Sex: male      Date of Procedure: 7/21/2020    Procedure(s):   Tunneled Central Venous Catheter  Tunneled Hemodialysis Catheter     Performed By: ROLANDO Rucker     none    Anesthesia: None    Estimated Blood Loss: Less than 5ml    Specimens: None    Findings:     - Written and verbal informed consent was obtained. - Time Out was performed. - Permanent image storage performed on PACS. - Image-guided tunneled central venous catheter placement and tunneled dialysis catheter placement performed using maximum barrier precautions and sterile technique. - Please refer to report on PACS for full details. Implants: None    Plan: Okay for immediate use.      Signed By: Yasmany Morelos, [id number]     July 21, 2020

## 2020-07-21 NOTE — PROGRESS NOTES
I discussed with ir .they will place hd and central lines asap. I discused with pt last night and he agreed. I will talk to him again this morning. he needs psych consult for depression

## 2020-07-21 NOTE — PROGRESS NOTES
RENAL DAILY PROGRESS NOTE    Patient: Mike Zambrano               Sex: male          DOA: 7/20/2020 11:26 AM        YOB: 1970      Age:  44 y.o.        LOS:  LOS: 1 day     Subjective: Mike Zambrano is a 44 y.o.  who presents with Hypocalcemia [E83.51]  Hypocalcemia [E83.51]  Acute renal failure superimposed on chronic kidney disease, unspecified CKD stage, unspecified acute renal failure type ([facility] [N17.9, N18.9]. Asked to evaluate for esrd and hypocalcemia,s/p subtotal parathyroidectomy,non compliance  Chief complains: Patient denies nausea, vomiting, chest pain, dizziness, shortness of breath or headache.  - Reviewed last 24 hrs events     Current Facility-Administered Medications   Medication Dose Route Frequency    calcium carbonate (TUMS) chewable tablet 200 mg [elemental]  200 mg Oral ONCE    calcitRIOL (ROCALTROL) capsule 0.5 mcg  0.5 mcg Oral BID    calcium carbonate (OS-VIRAL) tablet 1,000 mg [elemental]  1,000 mg Oral QID    [Held by provider] calcium acetate(phosphat bind) (PHOSLO) capsule 2,668 mg  4 Cap Oral TID WITH MEALS    sodium chloride (NS) flush 5-40 mL  5-40 mL IntraVENous Q8H    sodium chloride (NS) flush 5-40 mL  5-40 mL IntraVENous PRN    [Held by provider] cloNIDine HCL (CATAPRES) tablet 0.1 mg  0.1 mg Oral BID    [Held by provider] carvediloL (COREG) tablet 25 mg  25 mg Oral Q12H    hydrALAZINE (APRESOLINE) 20 mg/mL injection 20 mg  20 mg IntraVENous Q6H PRN    heparin (porcine) injection 5,000 Units  5,000 Units SubCUTAneous Q8H    trimethobenzamide (TIGAN) injection 200 mg  200 mg IntraMUSCular Q6H PRN    ondansetron (ZOFRAN) 8 mg IV  8 mg IntraVENous Q6H PRN     Current Outpatient Medications   Medication Sig    carvedilol (COREG) 25 mg tablet Take 1 Tab by mouth two (2) times daily (with meals).  cinacalcet (SENSIPAR) 30 mg tablet Take  by mouth daily.     ergocalciferol (ERGOCALCIFEROL) 50,000 unit capsule Take  by mouth every seven (7) days.    calcitRIOL (ROCALTROL) 0.5 mcg capsule Take  by mouth daily.  losartan (COZAAR) 100 mg tablet Take 1 Tab by mouth daily.  cloNIDine HCl (CATAPRES) 0.1 mg tablet Take 0.1 mg by mouth two (2) times a day.  amLODIPine (NORVASC) 10 mg tablet Take 1 Tab by mouth daily.  allopurinol (ZYLOPRIM) 100 mg tablet Take 2 Tabs by mouth daily. Objective:     Visit Vitals  BP (!) 151/108   Pulse 90   Temp 99.3 °F (37.4 °C)   Resp 18   Wt 88 kg (194 lb)   SpO2 98%   BMI 24.91 kg/m²     No intake or output data in the 24 hours ending 07/21/20 1210    Physical Examination:       RS: diminished breath sounds  CVS: S1-S2 heard, RRR, No S3 / murmur  Abdomen: Soft,   Extremities: No edema,  CNS: Awake  HEENT: Head is atraumatic, PERRLA, conjunctiva pink & non icteric. No JVD or carotid bruit       Data Review:      Labs:     Hematology:   Recent Labs     07/21/20  0850 07/20/20  1150   WBC 13.9* 11.1   HGB 10.4* 10.3*   HCT 33.3* 32.8*     Chemistry:   Recent Labs     07/21/20  0850 07/20/20  1150   * 93*   CREA 29.20* 27.30*   CA <5.0* <5.0*   ALB 2.8* 3.0*   K 5.2 5.1    136   CL 99* 96*   CO2 22 23   PHOS 8.4* 7.2*   GLU 99 135*        Images:    XR (Most Recent). CXR reviewed by me and compared with previous CXR Results from Hospital Encounter encounter on 07/20/20   XR CHEST PORT    Narrative Portable CXR:    HISTORY: Pleural effusion. Hypocalcemia. Dialysis patient. Comparison July 8, 2020    Mild cardiomegaly with no significant vascular congestion. Loculated collection  in the left lower chest is unchanged. Pleural effusion and right chest also  stable. Parenchymal disease in the lung bases not completely excluded. Impression IMPRESSION: Stable bilateral pleural effusion, loculation on the left. Parenchymal disease in the lung bases not excluded. No vascular congestion.         CT (Most Recent) Results from Hospital Encounter encounter on 02/29/20   CT ABD PELV WO CONT    Narrative Indication: evaluate PD cuff. Peritoneal dialysis tube malpositioned. Impression IMPRESSION: Left lower quadrant peritoneal dialysis catheter looped in pelvis. Bilateral pleural effusions. Cardiomegaly. Gallbladder wall thickening,  ultrasound follow-up recommended. Bilateral renal cysts. Diverticulosis. Left  inguinal canal fluid collection. Comment: CT images of the abdomen and pelvis were obtained without oral or  intravenous contrast. The initial report was rendered by [facility]  radiology services. Small bilateral pleural effusions are present with atelectasis in the underlying  lungs. The heart is enlarged. The gallbladder is contracted and thickening of the fundus wall is noted with  suspicion of tiny stone. Nonemergent ultrasound recommended for further  evaluation. The liver spleen pancreas and adrenal glands are unremarkable. The kidneys are mildly atrophic and harbor cysts. Calculus or hydronephrosis. The ureters and bladder are unremarkable. Peritoneal dialysis catheter is looped in the pelvis entering the peritoneum in  the left lower quadrant. Fluid collection is noted in the left inguinal canal.    Mild colonic diverticulosis is noted without diverticulitis. Pelvic viscera is unremarkable. No bowel obstruction, free intraperitoneal air, free fluid or appendicitis. DICOM format imaged data is available to non-affiliated external healthcare  facilities or entities on a secure, media free, reciprocally searchable basis  with patient authorization  for 12 months following the date of the study. EKG No results found for this or any previous visit. I have personally reviewed the old medical records and patient's labs    Plan / Recommendation:      1. Esrd,pt is agreable with hemodialysis. discussed with ir,they will place dialysis cath asap,plan dialysis with high calcium bath,recheck ionized calcium after dialysis  2.severe hypocalcemia,related to subtotal parathyroidectomy. pt is agreable with central line,ir will place central line ,give iv calcium drip high dose in icu.er nurse said that they can,t give more than 3 gram of calcium gluconate at one time.   3.htn,resume bp meds  4-hyperphosphatemia,not compliant with calcium acetate  5- depression,he will benefit from anti depression meds    D/w Dr. Evelyn Cheung MD  Nephrology  7/21/2020

## 2020-07-21 NOTE — PROGRESS NOTES
Pt was in er room 10 since admission yesterday. didn,t receive high calcium infusion as ordered by me yesterday,as er policy prohibits infusion of more than 3 g of calcium. unable to dialyze in er room 10. er rooms are full.told dialysis nurse to take him to dialysis room,dialyze him with high calcium bath,infuse calcium gluconate 3 g over 3 hours. will check ionized calcium pre and post dialysis

## 2020-07-21 NOTE — PROGRESS NOTES
Reason for Admission:  Hypocalcemia [E83.51]  Hypocalcemia [E83.51]  Acute renal failure superimposed on chronic kidney disease, unspecified CKD stage, unspecified acute renal failure type ([facility] [address] [address] [N17.9, N18.9]                 RUR Score:    21            Plan for utilizing home health:    yes                      Likelihood of Readmission:   Moderate                         Do you (patient/family) have any concerns for transition/discharge?  no    Transition of Care Plan:       Initial assessment completed with patient. Cognitive status of patient: oriented to time, place, person and situation. Face sheet information confirmed:  yes. The patient designates Justin Wilson to participate in his discharge plan and to receive any needed information. This patient lives in a single family home with patient. Patient is able to navigate steps as needed. Prior to hospitalization, patient was considered to be independent with ADLs/IADLS : yes . Patient has a current ACP document on file: no  The patient and daughter will be available to transport patient home upon discharge. The patient already has [employer], medical equipment available in the home. Patient is not currently active with home health. Patient has not stayed in a skilled nursing facility or rehab. This patient is on dialysis :yes    If yes, peritoneal dialysis patient and receives treatment on daily at home. List of available Home Health agencies were provided and reviewed with the patient prior to discharge. Freedom of choice signed: yes, for Nicholas rosales. Currently, the discharge plan is Home with [address] [place] [address] Keenan Jackson. The patient states that he can obtain his medications from the pharmacy, and take his medications as directed. Patient's current insurance is Medicare and [facility] Management Interventions  PCP Verified by CM:  Yes  Mode of Transport at Discharge: Self  Current Support Network: Lives Alone  Confirm Follow Up Transport: Family  The Plan for Transition of Care is Related to the Following Treatment Goals : Home health  The Patient and/or Patient Representative was Provided with a Choice of Provider and Agrees with the Discharge Plan?: Yes  Freedom of Choice List was Provided with Basic Dialogue that Supports the Patient's Individualized Plan of Care/Goals, Treatment Preferences and Shares the Quality Data Associated with the Providers?: Yes  Discharge Location  Discharge Placement: Home with home health        Chinyere Laguerre RN BSN  Care Manager  [phone]

## 2020-07-21 NOTE — CONSULTS
Patient seen. Full dictated note to follow. Presently he has capacity to make medical decisions however, given his medical illness, delirium can be anticipated and he may quickly lose capacity. I do not recommend withholding urgent care in this case. Thank you for the consultation.

## 2020-07-21 NOTE — PROGRESS NOTES
TRANSFER - OUT REPORT:    Verbal report given to Hung HYMAN(name) on Jessi Horne  being transferred to ER(unit) for routine post - op       Report consisted of patients Situation, Background, Assessment and   Recommendations(SBAR). Information from the following report(s) SBAR, Kardex, Procedure Summary and MAR was reviewed with the receiving nurse. Lines:   Peripheral IV 07/21/20 Left Wrist (Active)   Site Assessment Clean, dry, & intact 07/21/20 0852   Phlebitis Assessment 0 07/21/20 0852   Infiltration Assessment 0 07/21/20 0852   Dressing Status Clean, dry, & intact 07/21/20 0852       Peripheral IV 07/21/20 Left Hand (Active)   Site Assessment Clean, dry, & intact 07/21/20 0853   Phlebitis Assessment 0 07/21/20 0853   Infiltration Assessment 0 07/21/20 0853   Dressing Status Clean, dry, & intact 07/21/20 9309        Opportunity for questions and clarification was provided.       Patient transported with:   Registered Nurse  Tech

## 2020-07-21 NOTE — PROGRESS NOTES
PCCM update. Our service notified by nursing that PIV blew. Unfortunately, our service accepted this patient for hypocalcemia, multiple boluses of calcium were ordered, and do not appear to have been given by nursing AND our service was not notified earlier in the day or throughout the evening. 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 Due to this scenario, we will encourage nursing to obtain a PIV in the meantime and promote PO intake -- will give antiemetics PRN.       Otila Olszewski MD/MPH     Pulmonary, Critical Care Medicine  [facility]

## 2020-07-21 NOTE — CONSULTS
[address] [facility]    Name:  Harish Badillo  MR#:   [id number]  :  1970  ACCOUNT #:  [de-identified]  DATE OF SERVICE:  2020    Thank you for the opportunity to participate in the care of the patient. As you know, he is a 64-year-old male who presented with hypoglycemia. We are consulted to make recommendations regarding the patient's capacity to make medical decisions. When I met with the patient, he was resting comfortably in a bed in the emergency room. (Though had already admitted him to the hospital.)  He states that he has been in [facility] and he is here because of low calcium. He states that he is seeking treatment, but that he had difficulty swallowing large pills. He is oriented to all spheres. He was able to state that his calcium is too far from the normal value and that he could have cardiac difficulties and that he is interested in pursuing treatment. There were no concerns about suicidal or homicidal thinking, and he did not appear to be responding to external stimuli. MENTAL STATUS EXAM:  This is an [place] male cooperative and oriented with fair eye contact. Mood is \"okay. \"  Affect is congruent. Speech is monotone, somewhat soft. Thought processes are generally logical and goal directed. Cognitive function approaching baseline. Insight and judgment are fair to good. DIAGNOSES:  Altered mental status, resolving and hypocalcemia. TREATMENT RECOMMENDATIONS:  Presently, the patient has capacity to make medical decisions, able to reasonably weigh the risks and potential benefits of treatment versus not. He does state that he has difficulty swallowing large tablets, so perhaps an alternative is either IV or crushed formulation may be helpful. He is also at risk for delirium due to his electrolyte abnormalities.   If it does happen, he has capacity to quickly diminish and it may be the case that earlier in the day he did not have capacity due to altered mental status, but that has now improved. Capacity is certainly a moving target. I will not withhold urgent treatment for a patient who is at high risk of delirium and exhibit gross confusion. The primary team agrees that he is too confused to make a decision. Should you have further questions or any additional symptoms, please feel free to contact our service. Thank you for the consultation.         Yanely Patel MD      [id number]  D:  07/21/2020 11:44  T:  07/21/2020 16:37  JOB #:  [id number]

## 2020-07-21 NOTE — ADT AUTH CERT NOTES
Systemic or Infectious Condition GRG - Care Day 2 (7/21/2020) by Cloretta Lesch, RN         Review Entered  Review Status    7/21/2020 14:52  Completed        Criteria Review       Care Day: 2 Care Date: 7/21/2020 Level of Care: ICU    Guideline Day 2    Level Of Care    ( ) Floor    7/21/2020 14:52:13 EDT by Mt Mcmullen      Intensive Care    Clinical Status    ( ) * No ICU or intermediate care needs    7/21/2020 14:52:13 EDT by Mt Mcmullen      Intensive Care    Hypocalcemia   Acute renal failure superimposed on chronic kidney disease, unspecified CKD stage, unspecified acute renal failure type    Interventions    (X) Inpatient interventions continue    7/21/2020 14:52:13 EDT by Jerry Barton Esrd,agreable with hemodialysis. discussed with ir,they will place dialysis cath asap,plan dialysis with high calcium bath,recheck ionized calcium after dialysis;severe hypocalcemia,related to subtotal parathyroidectomy. pt is agreable CENTRAL LINE    (X) Transition to oral routes    7/21/2020 14:52:13 EDT by Mt Mcmullen      MEDS: CA GLUCONATE 3G IV X1;TIGAN IM X1;APRESOLINE 20MG IV X1;HEPARIN 5000 UNITS SC Q8HRS    * Milestone    Additional Notes    REVIEW 07/21/2020       NEPHROLOGY:    1. Esrd,pt is agreable with hemodialysis. discussed with ir,they will place dialysis cath asap,plan dialysis with high calcium bath,recheck ionized calcium after dialysis    2.severe hypocalcemia,related to subtotal parathyroidectomy. pt is agreable with central line,ir will place central line ,give iv calcium drip high dose in icu.er nurse said that they can,t give more than 3 gram of calcium gluconate at one time.     3.htn,resume bp meds    4-hyperphosphatemia,not compliant with calcium acetate    5- depression,he will benefit from anti depression meds         D/w Dr. Silver Mendoza    BP (!) 151/108    Pulse 90    Temp 99.3 °F (37.4 °C)    Resp 18    Wt 88 kg (194 lb)    SpO2 98%    BMI 24.91 kg/m²       No intake or output data in the 24 hours ending 07/21/20 1210         Physical Examination:              RS: diminished breath sounds    CVS: S1-S2 heard, RRR, No S3 / murmur    Abdomen: Soft,    Extremities: No edema,    CNS: Awake    HEENT: Head is atraumatic, PERRLA, conjunctiva pink & non icteric.  No JVD or carotid bruit          Recent Labs      07/21/20    0850 07/20/20    1150    WBC 13.9* 11.1    HGB 10.4* 10.3*    HCT 33.3* 32.8*       Chemistry:    Recent Labs      07/21/20    0850 07/20/20    1150    * 93*    CREA 29.20* 27.30*    CA <5.0* <5.0*    ALB 2.8* 3.0*    K 5.2 5.1     136    CL 99* 96*    CO2 22 23    PHOS 8.4* 7.2*    GLU 99 135*         EKG:Normal sinus rhythm    Possible Left atrial enlargement    Left ventricular hypertrophy    T wave abnormality, consider lateral ischemia    Prolonged QT    Abnormal ECG    When compared with ECG of 20-JUL-2020 13:52,    No significant change was found    Confirmed by Shayan Hill MD, Carroll Knutson ([id number]) on 7/21/2020 1:43:08 PM       07/21/20 0530  -  90  18  151/108   98 %  RA       07/21/20 1330  -  79  18  167/111   98 % RA        Systemic or Infectious Condition GRG - Clinical Indications for Admission to Inpatient Care by Aniceto Hammond RN         Review Entered  Review Status    7/21/2020 14:46  Completed        Criteria Review       Clinical Indications for Admission to Inpatient Care    Most Recent : Melba Montgomery Most Recent Date: 7/21/2020 14:46:29 EDT    (X) Hospital admission is needed for appropriate care of the patient because of  1 or more  of    the following :       (X) Systemic or infectious condition causing severe symptoms or findings not responsive to emergency       or observation care treatment (as appropriate) as indicated by  1 or more  of the following        (1) (2):          (X) Severe electrolyte abnormalities (43)          7/21/2020 14:46:29 EDT by Melba Montgomery            ionized calcium 0.48/0.46, rbc 4.14, hgb 10.3 hct 32.8, neut 95, lymph 3, mono 2, abs neut 10.5, abs lymph 0.4, cl 96, bun 93 crt 27.30, phos 7.2,  GFR 2, alb 3.0, glob 5.1, a-g ratio 0.6, ALT 11, AST 12, alk phos 130, CK 1038, trop 0.02        LOC:Acute Adult-Electrolyte or Mineral Imbalance - CARE DAY 1-  (7/20/2020) by Dutch Sharpe RN         Review Entered  Review Status    7/21/2020 11:28  In Primary        Criteria Review    REVIEW SUMMARY     Patient: TAMIA CLARKE  Review Number: [id number]  Review Status: In Primary     Condition Specific: Yes     Condition Level Of Care Code: ACUTE  Condition Level Of Care Description: Acute        OUTCOMES  Outcome Type: Primary           REVIEW DETAILS     Service Date: 07/20/2020  Admit Date: 07/20/2020  Product: Andrey Arellano Adult  Subset: Electrolyte or Mineral Imbalance      (Symptom or finding within 24h)      ~--Admin, IQ Admin Admin on 07- 11:27 AM--~      IP ICU 99.3,,25,183/118,100%       BMP & CBC qd, Phos & mg qd, ionized calcium q6h, complete bedrest, scd's, continuous oximetry, IR consult- insert tunnel CVC port,       apresoline 20 mg IV q6h prn SBP > 160 - x1, ionized calcium 0.48/0.46, rbc 4.14, hgb 10.3 hct 32.8, neut 95, lymph 3, mono 2, abs neut 10.5, abs lymph 0.4, cl 96, bun 93 crt 27.30, phos 7.2,  GFR 2, alb 3.0, glob 5.1, a-g ratio 0.6, ALT 11, AST 12, alk phos 130, CK 1038, trop 0.02, cxr- Stable bilateral pleural effusion, loculation on the left. Parenchymal disease in the lung bases not excluded. No vascular congestion. EKG- Normal sinus rhythm  Possible Left atrial enlargement  Left ventricular hypertrophy  T wave abnormality, consider lateral ischemia  Prolonged QT       ED Note - recent AMA from here on 7/17 after removing HD cath and central line in neck; presents with weakness, feeling bad, nausea, hiccups.  States he has been doing his PD at home, but is feeling worse this AM. Patient had extensive discussion with nephrology prior to leaving about the need for HD to control his calcium - today he would like the HD lines back in his neck. He does not want anything given in his arms or groin. Physical Exam      Vitals signs and nursing note reviewed. Exam conducted with a chaperone present. Constitutional:           General: He is not in acute distress. Appearance: He is normal weight. He is ill-appearing. He is not toxic-appearing or diaphoretic. HENT:          Head: Normocephalic. Mouth/Throat:          Mouth: Mucous membranes are dry. Eyes:          General:             Right eye: No discharge. Left eye: No discharge. Extraocular Movements: Extraocular movements intact. Pupils: Pupils are equal, round, and reactive to light. Neck:          Musculoskeletal: Normal range of motion. Cardiovascular:          Rate and Rhythm: Normal rate and regular rhythm. Pulses: Normal pulses. Pulmonary:          Effort: Pulmonary effort is normal.          Breath sounds: Normal breath sounds. Abdominal:          General: There is no distension. Tenderness: There is no abdominal tenderness. Musculoskeletal: Normal range of motion. General: No swelling. Right lower leg: No edema. Left lower leg: No edema. Skin:         General: Skin is warm and dry. Capillary Refill: Capillary refill takes less than 2 seconds. Coloration: Skin is not jaundiced. Findings: No bruising. Neurological:          Mental Status: He is alert and oriented to person, place, and time. Mental status is at baseline. Motor: Weakness present. Comments: Pos [place] sign       Psychiatric:             Mood and Affect: Mood normal. .      Hypocalcemia       2. History of peritoneal dialysis       3. ESRD (end stage renal disease) on dialysis ([address]       4. Prolonged Q-T interval on ECG       H&P - PLAN:      Dispo:  Admit to ICU for management of severe hypocalcemia and potentially life- threatening arrhythmias             Neuro      ·        Serial exams      ·        No acute pain or withdrawal issues at this time      Resp:       ·        Supp O2 PRN for SpO2 >90%- currently 100% on RA      ·        CXR       ·        Pulmonary Hygiene, IS.        ·        Keep HOB elevated > 30 degrees, aspiration precautions. I/D:       ·        Trend WBCs and temp curve. ·        Monitor surgical site and drain output. Hem/Onc:       ·        Daily CBC; H/H, and plts are stable.       ·        SQ heparin for DVT ppx      CVS:       ·        Telemetry: monitor QTC and avoid QTC prolonging agents      ·        Monitor hemodynamics. Metabolic/Yvonne;:       ·        Trend BMP, Mag, Phos. ·        Continue ionized calcium check q4h. ·        Trend renal indices      ·        PD/HD per nephrology      ·        Replace lytes per protocol. ·        Consult has been placed to IR and or Vascular for replacement of HD Cath      Endocrine:       ·        POC Glucose AC&HS. Avoid hypoglycemia.        GI:       ·        NO ZOFRAN for nausea      ·        PO status: ok for clears and meds      Musc/Skin:       ·        Per protocol       ·        Surgical incision care- site is healing well             ·        Code Status: FULL CODE                           (Excludes PO medications unless noted)          [X] Select Day, One:              [X] Episode Day 1, One:                  [X] ACUTE, >= One:                      [X] Hypocalcemia, Both:                          [X] Finding, One:                              [X] Calcium 5.0-7.4 mg/dL(1.25-1.85 mmol/L) and, >= One:                                  [X] Muscle weakness                                  ~--Admin, IQ Admin Admin on 07- 11:14 AM--~                                  presents with weakness, feeling bad, nausea, hiccups [X] Calcium                          ~--Admin, IQ Admin Admin on 2020 11:15 AM--~                          rocaltrol 0.5 mcg po bid, os-flor 1000mg po qid, cium ch                                                                                     H&P Notes      H&P by Donny Henson DO at 20 144 documented on ED from 2020 in [facility] EMERGENCY DEPT   Author:  Donny Henson DO  Author Type:  Physician  Filed:  20 [id number]    Note Status:  Signed  Cosign:  Cosign Not Required  Date of Service:  20    :  Donny Henson DO (Physician)       [facility]  Pulmonary, Critical Care, and Sleep Medicine     Critical Care History and Physical     Name: Luis Blanco MRN: [id number]   : 1970 Hospital: [address] [place] [address]    Date: 2020           IMPRESSION:      · Hypocalcemia - 2/2 above; and patient leaving AMA  · Hyperphosphatemia- patient refusing PhosLo - states it causes nausea and patient already nauseated  · Secondary hyperparathyroidism - 2/2 ESRD; s/p subtotal parathyroidectomy 20 with Dr. Serafin Peacock (Pathology notable for parathyroid hyperplasia)  · Hyperkalemia- mild  · Prolonged QT interval:  Slightly improved today. suspect related to hypocalcemia and electrolyte derangements. · ESRD - on PD 7 days/week, Nephrology: Dr. Xena Burrows following, IR/ Vascular consult placed for HD cath - anticipate need for HD cath placement  · Pleural Effusions: most likely related to CHF and ESRD- last CXR prior to patient pulling out tunneled catheter last admission  · Hypertension  · HFrEF 46% (2019) with global hypokinesis and estimated RVSP of 45-50mmHg based on TR jet.   · H/O Anxiety/ Depression  · Patient refusing certain therapies- has capacity based on Psych exam 2020 but we will need to continue to reassess           Patient Active Problem List   Diagnosis Code    HTN, goal below 130/80 I10    Urinary retention R33.9    UTI (urinary tract infection) N39.0    Hematuria R31.9    Acute kidney injury ([facility] N17.9    Anemia D64.9    Hyperparathyroidism (HCC) E21.3    Parathyroid hyperplasia (HCC) E21.0    Hypocalcemia E83.51    ESRD on peritoneal dialysis ([facility] N18.6, E57.5    Systolic CHF, chronic (HCC) I50.22    Acute renal failure superimposed on chronic kidney disease ([facility] N17.9, N18.9      PLAN:   Dispo: Admit to ICU for management of severe hypocalcemia and potentially life- threatening arrhythmias once bed available     Neuro  · Serial exams  · No acute pain or withdrawal issues at this time  Resp:   · Supp O2 PRN for SpO2 >90%- currently 100%    · Pulmonary Hygiene, IS.    · Keep HOB elevated > 30 degrees, aspiration precautions. I/D:   · Trend WBCs and temp curve. · Monitor surgical site and drain output. Hem/Onc:   · Daily CBC; H/H, and plts are stable.   · SQ heparin for DVT ppx  CVS:   · Telemetry: monitor QTC and avoid QTC prolonging agents  · Monitor hemodynamics. Metabolic/Yvonne;:   · Trend BMP, Mag, Phos. · Continue ionized calcium check q4h. · Trend renal indices  · PD/HD per nephrology  · Replace lytes per protocol. · Consult has been placed to IR and or Vascular for replacement of HD Cath- awaiting placememt  Endocrine:   · POC Glucose AC&HS. Avoid hypoglycemia. GI:   · NO ZOFRAN for nausea  · PO status: ok for clears and meds  Musc/Skin:   · Per protocol   · Surgical incision care- site is healing well     · Code Status: FULL CODE      Subjective/Interval History:      The patient was evaluated in the ED bed 2 for Dr Shawnee Singleton due to report of severe hypocalcemia     atient is a 51 y. o. male with PMH ESRD on PD, HTN, STEFANIE, gout, BPH, and HFrEF 46% (4/2019)            The patient was admitted to [facility]: 7/6-/7/17/20  For parathyroidectomy.  Patient left [facility] 4/17/2020.     Patient represents today to [facility] \" I knew something was wrong\" with reports of profound weakness and nausea with emesis. He does not have an oxygen requirement at this time. No chest pain, wheezing. No hemoptysis or other bleeding concerns.     He denies any fever or chills. No respiratory complaints.     He denies any muscle cramps, spasms, paraesthesia or clementine-oral numbness     He denies smoking or taking any illicit substances.     He reports that he has been staying at home and that he has been performing his PD at home     He is refusing his PhosLo tabs today- stating that it makes him nauseated.     His adult daughter who had been working at a [employer] has been at home helping him but she was recently called back to work        07/21/20  · Patient remains down in ED - awaiting ICU bed  · IV access lost overnight- patient initially refusing IV and PO meds  · Ionized calcium critical low this morning 3- grams IVPB given once patient agreed to PIV- Awaiting follow up stat right now. 1 gram given yesterday. · Awaiting IR to assist in IV access and HD cath placement  · Psych saw patient and noted patient had capacity at time of evaluation but that critical illness could cause patient to loose capacity- will continue to reassess  · Discussed case with Nephrology, ED team and IR  · Patient refusing PO medications  · Tigan changed from PRN to scheduled- trying to encourage patient to take PO medications.   Patient's selective refusal of certain therapies interfering with promptly delivery of medical care.           ROS:A comprehensive review of systems was negative except for that written in the HPI.     Objective:   Vital Signs:    Visit Vitals  BP (!) 167/111   Pulse 79   Temp 99.3 °F (37.4 °C)   Resp 18   Wt 88 kg (194 lb)   SpO2 98%   BMI 24.91 kg/m²       O2 Device: Room air                 Physical Exam:      General:  Alert and oriented x 3 , appears tired, he is cooperative and answering questions for me, Patient sitting up on edge of bed- ED team drawing blood from patient from IV- patient is cooperating   Head:  Normocephalic, without obvious abnormality, atraumatic. Eyes:  Conjunctivae/corneas clear. PERRL. Nose: Nares normal. Septum midline. Mucosa normal.   Throat: Lips, mucosa, and tongue normal. Teeth and gums normal.   Neck: Supple, symmetrical, trachea midline, healing incision to anterior lower neck, no surrounding erythema, swelling or induration   Lungs:   Symmetrical chest rise; good AE bilat; CTAB   Heart:  Borderline bradycardic, regular rhythm, S1, S2 normal   Abdomen:   Soft, non-tender. Bowel sounds normal. No distention   Extremities: Extremities normal, atraumatic, no cyanosis or edema. Pulses: 2+ and symmetric all extremities. Skin: Skin color, texture, turgor normal. No rashes or lesions.    Neurologic: Grossly non-focal, except mild tongue fasciculation are present- still present, follows commands, A&Ox4                                                 DATA:  Labs:     COAGS:    No results found for: APTT, PTP, INR, INREXT, INREXT       Recent Labs     07/21/20  0850 07/20/20  1150   WBC 13.9* 11.1   HGB 10.4* 10.3*   HCT 33.3* 32.8*    227          Recent Labs     07/21/20  0850 07/20/20  1150    136   K 5.2 5.1   CL 99* 96*   CO2 22 23   GLU 99 135*   * 93*   CREA 29.20* 27.30*   CA <5.0* <5.0*   MG 2.0 2.1   PHOS 8.4* 7.2*   ALB 2.8* 3.0*   ALT 11* 11*   INR  --  1.1           Lab Results   Component Value Date/Time     TSH 2.010 04/15/2019 05:45 AM     Free T4 1.12 04/15/2019 05:45 AM     No results found for: HBA1C, HGBE8, GJR0RPOM, LJQ4XGXA, CXG9IGUG     No results for input(s): PH, PCO2, PO2, HCO3, FIO2 in the last 72 hours.              EKG Results      Procedure 720 Value Units Date/Time     EKG, 12 LEAD, INITIAL [[id number]] Collected:  07/21/20 1201     Order Status:  Completed Updated:  07/21/20 1343       Ventricular Rate 72 BPM         Atrial Rate 72 BPM         P-R Interval 146 ms         QRS Duration 102 ms         Q-T Interval 466 ms         QTC Calculation (Bezet) 510 ms         Calculated P Axis 54 degrees         Calculated R Axis -12 degrees         Calculated T Axis 125 degrees         Diagnosis --       Normal sinus rhythm  Possible Left atrial enlargement  Left ventricular hypertrophy  T wave abnormality, consider lateral ischemia  Prolonged QT  Abnormal ECG  When compared with ECG of 20-JUL-2020 13:52,  No significant change was found  Confirmed by Everardo Valle MD, Gildardo Fraser ([id number]) on 7/21/2020 1:43:08 PM        EKG, 12 LEAD, INITIAL [[id number]] Collected:  07/20/20 1352     Order Status:  Completed Updated:  07/20/20 1805       Ventricular Rate 82 BPM         Atrial Rate 82 BPM         P-R Interval 156 ms         QRS Duration 92 ms         Q-T Interval 478 ms         QTC Calculation (Bezet) 558 ms         Calculated P Axis 46 degrees         Calculated R Axis -28 degrees         Calculated T Axis 119 degrees         Diagnosis --       Normal sinus rhythm  Possible Left atrial enlargement  Left ventricular hypertrophy  T wave abnormality, consider lateral ischemia  Prolonged QT  Abnormal ECG  No previous ECGs available  Confirmed by Ahalogy ([id number]) on 7/20/2020 6:05:23 PM        EKG, 12 LEAD, INITIAL [[id number]]       Order Status:  Canceled              Echo: 4/[facility]/201[facility])  Interpretation Summary  A complete two-dimensional transthoracic echocardiogram was performed (2D, M-  mode, Doppler and color flow Doppler). The study was technically adequate. 1. Mildly dilated LV cavity size with moderate concentric hypertrophy. 2. Mildly reduced LV systolic function with an EF of 46%. Mild global  hypokinesis. 3. Grade III diastolic dysfunction. 4. Normal RV size and function. 5. No hemodynamically significant valvular abnormalities. 6. Estimated RVSP of 45-50mmHg based on TR jet.     All other findings detailed below.  Previous echocardiogram done at [facility]  dated 08/09/2018 showed an EF of 56%, mild to moderate TR, and an RVSP of  49mmHg.              Imaging:             CXR Results  (Last 48 hours)                 07/20/20 1602   XR CHEST PORT Final result     Impression:   IMPRESSION: Stable bilateral pleural effusion, loculation on the left. Parenchymal disease in the lung bases not excluded. No vascular congestion.        Narrative:   Portable CXR:       HISTORY: Pleural effusion. Hypocalcemia. Dialysis patient. Comparison July 8, 2020       Mild cardiomegaly with no significant vascular congestion. Loculated collection   in the left lower chest is unchanged. Pleural effusion and right chest also   stable. Parenchymal disease in the lung bases not completely excluded.                      [x]?    Imaging  I have personally reviewed the patients radiographs  []? Radiographs reviewed with radiologist              [x]? No change from prior, tubes and lines in adequate position  []? Improved   []? Worsening     High complexity decision making was performed during the evaluation of this patient at high risk for decompensation with multiple organ involvement     Above mentioned total time spent on reviewing the case/medical record/data/notes/EMR/patient examination/documentation/coordinating care with nurse/consultants, exclusive of procedures with complex decision making performed and > 50% time spent in face to face evaluation.     Critical Care Time:  The services I provided to this patient were to treat and/or prevent clinically significant deterioration that could result in the failure of one or more body systems and/or organ systems due to respiratory distress, hypoxia, cardiac dysrhythmia.     Services included the following:  -reviewing nursing notes and old charts  -direct patient care  -medication orders and management  -interpreting and reviewing diagnostic studies/labs  -re-evaluations  -documentation time     Aggregate critical care time was 40  minutes, which includes only time during which I was engaged in work directly related to the patient's care as described above.     During this entire length of time I was immediately available to the patient. The reason for providing this level of medical care for this critically ill patient was due a critical illness that impaired one or more vital organ systems such that there was a high probability of imminent or life threatening deterioration in the patients condition. This care involved high complexity decision making to assess, manipulate, and support vital system functions, to treat this degreee vital organ system failure and to prevent further life threatening deterioration of the patients condition        Complex decision making was made in the evaluation and management plans during this consultation.   More than 50% of time was spent in counseling and coordination of care including review of data and discussion with other team members.           Ananda Felix DO, [address] [facility]  Pulmonary, Critical Care, and Sleep Medicine

## 2020-07-21 NOTE — ACP (ADVANCE CARE PLANNING)
Advance Care Planning     Advance Care Planning Clinical Specialist  Conversation Note      Date of ACP Conversation: 7/20/2020    [facility] Conducted with:   Patient with Decision Making Capacity    ACP Clinical Specialist: Maurilio Yip    *When Decision Maker makes decisions on behalf of the incapacitated patient: Decision Maker is asked to consider and make decisions based on patient values, known preferences, or best interests. Health Care Decision Maker:    Current Designated Health Care Decision Maker:   Primary Decision Maker: Roque Guadalupe - [facility] - [phone]  (If there is a [address] named in the \"Healthcare Decision Makers\" box in the ACP activity, but it is not visible above, be sure to open that field and then select the health care decision maker relationship (ie \"primary\") in the blank space to the right of the name.) Validate  this information as still accurate & up-to-date; edit [address] field as needed.)    Note: Assess and validate information in current ACP documents, as indicated. Note: If the relationship of these Decision-Makers to the patient does NOT follow your state's Next of Kin hierarchy, recommend that patient complete ACP document that meets state-specific requirements to allow them to act on the patient's behalf when appropriate. Care Preferences    Hospitalization: \"If your health worsens and it becomes clear that your chance of recovery is unlikely, what would your preference be regarding hospitalization? \"    Choice:  [x]  The patient wants hospitalization  []  The patient prefers comfort-focused treatment without hospitalization.     NOTE: If the patient has a valid advance directive AND now provides care preference(s) that are inconsistent with that prior directive, advise the patient to consider either: creating a new advance directive that complies with state-specific requirements; or, if that is not possible, orally revoking that prior directive in accordance with state-specific requirements, which must be documented in the EHR. [] Yes  [] No   Educated Patient / Annice Peals regarding differences between Advance Directives and portable DNR orders.     Length of ACP Conversation in minutes:      Conversation Outcomes:  [] ACP discussion completed  [] Existing advance directive reviewed with patient; no changes to patient's previously recorded wishes   [] New Advance Directive completed   [] Portable Do Not Rescitate prepared for Provider review and signature  [] POLST/POST/MOLST/MOST prepared for Provider review and signature      Follow-up plan:    [x] Schedule follow-up conversation to continue planning  [x] Referred individual to Provider for additional questions/concerns   [] Advised patient/agent/surrogate to review completed ACP document and update if needed with changes in condition, patient preferences or care setting     [] This note routed to one or more involved healthcare providers    Chiara Verma RN BSN  Care Manager  [phone]

## 2020-07-21 NOTE — ED NOTES
Patient's IV infiltrated. Failed to obtain new line after multiple attempts. Dinorah Reza notified about patient's IV infiltrating.

## 2020-07-21 NOTE — ED NOTES
Bedside and verbal shift report given by BOOGIE Moralez(off going nurse) to Navya Izaguirre RN (oncoming nurse). Report included the following information SBAR and ED Summary.

## 2020-07-21 NOTE — ED NOTES
I received a call from the IR PA. Her concern was the patient does have a line and has not been getting calcium. I discussed the information with the nursing and the charge nurse in the ED at 8:20 AM.  They made me aware that this line has not been working this morning and patient is been refusing few medications  They also stated the patient needed line that was difficult to place and needed ultrasound    I have told the IR PA to discuss the information with admitting team and ICU as patient is been admitted for 22 hours. Have also been made aware there is no PICC line team  Also been aware that patient cannot take p.o. calcium and he vomits. I will await their instructions have asked him to call me back once there is a clarity on who is managing the patient as I am unaware of any of the patient's details. Charge nurse is also aware of my discussion with the IR team and I understand patient needs to be dialyzed and patient needs access catheter for dialysis.   -------------

## 2020-07-21 NOTE — ED NOTES
ER MD made aware of IV status and states \"I cannot take on another patient, the ICU needs to manage this patient\" ICU made aware

## 2020-07-22 LAB
ALBUMIN SERPL-MCNC: 2.7 G/DL (ref 3.4–5)
ALBUMIN/GLOB SERPL: 0.6 {RATIO} (ref 0.8–1.7)
ALP SERPL-CCNC: 122 U/L (ref 45–117)
ALT SERPL-CCNC: 7 U/L (ref 16–61)
ANION GAP SERPL CALC-SCNC: 10 MMOL/L (ref 3–18)
AST SERPL-CCNC: 7 U/L (ref 10–38)
ATRIAL RATE: 76 BPM
BASOPHILS # BLD: 0 K/UL (ref 0–0.1)
BASOPHILS NFR BLD: 0 % (ref 0–2)
BILIRUB SERPL-MCNC: 0.6 MG/DL (ref 0.2–1)
BUN SERPL-MCNC: 52 MG/DL (ref 7–18)
BUN/CREAT SERPL: 3 (ref 12–20)
CA-I SERPL-SCNC: 0.77 MMOL/L (ref 1.12–1.32)
CA-I SERPL-SCNC: 0.85 MMOL/L (ref 1.12–1.32)
CA-I SERPL-SCNC: 1.1 MMOL/L (ref 1.12–1.32)
CALCIUM SERPL-MCNC: 6.8 MG/DL (ref 8.5–10.1)
CALCULATED P AXIS, ECG09: 49 DEGREES
CALCULATED R AXIS, ECG10: -8 DEGREES
CALCULATED T AXIS, ECG11: 153 DEGREES
CHLORIDE SERPL-SCNC: 99 MMOL/L (ref 100–111)
CO2 SERPL-SCNC: 27 MMOL/L (ref 21–32)
CREAT SERPL-MCNC: 17.5 MG/DL (ref 0.6–1.3)
DIAGNOSIS, 93000: NORMAL
DIFFERENTIAL METHOD BLD: ABNORMAL
EOSINOPHIL # BLD: 0.5 K/UL (ref 0–0.4)
EOSINOPHIL NFR BLD: 6 % (ref 0–5)
ERYTHROCYTE [DISTWIDTH] IN BLOOD BY AUTOMATED COUNT: 16.8 % (ref 11.6–14.5)
GLOBULIN SER CALC-MCNC: 4.8 G/DL (ref 2–4)
GLUCOSE BLD STRIP.AUTO-MCNC: 101 MG/DL (ref 70–110)
GLUCOSE BLD STRIP.AUTO-MCNC: 79 MG/DL (ref 70–110)
GLUCOSE BLD STRIP.AUTO-MCNC: 95 MG/DL (ref 70–110)
GLUCOSE SERPL-MCNC: 85 MG/DL (ref 74–99)
HCT VFR BLD AUTO: 33.7 % (ref 36–48)
HGB BLD-MCNC: 10.5 G/DL (ref 13–16)
LYMPHOCYTES # BLD: 0.5 K/UL (ref 0.9–3.6)
LYMPHOCYTES NFR BLD: 6 % (ref 21–52)
MAGNESIUM SERPL-MCNC: 2 MG/DL (ref 1.6–2.6)
MCH RBC QN AUTO: 25.4 PG (ref 24–34)
MCHC RBC AUTO-ENTMCNC: 31.2 G/DL (ref 31–37)
MCV RBC AUTO: 81.6 FL (ref 74–97)
MONOCYTES # BLD: 0.9 K/UL (ref 0.05–1.2)
MONOCYTES NFR BLD: 10 % (ref 3–10)
NEUTS SEG # BLD: 7.2 K/UL (ref 1.8–8)
NEUTS SEG NFR BLD: 78 % (ref 40–73)
P-R INTERVAL, ECG05: 148 MS
PHOSPHATE SERPL-MCNC: 6.5 MG/DL (ref 2.5–4.9)
PLATELET # BLD AUTO: 218 K/UL (ref 135–420)
PLATELET COMMENTS,PCOM: ABNORMAL
PMV BLD AUTO: 9.7 FL (ref 9.2–11.8)
POTASSIUM SERPL-SCNC: 4.5 MMOL/L (ref 3.5–5.5)
PROT SERPL-MCNC: 7.5 G/DL (ref 6.4–8.2)
Q-T INTERVAL, ECG07: 432 MS
QRS DURATION, ECG06: 94 MS
QTC CALCULATION (BEZET), ECG08: 486 MS
RBC # BLD AUTO: 4.13 M/UL (ref 4.7–5.5)
RBC MORPH BLD: ABNORMAL
SODIUM SERPL-SCNC: 136 MMOL/L (ref 136–145)
VENTRICULAR RATE, ECG03: 76 BPM
WBC # BLD AUTO: 9.1 K/UL (ref 4.6–13.2)

## 2020-07-22 PROCEDURE — 74011250637 HC RX REV CODE- 250/637: Performed by: PHYSICIAN ASSISTANT

## 2020-07-22 PROCEDURE — 74011250636 HC RX REV CODE- 250/636: Performed by: INTERNAL MEDICINE

## 2020-07-22 PROCEDURE — 82330 ASSAY OF CALCIUM: CPT

## 2020-07-22 PROCEDURE — 80053 COMPREHEN METABOLIC PANEL: CPT

## 2020-07-22 PROCEDURE — 74011250636 HC RX REV CODE- 250/636: Performed by: PHYSICIAN ASSISTANT

## 2020-07-22 PROCEDURE — 90935 HEMODIALYSIS ONE EVALUATION: CPT

## 2020-07-22 PROCEDURE — 84100 ASSAY OF PHOSPHORUS: CPT

## 2020-07-22 PROCEDURE — 83735 ASSAY OF MAGNESIUM: CPT

## 2020-07-22 PROCEDURE — 74011000258 HC RX REV CODE- 258: Performed by: PHYSICIAN ASSISTANT

## 2020-07-22 PROCEDURE — 93005 ELECTROCARDIOGRAM TRACING: CPT

## 2020-07-22 PROCEDURE — 74011000258 HC RX REV CODE- 258: Performed by: INTERNAL MEDICINE

## 2020-07-22 PROCEDURE — 74011000250 HC RX REV CODE- 250: Performed by: PHYSICIAN ASSISTANT

## 2020-07-22 PROCEDURE — 65610000006 HC RM INTENSIVE CARE

## 2020-07-22 PROCEDURE — 85025 COMPLETE CBC W/AUTO DIFF WBC: CPT

## 2020-07-22 PROCEDURE — 74011250637 HC RX REV CODE- 250/637: Performed by: INTERNAL MEDICINE

## 2020-07-22 PROCEDURE — 82962 GLUCOSE BLOOD TEST: CPT

## 2020-07-22 RX ORDER — CLONIDINE HYDROCHLORIDE 0.1 MG/1
0.2 TABLET ORAL 2 TIMES DAILY
Status: DISCONTINUED | OUTPATIENT
Start: 2020-07-22 | End: 2020-07-24 | Stop reason: SDUPTHER

## 2020-07-22 RX ORDER — CALCIUM CHLORIDE INJECTION 100 MG/ML
1 INJECTION, SOLUTION INTRAVENOUS ONCE
Status: COMPLETED | OUTPATIENT
Start: 2020-07-22 | End: 2020-07-22

## 2020-07-22 RX ORDER — LOSARTAN POTASSIUM 50 MG/1
100 TABLET ORAL DAILY
Status: DISCONTINUED | OUTPATIENT
Start: 2020-07-22 | End: 2020-07-24 | Stop reason: SDUPTHER

## 2020-07-22 RX ORDER — LANOLIN ALCOHOL/MO/W.PET/CERES
6 CREAM (GRAM) TOPICAL
Status: DISCONTINUED | OUTPATIENT
Start: 2020-07-22 | End: 2020-07-25 | Stop reason: HOSPADM

## 2020-07-22 RX ORDER — AMLODIPINE BESYLATE 10 MG/1
10 TABLET ORAL DAILY
Status: DISCONTINUED | OUTPATIENT
Start: 2020-07-22 | End: 2020-07-24 | Stop reason: SDUPTHER

## 2020-07-22 RX ORDER — CALCIUM CARBONATE 200(500)MG
200 TABLET,CHEWABLE ORAL
Status: CANCELLED | OUTPATIENT
Start: 2020-07-22

## 2020-07-22 RX ORDER — THERA TABS 400 MCG
1 TAB ORAL DAILY
Status: DISCONTINUED | OUTPATIENT
Start: 2020-07-23 | End: 2020-07-25 | Stop reason: HOSPADM

## 2020-07-22 RX ADMIN — CALCIUM 1000 MG: 500 TABLET ORAL at 12:49

## 2020-07-22 RX ADMIN — CALCIUM 1000 MG: 500 TABLET ORAL at 09:34

## 2020-07-22 RX ADMIN — CALCITRIOL CAPSULES 0.25 MCG 0.5 MCG: 0.25 CAPSULE ORAL at 22:37

## 2020-07-22 RX ADMIN — Medication 10 ML: at 18:25

## 2020-07-22 RX ADMIN — CLONIDINE HYDROCHLORIDE 0.2 MG: 0.1 TABLET ORAL at 09:33

## 2020-07-22 RX ADMIN — HEPARIN SODIUM 5000 UNITS: 5000 INJECTION INTRAVENOUS; SUBCUTANEOUS at 03:48

## 2020-07-22 RX ADMIN — CALCIUM 1000 MG: 500 TABLET ORAL at 22:37

## 2020-07-22 RX ADMIN — Medication 10 ML: at 22:00

## 2020-07-22 RX ADMIN — CALCIUM GLUCONATE 3 G: 98 INJECTION, SOLUTION INTRAVENOUS at 10:51

## 2020-07-22 RX ADMIN — HEPARIN SODIUM 5000 UNITS: 5000 INJECTION INTRAVENOUS; SUBCUTANEOUS at 12:49

## 2020-07-22 RX ADMIN — LOSARTAN POTASSIUM 100 MG: 50 TABLET, FILM COATED ORAL at 10:50

## 2020-07-22 RX ADMIN — CALCIUM GLUCONATE 2 G: 98 INJECTION, SOLUTION INTRAVENOUS at 23:41

## 2020-07-22 RX ADMIN — AMLODIPINE BESYLATE 10 MG: 10 TABLET ORAL at 09:34

## 2020-07-22 RX ADMIN — HYDRALAZINE HYDROCHLORIDE 20 MG: 20 INJECTION, SOLUTION INTRAMUSCULAR; INTRAVENOUS at 03:40

## 2020-07-22 RX ADMIN — Medication 10 ML: at 06:00

## 2020-07-22 RX ADMIN — LORAZEPAM 1 MG: 2 INJECTION INTRAMUSCULAR; INTRAVENOUS at 03:48

## 2020-07-22 RX ADMIN — CALCIUM CHLORIDE 1 G: 100 INJECTION INTRAVENOUS; INTRAVENTRICULAR at 18:50

## 2020-07-22 RX ADMIN — MELATONIN 6 MG: at 22:37

## 2020-07-22 RX ADMIN — CALCIUM 1000 MG: 500 TABLET ORAL at 18:03

## 2020-07-22 RX ADMIN — CALCITRIOL CAPSULES 0.25 MCG 0.5 MCG: 0.25 CAPSULE ORAL at 09:33

## 2020-07-22 NOTE — ROUTINE PROCESS
Dialysis  Nurse stated that this patient had episode hypotension, blood was returned and dialysis stopped. Dr Kathi Cobian notifed. Orders rec'd from ICU team.  Ion calcium spec sent to lab. Pt feeling better now, eating some of dinner(doesn't like too well). Calcium chloride 1gm given slow IV over 20  Min.

## 2020-07-22 NOTE — ROUTINE PROCESS
Bedside and Verbal shift change report given to [address] [place] [address] (oncoming nurse) by Isabel Rock RN   (offgoing nurse). Report given with SBAR, Kardex, Intake/Output, MAR, Accordion and Recent Results.

## 2020-07-22 NOTE — PROGRESS NOTES
[employer] [facility]  Pulmonary, Critical Care, and Sleep Medicine    Critical Care - Progress Note    Name: Peña Call MRN: [id number]   : 1970 Hospital: [facility]   Date: 2020        IMPRESSION:     · Hypocalcemia - 2/2 above; and patient leaving AMA  · Hyperphosphatemia- patient refusing PhosLo - states it causes nausea and patient already nauseated  · Secondary hyperparathyroidism - 2/2 ESRD; s/p subtotal parathyroidectomy 20 with Dr. Elena Collazo (Pathology notable for parathyroid hyperplasia)  · Hyperkalemia- mild  · Prolonged QT interval:  Slightly improved today. suspect related to hypocalcemia and electrolyte derangements. · ESRD - on PD 7 days/week, Nephrology: Dr. Adele Mas following, IR/ Vascular consult placed for HD cath - anticipate need for HD cath placement  · Pleural Effusions: most likely related to CHF and ESRD- last CXR prior to patient pulling out tunneled catheter last admission  · Hypertension  · HFrEF 46% (2019) with global hypokinesis and estimated RVSP of 45-50mmHg based on TR jet. · H/O Anxiety/ Depression  · Patient refusing certain therapies- has capacity based on Psych exam 2020 but we will need to continue to reassess     Patient Active Problem List   Diagnosis Code    HTN, goal below 130/80 I10    Urinary retention R33.9    UTI (urinary tract infection) N39.0    Hematuria R31.9    Acute kidney injury ([address] N17.9    Anemia D64.9    Hyperparathyroidism ([address] E21.3    Parathyroid hyperplasia ([address] E21.0    Hypocalcemia E83.51    ESRD on peritoneal dialysis ([address] N18.6, T19.9    Systolic CHF, chronic (HCC) I50.22    Acute renal failure superimposed on chronic kidney disease ([address] N17.9, N18.9      PLAN:      Neuro  · Serial exams  · No acute pain or withdrawal issues at this time  Resp:   · Supp O2 PRN for SpO2 >90%- currently 100%    · Pulmonary Hygiene, IS.    · Keep HOB elevated > 30 degrees, aspiration precautions.    I/D: · Trend WBCs and temp curve. · Monitor surgical site and drain output. Hem/Onc:   · Daily CBC; H/H, and plts are stable. · SQ heparin for DVT ppx  CVS:   · Telemetry: monitor QTC and avoid QTC prolonging agents  · Monitor hemodynamics. Metabolic/Yvonne;:   · Trend BMP, Mag, Phos. · Continue ionized calcium replacement   · Trend renal indices  · PD/HD per nephrology  · Replace lytes per protocol. Endocrine:   · POC Glucose AC&HS. Avoid hypoglycemia. GI:   · NO ZOFRAN or PHENERGAN for nausea  · PO as tolerated by paitent  Musc/Skin:   · Per protocol   · Surgical incision care- site is healing well    · Code Status: FULL CODE     Subjective/Interval History:     The patient was evaluated in the ED bed 2 for Dr Tan Police due to report of severe hypocalcemia    atient is a 51 y. o. male with PMH ESRD on PD, HTN, STEFANIE, gout, BPH, and HFrEF 46% (4/2019)         The patient was admitted to [facility]: 7/6-/7/17/20  For parathyroidectomy. Patient left [facility] 4/17/2020. Patient represents today to [facility] \" I knew something was wrong\" with reports of profound weakness and nausea with emesis. He does not have an oxygen requirement at this time. No chest pain, wheezing. No hemoptysis or other bleeding concerns. He denies any fever or chills. No respiratory complaints. He denies any muscle cramps, spasms, paraesthesia or clementine-oral numbness    He denies smoking or taking any illicit substances. He reports that he has been staying at home and that he has been performing his PD at home    He is refusing his PhosLo tabs today- stating that it makes him nauseated. His adult daughter who had been working at a [employer] has been at home helping him but she was recently called back to work      07/22/20  · Patient resting in ICU bed- this is the best I have seen him this admission; He is frustrated that his calcium remains low despite aggressive replacement. I have attempted to explain it to him.   · No pain complaints at this time  · Completed HD last night  · Still with ongoing hypocalcemia which ICU team continues to replace  · HD performed in ICU today- Patient with some painful muscle twitching during HD, also rhythm change to wide complex tachycardia - spontaneously resolved  · QTC this morning still prolonged  · Dr. Baker  Nephrology contacted and HD stopped  · Patient is tolerating some food. He has been taking some medications and some food: pudding. · Patient denies nausea  · He has been cooperating with ICU care team today          ROS:A comprehensive review of systems was negative except for that written in the HPI. Objective:   Vital Signs:    Visit Vitals  BP (!) 154/98   Pulse 88   Temp 98.1 °F (36.7 °C) (Oral)   Resp 17   Ht 6' 2\" (1.88 m)   Wt 88 kg (194 lb)   SpO2 100%   BMI 24.91 kg/m²       O2 Device: Room air                Physical Exam:      General:  Alert and oriented x 3  Resting in ICU bed   Head:  Normocephalic, without obvious abnormality, atraumatic. Eyes:  Conjunctivae/corneas clear. PERRL. Nose: Nares normal. Septum midline. Mucosa normal.   Throat: Lips, mucosa, and tongue normal. Teeth and gums normal.   Neck: Supple, symmetrical, trachea midline, healing incision to anterior lower neck, no surrounding erythema, swelling or induration   Lungs:   Symmetrical chest rise; good AE bilat; CTA, Tunnel HH cath and CVL in right upper Chest Wall   Heart:  Borderline bradycardic, regular rhythm, S1, S2 normal   Abdomen:   Soft, non-tender. Bowel sounds normal. No distention   Extremities: Extremities normal, atraumatic, no cyanosis or edema. Pulses: 2+ and symmetric all extremities. Skin: Skin color, texture, turgor normal. No rashes or lesions.    Neurologic: Grossly non-focal,  follows commands, A&Ox4, continues with mild tongue fascicualtions                  DATA:  Labs:    COAGS:    No results found for: APTT, PTP, INR, INREXT, INREXT  Recent Labs     07/22/20  0445 07/21/20  0850 07/20/20  1150   WBC 9.1 13.9* 11.1   HGB 10.5* 10.4* 10.3*   HCT 33.7* 33.3* 32.8*    237 227     Recent Labs     07/22/20  0445 07/21/20  0850 07/20/20  1150    137 136   K 4.5 5.2 5.1   CL 99* 99* 96*   CO2 27 22 23   GLU 85 99 135*   BUN 52* 101* 93*   CREA 17.50* 29.20* 27.30*   CA 6.8* <5.0* <5.0*   MG 2.0 2.0 2.1   PHOS 6.5* 8.4* 7.2*   ALB 2.7* 2.8* 3.0*   ALT 7* 11* 11*   INR  --   --  1.1     Lab Results   Component Value Date/Time    TSH 2.010 04/15/2019 05:45 AM    Free T4 1.12 04/15/2019 05:45 AM     Results for Jalil Marsh (MRN [id number]) as of 7/22/2020 18:53   Ref. Range 7/21/2020 08:50 7/21/2020 16:30 7/21/2020 18:45 7/22/2020 04:45 7/22/2020 14:30   Ionized Calcium Latest Ref Range: 1.12 - 1.32 MMOL/L 0.45 (LL) 0.59 (LL) 1.14 0.85 (L) 0.77 (LL)       No results found for: HBA1C, HGBE8, OZK9TGKL, TUL6JKCM, BZD6RANY    No results for input(s): PH, PCO2, PO2, HCO3, FIO2 in the last 72 hours.     EKG Results     Procedure 720 Value Units Date/Time    EKG, 12 LEAD, INITIAL [[id number]] Collected:  07/22/20 0723    Order Status:  Completed Updated:  07/22/20 1220     Ventricular Rate 76 BPM      Atrial Rate 76 BPM      P-R Interval 148 ms      QRS Duration 94 ms      Q-T Interval 432 ms      QTC Calculation (Bezet) 486 ms      Calculated P Axis 49 degrees      Calculated R Axis -8 degrees      Calculated T Axis 153 degrees      Diagnosis --     Normal sinus rhythm  Possible Left atrial enlargement  Left ventricular hypertrophy  T wave abnormality, consider inferolateral ischemia  Prolonged QT  Abnormal ECG  When compared with ECG of 21-JUL-2020 12:01,  No significant change was found  Confirmed by [facility] Staff, MD, Jignesh Garg ([id number]) on 7/22/2020 12:20:13 PM      EKG, 12 LEAD, INITIAL [[id number]] Collected:  07/21/20 1201    Order Status:  Completed Updated:  07/21/20 1343     Ventricular Rate 72 BPM      Atrial Rate 72 BPM      P-R Interval 146 ms      QRS Duration 102 ms      Q-T Interval 466 ms QTC Calculation (Bezet) 510 ms      Calculated P Axis 54 degrees      Calculated R Axis -12 degrees      Calculated T Axis 125 degrees      Diagnosis --     Normal sinus rhythm  Possible Left atrial enlargement  Left ventricular hypertrophy  T wave abnormality, consider lateral ischemia  Prolonged QT  Abnormal ECG  When compared with ECG of 20-JUL-2020 13:52,  No significant change was found  Confirmed by Laney Quiros MD, Hong Arnold ([id number]) on 7/21/2020 1:43:08 PM      EKG, 12 LEAD, INITIAL [[id number]] Collected:  07/20/20 1352    Order Status:  Completed Updated:  07/20/20 1805     Ventricular Rate 82 BPM      Atrial Rate 82 BPM      P-R Interval 156 ms      QRS Duration 92 ms      Q-T Interval 478 ms      QTC Calculation (Bezet) 558 ms      Calculated P Axis 46 degrees      Calculated R Axis -28 degrees      Calculated T Axis 119 degrees      Diagnosis --     Normal sinus rhythm  Possible Left atrial enlargement  Left ventricular hypertrophy  T wave abnormality, consider lateral ischemia  Prolonged QT  Abnormal ECG  No previous ECGs available  Confirmed by Arianne Cadena ([id number]) on 7/20/2020 6:05:23 PM      EKG, 12 LEAD, INITIAL [[id number]]     Order Status:  Canceled            Echo: 4/16/201[facility])  Interpretation Summary  A complete two-dimensional transthoracic echocardiogram was performed (2D, M-  mode, Doppler and color flow Doppler). The study was technically adequate. 1. Mildly dilated LV cavity size with moderate concentric hypertrophy. 2. Mildly reduced LV systolic function with an EF of 46%. Mild global  hypokinesis. 3. Grade III diastolic dysfunction. 4. Normal RV size and function. 5. No hemodynamically significant valvular abnormalities. 6. Estimated RVSP of 45-50mmHg based on TR jet.     All other findings detailed below.  Previous echocardiogram done at [facility]  dated 08/09/2018 showed an EF of 56%, mild to moderate TR, and an RVSP of  49mmHg.           Imaging:    CXR Results  (Last 48 hours)    None            [x]  Imaging  I have personally reviewed the patients radiographs  []Radiographs reviewed with radiologist   [x]No change from prior, tubes and lines in adequate position  []Improved   []Worsening    High complexity decision making was performed during the evaluation of this patient at high risk for decompensation with multiple organ involvement     Above mentioned total time spent on reviewing the case/medical record/data/notes/EMR/patient examination/documentation/coordinating care with nurse/consultants, exclusive of procedures with complex decision making performed and > 50% time spent in face to face evaluation. Critical Care Time:  The services I provided to this patient were to treat and/or prevent clinically significant deterioration that could result in the failure of one or more body systems and/or organ systems due to respiratory distress, hypoxia, cardiac dysrhythmia. Services included the following:  -reviewing nursing notes and old charts  -direct patient care  -medication orders and management  -interpreting and reviewing diagnostic studies/labs  -re-evaluations  -documentation time     Aggregate critical care time was 30   minutes, which includes only time during which I was engaged in work directly related to the patient's care as described above. During this entire length of time I was immediately available to the patient. The reason for providing this level of medical care for this critically ill patient was due a critical illness that impaired one or more vital organ systems such that there was a high probability of imminent or life threatening deterioration in the patients condition.  This care involved high complexity decision making to assess, manipulate, and support vital system functions, to treat this degreee vital organ system failure and to prevent further life threatening deterioration of the patients condition      Complex decision making was made in the evaluation and management plans during this consultation. More than 50% of time was spent in counseling and coordination of care including review of data and discussion with other team members.         Julieta Davila DO, [facility]    [facility]  Pulmonary, Critical Care, and Sleep Medicine

## 2020-07-22 NOTE — DIALYSIS
ACUTE HEMODIALYSIS FLOW SHEET    HEMODIALYSIS ORDERS: Physician: Dr. Roland Serrano     Dialyzer: Revaclear   Duration: 4 hr   BFR: 350   DFR: 800   Dialysate:  Temp 36-37*C   K+  2.0    Ca+ 4.0   Na 138   Bicarb 35   Wt Readings from Last 1 Encounters:   07/20/20 88 kg (194 lb)    Patient Chart [x]   Unable to Obtain []  Dry weight/UF Goal: 4000 ml    Heparin []  Bolus    Units    [] Hourly    Units    [x]None       Pre BP:   173/116    Pulse:  91   Respirations: 16    Temperature:  98.2    Tx: NSS    ml/Bolus   [x] N/A   Labs: [x]  Pre  [x]  Post:   [] N/A   Additional Orders(medications, blood products, hypotension management): [x] Yes   [] No     [x]  [facility] Consent Verified     CATHETER ACCESS:  []N/A   [x]Right   []Left   []IJ   []Fem  [x]Chest wall  []TransHepatic   [x] First use X-ray verified     [x]Tunnel    [] Non Tunneled   []No S/S infection  []Redness  []Drainage []Cultured []Swelling []Pain   []Medical Aseptic Prep Utilized   []Dressing Changed  [x] Biopatch  Date: 7/21/2020   []Clotted   [x]Patent   Flows: [x]Good  []Poor  []Reversed   If access problem,  notified: []Yes    [x]N/A        GRAFT/FISTULA ACCESS:   [x]N/A     []Right     []Left     []UE     []LE   []AVG   []AVF       []Medical Aseptic Prep Utilized   []No S/S infection  []Redness  []Drainage [] Cultured  [] Swelling  [] Pain  Bruit:   [] Strong    [] Weak       Thrill :   [] Strong    [] Weak     Needle Gauge: 15   Length: 1 inch   If access problem,  notified: []Yes     []N/A          GENERAL ASSESSMENT:    LUNGS:  Rate 16   SaO2%      [x] Clear  [] Coarse  [] Crackles  [] Wheezing                                                [x] Diminished     Location : []RLL   []LLL    []RUL  []TRICIA   Cough:      []Productive  []Dry  [x]N/A   Respirations:  [x]Easy  []Labored   Therapy:  []RA  O2 Type:  []NC  Mask: []   NRB    [] BiPaP  Flow:  l/min                   [] Ventilator  [] Intubated  [] Trach     CARDIAC: [] Regular      [x] Irregular   [] Pericardial Rub            []  Monitored  [] Bedside  [] Remotely monitored     EDEMA: [x] None   []Generalized  [] Pitting [] 1+   [] 2 +   [] 3+    [] 4+  [] Pedal    SKIN:   [x] Warm  [] Hot     [] Cold   [x] Dry     [] Pale   [] Diaphoretic                  [] Flushed  [] Jaundiced  [] Cyanotic     LOC:    [] Alert      [x]Oriented:    [x] Person     [x] Place  []Time               [] Confused  [x] Lethargic  [] Medicated  [] Non-responsive   GI / ABDOMEN:                     [] Flat    [] Distended    [x] Soft    [] Firm   []  Obese                   [] Diarrhea   [] FMS [x] Bowel Sounds  [] Nausea  [] Vomiting     / URINE ASSESSMENT:                   [x] Voiding    [] Oliguria  [] Anuria   []  [place]                  [] Incontinent  []  Incontinent Brief   []  PureWick     PAIN:  [x] 0 []1  []2   []3   []4   []5   []6   []7   []8   []9   []10            Scale 0-10  Action/Follow Up:    MOBILITY:  [x] Bed    [] Stretcher      All Vitals and Treatment Details on Attached [address]: [facility]          Room # BETTY/BETTY    [] Routine         [] 1st Time Acute    [x] Urgent      [] Stat            [x] Acute Room   []  Bedside    [] ICU/CCU     [] ER   Isolation Precautions:  [x] Dialysis    There are currently no Active Isolations     ALLERGIES:     No Known Allergies   Code Status:  Full Code     Hepatitis Status   2nd RN check :     Lab Results   Component Value Date/Time    Hepatitis B surface Ag <0.10 07/20/2020 11:30 AM    Hepatitis B surface Ab 259.23 07/20/2020 11:30 AM    HCV Ab <0.1 09/11/2018 09:35 AM        Current Labs:      Lab Results   Component Value Date/Time    WBC 13.9 (H) 07/21/2020 08:50 AM    Hemoglobin, POC 10.2 (L) 07/06/2020 08:43 AM    HGB 10.4 (L) 07/21/2020 08:50 AM    Hematocrit, POC 30 (L) 07/06/2020 08:43 AM    HCT 33.3 (L) 07/21/2020 08:50 AM    PLATELET 148 76/92/5266 08:50 AM    MCV 79.7 07/21/2020 08:50 AM     Lab Results   Component Value Date/Time Sodium 137 07/21/2020 08:50 AM    Potassium 5.2 07/21/2020 08:50 AM    Chloride 99 (L) 07/21/2020 08:50 AM    CO2 22 07/21/2020 08:50 AM    Anion gap 16 07/21/2020 08:50 AM    Glucose 99 07/21/2020 08:50 AM     (H) 07/21/2020 08:50 AM    Creatinine 29.20 (H) 07/21/2020 08:50 AM    BUN/Creatinine ratio 3 (L) 07/21/2020 08:50 AM    GFR est AA 2 (L) 07/21/2020 08:50 AM    GFR est non-AA 2 (L) 07/21/2020 08:50 AM    Calcium <5.0 (LL) 07/21/2020 08:50 AM          DIET:  DIET NPO     PRIMARY NURSE REPORT:   Pre Dialysis: Karina Danielle RN     Time: 6993      EDUCATION:    [x] Patient [] Other           Knowledge Basis: []None [x]Minimal [] Substantial   Barriers to learning  [x]N/A   [] Access Care     [] S&S of infection  [] Fluid Management  [] K+   [x] Procedural    []Albumin   [] Medications   [] Tx Options   [] Transplant   [] Diet   [] Other   Teaching Tools:  [x] Explain  [] Demo  [] Handouts [] Video  Patient response: [x] Verbalized understanding  [] Teach back  [] Return demonstration   [x] Requires follow up      [x]Time Out/Safety Check  [x] Extracorporeal Circuit Tested for integrity       RO/HEMODIALYSIS MACHINE SAFETY CHECKS - Before each treatment:      [facility]                                    [x] Unit Machine # 7 with centralized RO                                                                                                   Alarm Test:  Pass time 1600            [x] RO/Machine Log Complete    Machine Temp    36*C             Dialysate: pH  7.4    Conductivity: Meter 14.0     HD Machine  13.9      TCD: 13.7  Dialyzer Lot # [id number]     Blood Tubing Lot # [id number]     Saline Lot # [id number]     CHLORINE TESTING-Before each treatment and every 4 hours    Total Chlorine: [x] less than 0.1 ppm  Initial Time Check: 1300       4 Hr/2nd Check Time: 1700   (if greater than 0.1 ppm from Primary then every 30 minutes from Secondary)     TREATMENT INITIATION - with Dialysis Precautions: [x] All Connections Secured              [x] Saline Line Double Clamped   [x] Venous Parameters Set               [x] Arterial Parameters Set    [x] Prime Given 250ml NSS              [x]Air Foam Detector Engaged      Treatment Initiation Note:  3094  Pt received from Int Rad via stretcher post placement of Rt chest wall Tunneled Dialysis catheter and PICC line insertion at adjacent site via Rt IJ. Treatment time 4 hours with 4 GM Calcium bath. Ionized Ca obtained and sent to lab at the request of the ICU physician. 1630  Rt TCD intact, patent and flushes well. Dialysis initiated and Dr Keyanna Way notified. CaGluconate 3 Gm infusion ordered over 3 hours for administration during dialysis via PICC line. During Treatment Notes:  1730  CaGluconate 3Gm infusion started. Dr Alan Root notified of pt BP. Clonidine 0.2mg po dose ordered. Vascular access visible with arterial and venous line connections intact. Pt sleeping. 1830  Vascular access visible with arterial and venous line connections intact. Pt resting comfortably. 1845  Clonidine received from pharmacy and administered. 1930  Vascular access visible with arterial and venous line connections intact. Dialysis complete. Calcium Gluconate infusion complete. Ionized Ca drawn and set to lab.   Dr Keyanna Way notified       Medication Dose Volume Route Time [facility] Nurse, Title   Ca Gluconate 3  ml IV PICC line 1730 Casey Jain RN   Clonidine 0.2 mg 1 tab PO 1845 Casey Jain RN     Post Assessment  Dialyzer Cleared:   [x] Good  [] Fair  [] Poor  Blood processed:  80.9 L  UF Removed:  4000 Ml  Post Wt: n/a  Post /115   Pulse  94 Resp  16  Temp 98.3 Lungs: [] Clear [x] Course  [] Crackles                 []  Wheezing   [x] Diminished   Post Tx Vascular Access: [x] N/A       Cardiac :[] Regular   [x] Irregular   Rhythm:  [] Monitored   [x] Not Monitored    CVC Catheter: [] N/A  Locking solution: Heparin 1:1000 U  Arterial port 1.9 ml Venous port 1.9 ml   Edema:  [x] None  [] Generalized                     Skin:[x] Warm  [x] Dry [] Diaphoretic               [] Flushed  [] Pale [] Cyanotic Pain:  [x]0  []1 []2  []3 []4  []5  []6  []7 []8  []9  []10     Post Treatment Note:   2100  Pt tolerated dialysis well. Dialysis catheter intact, patent and heplocked as noted above. CaGluconate infusion complete.   Ionized CA obtained and sent to lab     POST TREATMENT PRIMARY NURSE HANDOFF REPORT:   Post Dialysis: Mirna Tesfaye RN                Time:  2130       Abbreviations: AVG-arterial venous graft, AVF-arterial venous fistula, IJ-Internal Jugular, Subcl-Subclavian, Fem-Femoral, Tx-treatment, AP/HR-apical heart rate, VSS- Vital Signs Stable, CVC- Central Venous Catheter, DFR-dialysate flow rate, BFR-blood flow rate, AP-arterial pressure, -venous pressure, UF-ultrafiltrate, TMP-transmembrane pressure, Biju-Venous, Art-Arterial, RO-Reverse Osmosis

## 2020-07-22 NOTE — PROGRESS NOTES
Comprehensive Nutrition Assessment    Type and Reason for Visit: Initial, Positive nutrition screen(MST)    Nutrition Recommendations/Plan:   - Start renal diet, multivitamin and oral supplements: Nepro TID. Nutrition Assessment:  Able to tolerate oral medications this morning and nausea/vomiting resolved per nursing and pt hungry, asking to eat. Lethargic this morning. HD UF: 4 L (7/21) and plan for dialysis again today; given calcitriol, calcium carbonate, 3 gm Ca with ondansetron ordered prn/not given and tigan held. Malnutrition Assessment:  Malnutrition Status:  No malnutrition      Nutrition History and Allergies: Admitted to [facility] 7/6-/7/17/20 for parathyroidectomy. Patient left [facility] 4/17/[facility]. Admitted with profound weakness and nausea with emesis, hypocalcemia and acute on chronic renal failure requiring emergent hemodialysis. PD pt, noncompliant. Estimated Daily Nutrient Needs:  Energy (kcal):  3885-0626  Protein (g):  106-132       Fluid (ml/day):  750-1500    Nutrition Related Findings:  BM PTA      Wounds:    None       Current Nutrition Therapies:   DIET NPO    Anthropometric Measures:  · Height:  6' 2\" (188 cm)  · Current Body Wt:  88 kg (194 lb 0.1 oz)   · Admission Body Wt:  194 lb 0.1 oz    · Usual Body Wt:  190 lb     · Ideal Body Wt:  190:  102.1 %   · BMI Categories:  Normal weight (BMI 18.5-24. 9)       Nutrition Diagnosis:   · Increased nutrient needs(protein) related to catabolic illness, renal dysfunction(hemodialysis) as evidenced by (increased losses during hemodialysis)      Nutrition Interventions:   Food and/or Nutrient Delivery: Start oral diet, Start oral nutrition supplement, Vitamin supplement  Coordination of Nutrition Care: Continued inpatient monitoring, Interdisciplinary rounds(verbal order obtained from MD)    Goals:  PO nutrition intake will meet >75% of patient estimated nutritional needs within the next 7 days.        Nutrition Monitoring and Evaluation: Behavioral-Environmental Outcomes: Beliefs and attitudes, Readiness for change  Food/Nutrient Intake Outcomes: Diet advancement/tolerance, Food and nutrient intake, Supplement intake, Vitamin/mineral intake  Physical Signs/Symptoms Outcomes: Biochemical data, GI status, Meal time behavior, Nutrition focused physical findings    Discharge Planning:    Continue oral nutrition supplement, Continue current diet     Electronically signed by Karen Sandhu RD, [address]  on 7/22/2020 at 2:42 PM    Contact: [phone]

## 2020-07-22 NOTE — PROGRESS NOTES
RENAL DAILY PROGRESS NOTE    Patient: Pieter Bonilla               Sex: male          DOA: 7/20/2020 11:26 AM        YOB: 1970      Age:  44 y.o.        LOS:  LOS: 2 days     Subjective: Pieter Bonilla is a 44 y.o.  who presents with Hypocalcemia [E83.51]  Hypocalcemia [E83.51]  Acute renal failure superimposed on chronic kidney disease, unspecified CKD stage, unspecified acute renal failure type ([facility] [address] [N17.9, N18.9]. Asked to evaluate for esrd and hypocalcemia,s/p subtotal parathyroidectomy,non compliance  Chief complains: none.   - Reviewed last 24 hrs events     Current Facility-Administered Medications   Medication Dose Route Frequency    cloNIDine HCL (CATAPRES) tablet 0.2 mg  0.2 mg Oral BID    amLODIPine (NORVASC) tablet 10 mg  10 mg Oral DAILY    losartan (COZAAR) tablet 100 mg  100 mg Oral DAILY    ELECTROLYTE REPLACEMENT PROTOCOL - Calcium   1 Each Other PRN    [START ON 7/23/2020] therapeutic multivitamin (THERAGRAN) tablet 1 Tab  1 Tab Oral DAILY    labetaloL (NORMODYNE;TRANDATE) 20 mg/4 mL (5 mg/mL) injection 10 mg  10 mg IntraVENous Q6H PRN    acetaminophen (TYLENOL) solution 500 mg  500 mg Oral Q4H PRN    calcitRIOL (ROCALTROL) capsule 0.5 mcg  0.5 mcg Oral BID    calcium carbonate (OS-VIRAL) tablet 1,000 mg [elemental]  1,000 mg Oral QID    sodium chloride (NS) flush 5-40 mL  5-40 mL IntraVENous Q8H    sodium chloride (NS) flush 5-40 mL  5-40 mL IntraVENous PRN    hydrALAZINE (APRESOLINE) 20 mg/mL injection 20 mg  20 mg IntraVENous Q6H PRN    heparin (porcine) injection 5,000 Units  5,000 Units SubCUTAneous Q8H    [Held by provider] trimethobenzamide (TIGAN) injection 200 mg  200 mg IntraMUSCular Q6H PRN       Objective:     Visit Vitals  BP (!) 166/122   Pulse 70   Temp 98.4 °F (36.9 °C)   Resp 20   Ht 6' 2\" (1.88 m)   Wt 88 kg (194 lb)   SpO2 99%   BMI 24.91 kg/m²       Intake/Output Summary (Last 24 hours) at 7/22/2020 1505  Last data filed at 7/21/2020 2200  Gross per 24 hour   Intake --   Output 4000 ml   Net -4000 ml       Physical Examination:       RS: diminished breath sounds  CVS: S1-S2 heard, RRR, No S3 / murmur  Abdomen: Soft,   Extremities: No edema,  CNS: Awake  HEENT: Head is atraumatic, PERRLA, conjunctiva pink & non icteric. No JVD or carotid bruit       Data Review:      Labs:     Hematology:   Recent Labs     07/22/20  0445 07/21/20  0850 07/20/20  1150   WBC 9.1 13.9* 11.1   HGB 10.5* 10.4* 10.3*   HCT 33.7* 33.3* 32.8*     Chemistry:   Recent Labs     07/22/20  0445 07/21/20  0850 07/20/20  1150   BUN 52* 101* 93*   CREA 17.50* 29.20* 27.30*   CA 6.8* <5.0* <5.0*   ALB 2.7* 2.8* 3.0*   K 4.5 5.2 5.1    137 136   CL 99* 99* 96*   CO2 27 22 23   PHOS 6.5* 8.4* 7.2*   GLU 85 99 135*        Images:    XR (Most Recent). CXR reviewed by me and compared with previous CXR Results from Hospital Encounter encounter on 07/20/20   XR CHEST PORT    Narrative Portable CXR:    HISTORY: Pleural effusion. Hypocalcemia. Dialysis patient. Comparison July 8, 2020    Mild cardiomegaly with no significant vascular congestion. Loculated collection  in the left lower chest is unchanged. Pleural effusion and right chest also  stable. Parenchymal disease in the lung bases not completely excluded. Impression IMPRESSION: Stable bilateral pleural effusion, loculation on the left. Parenchymal disease in the lung bases not excluded. No vascular congestion. CT (Most Recent) Results from Hospital Encounter encounter on 02/29/20   CT ABD PELV WO CONT    Narrative Indication: evaluate PD cuff. Peritoneal dialysis tube malpositioned. Impression IMPRESSION: Left lower quadrant peritoneal dialysis catheter looped in pelvis. Bilateral pleural effusions. Cardiomegaly. Gallbladder wall thickening,  ultrasound follow-up recommended. Bilateral renal cysts. Diverticulosis. Left  inguinal canal fluid collection.     Comment: CT images of the abdomen and pelvis were obtained without oral or  intravenous contrast. The initial report was rendered by [facility]  radiology services. Small bilateral pleural effusions are present with atelectasis in the underlying  lungs. The heart is enlarged. The gallbladder is contracted and thickening of the fundus wall is noted with  suspicion of tiny stone. Nonemergent ultrasound recommended for further  evaluation. The liver spleen pancreas and adrenal glands are unremarkable. The kidneys are mildly atrophic and harbor cysts. Calculus or hydronephrosis. The ureters and bladder are unremarkable. Peritoneal dialysis catheter is looped in the pelvis entering the peritoneum in  the left lower quadrant. Fluid collection is noted in the left inguinal canal.    Mild colonic diverticulosis is noted without diverticulitis. Pelvic viscera is unremarkable. No bowel obstruction, free intraperitoneal air, free fluid or appendicitis. DICOM format imaged data is available to non-affiliated external healthcare  facilities or entities on a secure, media free, reciprocally searchable basis  with patient authorization  for 12 months following the date of the study. EKG No results found for this or any previous visit. I have personally reviewed the old medical records and patient's labs    Plan / Recommendation:      1. Esrd,plan dialysis today with high ca bath  2. hypocalcemia,related to subtotal parathyroidectomy. hungry bone syndrome. ordered iv calcium gluconate. continue po calcium carbonate and calcitriol  3.htn,resume bp meds  4-hyperphosphatemia,not compliant with calcium acetate  5- depression,he will benefit from anti depression meds        Becky Shore MD  Nephrology  7/22/2020

## 2020-07-22 NOTE — PROGRESS NOTES
ACUTE HEMODIALYSIS FLOW SHEET    HEMODIALYSIS ORDERS: Physician: Dr. Juarez Sosary: Revaclear   Duration: 4 hr  BFR: 350   DFR: 800   Dialysate:  Temp 36.0   K+   2    Ca+  3.5   Na 138   Bicarb 30   Wt Readings from Last 1 Encounters:   07/20/20 88 kg (194 lb)    Patient Chart [x]   Unable to Obtain []  Dry weight/UF Goal: 4000 ml  Access RIJ     Heparin []  Bolus    Units    [] Hourly    Units    [x]None      Catheter locking solution Heparin 1:1000   Pre BP:   147/102    Pulse:  78   Respirations: 18    Temperature:  98.1    Tx: NSS   ml/Bolus   [x] N/A   Labs: []  Pre  []  Post:   [x] N/A   Additional Orders(medications, blood products, hypotension management): [] Yes   [x] No     [x]  [facility] Consent Verified     CATHETER ACCESS:  []N/A   [x]Right   []Left   [x]IJ     []Fem   []Chest wall   [] First use X-ray verified     [x]Tunnel    [] Non Tunneled   [x]No S/S infection  []Redness  []Drainage []Cultured []Swelling []Pain   [x]Medical Aseptic Prep Utilized   []Dressing Changed  [] Biopatch  Date: 7/21/20   []Clotted   [x]Patent   Flows: [x]Good  []Poor  []Reversed   If access problem,  notified: []Yes    []N/A        GRAFT/FISTULA ACCESS:   [x]N/A     []Right     []Left     []UE     []LE   []AVG   []AVF     []Buttonhole    []Medical Aseptic Prep Utilized   []No S/S infection  []Redness  []Drainage []Cultured  [] Swelling  [] Pain  Bruit:   [] Strong    [] Weak       Thrill :   [] Strong    [] Weak     Needle Gauge: 15   Length: 1 inch   If access problem,  notified: []Yes     [x]N/A     Please describe access if present and not used: N/A       GENERAL ASSESSMENT:    LUNGS:   SaO2%  100    [] Clear  [x] Coarse  [] Crackles  [] Wheezing                                                [] Diminished     Location : []RLL   []LLL    []RUL  []TRICIA   Cough: []Productive  []Dry  [x]N/A   Respirations:  [x]Easy  []Labored   Therapy:  [x]RA  []NC /min    Mask: []NRB  [] Venti    O2% []Ventilator  []Intubated  [] Trach  [] BiPaP   CARDIAC: []Regular      [x] Irregular   [] Pericardial Rub  [] JVD          []  Monitored  [] Bedside  [] Remotely monitored     EDEMA: [] None  [x]Generalized  [] Pitting [] 1    [] 2    [] 3    [] 4                 [] Facial  [] Pedal  []  UE  [] LE   SKIN:   [x] Warm  [] Hot     [] Cold   [x] Dry     [] Pale   [] Diaphoretic                  [] Flushed  [] Jaundiced  [] Cyanotic  [] Rash  [] Weeping   LOC:    [x] Alert      [x]Oriented:    [x] Person     [x] Place  [x]Time               [] Confused  [] Lethargic  [] Medicated  [] Non-responsive   GI / ABDOMEN:                     [] Flat    [] Distended    [x] Soft    [] Firm   []  Obese                   [] Diarrhea  [x] Bowel Sounds  [] Nausea  [] Vomiting     / URINE ASSESSMENT:                   [x] Voiding   [] Oliguria  [] Anuria   []  Murillo                  [] Incontinent  []  Incontinent Brief []  Fecal Management System     PAIN:  [x] 0 []1  []2   []3   []4   []5   []6   []7   []8   []9   []10            Scale 0-10  Action/Follow Up:    MOBILITY:  [x] Bed    [] Stretcher      All Vitals and Treatment Details on Attached [address]: [address] [facility]          Room # 434/08   [] 1st Time Acute      [] Stat       [x] Routine      [] Urgent     [x] Acute Room  []  Bedside  [] ICU/CCU  [] ER   Isolation Precautions:  [x] Dialysis    There are currently no Active Isolations       ALLERGIES:     No Known Allergies   Code Status:  Full Code     Hepatitis Status     Lab Results   Component Value Date/Time    Hepatitis B surface Ag <0.10 07/20/2020 11:30 AM    Hepatitis B surface Ab 259.23 07/20/2020 11:30 AM    HCV Ab <0.1 09/11/2018 09:35 AM        Current Labs:      Lab Results   Component Value Date/Time    WBC 9.1 07/22/2020 04:45 AM    Hemoglobin, POC 10.2 (L) 07/06/2020 08:43 AM    HGB 10.5 (L) 07/22/2020 04:45 AM    Hematocrit, POC 30 (L) 07/06/2020 08:43 AM    HCT 33.7 (L) 07/22/2020 04:45 AM    PLATELET 218 07/22/2020 04:45 AM    MCV 81.6 07/22/2020 04:45 AM     Lab Results   Component Value Date/Time    Sodium 136 07/22/2020 04:45 AM    Potassium 4.5 07/22/2020 04:45 AM    Chloride 99 (L) 07/22/2020 04:45 AM    CO2 27 07/22/2020 04:45 AM    Anion gap 10 07/22/2020 04:45 AM    Glucose 85 07/22/2020 04:45 AM    BUN 52 (H) 07/22/2020 04:45 AM    Creatinine 17.50 (H) 07/22/2020 04:45 AM    BUN/Creatinine ratio 3 (L) 07/22/2020 04:45 AM    GFR est AA 3 (L) 07/22/2020 04:45 AM    GFR est non-AA 3 (L) 07/22/2020 04:45 AM    Calcium 6.8 (L) 07/22/2020 04:45 AM          DIET:  DIET RENAL  DIET NUTRITIONAL SUPPLEMENTS      PRIMARY NURSE REPORT:   Pre Dialysis: Samuel Buitrago     Time: 1400        EDUCATION:    [x] Patient [] Other           Knowledge Basis: []None [x]Minimal [] Substantial   Barriers to learning  [x]N/A   [] Access Care     [] S&S of infection  [] Fluid Management  [] K+   [x] Procedural    []Albumin   [] Medications   [] Tx Options   [] Transplant   [] Diet   [] Other   Teaching Tools:  [x] Explain  [] Demo  [] Handouts [] Video  Patient response: [x] Verbalized understanding  [] Teach back  [] Return demonstration   [] Requires follow up      [x]Time Out/Safety Check  [x] Extracorporeal Circuit Tested for integrity       RO/HEMODIALYSIS MACHINE SAFETY CHECKS - Before each treatment:     Machine Number:                   [facility]                                                               [x] Portable Machine #1/RO serial # [id number]                                                                                                               Alarm Test:  Pass time 1500            [x] RO/Machine Log Complete    Machine Temp    36.0             Dialysate: pH  7.4    Conductivity: Meter 14.0     HD Machine  14.1      TCD: 13.8  Dialyzer Lot # [id number]     Blood Tubing Lot # [id number]     Saline Lot # [id number]     CHLORINE TESTING-Before each treatment and every 4 hours    Total Chlorine: [x] less than 0.1 ppm  Initial Time Check: 1535       4 Hr/2nd Check Time:    (if greater than 0.1 ppm from Primary then every 30 minutes from Secondary)     TREATMENT INITIATION - with Dialysis Precautions:   [x] All Connections Secured              [x] Saline Line Double Clamped   [x] Venous Parameters Set               [x] Arterial Parameters Set    [x] Prime Given 250ml NSS              [x]Air Foam Detector Engaged      Treatment Initiation Note:  9432 Arrived at pt's room, pt denies any pain or SOB. Pt A & O  X 3, follows commands, no distress noted at this time. Pt sitting up in bed talking on phone without any complaints. VSS. Will continue to monitor pt's status. During Treatment Notes:  4080  RIJ CVC assessed no abnormalities noted, line patent with good flow. CVC accessed without any difficulty, pt tolerated well. Vascular access visible with arterial and venous line connections intact. 1600  Pt lying in bed with eyes closed without any distress. Vascular access visible with arterial and venous line connections intact. 1700  Pt resting with eyes closed, no complaints at this time. Vascular access visible with arterial and venous line connections intact. 1730 Pt's blood pressure dropped form 150/104 to 117/92. BP rechecked 94/71. UF turned off. EKG changes noted on the bedside monitor. Pt becomes altered but still able to speak and states \"his abdomen hurts on the left side. 1735   Pt given 250cc bolus of normal saline  1740  Pt's BS is 95   1745  Pt continue to be hypotensive. ROLANDO Farr at bedside evaluating patient. Pt given 100cc bolus of normal saline. 1752  Pt still hypotensive with confusion 150cc bolus of normal saline given. [address] [facility]  Dr. Rene Moon notified of pts condition. Verbal order given to stop dialysis at this time. 1800  Pt rinsed back without any problems   1805  ROLANDO Frost returns to bedside.  Pt sits up in bed and began asking questions pt able to follow commands. 1820  Pt given meal and is sitting up in bed eating, no distress noted. Medication Dose Volume Route Time Milton Nurse, Title   none     Osvaldo Hutton RN     Post Assessment  Dialyzer Cleared:[] Good [x] Fair  [] Poor  Blood processed:  44.1 L  UF Removed:  2000 Ml  Post /98  Pulse  88 Resp  17   Temp     Post Tx Vascular Access: [x] N/A        CVC Catheter: [] N/A  Locking solution: Heparin 1:1000 U  Arterial port 1.9 ml   Venous port 1.9ml         Skin:[x] Warm  [x] Dry [] Diaphoretic               [] Flushed  [] Pale [] Cyanotic   Pain:  [x]0  []1 []2  []3 []4  []5  []6 []7 []8  []9  []10       Post Treatment Note:   8053  HD completed at this time, pt tolerated well. Dressing clean, dry and intact. POST TREATMENT PRIMARY NURSE HANDOFF REPORT:   Post Dialysis: RANJITH Cheney RN                Time:  1245       Abbreviations: AVG-arterial venous graft, AVF-arterial venous fistula, IJ-Internal Jugular, Subcl-Subclavian, Fem-Femoral, Tx-treatment, AP/HR-apical heart rate, DFR-dialysate flow rate, BFR-blood flow rate, AP-arterial pressure, -venous pressure, UF-ultrafiltrate, TMP-transmembrane pressure, Biju-Venous, Art-Arterial, RO-Reverse Osmosis

## 2020-07-23 LAB
ALBUMIN SERPL-MCNC: 2.8 G/DL (ref 3.4–5)
ALBUMIN/GLOB SERPL: 0.7 {RATIO} (ref 0.8–1.7)
ALP SERPL-CCNC: 120 U/L (ref 45–117)
ALT SERPL-CCNC: 7 U/L (ref 16–61)
ANION GAP SERPL CALC-SCNC: 12 MMOL/L (ref 3–18)
AST SERPL-CCNC: 10 U/L (ref 10–38)
ATRIAL RATE: 97 BPM
BASOPHILS # BLD: 0 K/UL (ref 0–0.1)
BASOPHILS NFR BLD: 1 % (ref 0–2)
BILIRUB SERPL-MCNC: 0.7 MG/DL (ref 0.2–1)
BUN SERPL-MCNC: 35 MG/DL (ref 7–18)
BUN/CREAT SERPL: 3 (ref 12–20)
CA-I SERPL-SCNC: 0.91 MMOL/L (ref 1.12–1.32)
CA-I SERPL-SCNC: 0.99 MMOL/L (ref 1.12–1.32)
CA-I SERPL-SCNC: 1.05 MMOL/L (ref 1.12–1.32)
CA-I SERPL-SCNC: 1.07 MMOL/L (ref 1.12–1.32)
CALCIUM SERPL-MCNC: 7.4 MG/DL (ref 8.5–10.1)
CALCULATED P AXIS, ECG09: 51 DEGREES
CALCULATED R AXIS, ECG10: -8 DEGREES
CALCULATED T AXIS, ECG11: -176 DEGREES
CHLORIDE SERPL-SCNC: 105 MMOL/L (ref 100–111)
CO2 SERPL-SCNC: 22 MMOL/L (ref 21–32)
CREAT SERPL-MCNC: 14 MG/DL (ref 0.6–1.3)
DIAGNOSIS, 93000: NORMAL
DIFFERENTIAL METHOD BLD: ABNORMAL
EOSINOPHIL # BLD: 0.5 K/UL (ref 0–0.4)
EOSINOPHIL NFR BLD: 6 % (ref 0–5)
ERYTHROCYTE [DISTWIDTH] IN BLOOD BY AUTOMATED COUNT: 16.4 % (ref 11.6–14.5)
GLOBULIN SER CALC-MCNC: 4 G/DL (ref 2–4)
GLUCOSE BLD STRIP.AUTO-MCNC: 84 MG/DL (ref 70–110)
GLUCOSE SERPL-MCNC: 79 MG/DL (ref 74–99)
HCT VFR BLD AUTO: 34.3 % (ref 36–48)
HGB BLD-MCNC: 10.6 G/DL (ref 13–16)
LYMPHOCYTES # BLD: 0.9 K/UL (ref 0.9–3.6)
LYMPHOCYTES NFR BLD: 12 % (ref 21–52)
MAGNESIUM SERPL-MCNC: 2.2 MG/DL (ref 1.6–2.6)
MCH RBC QN AUTO: 25.5 PG (ref 24–34)
MCHC RBC AUTO-ENTMCNC: 30.9 G/DL (ref 31–37)
MCV RBC AUTO: 82.7 FL (ref 74–97)
MONOCYTES # BLD: 0.5 K/UL (ref 0.05–1.2)
MONOCYTES NFR BLD: 7 % (ref 3–10)
NEUTS SEG # BLD: 5.4 K/UL (ref 1.8–8)
NEUTS SEG NFR BLD: 74 % (ref 40–73)
P-R INTERVAL, ECG05: 136 MS
PHOSPHATE SERPL-MCNC: 5.3 MG/DL (ref 2.5–4.9)
PLATELET # BLD AUTO: 186 K/UL (ref 135–420)
PMV BLD AUTO: 9.4 FL (ref 9.2–11.8)
POTASSIUM SERPL-SCNC: 4.8 MMOL/L (ref 3.5–5.5)
PROT SERPL-MCNC: 6.8 G/DL (ref 6.4–8.2)
Q-T INTERVAL, ECG07: 376 MS
QRS DURATION, ECG06: 102 MS
QTC CALCULATION (BEZET), ECG08: 477 MS
RBC # BLD AUTO: 4.15 M/UL (ref 4.7–5.5)
SODIUM SERPL-SCNC: 139 MMOL/L (ref 136–145)
VENTRICULAR RATE, ECG03: 97 BPM
WBC # BLD AUTO: 7.3 K/UL (ref 4.6–13.2)

## 2020-07-23 PROCEDURE — 74011250637 HC RX REV CODE- 250/637: Performed by: INTERNAL MEDICINE

## 2020-07-23 PROCEDURE — 74011000258 HC RX REV CODE- 258: Performed by: PHYSICIAN ASSISTANT

## 2020-07-23 PROCEDURE — 74011250636 HC RX REV CODE- 250/636: Performed by: INTERNAL MEDICINE

## 2020-07-23 PROCEDURE — 82962 GLUCOSE BLOOD TEST: CPT

## 2020-07-23 PROCEDURE — 74011250637 HC RX REV CODE- 250/637: Performed by: PHYSICIAN ASSISTANT

## 2020-07-23 PROCEDURE — 85025 COMPLETE CBC W/AUTO DIFF WBC: CPT

## 2020-07-23 PROCEDURE — 82330 ASSAY OF CALCIUM: CPT

## 2020-07-23 PROCEDURE — 74011250636 HC RX REV CODE- 250/636: Performed by: PHYSICIAN ASSISTANT

## 2020-07-23 PROCEDURE — 36592 COLLECT BLOOD FROM PICC: CPT

## 2020-07-23 PROCEDURE — 36600 WITHDRAWAL OF ARTERIAL BLOOD: CPT

## 2020-07-23 PROCEDURE — 65660000004 HC RM CVT STEPDOWN

## 2020-07-23 PROCEDURE — 84100 ASSAY OF PHOSPHORUS: CPT

## 2020-07-23 PROCEDURE — 74011000258 HC RX REV CODE- 258: Performed by: INTERNAL MEDICINE

## 2020-07-23 PROCEDURE — 80053 COMPREHEN METABOLIC PANEL: CPT

## 2020-07-23 PROCEDURE — 93005 ELECTROCARDIOGRAM TRACING: CPT

## 2020-07-23 PROCEDURE — 83735 ASSAY OF MAGNESIUM: CPT

## 2020-07-23 RX ORDER — CARVEDILOL 12.5 MG/1
12.5 TABLET ORAL EVERY 12 HOURS
Status: DISCONTINUED | OUTPATIENT
Start: 2020-07-23 | End: 2020-07-24 | Stop reason: SDUPTHER

## 2020-07-23 RX ADMIN — MELATONIN 6 MG: at 22:26

## 2020-07-23 RX ADMIN — Medication 10 ML: at 22:00

## 2020-07-23 RX ADMIN — CARVEDILOL 12.5 MG: 12.5 TABLET, FILM COATED ORAL at 22:27

## 2020-07-23 RX ADMIN — CALCIUM 1000 MG: 500 TABLET ORAL at 09:34

## 2020-07-23 RX ADMIN — AMLODIPINE BESYLATE 10 MG: 10 TABLET ORAL at 09:34

## 2020-07-23 RX ADMIN — HEPARIN SODIUM 5000 UNITS: 5000 INJECTION INTRAVENOUS; SUBCUTANEOUS at 15:43

## 2020-07-23 RX ADMIN — CALCIUM GLUCONATE 2 G: 98 INJECTION, SOLUTION INTRAVENOUS at 09:21

## 2020-07-23 RX ADMIN — LOSARTAN POTASSIUM 100 MG: 50 TABLET, FILM COATED ORAL at 09:34

## 2020-07-23 RX ADMIN — CALCIUM 1000 MG: 500 TABLET ORAL at 18:00

## 2020-07-23 RX ADMIN — ACETAMINOPHEN ORAL SOLUTION 500 MG: 650 SOLUTION ORAL at 10:10

## 2020-07-23 RX ADMIN — Medication 10 ML: at 05:13

## 2020-07-23 RX ADMIN — HYDRALAZINE HYDROCHLORIDE 20 MG: 20 INJECTION, SOLUTION INTRAMUSCULAR; INTRAVENOUS at 04:32

## 2020-07-23 RX ADMIN — CALCIUM 1000 MG: 500 TABLET ORAL at 22:27

## 2020-07-23 RX ADMIN — CALCIUM GLUCONATE 4 G: 98 INJECTION, SOLUTION INTRAVENOUS at 07:18

## 2020-07-23 RX ADMIN — CALCITRIOL CAPSULES 0.25 MCG 0.5 MCG: 0.25 CAPSULE ORAL at 22:26

## 2020-07-23 RX ADMIN — CALCITRIOL CAPSULES 0.25 MCG 0.5 MCG: 0.25 CAPSULE ORAL at 09:34

## 2020-07-23 RX ADMIN — THERA TABS 1 TABLET: TAB at 09:35

## 2020-07-23 RX ADMIN — HEPARIN SODIUM 5000 UNITS: 5000 INJECTION INTRAVENOUS; SUBCUTANEOUS at 09:35

## 2020-07-23 RX ADMIN — CLONIDINE HYDROCHLORIDE 0.2 MG: 0.1 TABLET ORAL at 18:00

## 2020-07-23 RX ADMIN — CALCIUM GLUCONATE 3 G: 98 INJECTION, SOLUTION INTRAVENOUS at 14:42

## 2020-07-23 RX ADMIN — HEPARIN SODIUM 5000 UNITS: 5000 INJECTION INTRAVENOUS; SUBCUTANEOUS at 00:00

## 2020-07-23 RX ADMIN — CLONIDINE HYDROCHLORIDE 0.2 MG: 0.1 TABLET ORAL at 09:35

## 2020-07-23 RX ADMIN — CALCIUM 1000 MG: 500 TABLET ORAL at 13:13

## 2020-07-23 RX ADMIN — Medication 10 ML: at 14:00

## 2020-07-23 NOTE — PROGRESS NOTES
[facility]. Pulmonary, Critical Care, and Sleep Medicine    Name: Dakota Zelaya MRN: [id number]   : 1970 Hospital: [address] [place] [address] Dr   Date: 2020  Admission Date: 2020     Chart and notes reviewed. Data reviewed. I have evaluated all findings. [x]I have reviewed the flowsheet and previous days notes. []The patient is unable to give any meaningful history or review of systems because the patient is:  []Intubated []Sedated   []Unresponsive      []The patient is critically ill on      []Mechanical ventilation []Pressors   []BiPAP []         Interval HPI: 44year old male with past medical history of ESRD on PD, HTN, STEFANIE, gout, BPH, and HFrEF 46% (2019), was admitted to [facility] -20 for parathyroidectomy and he left [facility] 20. Pt presented to [facility] again with reports of profound weakness and nausea w/ emesis. Pt was found to be profoundly hypocalcemic. He denies fever, chills, respiratory complaints, muscle cramps, spasms, paraesthesia, or clementine-oral numbness. He reports he has been staying home and has been performing his PD at home. Nephro following - Dr. Shamir Trinidad. Pt underwent HD on 20 however some painful muscle twitching was noted during HD and rhythm changed to wide complex tachycardia which spontaneously resolved - HD was stopped per nephrology recc's. Subjective 20  Hospital Day:3  Overnight events:no significant events noted overnight  Mentation/Activity: awake, alert and oriented x3  Respiratory/ Secretions: non-tenacious, non-stable  Hemodynamics: H/H stable                ROS:Pertinent items are noted in HPI. Events and notes from last 24 hours reviewed. Care plan discussed on multidisciplinary rounds.   Patient Active Problem List   Diagnosis Code    HTN, goal below 130/80 I10    Urinary retention R33.9    UTI (urinary tract infection) N39.0    Hematuria R31.9    Acute kidney injury ([address] N17.9    Anemia D64.9    Hyperparathyroidism ([facility] E21.3    Parathyroid hyperplasia ([facility] E21.0    Hypocalcemia E83.51    ESRD on peritoneal dialysis ([facility] N18.6, Z55.7    Systolic CHF, chronic (HCC) I50.22    Acute renal failure superimposed on chronic kidney disease ([facility] N17.9, N18.9       Vital Signs:  Visit Vitals  BP (!) 167/96 (BP 1 Location: Right arm, BP Patient Position: At rest;Head of bed elevated (Comment degrees))   Pulse (!) 101   Temp 98.2 °F (36.8 °C)   Resp 18   Ht 6' 2\" (1.88 m)   Wt 88 kg (194 lb)   SpO2 99%   BMI 24.91 kg/m²       O2 Device: Room air       Temp (24hrs), Av.1 °F (36.7 °C), Min:97.9 °F (36.6 °C), Max:98.4 °F (36.9 °C)       Intake/Output:   Last shift:      No intake/output data recorded. Last 3 shifts: 1901 -  0700  In: 620 [P.O.:320;  I.V.:300]  Out: 5650 [Urine:150]    Intake/Output Summary (Last 24 hours) at 2020 1540  Last data filed at 2020 0700  Gross per 24 hour   Intake 520 ml   Output 1550 ml   Net -1030 ml        Ventilator Settings:                Current Facility-Administered Medications   Medication Dose Route Frequency    calcium gluconate 3 g in 0.9% sodium chloride 100 mL IVPB  3 g IntraVENous ONCE    cloNIDine HCL (CATAPRES) tablet 0.2 mg  0.2 mg Oral BID    amLODIPine (NORVASC) tablet 10 mg  10 mg Oral DAILY    losartan (COZAAR) tablet 100 mg  100 mg Oral DAILY    therapeutic multivitamin (THERAGRAN) tablet 1 Tab  1 Tab Oral DAILY    melatonin tablet 6 mg  6 mg Oral QHS    calcitRIOL (ROCALTROL) capsule 0.5 mcg  0.5 mcg Oral BID    calcium carbonate (OS-VIRAL) tablet 1,000 mg [elemental]  1,000 mg Oral QID    sodium chloride (NS) flush 5-40 mL  5-40 mL IntraVENous Q8H    heparin (porcine) injection 5,000 Units  5,000 Units SubCUTAneous Q8H         Telemetry: [x]Sinus []A-flutter []Paced    []A-fib []Multiple PVCs                  Physical Exam:      General:  Alert and oriented x 3  Resting in ICU bed   Head:  Normocephalic, without obvious abnormality, atraumatic. Eyes:  Conjunctivae/corneas clear. PERRL. Nose: Nares normal. Septum midline. Mucosa normal.   Throat: Lips, mucosa, and tongue normal. Teeth and gums normal.   Neck: Supple, symmetrical, trachea midline, healing incision to anterior lower neck, no surrounding erythema, swelling or induration   Lungs:   Symmetrical chest rise; good AE bilat; CTA, Tunnel HH cath and CVL in right upper Chest Wall   Heart:  Borderline bradycardic, regular rhythm, S1, S2 normal   Abdomen:   Soft, non-tender. Bowel sounds normal. No distention   Extremities: Extremities normal, atraumatic, no cyanosis or edema. Pulses: 2+ and symmetric all extremities. Skin: Skin color, texture, turgor normal. No rashes or lesions. Neurologic: Grossly non-focal,  follows commands, A&Ox4           DATA:  MAR reviewed and pertinent medications noted or modified as needed    Labs:  Recent Labs     07/23/20  0430 07/22/20  0445 07/21/20  0850   WBC 7.3 9.1 13.9*   HGB 10.6* 10.5* 10.4*   HCT 34.3* 33.7* 33.3*    218 237     Recent Labs     07/23/20  0430 07/22/20  0445 07/21/20  0850    136 137   K 4.8 4.5 5.2    99* 99*   CO2 22 27 22   GLU 79 85 99   BUN 35* 52* 101*   CREA 14.00* 17.50* 29.20*   CA 7.4* 6.8* <5.0*   MG 2.2 2.0 2.0   PHOS 5.3* 6.5* 8.4*   ALB 2.8* 2.7* 2.8*   ALT 7* 7* 11*     No results for input(s): PH, PCO2, PO2, HCO3, FIO2 in the last 72 hours. No results for input(s): FIO2I, IFO2, HCO3I, IHCO3, HCOPOC, PCO2I, PCOPOC, IPHI, PHI, PHPOC, PO2I, PO2POC in the last 72 hours. No lab exists for component: IPOC2    Imaging:  [x]   I have personally reviewed the patients radiographs and reports  XR Results (most recent):  Results from Hospital Encounter encounter on 07/20/20   XR CHEST PORT    Narrative Portable CXR:    HISTORY: Pleural effusion. Hypocalcemia. Dialysis patient. Comparison July 8, 2020    Mild cardiomegaly with no significant vascular congestion.  Loculated collection  in the left lower chest is unchanged. Pleural effusion and right chest also  stable. Parenchymal disease in the lung bases not completely excluded. Impression IMPRESSION: Stable bilateral pleural effusion, loculation on the left. Parenchymal disease in the lung bases not excluded. No vascular congestion. CT Results (most recent):  Results from Hospital Encounter encounter on 02/29/20   CT ABD PELV WO CONT    Narrative Indication: evaluate PD cuff. Peritoneal dialysis tube malpositioned. Impression IMPRESSION: Left lower quadrant peritoneal dialysis catheter looped in pelvis. Bilateral pleural effusions. Cardiomegaly. Gallbladder wall thickening,  ultrasound follow-up recommended. Bilateral renal cysts. Diverticulosis. Left  inguinal canal fluid collection. Comment: CT images of the abdomen and pelvis were obtained without oral or  intravenous contrast. [facility]. Small bilateral pleural effusions are present with atelectasis in the underlying  lungs. The heart is enlarged. The gallbladder is contracted and thickening of the fundus wall is noted with  suspicion of tiny stone. Nonemergent ultrasound recommended for further  evaluation. The liver spleen pancreas and adrenal glands are unremarkable. The kidneys are mildly atrophic and harbor cysts. Calculus or hydronephrosis. The ureters and bladder are unremarkable. Peritoneal dialysis catheter is looped in the pelvis entering the peritoneum in  the left lower quadrant. Fluid collection is noted in the left inguinal canal.    Mild colonic diverticulosis is noted without diverticulitis. Pelvic viscera is unremarkable. No bowel obstruction, free intraperitoneal air, free fluid or appendicitis.     DICOM format imaged data is available to non-affiliated external healthcare  facilities or entities on a secure, media free, reciprocally searchable basis  with patient authorization  for 12 months following the date of the study. IMPRESSION:   · Hypocalcemia - 2/2 above; and patient leaving AMA  · Hyperphosphatemia- patient refusing PhosLo - states it causes nausea and patient already nauseated  · Secondary hyperparathyroidism - 2/2 ESRD; s/p subtotal parathyroidectomy 7/6/20 with Dr. Elena Collazo (Pathology notable for parathyroid hyperplasia)  · Hyperkalemia- mild  · Prolonged QT interval:  Slightly improved today. suspect related to hypocalcemia and electrolyte derangements. · ESRD - on PD 7 days/week, Nephrology: Dr. Adele Mas following, IR/ Vascular consult placed for HD cath - anticipate need for HD cath placement  · Pleural Effusions: most likely related to CHF and ESRD- last CXR prior to patient pulling out tunneled catheter last admission  · Hypertension  · HFrEF 46% (4/16/2019) with global hypokinesis and estimated RVSP of 45-50mmHg based on TR jet. · H/O Anxiety/ Depression  · Patient refusing certain therapies- has capacity based on Psych exam 7/21/2020 but we will need to continue to reassess     Patient Active Problem List   Diagnosis Code    HTN, goal below 130/80 I10    Urinary retention R33.9    UTI (urinary tract infection) N39.0    Hematuria R31.9    Acute kidney injury ([address] N17.9    Anemia D64.9    Hyperparathyroidism ([facility] E21.3    Parathyroid hyperplasia ([address] E21.0    Hypocalcemia E83.51    ESRD on peritoneal dialysis ([address] N18.6, Q23.5    Systolic CHF, chronic (HCC) I50.22    Acute renal failure superimposed on chronic kidney disease (HCC) N17.9, N18.9        RECOMMENDATIONS:   Neuro  · Serial exams  · No acute pain or withdrawal issues at this time  Resp:   · Supp O2 PRN for SpO2 >90%- currently 100%    · Pulmonary Hygiene, IS.    · Keep HOB elevated > 30 degrees, aspiration precautions. I/D:   · Trend WBCs and temp curve. · Monitor surgical site and drain output.   Hem/Onc:   · Daily CBC; H/H, and plts are stable.   · SQ heparin for DVT ppx  CVS:   · Telemetry: monitor QTC and avoid QTC prolonging agents  · Monitor hemodynamics. Metabolic/Yvonne;:   · Trend BMP, Mag, Phos. · Continue ionized calcium replacement   · Trend renal indices  · PD/HD per nephrology  · Replace lytes per protocol.     Endocrine:   · POC Glucose AC&HS. Avoid hypoglycemia. GI:   · NO ZOFRAN or PHENERGAN for nausea  · PO as tolerated by paitent  Musc/Skin:   · Per protocol   · Surgical incision care- site is healing well     · Code Status: FULL CODE  · Case discussed during rounds today, plan to transfer pt to stepdown, Dr. Lady Walker aware and accepted patient. Best practice :    Glycemic control  IHI ICU bundles:   Central Line Bundle Followed     [facility] Vent patients- VAP bundle, aim to keep peak plateau pressure 37-49ED H2O  Sress ulcer prophylaxis. DVT prophylaxis. Need for Lines, briones assessed. Restraints need. Palliative care evaluation. Bry Steele PA-C  07/23/20  Pulmonary, Critical Care Medicine  [facility]         [facility] Staff Addendum     I have independently evaluated the patient and reviewed the patient's chart. I have discussed the findings and care plan with ICU Care Team. Care Plan reviewed on ICU milti-D rounds. I agree with the above evaluation, assessment and recommendations along with the following comments and observations. Please also review my orders. Patient did well overnight. Still requiring IV replacement of calcium. Tolerating PO. At this time, no ongoing need for ICU admission. Will transfer to SDU for ongoing care and managment      Complex decision making was made in the evaluation and management plans during this consultation. More than 50% of time was spent in counseling and coordination of care including review of data and discussion with other team members. Further recommendations and therapies pending clinical course.     Critical Care and time spent coordinating care, minus procedure time:  30 min    Abilio Salazar DO, [facility]  Pulmonary, Sleep and Critical Care Medicine  4:42 PM

## 2020-07-23 NOTE — ROUTINE PROCESS
Bedside and Verbal shift change report given to [facility] [place], RN (oncoming nurse) by Minerva Lockett RN (offgoing nurse). Report included the following information SBAR, Kardex, ED Summary, Intake/Output, MAR and Cardiac Rhythm NSR-Sinus Tach.

## 2020-07-23 NOTE — ROUTINE PROCESS
Bedside and Verbal shift change report given Derick RN (oncoming nurse) by Zee Rodriguez RN (offgoing nurse). Report included the following information SBAR, Kardex, Intake/Output, MAR, Recent Results and Cardiac Rhythm . Maulik Reese

## 2020-07-23 NOTE — PROGRESS NOTES
[facility] Hospitalist Group  Progress Note    Patient: Kilo Hamilton Age: 44 y.o. : 1970 MR#: [id number] SSN: [id number]  Date: 2020     Subjective:   Patient seen in the ICU. He has no complaints- denies n/v.   He is calm and cooperative    Assessment/Plan:   Mr. Miguel Beaulieu is a 45 yo AA man with a history of ESRD on PD, HTN, HFrEF, anxiety, depression, recent subtotal parathyroidectomy who presented to [facility] ED on 2020 with generalized weakness, nausea and vomiting. He was found to have severe hypocalcemia so the ED consulted Renal and the ICU. To note, the patient was admitted on - for subtotal parathyroidectomy by Dr Lucius Matias, he left AMA on . During that admission, he wanted to remove cental line - the right non-tunneled central line placed by IR on 7/10 - so he could not get IV calcium, refusing oral calcium and calcitriol, refusing to switch to hemodialysis as recommended by Dr Lorraine Kim. This admission- the patient was admitted to the ICU for severe symptomatic hypocalcemia. Dr Lorraine Kim notes that he is not compliant with PD and agreed to change to HD this admission. IR placed tunneled central venous cath and [facility] on 2020. He was dialyzed on  with high calcium bath. Psy was consulted b/c of depression, anxiety and hx of refusing treatment- patient has capacity to make decisions. On  , during dialysis, the patient had an episode of hypotension, blood was returned and dialysis stopped. \"some painful muscle twitching was noted during HD and rhythm changed to wide complex tachycardia which spontaneously resolved - HD was stopped per nephrology recc's. \"  Today he is being transferred to the hospitalist service. CONSULTS: Nephrology, IR, Vascular , PSY     1. Severe symptomatic hypocalcemia secondary to subtotal parathyroidectomy and non compliance with calcium, calcitriol. Hungry bone syndrome.    2. Hyperphosphatemia- pt refused phoslo in ICU due to nausea   3. Secondary hyperparathyroidism secondary to ESRD; s/p subtotal parathyroidectomy on 7/6/2020 with Dr. Raissa Zavala  - pathology notable for parathyroid hyperplasia   4. Hyperkalemia, mild   5. Prolonged QT interval- suspect related to electrolyte derangements   6. ESRD on PD 7 days/week. 7. Pleural effusions- suspect related to CHF and ESRD   8. Anemia of chronic disease   9. HTN  10. HFrEF 46% on ECHO 4/16/2020   11. STEFANIE  12. Gout   13. BPH  14. Anxiety/depression   15. Medical non compliance     - Renal follows- HD per renal.    Resume IV calcium gluconate, oral calcium carbonate and calcitriol. The patient is non compliant with calcium acetate while in ICU. Dr Katherine García made arrangments for op dialysis mon wed Friday 1130am  - cardiac monitoring; Daily EKG; avoid Qtc prolonging agents - no zofran, no phenergan. - BP elevated- he also takes 25 mg coreg BID at home, I will resume at 12.5 mg BID, with holding parameters, and titrate up as needed. He does get hypotensive with HD once this admission  ucrrently on norvasc, clonidine, losartan. Prn hydralazine prn labetalol   - ICU note states that \"patient refusing certain therapies- has capacity based on psy exam on 7/21/2020, but we will need to continue to reassess\" Psychiatry was consulted- Dr Dyllan Mohr on 7/21 \"he has capacity to make medical decisions however, given his medical illness, delirium can be anticipated and he may quickly lose capacity. I do not recommend withholding urgent care in this case. \"  - nutrition follows- renal diet, MVI, nepro   - PT OT pending.   - consult  to assist pt to establish a PCP   - I have reviewed his home med rec. FULL CODE  dvt ppx- heparin sq   Renal diet.      Additional Notes:      Case discussed with:  [x]Patient  []Family  []Nursing  []Case Management  DVT Prophylaxis:  []Lovenox  [x]Hep SQ  []SCDs  []Coumadin   []On Heparin gtt    Objective:   VS:   Visit Vitals  BP (!) 148/92 Pulse 100   Temp 98.2 °F (36.8 °C)   Resp 17   Ht 6' 2\" (1.88 m)   Wt 88 kg (194 lb)   SpO2 98%   BMI 24.91 kg/m²      Tmax/24hrs: Temp (24hrs), Av.1 °F (36.7 °C), Min:97.9 °F (36.6 °C), Max:98.4 °F (36.9 °C)      Intake/Output Summary (Last 24 hours) at 2020 1417  Last data filed at 2020 0700  Gross per 24 hour   Intake 520 ml   Output 1550 ml   Net -1030 ml       General:  AA male laying in bed, Alert, NAD  Cardiovascular:  RRR  Pulmonary: on room air,  LSC throughout; respiratory effort WNL  GI:  +BS in all four quadrants, soft, non-tender  Extremities:  No edema; 2+ dorsalis pedis pulses bilaterally  Neuro: awake, alert, ox3, moving all extremities, follows commands     +++ TDC in right chest wall- clean dry intact   +++ central line       Labs:    Recent Results (from the past 24 hour(s))   CALCIUM, IONIZED    Collection Time: 20  2:30 PM   Result Value Ref Range    Ionized Calcium 0.77 (LL) 1.12 - 1.32 MMOL/L   GLUCOSE, POC    Collection Time: 20  4:29 PM   Result Value Ref Range    Glucose (POC) 79 70 - 110 mg/dL   GLUCOSE, POC    Collection Time: 20  5:38 PM   Result Value Ref Range    Glucose (POC) 95 70 - 110 mg/dL   CALCIUM, IONIZED    Collection Time: 20  6:30 PM   Result Value Ref Range    Ionized Calcium 1.10 (L) 1.12 - 1.32 MMOL/L   CALCIUM, IONIZED    Collection Time: 20 11:10 PM   Result Value Ref Range    Ionized Calcium 1.07 (L) 1.12 - 1.32 MMOL/L   MAGNESIUM    Collection Time: 20  4:30 AM   Result Value Ref Range    Magnesium 2.2 1.6 - 2.6 mg/dL   PHOSPHORUS    Collection Time: 20  4:30 AM   Result Value Ref Range    Phosphorus 5.3 (H) 2.5 - 4.9 MG/DL   CBC WITH AUTOMATED DIFF    Collection Time: 20  4:30 AM   Result Value Ref Range    WBC 7.3 4.6 - 13.2 K/uL    RBC 4.15 (L) 4.70 - 5.50 M/uL    HGB 10.6 (L) 13.0 - 16.0 g/dL    HCT 34.3 (L) 36.0 - 48.0 %    MCV 82.7 74.0 - 97.0 FL    MCH 25.5 24.0 - 34.0 PG    MCHC 30.9 (L) 31.0 - 37.0 g/dL    RDW 16.4 (H) 11.6 - 14.5 %    PLATELET 042 694 - 339 K/uL    MPV 9.4 9.2 - 11.8 FL    NEUTROPHILS 74 (H) 40 - 73 %    LYMPHOCYTES 12 (L) 21 - 52 %    MONOCYTES 7 3 - 10 %    EOSINOPHILS 6 (H) 0 - 5 %    BASOPHILS 1 0 - 2 %    ABS. NEUTROPHILS 5.4 1.8 - 8.0 K/UL    ABS. LYMPHOCYTES 0.9 0.9 - 3.6 K/UL    ABS. MONOCYTES 0.5 0.05 - 1.2 K/UL    ABS. EOSINOPHILS 0.5 (H) 0.0 - 0.4 K/UL    ABS. BASOPHILS 0.0 0.0 - 0.1 K/UL    DF AUTOMATED     METABOLIC PANEL, COMPREHENSIVE    Collection Time: 07/23/20  4:30 AM   Result Value Ref Range    Sodium 139 136 - 145 mmol/L    Potassium 4.8 3.5 - 5.5 mmol/L    Chloride 105 100 - 111 mmol/L    CO2 22 21 - 32 mmol/L    Anion gap 12 3.0 - 18 mmol/L    Glucose 79 74 - 99 mg/dL    BUN 35 (H) 7.0 - 18 MG/DL    Creatinine 14.00 (H) 0.6 - 1.3 MG/DL    BUN/Creatinine ratio 3 (L) 12 - 20      GFR est AA 5 (L) >60 ml/min/1.73m2    GFR est non-AA 4 (L) >60 ml/min/1.73m2    Calcium 7.4 (L) 8.5 - 10.1 MG/DL    Bilirubin, total 0.7 0.2 - 1.0 MG/DL    ALT (SGPT) 7 (L) 16 - 61 U/L    AST (SGOT) 10 10 - 38 U/L    Alk.  phosphatase 120 (H) 45 - 117 U/L    Protein, total 6.8 6.4 - 8.2 g/dL    Albumin 2.8 (L) 3.4 - 5.0 g/dL    Globulin 4.0 2.0 - 4.0 g/dL    A-G Ratio 0.7 (L) 0.8 - 1.7     CALCIUM, IONIZED    Collection Time: 07/23/20  4:30 AM   Result Value Ref Range    Ionized Calcium 0.99 (L) 1.12 - 1.32 MMOL/L   EKG, 12 LEAD, INITIAL    Collection Time: 07/23/20  7:56 AM   Result Value Ref Range    Ventricular Rate 97 BPM    Atrial Rate 97 BPM    P-R Interval 136 ms    QRS Duration 102 ms    Q-T Interval 376 ms    QTC Calculation (Bezet) 477 ms    Calculated P Axis 51 degrees    Calculated R Axis -8 degrees    Calculated T Axis -176 degrees    Diagnosis       Normal sinus rhythm  Possible Left atrial enlargement  Left ventricular hypertrophy  ST & T wave abnormality, consider inferolateral ischemia  Prolonged QT  Abnormal ECG  When compared with ECG of 22-JUL-2020 07:23,  No significant change was found  Confirmed by Geoffrey Medina MD, --- ([id number]) on 7/23/2020 1:30:46 PM     CALCIUM, IONIZED    Collection Time: 07/23/20  9:40 AM   Result Value Ref Range    Ionized Calcium 0.91 (L) 1.12 - 1.32 MMOL/L   GLUCOSE, POC    Collection Time: 07/23/20 10:43 AM   Result Value Ref Range    Glucose (POC) 84 70 - 110 mg/dL       Signed By: ROLANDO Scott     July 23, 2020

## 2020-07-23 NOTE — ROUTINE PROCESS
TRANSFER - OUT REPORT:    Verbal report given to nurse Chloé RN (name) on Mike Zambrano  being transferred to CVT SD rm 2305(unit) for routine progression of care       Report consisted of patients Situation, Background, Assessment and   Recommendations(SBAR). Information from the following report(s) SBAR, Kardex, Intake/Output, MAR and Accordion was reviewed with the receiving nurse. Lines:   Double Lumen BARD POWERLINE [id number] 07/21/20 Right;Upper Internal jugular (Active)   Central Line Being Utilized Yes 07/22/20 2000   Criteria for Appropriate Use Limited/no vessel suitable for conventional peripheral access 07/22/20 2000   Site Assessment Clean, dry, & intact 07/23/20 0000   Infiltration Assessment 0 07/22/20 2000   Affected Extremity/Extremities Color distal to insertion site pink (or appropriate for race); Pulses palpable;Range of motion performed 07/22/20 2000   Date of Last Dressing Change 07/21/20 07/22/20 2000   Dressing Status Clean, dry, & intact 07/23/20 0000   Dressing Type Disk with Chlorhexadine gluconate (CHG) 07/23/20 0000   Action Taken Open ports on tubing capped 07/22/20 2000   Proximal Hub Color/Line Status Red;Patent;Capped 07/23/20 0000   Positive Blood Return (Medial Site) Yes 07/23/20 0000   Distal Hub Color/Line Status Purple;Patent;Capped 07/23/20 0000   Positive Blood Return (Lateral Site) Yes 07/23/20 0000   Alcohol Cap Used Yes 07/22/20 0800       Peripheral IV 07/21/20 Left Wrist (Active)   Site Assessment Clean, dry, & intact 07/22/20 2000   Phlebitis Assessment 0 07/22/20 2000   Infiltration Assessment 0 07/22/20 2000   Dressing Status Clean, dry, & intact 07/22/20 2000   Dressing Type Transparent;Tape 07/22/20 2000   Hub Color/Line Status Pink;Flushed;Patent;Capped 07/23/20 0000   Action Taken Open ports on tubing capped 07/22/20 2000   Alcohol Cap Used Yes 07/22/20 2000       Peripheral IV 07/21/20 Left Hand (Active)   Site Assessment Clean, dry, & intact 07/22/20 2000   Phlebitis Assessment 0 07/22/20 2000   Infiltration Assessment 0 07/22/20 2000   Dressing Status Clean, dry, & intact 07/22/20 2000   Dressing Type Transparent;Tape 07/22/20 2000   Hub Color/Line Status Blue;Flushed;Patent;Capped 07/23/20 0000   Action Taken Open ports on tubing capped 07/22/20 2000   Alcohol Cap Used Yes 07/22/20 2000        Opportunity for questions and clarification was provided.       Patient transported with:   Monitor  Registered Nurse  Tech

## 2020-07-23 NOTE — PROGRESS NOTES
attended the interdisciplinary rounds for Juanita Templeton, who is a 44 y. o.,male. Patients Primary Language is: [place]. According to the patients EMR Moravian Affiliation is: No preference. The reason the Patient came to the hospital is:   Patient Active Problem List    Diagnosis Date Noted    Acute renal failure superimposed on chronic kidney disease ([address] 07/20/2020    Parathyroid hyperplasia ([address] 07/09/2020    Hypocalcemia 07/09/2020    ESRD on peritoneal dialysis ([address] 69/77/4721    Systolic CHF, chronic ([address] 07/09/2020    Hyperparathyroidism ([address] 07/06/2020    Anemia 11/06/2019    Urinary retention 04/21/2018    UTI (urinary tract infection) 04/21/2018    Hematuria 04/21/2018    Acute kidney injury ([address] 04/21/2018    HTN, goal below 130/80 02/10/2017        Plan:  Daniel Bowman will continue to follow and will provide pastoral care on an as needed/requested basis.  recommends bedside caregivers page  on duty if patient shows signs of acute spiritual or emotional distress.     [address]  Board Certified [address] [facility]   [phone]

## 2020-07-23 NOTE — PROGRESS NOTES
1334: TRANSFER - IN REPORT:    Verbal report received from Desi Armstrong RN (name) on Luis First  being received from ICU 3 [facility] (unit) for change in patient condition(hypocalcemia)    Report consisted of patients Situation, Background, Assessment and   Recommendations(SBAR). Information from the following report(s) SBAR, Kardex, Intake/Output, MAR, Recent Results and Cardiac Rhythm NSR was reviewed with the receiving nurse. Opportunity for questions and clarification was provided. Assessment completed upon patients arrival to unit and care assumed. 1530: Pt arrive to unit. Vitals taken. Bedside and Verbal shift change report given to BOOGIE Valencia and Madi Quiñonez RN (oncoming nurse) by Ryland Radford RN (offgoing nurse). Report included the following information SBAR, Kardex, Intake/Output, MAR, Recent Results and Cardiac Rhythm NSR.

## 2020-07-23 NOTE — PROGRESS NOTES
Problem: Falls - Risk of  Goal: *Absence of Falls  Description: Document Rawland Stade Fall Risk and appropriate interventions in the flowsheet. Outcome: Progressing Towards Goal  Note: Fall Risk Interventions:            Medication Interventions: Bed/chair exit alarm    Elimination Interventions: Bed/chair exit alarm, Call light in reach, Patient to call for help with toileting needs, Urinal in reach, Toilet paper/wipes in reach, Toileting schedule/hourly rounds              Problem: Pressure Injury - Risk of  Goal: *Prevention of pressure injury  Description: Document Tres Scale and appropriate interventions in the flowsheet. Outcome: Progressing Towards Goal  Note: Pressure Injury Interventions:  Sensory Interventions: Assess changes in LOC, Assess need for specialty bed, Avoid rigorous massage over bony prominences, Check visual cues for pain, Discuss PT/OT consult with provider, Float heels, Keep linens dry and wrinkle-free, Maintain/enhance activity level, Minimize linen layers, Monitor skin under medical devices, Pressure redistribution bed/mattress (bed type), Turn and reposition approx. every two hours (pillows and wedges if needed)    Moisture Interventions: Absorbent underpads, Assess need for specialty bed, Limit adult briefs, Maintain skin hydration (lotion/cream), Minimize layers, Moisture barrier    Activity Interventions: Assess need for specialty bed, Pressure redistribution bed/mattress(bed type)    Mobility Interventions: Assess need for specialty bed, Float heels, HOB 30 degrees or less, Pressure redistribution bed/mattress (bed type), Turn and reposition approx.  every two hours(pillow and wedges), PT/OT evaluation    Nutrition Interventions: Document food/fluid/supplement intake, Discuss nutritional consult with provider, Offer support with meals,snacks and hydration    Friction and Shear Interventions: Apply protective barrier, creams and emollients, Feet elevated on foot rest, Foam dressings/transparent film/skin sealants, HOB 30 degrees or less, Minimize layers, Transferring/repositioning devices

## 2020-07-23 NOTE — ADT AUTH CERT NOTES
Parathyroidectomy - Care Day 11 (7/16/2020) by Jm Gallagher         Review Entered  Review Status    7/22/2020 16:00  Completed        Criteria Review       Care Day: 11 Care Date: 7/16/2020 Level of Care: Telemetry    Guideline Day 1    Level Of Care    (X) Early AM OR to recovery to discharge [B]    Clinical Status    (X) * Successful uncomplicated parathyroidectomy    7/22/2020 16:00:11 EDT by Andreas Monteiro      POD 10    (X) * No evidence of airway compromise or postoperative bleeding    7/22/2020 16:00:11 EDT by Andreas Monteiro      NA    (X) * No evidence of recurrent laryngeal nerve injury    7/22/2020 16:00:11 EDT by Andreas Monteiro      NA    ( ) * Stable calcium levels and outpatient monitoring arranged    7/22/2020 16:00:11 EDT by Andreas Monteiro      calcium level critical at 5.7    ( ) * No evidence of hypocalcemia (eg, tetany, seizures)    ( ) * Discharge plans and education understood    Activity    (X) * Ambulatory postoperatively    Routes    (X) * Oral hydration, medications, and diet as tolerated postoperatively    7/22/2020 16:00:11 EDT by Andreas Monteiro      norvasc 10mg po qd, refused zyloprim/heparin/pericolace, cozaar 100mg po qd, apresoline 100mg po tid, catapres 0.1mg po tid, coreg 25mg po bid, norco 1tab po Q4H PRN x1,    Medications    (X) Possible postoperative calcium supplements    7/22/2020 16:00:11 EDT by Andreas Monteiro      rocaltrol 0.5mcg po tid, phoslo 3335mg po tid with meals (only took 1334mg at ac breakfast and ac supper), oscal 3000mg po tid (only took 1000mg at 0900/2000mg at 1700);    7/22/2020 16:00:11 EDT by Andreas Monteiro    Subject: Additional Clinical Information    * Pt only taking approx 1/2 of meds; refusing the others or only taking 1/2 of pills    * Pt's IV access is out-removed yesterday after pt threatened to leave AMA with it in and go to another hospital to have it removed    * Still refusing HD    * Agreed to stay until tomorrow for monitoring as Ca cont to drop    * 98.0, 75, 153/106, 20, RA sat 99%    * rest of labs: gluc 115, bun 93, creat 25.60, phos 6.0, gfr 2, alb 2.4, glob 4.1, alt 8, ast 6    * Plan: as per MD          7/22/2020 16:00:11 EDT by Carito Hernandez    Subject: Additional Clinical Information    * pt refused peritoneal dialysis again today          * Milestone    Additional Notes    MD assessment/plan for 7/16:    1.  Parathyroid hyperplasia with secondary hyperparathyroidism status post subtotal parathyroidectomy on 7/5/20    2.  Hypocalcemia due to subtotal parathyroidectomy, slowly improving    3.  ESRD on peritoneal dialysis    4.  Hypertension    5.  Chronic diastolic CHF with EF of 64% in 4/2019    6.  Anemia of chronic medical disease and ESRD, stable    7.  Gout    8.  Hypoalbuminemia    9.  Hyperphosphatemia with ESRD    10.  Depression    11. Fever         ENT has signed off, s/p subtotal parathyroidectomy on 7/6/20    Nephrology following    Pt has agreed to PD daily, if he misses treatment he will likely need HD      Cont oral Calcitriol    Check ionized calcium q12 hours    FU blood cx's, UA and urine cx    Pt refused CXR      Cont amlodipine, carvedilol, clonidine, hydralazine, losartan    Cont allopurinol    ICS    Pt will consider medication for depression and psych eval, will continue discussion tomorrow       Physical exam:    General:  Uncooperative, Not in acute distress    HEENT: EOMI, supple neck, no JVD, dry oral mucosa    Cardiovascular: S1S2 regular, no rub/gallop     Pulmonary: Clear air entry bilaterally, no wheezing, no crackle    GI:  Soft, non tender, non distended, +bs, no guarding    Extremities:  No pedal edema, +distal pulses appreciated    Neuro: AOx3, moving all extremities, no gross deficit.     Psych: Uncooperative, upset, depressed demeanor        Parathyroidectomy - Care Day 10 (7/15/2020) by Sathish Khan         Review Entered  Review Status    7/22/2020 15:47  Completed        Criteria Review Care Day: 10 Care Date: 7/15/2020 Level of Care: Telemetry    Guideline Day 1    Level Of Care    (X) Early AM OR to recovery to discharge [B]    Clinical Status    (X) * Successful uncomplicated parathyroidectomy    7/22/2020 15:47:37 EDT by Deirdre Lyons      POD 9; sutures removed today    (X) * No evidence of airway compromise or postoperative bleeding    7/22/2020 15:47:37 EDT by Deirdre Lyons      na    (X) * No evidence of recurrent laryngeal nerve injury    7/22/2020 15:47:37 EDT by Deirdre Lyons      na    ( ) * Stable calcium levels and outpatient monitoring arranged    7/22/2020 15:47:37 EDT by Deirdre Lyons      critical ion flor leve 0.74 and flor leve 6.1    ( ) * No evidence of hypocalcemia (eg, tetany, seizures)    ( ) * Discharge plans and education understood    Activity    (X) * Ambulatory postoperatively    7/22/2020 15:47:37 EDT by Deirdre Lyons      ad loi    Routes    (X) * Oral hydration, medications, and diet as tolerated postoperatively    7/22/2020 15:47:37 EDT by Deirdre Lyons      pericolace 2tab po qhs, cozaar 100mg po qd, apresoline 100mg po tid, catapres 0.1mg po tid, coreg 25mg po bid, norvasc 10mg po qd, zyloprim 100mg po three times/week    Medications    (X) Possible postoperative calcium supplements    7/22/2020 15:47:37 EDT by Deirdre Lyons      flor gluconate 4gram IV x1 at 0900; rocaltrol 0.5mcg po tid, phos lo 3.3335mg po tid, oscal 3000mg po qid    7/22/2020 15:47:37 EDT by Deirdre Lyons    Subject: Additional Clinical Information    * Pt is POD 9 s/p parathyroidectomy    * ENT eval: flor once again diminished. as long as pt is receiving sig amts of extraneous flor, parathyroid function will be diminishedd due to negative feedback. however, pt needing sig flor to maintain levels even at marked dim flor level.  Will defer to medicine and renal    * Pt refusing to wear tele box    * Pt staing that he will either be dc tomorrow or will leave AMA    * Pt did do peritoneal dialysis last night    * nephrology eval: pt refusing HD when again discussed this option sec to PD non-compliance. Pt assures that will now be compliant with PD          * Milestone    Additional Notes    7/15/20:    MD assessment/plan:    Assessment/Plan:         1.  Parathyroid hyperplasia with secondary hyperparathyroidism status post subtotal parathyroidectomy on 7/5/20    2.  Hypocalcemia due to subtotal parathyroidectomy, slowly improving    3.  ESRD on peritoneal dialysis    4.  Hypertension    5.  Chronic diastolic CHF with EF of 08% in 4/2019    6.  Anemia of chronic medical disease and ESRD, stable    7.  Gout    8.  Low albumin- improving    9.  Hyperphosphatemia with ESRD    10.  Depression    11. Fever         Stop IV Ca++ replacement, Cont oral Calcitriol    ENT has signed off      Nephrology following    Pt has agreed to PD daily, if he misses treatment he will likely need HD      Check ionized calcium q12 hours    FU blood cx's, UA and urine cx    Pt refused CXR      Cont amlodipine, carvedilol, clonidine, hydralazine, losartan    Cont allopurinol    ICS    Pt will consider medication for depression and psych eval, will continue discussion tomorrow       Physical Exam:    General:  Uncooperative, Not in acute distress    HEENT: EOMI, supple neck, no JVD, dry oral mucosa    Cardiovascular: S1S2 regular, no rub/gallop     Pulmonary: Clear air entry bilaterally, no wheezing, no crackle    GI:  Soft, non tender, non distended, +bs, no guarding    Extremities:  No pedal edema, +distal pulses appreciated    Neuro: AOx3, moving all extremities, no gross deficit.     Psych: Uncooperative, upset, depressed demeanor       149/95, 98.4, 71, 20, 98% on RA

## 2020-07-23 NOTE — PROGRESS NOTES
conducted a Follow up consultation and Spiritual Assessment for Cory De La Rosa, who is a 44 y. o.,male. The  provided the following Interventions:  Continued the relationship of care and support. Listened empathically. Offered prayer and assurance of continued prayer on patients behalf. Chart reviewed. The following outcomes were achieved:  Patient expressed gratitude for 's visit. Assessment:  There are no further spiritual or Islam issues which require Spiritual Care Services interventions at this time. Plan:  Chaplains will continue to follow and will provide pastoral care on an as needed/requested basis.  recommends bedside caregivers page  on duty if patient shows signs of acute spiritual or emotional distress.      [address]   Board Certified [address] [facility]   [phone]

## 2020-07-23 NOTE — PROGRESS NOTES
RENAL DAILY PROGRESS NOTE    Patient: Juanita Templeton               Sex: male          DOA: 7/20/2020 11:26 AM        YOB: 1970      Age:  44 y.o.        LOS:  LOS: 3 days     Subjective: Juanita Templeton is a 44 y.o.  who presents with Hypocalcemia [E83.51]  Hypocalcemia [E83.51]  Acute renal failure superimposed on chronic kidney disease, unspecified CKD stage, unspecified acute renal failure type ([facility] [address] [N17.9, N18.9]. Asked to evaluate for esrd and hypocalcemia,s/p subtotal parathyroidectomy,non compliance  Chief complains: none.   - Reviewed last 24 hrs events     Current Facility-Administered Medications   Medication Dose Route Frequency    cloNIDine HCL (CATAPRES) tablet 0.2 mg  0.2 mg Oral BID    amLODIPine (NORVASC) tablet 10 mg  10 mg Oral DAILY    losartan (COZAAR) tablet 100 mg  100 mg Oral DAILY    ELECTROLYTE REPLACEMENT PROTOCOL - Calcium   1 Each Other PRN    therapeutic multivitamin (THERAGRAN) tablet 1 Tab  1 Tab Oral DAILY    melatonin tablet 6 mg  6 mg Oral QHS    labetaloL (NORMODYNE;TRANDATE) 20 mg/4 mL (5 mg/mL) injection 10 mg  10 mg IntraVENous Q6H PRN    acetaminophen (TYLENOL) solution 500 mg  500 mg Oral Q4H PRN    calcitRIOL (ROCALTROL) capsule 0.5 mcg  0.5 mcg Oral BID    calcium carbonate (OS-VIRAL) tablet 1,000 mg [elemental]  1,000 mg Oral QID    sodium chloride (NS) flush 5-40 mL  5-40 mL IntraVENous Q8H    sodium chloride (NS) flush 5-40 mL  5-40 mL IntraVENous PRN    hydrALAZINE (APRESOLINE) 20 mg/mL injection 20 mg  20 mg IntraVENous Q6H PRN    heparin (porcine) injection 5,000 Units  5,000 Units SubCUTAneous Q8H    [Held by provider] trimethobenzamide (TIGAN) injection 200 mg  200 mg IntraMUSCular Q6H PRN       Objective:     Visit Vitals  BP (!) 162/99 (BP 1 Location: Right arm, BP Patient Position: At rest;Head of bed elevated (Comment degrees))   Pulse 90   Temp 98.4 °F (36.9 °C)   Resp 20   Ht 6' 2\" (1.88 m)   Wt 88 kg (194 lb)   SpO2 99%   BMI 24.91 kg/m²       Intake/Output Summary (Last 24 hours) at 7/23/2020 1640  Last data filed at 7/23/2020 0700  Gross per 24 hour   Intake 520 ml   Output 1550 ml   Net -1030 ml       Physical Examination:       RS: diminished breath sounds  CVS: S1-S2 heard, RRR, No S3 / murmur  Abdomen: Soft,   Extremities: No edema,  CNS: Awake  HEENT: Head is atraumatic, PERRLA, conjunctiva pink & non icteric. No JVD or carotid bruit       Data Review:      Labs:     Hematology:   Recent Labs     07/23/20  0430 07/22/20  0445 07/21/20  0850   WBC 7.3 9.1 13.9*   HGB 10.6* 10.5* 10.4*   HCT 34.3* 33.7* 33.3*     Chemistry:   Recent Labs     07/23/20  0430 07/22/20  0445 07/21/20  0850   BUN 35* 52* 101*   CREA 14.00* 17.50* 29.20*   CA 7.4* 6.8* <5.0*   ALB 2.8* 2.7* 2.8*   K 4.8 4.5 5.2    136 137    99* 99*   CO2 22 27 22   PHOS 5.3* 6.5* 8.4*   GLU 79 85 99        Images:    XR (Most Recent). CXR reviewed by me and compared with previous CXR Results from Hospital Encounter encounter on 07/20/20   XR CHEST PORT    Narrative Portable CXR:    HISTORY: Pleural effusion. Hypocalcemia. Dialysis patient. Comparison July 8, 2020    Mild cardiomegaly with no significant vascular congestion. Loculated collection  in the left lower chest is unchanged. Pleural effusion and right chest also  stable. Parenchymal disease in the lung bases not completely excluded. Impression IMPRESSION: Stable bilateral pleural effusion, loculation on the left. Parenchymal disease in the lung bases not excluded. No vascular congestion. CT (Most Recent) Results from Hospital Encounter encounter on 02/29/20   CT ABD PELV WO CONT    Narrative Indication: evaluate PD cuff. Peritoneal dialysis tube malpositioned. Impression IMPRESSION: Left lower quadrant peritoneal dialysis catheter looped in pelvis. Bilateral pleural effusions. Cardiomegaly. Gallbladder wall thickening,  ultrasound follow-up recommended.  Bilateral renal cysts. Diverticulosis. Left  inguinal canal fluid collection. Comment: CT images of the abdomen and pelvis were obtained without oral or  intravenous contrast. The initial report was rendered by [facility]  radiology services. Small bilateral pleural effusions are present with atelectasis in the underlying  lungs. The heart is enlarged. The gallbladder is contracted and thickening of the fundus wall is noted with  suspicion of tiny stone. Nonemergent ultrasound recommended for further  evaluation. The liver spleen pancreas and adrenal glands are unremarkable. The kidneys are mildly atrophic and harbor cysts. Calculus or hydronephrosis. The ureters and bladder are unremarkable. Peritoneal dialysis catheter is looped in the pelvis entering the peritoneum in  the left lower quadrant. Fluid collection is noted in the left inguinal canal.    Mild colonic diverticulosis is noted without diverticulitis. Pelvic viscera is unremarkable. No bowel obstruction, free intraperitoneal air, free fluid or appendicitis. DICOM format imaged data is available to non-affiliated external healthcare  facilities or entities on a secure, media free, reciprocally searchable basis  with patient authorization  for 12 months following the date of the study. EKG No results found for this or any previous visit. I have personally reviewed the old medical records and patient's labs    Plan / Recommendation:      1. Esrd,continue hemodialysis  with high ca bath.made arrangments for op dialysis mon wed Friday 1130am  2. hypocalcemia,related to subtotal parathyroidectomy. hungry bone syndrome. ordered iv calcium gluconate. continue po calcium carbonate and calcitriol. improving,decrease ionized ca to q12 hours  3.htn,resumee bp meds  4-hyperphosphatemia,improved          Doyle Bennett MD  Nephrology  7/23/2020

## 2020-07-23 NOTE — PROGRESS NOTES
Chart review patient transferred to CVT Stepdown. Patient will need PT/OT evals to assist in discharge planning. Previously lived alone and was independent. Patient was on PD at home, will monitor to see if this changes to HD. [facility] signed for [facility] home health.        DAVY Bobo  Case Management  [phone]

## 2020-07-24 LAB
ALBUMIN SERPL-MCNC: 2.5 G/DL (ref 3.4–5)
ALBUMIN/GLOB SERPL: 0.6 {RATIO} (ref 0.8–1.7)
ALP SERPL-CCNC: 105 U/L (ref 45–117)
ALT SERPL-CCNC: 7 U/L (ref 16–61)
ANION GAP SERPL CALC-SCNC: 8 MMOL/L (ref 3–18)
AST SERPL-CCNC: 9 U/L (ref 10–38)
BASOPHILS # BLD: 0 K/UL (ref 0–0.1)
BASOPHILS NFR BLD: 1 % (ref 0–2)
BILIRUB SERPL-MCNC: 0.4 MG/DL (ref 0.2–1)
BUN SERPL-MCNC: 46 MG/DL (ref 7–18)
BUN/CREAT SERPL: 3 (ref 12–20)
CA-I SERPL-SCNC: 0.86 MMOL/L (ref 1.12–1.32)
CA-I SERPL-SCNC: 1.09 MMOL/L (ref 1.12–1.32)
CALCIUM SERPL-MCNC: 6.3 MG/DL (ref 8.5–10.1)
CHLORIDE SERPL-SCNC: 105 MMOL/L (ref 100–111)
CO2 SERPL-SCNC: 25 MMOL/L (ref 21–32)
CREAT SERPL-MCNC: 15.7 MG/DL (ref 0.6–1.3)
DIFFERENTIAL METHOD BLD: ABNORMAL
EOSINOPHIL # BLD: 0.6 K/UL (ref 0–0.4)
EOSINOPHIL NFR BLD: 9 % (ref 0–5)
ERYTHROCYTE [DISTWIDTH] IN BLOOD BY AUTOMATED COUNT: 16.4 % (ref 11.6–14.5)
GLOBULIN SER CALC-MCNC: 4 G/DL (ref 2–4)
GLUCOSE SERPL-MCNC: 93 MG/DL (ref 74–99)
HCT VFR BLD AUTO: 29.4 % (ref 36–48)
HGB BLD-MCNC: 9 G/DL (ref 13–16)
LYMPHOCYTES # BLD: 0.6 K/UL (ref 0.9–3.6)
LYMPHOCYTES NFR BLD: 10 % (ref 21–52)
MAGNESIUM SERPL-MCNC: 2.1 MG/DL (ref 1.6–2.6)
MCH RBC QN AUTO: 25.2 PG (ref 24–34)
MCHC RBC AUTO-ENTMCNC: 30.6 G/DL (ref 31–37)
MCV RBC AUTO: 82.4 FL (ref 74–97)
MONOCYTES # BLD: 1 K/UL (ref 0.05–1.2)
MONOCYTES NFR BLD: 16 % (ref 3–10)
NEUTS SEG # BLD: 4.3 K/UL (ref 1.8–8)
NEUTS SEG NFR BLD: 64 % (ref 40–73)
PHOSPHATE SERPL-MCNC: 3.9 MG/DL (ref 2.5–4.9)
PLATELET # BLD AUTO: 173 K/UL (ref 135–420)
PMV BLD AUTO: 9.3 FL (ref 9.2–11.8)
POTASSIUM SERPL-SCNC: 4.6 MMOL/L (ref 3.5–5.5)
PROT SERPL-MCNC: 6.5 G/DL (ref 6.4–8.2)
RBC # BLD AUTO: 3.57 M/UL (ref 4.7–5.5)
SODIUM SERPL-SCNC: 138 MMOL/L (ref 136–145)
WBC # BLD AUTO: 6.6 K/UL (ref 4.6–13.2)

## 2020-07-24 PROCEDURE — 74011250637 HC RX REV CODE- 250/637: Performed by: PHYSICIAN ASSISTANT

## 2020-07-24 PROCEDURE — 82330 ASSAY OF CALCIUM: CPT

## 2020-07-24 PROCEDURE — 90935 HEMODIALYSIS ONE EVALUATION: CPT

## 2020-07-24 PROCEDURE — 97161 PT EVAL LOW COMPLEX 20 MIN: CPT

## 2020-07-24 PROCEDURE — 80053 COMPREHEN METABOLIC PANEL: CPT

## 2020-07-24 PROCEDURE — 83735 ASSAY OF MAGNESIUM: CPT

## 2020-07-24 PROCEDURE — 85025 COMPLETE CBC W/AUTO DIFF WBC: CPT

## 2020-07-24 PROCEDURE — 97165 OT EVAL LOW COMPLEX 30 MIN: CPT

## 2020-07-24 PROCEDURE — 74011250637 HC RX REV CODE- 250/637: Performed by: INTERNAL MEDICINE

## 2020-07-24 PROCEDURE — 93005 ELECTROCARDIOGRAM TRACING: CPT

## 2020-07-24 PROCEDURE — 74011250636 HC RX REV CODE- 250/636: Performed by: INTERNAL MEDICINE

## 2020-07-24 PROCEDURE — 84100 ASSAY OF PHOSPHORUS: CPT

## 2020-07-24 PROCEDURE — 65660000000 HC RM CCU STEPDOWN

## 2020-07-24 RX ORDER — HEPARIN SODIUM 1000 [USP'U]/ML
INJECTION, SOLUTION INTRAVENOUS; SUBCUTANEOUS
Status: DISPENSED
Start: 2020-07-24 | End: 2020-07-25

## 2020-07-24 RX ORDER — CLONIDINE HYDROCHLORIDE 0.1 MG/1
0.1 TABLET ORAL 2 TIMES DAILY
Status: DISCONTINUED | OUTPATIENT
Start: 2020-07-24 | End: 2020-07-24

## 2020-07-24 RX ORDER — AMLODIPINE BESYLATE 10 MG/1
10 TABLET ORAL DAILY
Status: DISCONTINUED | OUTPATIENT
Start: 2020-07-24 | End: 2020-07-25 | Stop reason: HOSPADM

## 2020-07-24 RX ORDER — ERGOCALCIFEROL 1.25 MG/1
50000 CAPSULE ORAL
Status: DISCONTINUED | OUTPATIENT
Start: 2020-07-24 | End: 2020-07-25 | Stop reason: HOSPADM

## 2020-07-24 RX ORDER — LOSARTAN POTASSIUM 50 MG/1
100 TABLET ORAL DAILY
Status: DISCONTINUED | OUTPATIENT
Start: 2020-07-24 | End: 2020-07-25 | Stop reason: HOSPADM

## 2020-07-24 RX ORDER — CLONIDINE HYDROCHLORIDE 0.1 MG/1
0.1 TABLET ORAL EVERY 12 HOURS
Status: DISCONTINUED | OUTPATIENT
Start: 2020-07-24 | End: 2020-07-25 | Stop reason: HOSPADM

## 2020-07-24 RX ORDER — CARVEDILOL 25 MG/1
25 TABLET ORAL 2 TIMES DAILY WITH MEALS
Status: DISCONTINUED | OUTPATIENT
Start: 2020-07-24 | End: 2020-07-25 | Stop reason: HOSPADM

## 2020-07-24 RX ADMIN — CALCIUM 1000 MG: 500 TABLET ORAL at 19:29

## 2020-07-24 RX ADMIN — HEPARIN SODIUM 5000 UNITS: 5000 INJECTION INTRAVENOUS; SUBCUTANEOUS at 00:13

## 2020-07-24 RX ADMIN — CARVEDILOL 25 MG: 25 TABLET, FILM COATED ORAL at 16:31

## 2020-07-24 RX ADMIN — MELATONIN 6 MG: at 21:25

## 2020-07-24 RX ADMIN — CALCITRIOL CAPSULES 0.25 MCG 0.5 MCG: 0.25 CAPSULE ORAL at 21:26

## 2020-07-24 RX ADMIN — Medication 10 ML: at 21:27

## 2020-07-24 NOTE — ROUTINE PROCESS
Pt is A&Ox4, he denies pain at this time and is resting in bed watching tv. All IV access are intact and patent. Skin is intact.

## 2020-07-24 NOTE — PROGRESS NOTES
[facility] Hospitalist Group  Progress Note    Patient: Adriana Sanchez Age: 44 y.o. : 1970 MR#: [id number] SSN: [id number]  Date: 2020     Subjective:     Seen in stepdown after dialysis   Denies complaints   He is calm and cooperative    Assessment/Plan:   Mr. Bobbi Santiago is a 45 yo AA man with a history of ESRD on PD, HTN, HFrEF, anxiety, depression, recent subtotal parathyroidectomy who presented to [facility] ED on 2020 with generalized weakness, nausea and vomiting. He was found to have severe hypocalcemia so the ED consulted Renal and the ICU. Of note, the patient was admitted from - for subtotal parathyroidectomy by Dr Raissa Zavala, he left AMA on . During that admission, he wanted to remove cental line - the right non-tunneled central line placed by IR on 7/10 - so he could not get IV calcium, refusing oral calcium and calcitriol, refusing to switch to hemodialysis as recommended by Dr Katherine García. This admission- the patient was admitted to the ICU for severe symptomatic hypocalcemia. Dr Katherine García notes that he is not compliant with PD and agreed to change to HD this admission. Psych was consulted b/c of depression, anxiety and hx of refusing treatment- patient has capacity to make decisions. On , during dialysis, the patient had an episode of hypotension, blood was returned and dialysis stopped. \"some painful muscle twitching was noted during HD and rhythm changed to wide complex tachycardia which spontaneously resolved - HD was stopped per nephrology recc's. \"  Today he is being transferred to the hospitalist service. CONSULTS: Nephrology, IR, Vascular , Psychiatry      1. Severe symptomatic hypocalcemia secondary to subtotal parathyroidectomy and non compliance with calcium, calcitriol. Hungry bone syndrome. 2. Hyperphosphatemia  3.  Secondary hyperparathyroidism secondary to ESRD; s/p subtotal parathyroidectomy on 2020 with  Yogi  4. Hyperkalemia, mild   5. Prolonged QT interval- suspect related to electrolyte derangements   6. ESRD on PD 7 days/week. 7. Pleural effusions- suspect related to CHF and ESRD   8. Anemia of chronic disease   9. HTN  10. HFrEF- 46% on ECHO 2020   11. STEFANIE  12. Gout   13. BPH  14. Anxiety/depression   15. Medical non compliance     Nephrology following   Resume IV calcium gluconate, oral calcium carbonate and calcitriol. Daily EKG; avoid Qtc prolonging agents - no zofran, no phenergan   Cont amlodipine, carvedilol, clonidine, losartan. Cont prn hydralazine prn labetalol   Psychiatry was consulted- Dr Maciej Menendez on  Rossy Hurtado has capacity to make medical decisions however, given his medical illness, delirium can be anticipated and he may quickly lose capacity. I do not recommend withholding urgent care in this case. \"  Nutrition following  PT, OT     Needs central line removed prior to discharge       Case discussed with:  [x]Patient  []Family  []Nursing  []Case Management  DVT Prophylaxis:  []Lovenox  [x]Hep SQ  []SCDs  []Coumadin   []On Heparin gtt  Diet: Renal   Code Status: FULL   Disposition: Stable for transfer to telemetry; hopefully home in 1-3 days       AMANDA Sheth DO   2020         Objective:   VS:   Visit Vitals  /90   Pulse (!) 106   Temp 98.3 °F (36.8 °C)   Resp 18   Ht 6' 2\" (1.88 m)   Wt 85.5 kg (188 lb 7.9 oz)   SpO2 99%   BMI 24.20 kg/m²      Tmax/24hrs: Temp (24hrs), Av.1 °F (36.7 °C), Min:97.9 °F (36.6 °C), Max:98.4 °F (36.9 °C)      Intake/Output Summary (Last 24 hours) at 2020 1802  Last data filed at 2020 1742  Gross per 24 hour   Intake 480 ml   Output 3000 ml   Net -2520 ml       General:  AA male laying in bed, Alert, NAD  Cardiovascular:  RRR  Pulmonary: on room air,  LSC throughout; respiratory effort WNL  GI:  +BS in all four quadrants, soft, non-tender  Extremities:  No edema; 2+ dorsalis pedis pulses bilaterally  Neuro: awake, alert, ox3, moving all extremities, follows commands     +++ TDC in right chest wall- clean dry intact   +++ central line       Labs:    Recent Results (from the past 24 hour(s))   CALCIUM, IONIZED    Collection Time: 07/24/20  5:45 AM   Result Value Ref Range    Ionized Calcium 0.86 (L) 1.12 - 1.32 MMOL/L   CBC WITH AUTOMATED DIFF    Collection Time: 07/24/20  8:15 AM   Result Value Ref Range    WBC 6.6 4.6 - 13.2 K/uL    RBC 3.57 (L) 4.70 - 5.50 M/uL    HGB 9.0 (L) 13.0 - 16.0 g/dL    HCT 29.4 (L) 36.0 - 48.0 %    MCV 82.4 74.0 - 97.0 FL    MCH 25.2 24.0 - 34.0 PG    MCHC 30.6 (L) 31.0 - 37.0 g/dL    RDW 16.4 (H) 11.6 - 14.5 %    PLATELET 565 900 - 062 K/uL    MPV 9.3 9.2 - 11.8 FL    NEUTROPHILS 64 40 - 73 %    LYMPHOCYTES 10 (L) 21 - 52 %    MONOCYTES 16 (H) 3 - 10 %    EOSINOPHILS 9 (H) 0 - 5 %    BASOPHILS 1 0 - 2 %    ABS. NEUTROPHILS 4.3 1.8 - 8.0 K/UL    ABS. LYMPHOCYTES 0.6 (L) 0.9 - 3.6 K/UL    ABS. MONOCYTES 1.0 0.05 - 1.2 K/UL    ABS. EOSINOPHILS 0.6 (H) 0.0 - 0.4 K/UL    ABS. BASOPHILS 0.0 0.0 - 0.1 K/UL    DF AUTOMATED     MAGNESIUM    Collection Time: 07/24/20  8:15 AM   Result Value Ref Range    Magnesium 2.1 1.6 - 2.6 mg/dL   METABOLIC PANEL, COMPREHENSIVE    Collection Time: 07/24/20  8:15 AM   Result Value Ref Range    Sodium 138 136 - 145 mmol/L    Potassium 4.6 3.5 - 5.5 mmol/L    Chloride 105 100 - 111 mmol/L    CO2 25 21 - 32 mmol/L    Anion gap 8 3.0 - 18 mmol/L    Glucose 93 74 - 99 mg/dL    BUN 46 (H) 7.0 - 18 MG/DL    Creatinine 15.70 (H) 0.6 - 1.3 MG/DL    BUN/Creatinine ratio 3 (L) 12 - 20      GFR est AA 4 (L) >60 ml/min/1.73m2    GFR est non-AA 3 (L) >60 ml/min/1.73m2    Calcium 6.3 (L) 8.5 - 10.1 MG/DL    Bilirubin, total 0.4 0.2 - 1.0 MG/DL    ALT (SGPT) 7 (L) 16 - 61 U/L    AST (SGOT) 9 (L) 10 - 38 U/L    Alk.  phosphatase 105 45 - 117 U/L    Protein, total 6.5 6.4 - 8.2 g/dL    Albumin 2.5 (L) 3.4 - 5.0 g/dL    Globulin 4.0 2.0 - 4.0 g/dL    A-G Ratio 0.6 (L) 0.8 - 1.7     PHOSPHORUS Collection Time: 07/24/20  8:15 AM   Result Value Ref Range    Phosphorus 3.9 2.5 - 4.9 MG/DL

## 2020-07-24 NOTE — DIALYSIS
[facility]        ACUTE HEMODIALYSIS FLOW SHEET      HEMODIALYSIS ORDERS: Physician: tobi     Dialyzer: revaclear   Duration: 4 hr  BFR: 350   DFR: 800   Dialysate:  Temp 36-37*C  K+   2    Ca+  3.5 Na 138 Bicarb 30   Weight: 85.5 kg   Patient Chart [x]     Unable to Obtain []   Dry weight/UF Goal: 3000 Access CVL  Needle Gauge     Heparin []  Bolus      Units    [] Hourly       Units    [x]None      Catheter locking solution heparin   Pre BP:   146/103    Pulse:     81       Respirations: 16  Temperature:   98.1   Labs: Pre        Post:        [x] N/A   Additional Orders(medications, blood products, hypotension management) [x] N/A     [x] [place] Consent Verified     CATHETER ACCESS: []N/A   [x]Right   []Left   [x]IJ     []Fem   []chest wall   [] First use X-ray verified     [x]Tunnel                [] Non Tunneled   [x]No S/S infection  []Redness  []Drainage []Cultured []Swelling []Pain   [x]Medical Aseptic Prep Utilized   []Dressing Changed  [] Biopatch  Date:07/21/20       []Clotted   [x]Patent   Flows: [x]Good  []Poor  []Reversed   If access problem,  notified: []Yes    [x]N/A  Date:                       GENERAL ASSESSMENT:      LUNGS:  Rate  SaO2% [] N/A    [x] Clear  [] Coarse  [] Crackles  [] Wheezing        [] Diminished     Location : []RLL   []LLL    []RUL  []TRICIA     Cough: []Productive  []Dry  [x]N/A   Respirations:  [x]Easy  []Labored     Therapy:   [x]RA  []NC  l/min    Mask: []NRB []Venti       O2%                  []Ventilator  []Intubated  [] Trach  [] BiPaP     CARDIAC: [x]Regular      [] Irregular   [] Pericardial Rub  [] JVD        []  Monitored  [] Bedside  [] Remotely monitored [] N/A  Rhythm:      EDEMA: [] None  [x]Generalized  [] Pitting [] 1    [] 2    [] 3    [] 4                 [] Facial  [] Pedal  []  UE  [] LE     SKIN:   [x] Warm  [] Hot     [] Cold   [x] Dry     [] Pale   [] Diaphoretic                  [] Flushed  [] Jaundiced  [] Cyanotic  [] Rash  [] Weeping     LOC:    [x] Alert      [x]Oriented:    [x] Person     [x] Place  [x]Time               [] Confused  [] Lethargic  [] Medicated  [] Non-responsive     GI / ABDOMEN:  [] Flat    [] Distended    [x] Soft    [] Firm   []  Obese                             [] Diarrhea  [x] Bowel Sounds  [] Nausea  [] Vomiting       / URINE ASSESSMENT:[] Voiding   [x] Oliguria  [] Anuria   []  [place]     [] Incontinent    []  Incontinent Brief      []  Bathroom Privileges       PAIN: [x] 0 []1  []2   []3   []4   []5   []6   []7   []8   []9   []10              Scale 0-10  Action/Follow Up:      MOBILITY:  [] Amb    [] Amb/Assist    [x] Bed    [] Wheelchair  [] Stretcher      All Vitals and Treatment Details on Attached [address]: [facility]          Room # 1602/05      [] 1st Time Acute  [] Stat  [x] Routine  [] Urgent     [x] Acute Room  []  Bedside  [] ICU/CCU  [] ER   Isolation Precautions:   There are currently no Active Isolations      Special Considerations:         [] Blood Consent Verified [x]N/A     ALLERGIES: No Known Allergies            Code Status:Full Code        Hepatitis Status:                        Lab Results   Component Value Date/Time    Hepatitis B surface Ag <0.10 07/20/2020 11:30 AM    Hepatitis B surface Ab 259.23 07/20/2020 11:30 AM    HCV Ab <0.1 09/11/2018 09:35 AM                     Current Labs:   Lab Results   Component Value Date/Time    Sodium 138 07/24/2020 08:15 AM    Potassium 4.6 07/24/2020 08:15 AM    Chloride 105 07/24/2020 08:15 AM    CO2 25 07/24/2020 08:15 AM    Anion gap 8 07/24/2020 08:15 AM    Glucose 93 07/24/2020 08:15 AM    BUN 46 (H) 07/24/2020 08:15 AM    Creatinine 15.70 (H) 07/24/2020 08:15 AM    BUN/Creatinine ratio 3 (L) 07/24/2020 08:15 AM    GFR est AA 4 (L) 07/24/2020 08:15 AM    GFR est non-AA 3 (L) 07/24/2020 08:15 AM    Calcium 6.3 (L) 07/24/2020 08:15 AM      Lab Results   Component Value Date/Time    WBC 6.6 07/24/2020 08:15 AM    Hemoglobin, POC 10.2 (L) 07/06/2020 08:43 AM HGB 9.0 (L) 07/24/2020 08:15 AM    Hematocrit, POC 30 (L) 07/06/2020 08:43 AM    HCT 29.4 (L) 07/24/2020 08:15 AM    PLATELET 888 83/31/2685 08:15 AM    MCV 82.4 07/24/2020 08:15 AM                                                                                     DIET: DIET RENAL  DIET NUTRITIONAL SUPPLEMENTS       PRIMARY NURSE REPORT: First initial/Last name/Title      Pre Dialysis: Ze Rogers RN     Time: 0900      EDUCATION:    [x] Patient [] Other         Knowledge Basis: []None [x]Minimal [] Substantial   Barriers to learning  [x]N/A   [] Access Care     [] S&S of infection     [] Fluid Management     []K+     [x]Procedural    []Albumin     [] Medications     [] Tx Options     [] Transplant     [] Diet     [] Other   Teaching Tools:  [x] Explain  [] Demo  [] Handouts [] Video  Patient response:   [x] Verbalized understanding  [] Teach back  [] Return demonstration [] Requires follow up   Inappropriate due to            [x] Time Out/Safety Check  [x]Extracorporeal Circuit Tested for integrity       [address] - Before each treatment:     Machine Number:                   [facility]                                   [x] Unit Machine # 9 with centralized RO                                  [] Portable Machine #1/RO serial # [id number]                                  [] Portable Machine #2/RO serial # [id number]                                  [] Portable Machine #4/RO serial # [id number]                                                     [address] [facility]                                  [] Portable Machine #11/RO serial # [id number]                                   [] Portable Machine #12/RO serial # [id number]                                  [] Portable Machine #13/RO serial #  [id number]      Alarm Test:  Pass time 0930               [x] RO/Machine Log Complete      Temp    36*-37*             Dialysate: pH  7.4 Conductivity: Meter   14     HD Machine   14                  TCD: 14  Dialyzer Lot # [id number]          Blood Tubing Lot # [id number]          Saline Lot #  [id number]     CHLORINE TESTING-Before each treatment and every 4 hours    Total Chlorine: [x] less than 0.1 ppm  Time: 0900 4 Hr/2nd Check Time: 1300   (if greater than 0.1 ppm from Primary then every 30 minutes from Secondary)     TREATMENT INITIATION - with Dialysis Precautions:   [x] All Connections Secured                 [x] Saline Line Double Clamped   [x] Venous Parameters Set                  [x] Arterial Parameters Set    [x] Prime Given 250ml                          [x]Air Foam Detector Engaged      Treatment Initiation Note:Pt in stable condition. CVL accessed and treatment initiated without complication. Dr Jennifer Macias at bedside. Post Assessment:   Dialyzer Cleared: [] Good [x] Fair  [] Poor  Blood processed:  75.4 L  UF Removed  3000 Ml  POst BP:   139/103       Pulse: 1010        Respirations: 16  Temperature: 97.9 Lungs:     [x] Clear      [] Course         [] Crackles    [] Wheezing         [] Diminished   Post Tx Vascular Access:   AVF/AVG: Bleeding stopped   Art  min. Biju. Min   N/A Cardiac:   [x] Regular   [] Irregular   [] Monitor  [] N/A      Rhythm:       Catheter:   Locking solution: Heparin 1:1000   Art. 1.9  Biju. 1.9      Skin:   Pain:    [x] Warm  [x] Dry [] Diaphoretic    [] Flushed    [] Pale [] Cyanotic [x]0  []1  []2   []3  []4   []5   []6   []7   []8   []9   []10     Post Treatment Note:   HD well tolerated. 3L UF removed.  NAD noted during or post treatment       POST TREATMENT PRIMARY NURSE HANDOFF REPORT:     First initial/Last name/Title         Post Dialysis: Junie Ang RN Time:  1430     Abbreviations: AVG-arterial venous graft, AVF-arterial venous fistula, IJ-Internal Jugular, Subcl-Subclavian, Fem-Femoral, Tx-treatment, AP/HR-apical heart rate, DFR-dialysate flow rate, BFR-blood flow rate, AP-arterial pressure, -venous pressure, UF-ultrafiltrate, TMP-transmembrane pressure, Biju-Venous, Art-Arterial, RO-Reverse Osmosis

## 2020-07-24 NOTE — PROGRESS NOTES
Problem: Self Care Deficits Care Plan (Adult)  Goal: *Acute Goals and Plan of Care (Insert Text)  Outcome: Resolved/Met        OCCUPATIONAL THERAPY EVALUATION/DISCHARGE    Patient: Pieter Bonilla (46 y.o. male)  Date: 7/24/2020  Primary Diagnosis: Hypocalcemia [E83.51]  Hypocalcemia [E83.51]  Acute renal failure superimposed on chronic kidney disease, unspecified CKD stage, unspecified acute renal failure type ([facility] [address] [N17.9, N18.9]  Precautions:  Skin  PLOF: Patient was independent with self-care and functional mobility PTA. ASSESSMENT AND RECOMMENDATIONS:  Patient cleared to participate in OT evaluation by RN. Upon entering the room, patient was supine in bed, alert, and agreeable to participate in OT evaluation. CNA present assessing vitals and /95, HR 75 and O2 100%. Patient educated on the role of OT, evaluation process, and safety during this admission with patient verbalizing understanding. Patient is modified independent - independent with basic self-care, modified independent with bed mobility and Supervision with functional transfers using no AD. Based on the objective data described below, the patient presents with no deficits that impede pt function with ADLs, functional transfers, and functional mobility. OT to d/c from caseload at this time. Skilled occupational therapy is not indicated at this time.   Discharge Recommendations: None  Further Equipment Recommendations for Discharge: N/A      SUBJECTIVE:   Patient stated I need to use the bathroom    OBJECTIVE DATA SUMMARY:     Past Medical History:   Diagnosis Date    Anemia     Chronic kidney disease     ESRD-   peritoneal dilaysis    Gout     Heart failure ([facility] [address] 04/2019    History of blood transfusion 05/22/2018    Hypertension     Joint pain     Sleep apnea 07/03/2018    does not use c pap     Past Surgical History:   Procedure Laterality Date    COLONOSCOPY N/A 8/23/2019    COLONOSCOPY performed by Rosa Madison MD at [facility]    HX HERNIA REPAIR  10/23/2019    HX ORTHOPAEDIC Left     left knee sx     HX OTHER SURGICAL  2018    tenkoff catheter    HX UROLOGICAL  05/20/2018    prostate biopsy    HX WISDOM TEETH EXTRACTION      IR BX BONE MARROW DIAGNOSTIC  11/25/2019    IR INSERT NON TUNL CVC OVER 5 YRS  7/10/2020     Barriers to Learning/Limitations: None  Compensate with: visual, verbal, tactile, kinesthetic cues/model    Home Situation:   Home Situation  Home Environment: Private residence  # Steps to Enter: 11  One/Two Story Residence: Two story, live on 1st floor  Living Alone: Yes  Support Systems: Child(tawanda), Family member(s)  Patient Expects to be Discharged to[de-identified] Private residence  Current DME Used/Available at Home: None  Tub or Shower Type: Tub/Shower combination  [x]     Right hand dominant   []     Left hand dominant    Cognitive/Behavioral Status:  Neurologic State: Alert  Orientation Level: Oriented to person;Oriented to place;Oriented to situation  Cognition: Follows commands  Safety/Judgement: Fall prevention    Skin: Intact  Edema: None noted    Vision/Perceptual:    Acuity: Within Defined Limits         Coordination: BUE  Fine Motor Skills-Upper: Left Intact; Right Intact    Gross Motor Skills-Upper: Left Intact; Right Intact    Balance:  Sitting: Intact  Standing: Impaired; Without support  Standing - Static: Good  Standing - Dynamic : Fair(+)    Strength: BUE  Strength: Generally decreased, functional       Tone & Sensation: BUE  Tone: Normal  Sensation: Intact             Range of Motion: BUE  AROM: Within functional limits    Functional Mobility and Transfers for ADLs:  Bed Mobility:  Rolling: Modified independent  Supine to Sit: Modified independent  Sit to Supine: Modified independent  Scooting: Modified independent    Transfers:  Sit to Stand: Supervision  Stand to Sit: Supervision   Toilet Transfer : Supervision    ADL Assessment:  Feeding: Independent    Oral Facial Hygiene/Grooming: Independent    Bathing: Modified independent    Upper Body Dressing: Independent    Lower Body Dressing: Modified independent    Toileting: Modified independent    ADL Intervention:  Upper Body Dressing Assistance  Dressing Assistance: [id number]: Independent    Lower Body Dressing Assistance  Dressing Assistance: Modified independent  Socks: Modified independent  Leg Crossed Method Used: Yes  Position Performed: Seated edge of bed    Toileting  Toileting Assistance: Modified independent  Bladder Hygiene: Modified independent(standing at toilet)    Cognitive Retraining  Safety/Judgement: Fall prevention    Pain:  Pain level pre-treatment: 0/10   Pain level post-treatment: 0/10   Pain Intervention(s): Medication (see MAR); Response to intervention: Nurse notified, See doc flow    Activity Tolerance:   Good    Please refer to the flowsheet for vital signs taken during this treatment. After treatment:   []  Patient left in no apparent distress sitting up in chair  [x]  Patient left in no apparent distress in bed  [x]  Call bell left within reach  [x]  Nursing notified  []  Caregiver present  []  Bed alarm activated    COMMUNICATION/EDUCATION:   [x]      Role of Occupational Therapy in the acute care setting  [x]      Home safety education was provided and the patient/caregiver indicated understanding. [x]      Patient/family have participated as able and agree with findings and recommendations. []      Patient is unable to participate in plan of care at this time. Thank you for this referral.  ELPIDIO Aaron/CHAITANYA  Time Calculation: 10 mins      Eval Complexity: History: MEDIUM Complexity : Expanded review of history including physical, cognitive and psychosocial  history ; Examination: LOW Complexity : 1-3 performance deficits relating to physical, cognitive , or psychosocial skils that result in activity limitations and / or participation restrictions ;    Decision Making:LOW Complexity : No comorbidities that affect functional and no verbal or physical assistance needed to complete eval tasks

## 2020-07-24 NOTE — PROGRESS NOTES
Problem: Mobility Impaired (Adult and Pediatric)  Goal: *Acute Goals and Plan of Care (Insert Text)  Outcome: Resolved/Met   PHYSICAL THERAPY EVALUATION AND DISCHARGE    Patient: Manuel Newell (46 y.o. male)  Date: 7/24/2020  Primary Diagnosis: Hypocalcemia [E83.51]  Hypocalcemia [E83.51]  Acute renal failure superimposed on chronic kidney disease, unspecified CKD stage, unspecified acute renal failure type ([facility] [address] [N17.9, N18.9]        Precautions:   Skin    PLOF: Independent with mobility including gait no AD. ASSESSMENT :  PT orders received and patient cleared by nursing to participate with therapy. Patient is a 44 y.o. male admitted to the hospital due to weakness, nausea, and vomiting. Pt uses PD at home but has been non-complaint. Currently he has been receiving HD in the hospital and scheduled for today. Pt was recently in hospital 7/17/2020 but left AMA. Patient consents to PT evaluation and treatment. Pt has functional B LE strength and good sitting/standing balance. Pt is supervision/independent with bed mobility, transfers, and gait. Pt educated on safety while in the hospital and at home. Pt reports he is at his baseline for mobility. Pt ended therapy supine in bed with all needs met (about to leave the floor for HD). Patient does not require further skilled physical therapy at this level of care. PLAN :  Recommendations and Planned Interventions:    No skilled inpatient physical therapy needs identified at this time. Discharge Recommendations: None  Further Equipment Recommendations for Discharge: N/A     SUBJECTIVE:   Patient stated I've been walking to the bathroom.     OBJECTIVE DATA SUMMARY:     Past Medical History:   Diagnosis Date    Anemia     Chronic kidney disease     ESRD-   peritoneal dilaysis    Gout     Heart failure ([facility] [address] 04/2019    History of blood transfusion 05/22/2018    Hypertension     Joint pain     Sleep apnea 07/03/2018    does not use c pap     Past Surgical History:   Procedure Laterality Date    COLONOSCOPY N/A 8/23/2019    COLONOSCOPY performed by Ceasar Barahona MD at [address]  10/23/2019    HX ORTHOPAEDIC Left     left knee sx     HX OTHER SURGICAL  2018    tenkoff catheter    HX UROLOGICAL  05/20/2018    prostate biopsy    HX WISDOM TEETH EXTRACTION      IR BX BONE MARROW DIAGNOSTIC  11/25/2019    IR INSERT NON TUNL CVC OVER 5 YRS  7/10/2020     Barriers to Learning/Limitations: None  Compensate with: N/A  Home Situation:   Home Situation  Home Environment: Private residence  # Steps to Enter: 9  Rails to Enter: Yes  Hand Rails : Bilateral  One/Two Story Residence: Two story, live on 1st floor  Living Alone: Yes  Support Systems: Child(tawanda), Family member(s)  Patient Expects to be Discharged to[de-identified] Private residence  Current DME Used/Available at Home: None  Tub or Shower Type: Tub/Shower combination  Critical Behavior:  Neurologic State: Alert  Orientation Level: Oriented X4  Cognition: Follows commands  Safety/Judgement: Fall prevention  Psychosocial  Patient Behaviors: Calm; Cooperative                 B LE Strength:    Strength: Generally decreased, functional              B LE Tone & Sensation:   Tone: Normal          Sensation: Intact           B LE Range Of Motion:  AROM: Within functional limits                 Posture:  Posture (WDL): Within defined limits     Functional Mobility:  Bed Mobility:  Rolling: Modified independent  Supine to Sit: Modified independent  Sit to Supine: Modified independent  Scooting: Modified independent  Transfers:  Sit to Stand: Supervision  Stand to Sit: Supervision                       Balance:   Sitting: Intact  Standing: Impaired; Without support  Standing - Static: Good  Standing - Dynamic : Fair(+)    Ambulation/Gait Training:  Distance (ft): 20 Feet (ft)(in room)  Assistive Device: (none)  Ambulation - Level of Assistance: Supervision  Gait Abnormalities: (no gross abnormalities)  Speed/Enedelia: Quantason decreased (<100 feet/min)       Stairs:   Denies any issues    Therapeutic Exercises:   Reviewed and performed ankle pumps to increase blood flow and circulation. Pain:  No pain noted before, during, or at end of session. Activity Tolerance:   good  Please refer to the flowsheet for vital signs taken during this treatment. After treatment:   []         Patient left in no apparent distress sitting up in chair  [x]         Patient left in no apparent distress in bed  [x]         Call bell left within reach  [x]   Personal items in reach  [x]         Nursing notified [place]  []         Caregiver present  []         Bed/chair alarm activated  []         SCDs applied       COMMUNICATION/EDUCATION:   [x]         Role of Physical Therapy in the acute care setting. [x]         Fall prevention education was provided and the patient/caregiver indicated understanding. [x]         Patient/family have participated as able in goal setting and plan of care. [x]         Patient/family agree to work toward stated goals and plan of care. []         Patient understands intent and goals of therapy, but is neutral about his/her participation. []         Patient is unable to participate in goal setting/plan of care: ongoing with therapy staff. [x]         Out of bed with nursing assistance as needed at least 3-5 times a day. []         Other:     Thank you for this referral.  Ryne Saldivar, PT, DPT   Time Calculation: 8 mins      Eval Complexity: History: MEDIUM  Complexity : 1-2 comorbidities / personal factors will impact the outcome/ POC Exam:HIGH Complexity : 4+ Standardized tests and measures addressing body structure, function, activity limitation and / or participation in recreation  Presentation: MEDIUM Complexity : Evolving with changing characteristics  Clinical Decision Making:Low Complexity    Overall Complexity:LOW

## 2020-07-24 NOTE — PROGRESS NOTES
HD chairtime:  [employer] MWF 11:40am    Patient established with PCP through [facility]   August 4th @2pm, Dr. Vlad Dior

## 2020-07-24 NOTE — PROGRESS NOTES
0700: Bedside shift change report given to Atul Dodson (oncoming nurse) by Martita Gil RN (offgoing nurse). Report included the following information SBAR and Kardex. 6288: TRANSFER - OUT REPORT:    Verbal report given to ***(name) on Kilo Hamilton  being transferred to ***(unit) for {TRANSFER CARE:49625}       Report consisted of patients Situation, Background, Assessment and   Recommendations(SBAR). Information from the following report(s) {SBAR REPORTS UNET:17427} was reviewed with the receiving nurse. Lines:   Double Lumen BARD POWERLINE [facility]#[id number] 07/21/20 Right;Upper Internal jugular (Active)   Central Line Being Utilized Yes 07/24/20 0400   Criteria for Appropriate Use Limited/no vessel suitable for conventional peripheral access 07/24/20 0400   Site Assessment Clean, dry, & intact 07/24/20 0400   Infiltration Assessment 0 07/24/20 0400   Affected Extremity/Extremities Color distal to insertion site pink (or appropriate for race) 07/24/20 0400   Date of Last Dressing Change 07/21/20 07/23/20 1601   Dressing Status Clean, dry, & intact 07/24/20 0400   Dressing Type Bacteriocidal;Disk with Chlorhexadine gluconate (CHG); Tape;Transparent 07/23/20 1601   Action Taken Open ports on tubing capped 07/24/20 0400   Proximal Hub Color/Line Status Capped 07/24/20 0400   Positive Blood Return (Medial Site) Yes 07/24/20 0400   Distal Hub Color/Line Status Flushed;Patent 07/24/20 0400   Positive Blood Return (Lateral Site) Yes 07/24/20 0400   Alcohol Cap Used Yes 07/24/20 0400       Peripheral IV 07/21/20 Left Wrist (Active)   Site Assessment Clean, dry, & intact 07/24/20 0400   Phlebitis Assessment 0 07/24/20 0400   Infiltration Assessment 0 07/24/20 0400   Dressing Status Clean, dry, & intact 07/24/20 0400   Dressing Type Transparent 07/24/20 0400   Hub Color/Line Status Capped 07/24/20 0400   Action Taken Open ports on tubing capped 07/24/20 0400   Alcohol Cap Used Yes 07/24/20 0400       Peripheral IV 07/21/20 Left Hand (Active)   Site Assessment Clean, dry, & intact 07/24/20 0400   Phlebitis Assessment 0 07/24/20 0400   Infiltration Assessment 0 07/24/20 0400   Dressing Status Clean, dry, & intact 07/24/20 0400   Dressing Type Transparent 07/24/20 0400   Hub Color/Line Status Flushed;Patent 07/24/20 0400   Action Taken Open ports on tubing capped 07/24/20 0400   Alcohol Cap Used Yes 07/24/20 0400        Opportunity for questions and clarification was provided. Patient transported with:   {TRANSPORTDETAILS:98053}    1015: Patient off floor for dialysis. 1529: Patient back on floor. 1712: Patient's , arrived to bedside, patient walking around room. Asymptomatic. Dr. Natasha melendrez.

## 2020-07-24 NOTE — PROGRESS NOTES
HEMODIALYSIS ROUNDING NOTE      Patient: Jacky Burris               Sex: male          DOA: 7/20/2020 11:26 AM        YOB: 1970      Age:  44 y.o.        LOS:  LOS: 4 days     Subjective: Jacky Burris is a 44 y.o.  who presents with Hypocalcemia [E83.51]  Hypocalcemia [E83.51]  Acute renal failure superimposed on chronic kidney disease, unspecified CKD stage, unspecified acute renal failure type ([address] [N17.9, N18.9]. The patient is dialyzing utilizing the following method:Intermittent Hemodialysis    Chief Complains: Patient was seen on dialysis, denies nausea / vomiting / headache / dizziness / SOB / chest pain.   - Reviewed last 24 hrs events     Current Facility-Administered Medications   Medication Dose Route Frequency    amLODIPine (NORVASC) tablet 10 mg  10 mg Oral DAILY    carvediloL (COREG) tablet 25 mg  25 mg Oral BID WITH MEALS    ergocalciferol capsule 50,000 Units  50,000 Units Oral Q7D    losartan (COZAAR) tablet 100 mg  100 mg Oral DAILY    cloNIDine HCL (CATAPRES) tablet 0.1 mg  0.1 mg Oral Q12H    ELECTROLYTE REPLACEMENT PROTOCOL - Calcium   1 Each Other PRN    therapeutic multivitamin (THERAGRAN) tablet 1 Tab  1 Tab Oral DAILY    melatonin tablet 6 mg  6 mg Oral QHS    labetaloL (NORMODYNE;TRANDATE) 20 mg/4 mL (5 mg/mL) injection 10 mg  10 mg IntraVENous Q6H PRN    acetaminophen (TYLENOL) solution 500 mg  500 mg Oral Q4H PRN    calcitRIOL (ROCALTROL) capsule 0.5 mcg  0.5 mcg Oral BID    calcium carbonate (OS-VIRAL) tablet 1,000 mg [elemental]  1,000 mg Oral QID    sodium chloride (NS) flush 5-40 mL  5-40 mL IntraVENous Q8H    sodium chloride (NS) flush 5-40 mL  5-40 mL IntraVENous PRN    hydrALAZINE (APRESOLINE) 20 mg/mL injection 20 mg  20 mg IntraVENous Q6H PRN    heparin (porcine) injection 5,000 Units  5,000 Units SubCUTAneous Q8H    [Held by provider] trimethobenzamide (TIGAN) injection 200 mg  200 mg IntraMUSCular Q6H PRN       Objective:     Visit Vitals  BP (!) 138/95   Pulse 75   Temp 97.9 °F (36.6 °C)   Resp 18   Ht 6' 2\" (1.88 m)   Wt 85.5 kg (188 lb 7.9 oz)   SpO2 100%   BMI 24.20 kg/m²       Intake/Output Summary (Last 24 hours) at 7/24/2020 1040  Last data filed at 7/24/2020 0915  Gross per 24 hour   Intake 370 ml   Output --   Net 370 ml       Physical Examination:    GEN: AAO X 3, NAD  RS: Chest is bilateral equal, no wheezing / rales / crackles  CVS: S1-S2 heard, RRR  Abdomen: Soft, Non tender, Not distended, Positive bowel sounds  Extremities: No edema, no cyanosis, skin is warm on touch  CNS: Awake & follows commands, CN II-XII are grossly intact. HEENT: Head is atraumatic, PERRLA, conjunctiva pink & non icteric. No JVD or carotid bruit      Data Review:      Labs:     Hematology:   Recent Labs     07/24/20  0815 07/23/20  0430 07/22/20  0445   WBC 6.6 7.3 9.1   HGB 9.0* 10.6* 10.5*   HCT 29.4* 34.3* 33.7*     Chemistry:   Recent Labs     07/24/20  0815 07/23/20  0430 07/22/20  0445   BUN 46* 35* 52*   CREA 15.70* 14.00* 17.50*   CA 6.3* 7.4* 6.8*   ALB 2.5* 2.8* 2.7*   K 4.6 4.8 4.5    139 136    105 99*   CO2 25 22 27   PHOS 3.9 5.3* 6.5*   GLU 93 79 85        Images:     XR (Most Recent). CXR reviewed by me and compared with previous CXR Results from Hospital Encounter encounter on 07/20/20   XR CHEST PORT    Narrative Portable CXR:    HISTORY: Pleural effusion. Hypocalcemia. Dialysis patient. Comparison July 8, 2020    Mild cardiomegaly with no significant vascular congestion. Loculated collection  in the left lower chest is unchanged. Pleural effusion and right chest also  stable. Parenchymal disease in the lung bases not completely excluded. Impression IMPRESSION: Stable bilateral pleural effusion, loculation on the left. Parenchymal disease in the lung bases not excluded. No vascular congestion.         CT (Most Recent) Results from [facility] Encounter encounter on 02/29/20   CT ABD PELV WO CONT    Narrative Indication: evaluate PD cuff. Peritoneal dialysis tube malpositioned. Impression IMPRESSION: Left lower quadrant peritoneal dialysis catheter looped in pelvis. Bilateral pleural effusions. Cardiomegaly. Gallbladder wall thickening,  ultrasound follow-up recommended. Bilateral renal cysts. Diverticulosis. Left  inguinal canal fluid collection. Comment: CT images of the abdomen and pelvis were obtained without oral or  intravenous contrast. The initial report was rendered by [facility]  radiology services. Small bilateral pleural effusions are present with atelectasis in the underlying  lungs. The heart is enlarged. The gallbladder is contracted and thickening of the fundus wall is noted with  suspicion of tiny stone. Nonemergent ultrasound recommended for further  evaluation. The liver spleen pancreas and adrenal glands are unremarkable. The kidneys are mildly atrophic and harbor cysts. Calculus or hydronephrosis. The ureters and bladder are unremarkable. Peritoneal dialysis catheter is looped in the pelvis entering the peritoneum in  the left lower quadrant. Fluid collection is noted in the left inguinal canal.    Mild colonic diverticulosis is noted without diverticulitis. Pelvic viscera is unremarkable. No bowel obstruction, free intraperitoneal air, free fluid or appendicitis. DICOM format imaged data is available to non-affiliated external healthcare  facilities or entities on a secure, media free, reciprocally searchable basis  with patient authorization  for 12 months following the date of the study. Plan / Recommendation:         End Stage Renal Disease:  Plan HD today    At 10:40 AM on 7/24/2020, I saw and examined patient during hemodialysis treatment. The patient was receiving hemodialysis for treatment of end stage renal disease.  I have also reviewed vital signs, intake and output, lab results and recent events, and agreed with today's dialysis order. Access: No issue    Anemia: mircera  Dialyzing with high calcium bath. repeat ionized calcium post dialysis. he is tolerating po calcium and calcitriol. bp is much better. hopefully discharge soon. [facility] 1140 am     Artis Muñoz MD  Nephrology  7/24/2020

## 2020-07-25 VITALS
HEIGHT: 74 IN | TEMPERATURE: 98.4 F | SYSTOLIC BLOOD PRESSURE: 110 MMHG | RESPIRATION RATE: 20 BRPM | BODY MASS INDEX: 24.19 KG/M2 | OXYGEN SATURATION: 99 % | HEART RATE: 79 BPM | DIASTOLIC BLOOD PRESSURE: 76 MMHG | WEIGHT: 188.49 LBS

## 2020-07-25 LAB
ALBUMIN SERPL-MCNC: 2.9 G/DL (ref 3.4–5)
ALBUMIN/GLOB SERPL: 0.6 {RATIO} (ref 0.8–1.7)
ALP SERPL-CCNC: 115 U/L (ref 45–117)
ALT SERPL-CCNC: 11 U/L (ref 16–61)
ANION GAP SERPL CALC-SCNC: 7 MMOL/L (ref 3–18)
AST SERPL-CCNC: 11 U/L (ref 10–38)
ATRIAL RATE: 99 BPM
BASOPHILS # BLD: 0.1 K/UL (ref 0–0.1)
BASOPHILS NFR BLD: 1 % (ref 0–2)
BILIRUB SERPL-MCNC: 0.6 MG/DL (ref 0.2–1)
BUN SERPL-MCNC: 29 MG/DL (ref 7–18)
BUN/CREAT SERPL: 3 (ref 12–20)
CA-I SERPL-SCNC: 1.05 MMOL/L (ref 1.12–1.32)
CALCIUM SERPL-MCNC: 7.4 MG/DL (ref 8.5–10.1)
CALCULATED P AXIS, ECG09: 56 DEGREES
CALCULATED R AXIS, ECG10: 0 DEGREES
CALCULATED T AXIS, ECG11: -158 DEGREES
CHLORIDE SERPL-SCNC: 102 MMOL/L (ref 100–111)
CO2 SERPL-SCNC: 26 MMOL/L (ref 21–32)
CREAT SERPL-MCNC: 10.5 MG/DL (ref 0.6–1.3)
DIAGNOSIS, 93000: NORMAL
DIFFERENTIAL METHOD BLD: ABNORMAL
EOSINOPHIL # BLD: 0.6 K/UL (ref 0–0.4)
EOSINOPHIL NFR BLD: 7 % (ref 0–5)
ERYTHROCYTE [DISTWIDTH] IN BLOOD BY AUTOMATED COUNT: 16.2 % (ref 11.6–14.5)
GLOBULIN SER CALC-MCNC: 4.7 G/DL (ref 2–4)
GLUCOSE SERPL-MCNC: 100 MG/DL (ref 74–99)
HCT VFR BLD AUTO: 33.2 % (ref 36–48)
HGB BLD-MCNC: 9.8 G/DL (ref 13–16)
LYMPHOCYTES # BLD: 0.7 K/UL (ref 0.9–3.6)
LYMPHOCYTES NFR BLD: 8 % (ref 21–52)
MAGNESIUM SERPL-MCNC: 2.1 MG/DL (ref 1.6–2.6)
MCH RBC QN AUTO: 24.6 PG (ref 24–34)
MCHC RBC AUTO-ENTMCNC: 29.5 G/DL (ref 31–37)
MCV RBC AUTO: 83.4 FL (ref 74–97)
MONOCYTES # BLD: 1.4 K/UL (ref 0.05–1.2)
MONOCYTES NFR BLD: 16 % (ref 3–10)
NEUTS SEG # BLD: 5.7 K/UL (ref 1.8–8)
NEUTS SEG NFR BLD: 68 % (ref 40–73)
P-R INTERVAL, ECG05: 138 MS
PHOSPHATE SERPL-MCNC: 3.6 MG/DL (ref 2.5–4.9)
PLATELET # BLD AUTO: 185 K/UL (ref 135–420)
PLATELET COMMENTS,PCOM: ABNORMAL
PMV BLD AUTO: 9.7 FL (ref 9.2–11.8)
POTASSIUM SERPL-SCNC: 4.6 MMOL/L (ref 3.5–5.5)
PROT SERPL-MCNC: 7.6 G/DL (ref 6.4–8.2)
Q-T INTERVAL, ECG07: 354 MS
QRS DURATION, ECG06: 98 MS
QTC CALCULATION (BEZET), ECG08: 454 MS
RBC # BLD AUTO: 3.98 M/UL (ref 4.7–5.5)
RBC MORPH BLD: ABNORMAL
SODIUM SERPL-SCNC: 135 MMOL/L (ref 136–145)
VENTRICULAR RATE, ECG03: 99 BPM
WBC # BLD AUTO: 8.5 K/UL (ref 4.6–13.2)

## 2020-07-25 PROCEDURE — 83735 ASSAY OF MAGNESIUM: CPT

## 2020-07-25 PROCEDURE — 74011250637 HC RX REV CODE- 250/637: Performed by: PHYSICIAN ASSISTANT

## 2020-07-25 PROCEDURE — 74011250636 HC RX REV CODE- 250/636: Performed by: INTERNAL MEDICINE

## 2020-07-25 PROCEDURE — 74011250637 HC RX REV CODE- 250/637: Performed by: INTERNAL MEDICINE

## 2020-07-25 PROCEDURE — 85025 COMPLETE CBC W/AUTO DIFF WBC: CPT

## 2020-07-25 PROCEDURE — 82330 ASSAY OF CALCIUM: CPT

## 2020-07-25 PROCEDURE — 80053 COMPREHEN METABOLIC PANEL: CPT

## 2020-07-25 PROCEDURE — 74011000258 HC RX REV CODE- 258: Performed by: INTERNAL MEDICINE

## 2020-07-25 PROCEDURE — 94762 N-INVAS EAR/PLS OXIMTRY CONT: CPT

## 2020-07-25 PROCEDURE — 84100 ASSAY OF PHOSPHORUS: CPT

## 2020-07-25 RX ORDER — CALCIUM CARBONATE 500(1250)
1 TABLET ORAL 4 TIMES DAILY
Qty: 120 TAB | Refills: 0 | Status: SHIPPED | OUTPATIENT
Start: 2020-07-25 | End: 2020-08-24

## 2020-07-25 RX ORDER — CALCITRIOL 0.5 UG/1
0.5 CAPSULE ORAL 2 TIMES DAILY
Qty: 60 CAP | Refills: 0 | Status: SHIPPED | OUTPATIENT
Start: 2020-07-25

## 2020-07-25 RX ORDER — CALCIUM GLUCONATE 94 MG/ML
1 INJECTION, SOLUTION INTRAVENOUS ONCE
Status: DISCONTINUED | OUTPATIENT
Start: 2020-07-25 | End: 2020-07-25

## 2020-07-25 RX ADMIN — AMLODIPINE BESYLATE 10 MG: 10 TABLET ORAL at 08:48

## 2020-07-25 RX ADMIN — CALCITRIOL CAPSULES 0.25 MCG 0.5 MCG: 0.25 CAPSULE ORAL at 08:47

## 2020-07-25 RX ADMIN — CARVEDILOL 25 MG: 25 TABLET, FILM COATED ORAL at 08:47

## 2020-07-25 RX ADMIN — CALCIUM 1000 MG: 500 TABLET ORAL at 08:47

## 2020-07-25 RX ADMIN — Medication 10 ML: at 13:00

## 2020-07-25 RX ADMIN — HEPARIN SODIUM 5000 UNITS: 5000 INJECTION INTRAVENOUS; SUBCUTANEOUS at 01:19

## 2020-07-25 RX ADMIN — LOSARTAN POTASSIUM 100 MG: 50 TABLET, FILM COATED ORAL at 08:47

## 2020-07-25 RX ADMIN — CALCIUM GLUCONATE 1 G: 98 INJECTION, SOLUTION INTRAVENOUS at 12:17

## 2020-07-25 RX ADMIN — CALCIUM 1000 MG: 500 TABLET ORAL at 12:17

## 2020-07-25 RX ADMIN — THERA TABS 1 TABLET: TAB at 08:48

## 2020-07-25 RX ADMIN — HEPARIN SODIUM 5000 UNITS: 5000 INJECTION INTRAVENOUS; SUBCUTANEOUS at 08:48

## 2020-07-25 RX ADMIN — Medication 10 ML: at 05:51

## 2020-07-25 RX ADMIN — CLONIDINE HYDROCHLORIDE 0.1 MG: 0.1 TABLET ORAL at 08:47

## 2020-07-25 NOTE — PROGRESS NOTES
RENAL DAILY PROGRESS NOTE    Patient: Shant Wilson               Sex: male          DOA: 7/20/2020 11:26 AM        YOB: 1970      Age:  44 y.o.        LOS:  LOS: 5 days     Subjective: Shant Wilson is a 44 y.o.  who presents with Hypocalcemia [E83.51]  Hypocalcemia [E83.51]  Acute renal failure superimposed on chronic kidney disease, unspecified CKD stage, unspecified acute renal failure type ([facility] [address] [N17.9, N18.9]. Asked to evaluate for esrd and hypocalcemia,s/p subtotal parathyroidectomy,non compliance  Chief complains: none.   - Reviewed last 24 hrs events     Current Facility-Administered Medications   Medication Dose Route Frequency    amLODIPine (NORVASC) tablet 10 mg  10 mg Oral DAILY    carvediloL (COREG) tablet 25 mg  25 mg Oral BID WITH MEALS    ergocalciferol capsule 50,000 Units  50,000 Units Oral Q7D    losartan (COZAAR) tablet 100 mg  100 mg Oral DAILY    cloNIDine HCL (CATAPRES) tablet 0.1 mg  0.1 mg Oral Q12H    therapeutic multivitamin (THERAGRAN) tablet 1 Tab  1 Tab Oral DAILY    melatonin tablet 6 mg  6 mg Oral QHS    labetaloL (NORMODYNE;TRANDATE) 20 mg/4 mL (5 mg/mL) injection 10 mg  10 mg IntraVENous Q6H PRN    acetaminophen (TYLENOL) solution 500 mg  500 mg Oral Q4H PRN    calcitRIOL (ROCALTROL) capsule 0.5 mcg  0.5 mcg Oral BID    calcium carbonate (OS-VIRAL) tablet 1,000 mg [elemental]  1,000 mg Oral QID    sodium chloride (NS) flush 5-40 mL  5-40 mL IntraVENous Q8H    sodium chloride (NS) flush 5-40 mL  5-40 mL IntraVENous PRN    hydrALAZINE (APRESOLINE) 20 mg/mL injection 20 mg  20 mg IntraVENous Q6H PRN    heparin (porcine) injection 5,000 Units  5,000 Units SubCUTAneous Q8H    [Held by provider] trimethobenzamide (TIGAN) injection 200 mg  200 mg IntraMUSCular Q6H PRN       Objective:     Visit Vitals  /76 (BP 1 Location: Right arm, BP Patient Position: At rest)   Pulse 79   Temp 98.4 °F (36.9 °C)   Resp 20   Ht 6' 2\" (1.88 m)   Wt 85.5 kg (188 lb 7.9 oz)   SpO2 99%   BMI 24.20 kg/m²       Intake/Output Summary (Last 24 hours) at 7/25/2020 1427  Last data filed at 7/25/2020 0950  Gross per 24 hour   Intake 600 ml   Output 3000 ml   Net -2400 ml       Physical Examination:       RS: lungs clear  CVS: S1-S2 heard, RRR, No S3 / murmur  Abdomen: Soft,   Extremities: No edema,  CNS: Awake  HEENT: Head is atraumatic, PERRLA, conjunctiva pink & non icteric. No JVD or carotid bruit       Data Review:      Labs:     Hematology:   Recent Labs     07/25/20  0540 07/24/20  0815 07/23/20  0430   WBC 8.5 6.6 7.3   HGB 9.8* 9.0* 10.6*   HCT 33.2* 29.4* 34.3*     Chemistry:   Recent Labs     07/25/20  0540 07/24/20  0815 07/23/20  0430   BUN 29* 46* 35*   CREA 10.50* 15.70* 14.00*   CA 7.4* 6.3* 7.4*   ALB 2.9* 2.5* 2.8*   K 4.6 4.6 4.8   * 138 139    105 105   CO2 26 25 22   PHOS 3.6 3.9 5.3*   * 93 79        Images:    XR (Most Recent). CXR reviewed by me and compared with previous CXR Results from Hospital Encounter encounter on 07/20/20   XR CHEST PORT    Narrative Portable CXR:    HISTORY: Pleural effusion. Hypocalcemia. Dialysis patient. Comparison July 8, 2020    Mild cardiomegaly with no significant vascular congestion. Loculated collection  in the left lower chest is unchanged. Pleural effusion and right chest also  stable. Parenchymal disease in the lung bases not completely excluded. Impression IMPRESSION: Stable bilateral pleural effusion, loculation on the left. Parenchymal disease in the lung bases not excluded. No vascular congestion. CT (Most Recent) Results from Hospital Encounter encounter on 02/29/20   CT ABD PELV WO CONT    Narrative Indication: evaluate PD cuff. Peritoneal dialysis tube malpositioned. Impression IMPRESSION: Left lower quadrant peritoneal dialysis catheter looped in pelvis. Bilateral pleural effusions. Cardiomegaly. Gallbladder wall thickening,  ultrasound follow-up recommended. Bilateral renal cysts. Diverticulosis. Left  inguinal canal fluid collection. Comment: CT images of the abdomen and pelvis were obtained without oral or  intravenous contrast. The initial report was rendered by [facility]  radiology services. Small bilateral pleural effusions are present with atelectasis in the underlying  lungs. The heart is enlarged. The gallbladder is contracted and thickening of the fundus wall is noted with  suspicion of tiny stone. Nonemergent ultrasound recommended for further  evaluation. The liver spleen pancreas and adrenal glands are unremarkable. The kidneys are mildly atrophic and harbor cysts. Calculus or hydronephrosis. The ureters and bladder are unremarkable. Peritoneal dialysis catheter is looped in the pelvis entering the peritoneum in  the left lower quadrant. Fluid collection is noted in the left inguinal canal.    Mild colonic diverticulosis is noted without diverticulitis. Pelvic viscera is unremarkable. No bowel obstruction, free intraperitoneal air, free fluid or appendicitis. DICOM format imaged data is available to non-affiliated external healthcare  facilities or entities on a secure, media free, reciprocally searchable basis  with patient authorization  for 12 months following the date of the study. EKG No results found for this or any previous visit. I have personally reviewed the old medical records and patient's labs    Plan / Recommendation:      1. Esrd,continue hemodialysis  with high ca bath.[facility] arrangments [facility] 1140am  2. hypocalcemia,related to subtotal parathyroidectomy. hungry bone syndrome. improved. discharge home on po calcium and calcitriol. will give iv calcium with dialysis. please remove central line prior to discharge. keep dialysis cath.   3.htn,improved  4-hyperphosphatemia,improved      Discussed with dr John Kearns MD  Nephrology  7/25/2020

## 2020-07-25 NOTE — PROGRESS NOTES
D/C order noted for today. Orders reviewed. No needs identified at this time. pcp appointment set up. Outpatient dialysis se up.         NANCY Curtis RN  Care Management  Pager: [phone]

## 2020-07-25 NOTE — DISCHARGE SUMMARY
Discharge Summary      Patient: Compa Cook MRN: [id number]  CSN: [id number]    YOB: 1970  Age: 44 y.o. Sex: male    DOA: 7/20/2020 LOS:  LOS: 5 days   Discharge Date: 7/25/20207/25/20      Admission Diagnoses: Hypocalcemia [E83.51]  Hypocalcemia [E83.51]  Acute renal failure superimposed on chronic kidney disease, unspecified CKD stage, unspecified acute renal failure type ([facility] [address] [N17.9, N18.9]    Discharge Diagnoses:    1. Severe symptomatic hypocalcemia secondary to subtotal parathyroidectomy and non compliance with calcium, calcitriol. Hungry bone syndrome. 2. Hyperphosphatemia  3. Secondary hyperparathyroidism secondary to ESRD; s/p subtotal parathyroidectomy on 7/6/2020   4. Hyperkalemia, mild   5. Prolonged QT interval- suspect related to electrolyte derangements   6. ESRD on HD   7. Pleural effusions- suspect related to CHF and ESRD   8. Anemia of chronic disease   9. HTN  10. HFrEF- 46% on ECHO 4/16/2020   11. STEFANIE  12. Gout   13. BPH  14. Anxiety/depression   15. Medical non compliance        Discharge Condition: Stable    PHYSICAL EXAM  Visit Vitals  /76 (BP 1 Location: Right arm, BP Patient Position: At rest)   Pulse 79   Temp 98.4 °F (36.9 °C)   Resp 20   Ht 6' 2\" (1.88 m)   Wt 85.5 kg (188 lb 7.9 oz)   SpO2 99%   BMI 24.20 kg/m²     General:  AA male laying in bed, Alert, NAD  Cardiovascular:  RRR  Pulmonary: on room air,  LSC throughout; respiratory effort WNL  GI:  +BS in all four quadrants, soft, non-tender  Extremities:  No edema; 2+ dorsalis pedis pulses bilaterally  Neuro: awake, alert, ox3, moving all extremities, follows commands   Chest wall:  TDC in right chest wall- clean dry intact     Hospital Course:   Mr. Nina Shah is a 45 yo male with a PMHx of recent subtotal parathyroidectomy and hospitalization for severe hypocalcemia,  ESRD on PD, HTN, HFrEF, anxiety, and depression who presented to the ED with c/o generalized weakness, nausea and vomiting.  He was found to have critical low calcium at <5.0. Other notable labs included WBC's 13.9, Hgb 10.4, , creatinine 29.20 and phosphorus 8.4. CXR showed the findings below and an EKG showed sinus rhythm with possible LAE, LVH and a prolonged QTc of 558 ms. Mr. hSantel Steiner was then admitted to the ICU for severe hypocalcemia and potentially life-threatening arrhythmias. Of note, Mr. Shantel Steiner was admitted from 7/6-7/17 for subtotal parathyroidectomy. During that admission, he threatened to leave unless his central line was removed. Afterwards he could not receive IV calcium, he refused his oral calcium and calcitriol, he refused hemodialysis as recommended by nephrology, and he ultimately left A. After admission, nephrology was consulted and recommended that Mr. De Santiago receive hemodialysis as he had been non-compliant with his peritoneal dialysis for some time. Interventional radiology was consulted and placed a R IJ tunneled CVC. He was then taken for dialysis with a high calcium bath followed by high dose IV calcium gluconate along with PO calcium and calcitriol. Mr. Larraine Gowers calcium improved but remained low despite aggressive replacement. He received dialysis in the ICU the following day during which he had some painful muscle twitching and a rhythm change to a wide-complex tachycardia that spontaneously resolved. HD was stopped and his telemetry was monitored without further incidence. He subsequently had mild improvement in his calcium. He remained hemodynamically stable and was transferred to stepdown. Because of his history of depression and non-compliance, psychiatry was consulted who determined that he had capacity. Over the next couple days, he received hemodialysis per nephrology. He was compliant with his oral calcium and calcitriol with improvement in his symptoms. He eventually had significant improvement in his calcium and ionized calcium.  On 7/25/20, Mr. Shantel Steiner had remained hemodynamically stable and was cleared for discharged by nephrology. Numerous discussion and counseling were provided on the importance of adhering to his medication regimen, especially his calcium and calcitriol, and attending outpatient dialysis as scheduled. He understood and expressed agreement. Return precautions were discussed. He was given prescriptions and instructions for the medications below and instructed to follow-up with his PCP as directed. He was given instructions to attend outpatient dialysis which he also expressed understanding of. He was then discharged to home. Consults:   Interventional radiology- ROLANDO Burch, Dr. Maria Luisa Manning MD   Nephrology- Dr. James Rome MD   Psychiatry- Dr. Tamra Juarez MD       Significant Diagnostic Studies:    CXR 7/20/20:   Stable bilateral pleural effusion, loculation on the left. Parenchymal disease in the lung bases not excluded. No vascular congestion. Procedures Performed:     IR Insert Tunl CVC W/O Port 7/21/20: Successful placement of the right internal jugular tunneled double-lumen 23 cm cuff to tip 14.5 [employer] hemodialysis catheter and right internal jugular tunneled dual lumen 23 cm cuff to tip 6 [employer] Bard power line. Catheters are ready for immediate use. Discharge Medications:  Discharge Medication List as of 7/25/2020  2:21 PM      START taking these medications    Details   calcium carbonate (OS-VIRAL) 500 mg calcium (1,250 mg) tablet Take 1 Tab by mouth four (4) times daily for 30 days. , Normal, Disp-120 Tab,R-0         CONTINUE these medications which have CHANGED    Details   calcitRIOL (ROCALTROL) 0.5 mcg capsule Take 1 Cap by mouth two (2) times a day., Normal, Disp-60 Cap,R-0         CONTINUE these medications which have NOT CHANGED    Details   carvedilol (COREG) 25 mg tablet Take 1 Tab by mouth two (2) times daily (with meals). , Normal, Disp-90 Tab, R-1      cinacalcet (SENSIPAR) 30 mg tablet Take  by mouth daily. , Historical Med ergocalciferol (ERGOCALCIFEROL) 50,000 unit capsule Take  by mouth every seven (7) days. , Historical Med      losartan (COZAAR) 100 mg tablet Take 1 Tab by mouth daily. , Normal, Disp-30 Tab, R-30      cloNIDine HCl (CATAPRES) 0.1 mg tablet Take 0.1 mg by mouth two (2) times a day., Historical Med      amLODIPine (NORVASC) 10 mg tablet Take 1 Tab by mouth daily. , Normal, Disp-30 Tab, R-3      allopurinol (ZYLOPRIM) 100 mg tablet Take 2 Tabs by mouth daily. , Normal, Disp-60 Tab, R-3              Activity: activity as tolerated    Diet: Regular Diet and Renal Diet    Wound Care: Keep wound clean and dry      Follow-up Information     Follow up With Specialties Details Why Contact Info    [facility]  Go on 8/4/2020 2pm appointment with Dr. Mira Willard [address] to Every Monday, Wednesday, and Friday at 11:40am [address] [facility] [address]      Nimisha Viveros MD Otolaryngology, Surgery   [address]  [phone]             Minutes spent on discharge: >30 minutes spent coordinating this discharge (review instructions/follow-up, prescriptions, preparing report for sign off)          AMANDA Au DO   July 28, 2020

## 2020-07-25 NOTE — PROGRESS NOTES
MD updated on patient's ionized Ca 1.09. Orders received to continue to monitor. Bedside and Verbal shift change report given to ALEJANDRINA Mackenzie RN (oncoming nurse) by Cinthya Holley RN   (offgoing nurse). Report included the following information SBAR, Kardex, MAR and Recent Results.

## 2020-07-25 NOTE — DISCHARGE INSTRUCTIONS
Kidney Dialysis: Care Instructions  Your Care Instructions     Dialysis is a process that filters wastes from the blood when your kidneys can no longer do the job. It is not a cure, but it can help you live longer and feel better. It is a lifesaving treatment when you have kidney failure. Normal kidneys work 24 hours a day to clean wastes from your blood. Your kidneys are not able to do this job, so a process called dialysis will do some of the work for your kidneys. You and your doctor will decide which type of dialysis you should have. Peritoneal dialysis uses the lining of your belly (peritoneum) to filter your blood. You can do it at home, on a daily basis. Hemodialysis uses a man-made filter called a dialyzer to clean your blood. Most people need to go to a hospital or clinic 3 days a week for several hours each time. Sometimes hemodialysis can be done at home. It is normal to have questions about your treatment, and you have a right to know what is happening to you. Learning about dialysis can help you take an active role in your treatment. Dialysis does not cure kidney disease, but it can help you live longer and feel better. You will need to follow your diet and treatment schedule carefully. Follow-up care is a key part of your treatment and safety. Be sure to make and go to all appointments, and call your doctor if you are having problems. It's also a good idea to know your test results and keep a list of the medicines you take. What do you need to know about peritoneal dialysis? Peritoneal dialysis uses the lining of your belly (or peritoneal membrane) to filter your blood. Before you can begin peritoneal dialysis, your doctor will need to place a thin tube called a catheter in your belly. This is the dialysis access. · Peritoneal dialysis can be done at home or in any clean place. You may be able to do it while you sleep. · You can do it by yourself.  You don't have to rely on help from others. · You can do it at the times you choose as long as you do the right number of treatments. · It has to be done every day of the week. · Some people find it hard to do all the required steps. · It increases your chance for a serious infection of the lining of the belly (peritoneum). Overview  Hemodialysis uses a man-made membrane (dialyzer) to clean your blood. You're connected to the dialyzer by tubes attached to your blood vessels. Before you start dialysis, your doctor will create a site where the blood can flow in and out of your body during your sessions. · Hemodialysis is done mainly by trained health workers. They can watch for problems. · You can do it at a center where other people are doing dialysis. This can help provide emotional support. · You can schedule your treatments in the evenings and maybe at home. This gives you more control over your schedule. · It usually needs to be done on a set schedule 3 times a week. · It can cause side effects, like low blood pressure and muscle cramps. These can often be treated easily. · It requires being poked by a needle at each treatment. This bothers some people. Others get used to it and can do it themselves. How can you care for yourself at home? · Be sure to have all of your dialysis sessions. Do not try to shorten or skip your sessions. You have a better chance of a longer and healthier life by getting your full treatment. · Your doctor or health care team will show you the steps you need to go through each day before, during, and after dialysis. Be sure to follow these steps. If you do not understand a step, talk to your team.  · Your doctor and dietitian will help you design menus that follow your diet. Be sure to follow your diet guidelines. ? You will need to limit fluids and certain foods that contain salt (sodium), potassium, and phosphorus.   ? You may need to follow a heart-healthy diet to keep the fat (cholesterol) in your blood under control. ? You may need higher levels of protein in your diet. · Your doctor may recommend certain vitamins. But do not take any other medicine, including over-the-counter medicines, vitamins, and herbal products, without talking to your doctor first.  · Do not smoke. Smoking raises your risk of many health problems, including more kidney damage. If you need help quitting, talk to your doctor about stop-smoking programs and medicines. These can increase your chances of quitting for good. · Do not take ibuprofen (Advil, Motrin), naproxen (Aleve), or similar medicines, unless your doctor tells you to. These medicines may make kidney problems worse. When should you call for help? Call your doctor now or seek immediate medical care if:  · You have a fever. · You are dizzy or lightheaded, or you feel like you may faint. · You are confused or cannot think clearly. · You have new or worse nausea or vomiting. · You have new or more blood in your urine. · You have new swelling. Watch closely for changes in your health, and be sure to contact your doctor if:  · You do not get better as expected. Where can you learn more? Go to http://nichole-shahnaz.info/  Enter K049 in the search box to learn more about \"Kidney Dialysis: Care Instructions. \"  Current as of: August 12, 2019               Content Version: 12.5  © 3215-3098 [employer]. Care instructions adapted under license by [employer] (which disclaims liability or warranty for this information). If you have questions about a medical condition or this instruction, always ask your healthcare professional. [place] [address] any warranty or liability for your use of this information. Patient Education        Hemodialysis Access Surgery: What to Expect at Home  Your Recovery  Hemodialysis is a way to remove wastes from the blood when your kidneys can no longer do the job.  It's not a cure, but it can help you live longer and feel better. It's a lifesaving treatment when you have kidney failure. Hemodialysis is often called dialysis. Your doctor created a place (called an access) in your arm for your blood to flow in and out of your body during your dialysis sessions. Your arm will probably be bruised and swollen. It may hurt. The cut (incision) may bleed. The pain and bleeding will get better over several days. You will probably need only over-the-counter pain medicine. You can reduce swelling by propping up your arm on 1 or 2 pillows and keeping your elbow straight. You will have stitches. These may dissolve on their own, or your doctor will tell you when to come in to have them removed. You should also be able to return to work in a few days. You may feel some coolness or numbness in your hand. These feelings usually go away in a few weeks. Your doctor may suggest squeezing a soft object. This will strengthen your access and may make hemodialysis faster and easier. You should always be able to feel blood rushing through the fistula or graft. It feels like a slight vibration when you put your fingers on the skin over the fistula or graft. This feeling is called a thrill or a pulse. This care sheet gives you a general idea about how long it will take for you to recover. But each person recovers at a different pace. Follow the steps below to get better as quickly as possible. How can you care for yourself at home? Activity  · Rest when you feel tired. Getting enough sleep will help you recover. Do not lie on or sleep on the arm with the access. · Avoid activities such as washing windows or gardening that put stress on the arm with the access. · You may use your arm, but do not lift anything that weighs more than about 15 pounds. This may include a child, heavy grocery bags, a heavy briefcase or backpack, cat litter or dog food bags, or a vacuum .   · You can shower, but keep the access dry for the first 2 days. Cover the area with a plastic bag to keep it dry. · Do not soak or scrub the incision until it has healed. · Wear an arm guard to protect the area if you play sports or work with your arms. · You may drive when your doctor says it is okay. This is usually in 1 to 2 days. · Most people are able to return to work about 1 or 2 days after surgery. Diet  · Follow an eating plan that is good for your kidneys. A registered dietitian can help you make a meal plan that is right for you. You may need to limit protein, salt, fluids, and certain foods. Medicines  · Your doctor will tell you if and when you can restart your medicines. He or she will also give you instructions about taking any new medicines. · If you take aspirin or some other blood thinner, ask your doctor if and when to start taking it again. Make sure that you understand exactly what your doctor wants you to do. · Take pain medicines exactly as directed. ? If the doctor gave you a prescription medicine for pain, take it as prescribed. ? If you are not taking a prescription pain medicine, ask your doctor if you can take acetaminophen (Tylenol). Do not take ibuprofen (Advil, Motrin) or naproxen (Aleve), or similar medicines, unless your doctor tells you to. They may make chronic kidney disease worse. ? Do not take two or more pain medicines at the same time unless the doctor told you to. Many pain medicines have acetaminophen, which is Tylenol. Too much acetaminophen (Tylenol) can be harmful. · If you think your pain medicine is making you sick to your stomach:  ? Take your medicine after meals (unless your doctor has told you not to). ? Ask your doctor for a different pain medicine. · If your doctor prescribed antibiotics, take them as directed. Do not stop taking them just because you feel better. You need to take the full course of antibiotics. Incision care  · Keep the area dry for 2 days.  After 2 days, wash the area with soap and water every day, and always before dialysis. · Do not soak or scrub the incision until it has healed. · If you have a bandage, change it every day or as your doctor recommends. Your doctor will tell you when you can remove it. Exercise  · Squeeze a soft ball or other object as your doctor tells you. This will help blood flow through the access and help prevent blood clots. Elevation  · Prop up the sore arm on a pillow anytime you sit or lie down during the next 3 days. Try to keep it above the level of your heart. This will help reduce swelling. Other instructions  · Every day, check your access for a pulse or thrill in the fistula or graft area. A thrill is a vibration. To feel a pulse or thrill, place the first two fingers of your hand over the access. · Do not bump your arm. · Do not wear tight clothing, jewelry, or anything else that may squeeze the access. · Use your other arm to have blood drawn or blood pressure taken. · Do not put cream or lotion on or near the access. · Make sure all doctors you deal with know that you have a vascular access. Follow-up care is a key part of your treatment and safety. Be sure to make and go to all appointments, and call your doctor if you are having problems. It's also a good idea to know your test results and keep a list of the medicines you take. When should you call for help? [id number] anytime you think you may need emergency care. For example, call if:  · You passed out (lost consciousness). · You have chest pain, are short of breath, or cough up blood. Call your doctor now or seek immediate medical care if:  · Your hand or arm is cold or dark-colored. · You have no pulse in your access. · You have nausea or you vomit. · You have pain that does not get better after you take pain medicine. · You have loose stitches, or your incision comes open. · You are bleeding from the incision.   · You have signs of infection, such as:  ? Increased pain, swelling, warmth, or redness. ? Red streaks leading from the area. ? Pus draining from the area. ? A fever. · You have signs of a blood clot in your leg (called a deep vein thrombosis), such as:  ? Pain in your calf, back of the knee, thigh, or groin. ? Redness or swelling in your leg. Watch closely for changes in your health, and be sure to contact your doctor if you have any problems. Where can you learn more? Go to http://nichole-shahnaz.info/  Enter P616 in the search box to learn more about \"Hemodialysis Access Surgery: What to Expect at Home. \"  Current as of: August 12, 2019               Content Version: 12.5  © 9759-8316 [employer]. Care instructions adapted under license by [employer] (which disclaims liability or warranty for this information). If you have questions about a medical condition or this instruction, always ask your healthcare professional. [place] [address] any warranty or liability for your use of this information. Patient Education        Hypocalcemia: Care Instructions  Your Care Instructions     Hypocalcemia means that the level of calcium in your blood is lower than it should be. Your doctor may have done tests to check your calcium levels because you had certain symptoms. These include tingling or twitching of your muscles. Your doctor may do more tests to find out why your calcium is low and to see how well your kidneys and other organs are working. Your doctor will also want to see how well your parathyroid gland is working. This gland controls calcium levels in your blood. You may have this problem because you are not getting enough calcium in your diet. Or your body may not be absorbing the calcium as it should. You may be able to get your calcium up to a safe level by taking supplements. If your levels are very low, your doctor may give you a calcium shot, possibly along with magnesium.  You will probably also be given vitamin D, because you need it to absorb calcium. After your doctor has your calcium levels up, be sure to get plenty of calcium in your diet. If you have a kidney or parathyroid problem, you may need to keep taking extra calcium. Follow-up care is a key part of your treatment and safety. Be sure to make and go to all appointments, and call your doctor if you are having problems. It's also a good idea to know your test results and keep a list of the medicines you take. How can you care for yourself at home? · Take your medicines exactly as prescribed. Call your doctor if you think you are having a problem with your medicine. · Eat foods rich in calcium. These include yogurt, cheese, milk, and dark green vegetables. This is the best way to get the calcium you need. You can get vitamin D from eggs, fatty fish, cereal, and milk. · Talk to your doctor about taking a calcium plus vitamin D supplement. · Stay active. Regular weight-bearing exercise, such as walking, can help keep your bones strong. It can also improve your overall health. · Spend a small amount of time outside in the sun without sunscreen. The sun helps your body make vitamin D. Talk to your doctor first if you have had skin cancer or you are at high risk for skin cancer. When should you call for help? Call your doctor now or seek immediate medical care if:  · You feel numb or have tingling in your fingers and hands or toes and feet. · You are confused or are having trouble remembering things. · You have muscle spasms or cramps. · Your heart seems to be speeding up and then slowing down or skipping beats. · You are feeling down or blue, or you are not enjoying things like you once did. You may be depressed, which is common in people with hypocalcemia. Depression can be treated. Watch closely for changes in your health, and be sure to contact your doctor if:  · You do not get better as expected. Where can you learn more?   Go to http://nichole-shahnaz.info/  Enter P520 in the search box to learn more about \"Hypocalcemia: Care Instructions. \"  Current as of: July 29, 2019               Content Version: 12.5  © 8225-1031 Healthwise, Incorporated. Care instructions adapted under license by [employer] (which disclaims liability or warranty for this information). If you have questions about a medical condition or this instruction, always ask your healthcare professional. [place] [address] any warranty or liability for your use of this information.

## 2020-07-25 NOTE — PROGRESS NOTES
Problem: Falls - Risk of  Goal: *Absence of Falls  Description: Document Ver[place] Parker Fall Risk and appropriate interventions in the flowsheet. Outcome: Progressing Towards Goal  Note: Fall Risk Interventions:  Mobility Interventions: Communicate number of staff needed for ambulation/transfer         Medication Interventions: Teach patient to arise slowly    Elimination Interventions: Call light in reach              Problem: Patient Education: Go to Patient Education Activity  Goal: Patient/Family Education  Outcome: Progressing Towards Goal     Problem: Pressure Injury - Risk of  Goal: *Prevention of pressure injury  Description: Document Tres Scale and appropriate interventions in the flowsheet.   Outcome: Progressing Towards Goal  Note: Pressure Injury Interventions:  Sensory Interventions: Assess changes in LOC    Moisture Interventions: Absorbent underpads    Activity Interventions: Increase time out of bed    Mobility Interventions: HOB 30 degrees or less, Pressure redistribution bed/mattress (bed type)    Nutrition Interventions: Document food/fluid/supplement intake    Friction and Shear Interventions: Foam dressings/transparent film/skin sealants, HOB 30 degrees or less                Problem: Patient Education: Go to Patient Education Activity  Goal: Patient/Family Education  Outcome: Progressing Towards Goal     Problem: Nutrition Deficit  Goal: *Optimize nutritional status  Outcome: Progressing Towards Goal     Problem: Patient Education: Go to Patient Education Activity  Goal: Patient/Family Education  Outcome: Progressing Towards Goal     Problem: Patient Education: Go to Patient Education Activity  Goal: Patient/Family Education  Outcome: Progressing Towards Goal

## 2020-07-28 NOTE — ADT AUTH CERT NOTES
Systemic or Infectious Condition [facility] - Care Day 4 (7/24/2020) by Oswaldo File         Review Entered  Review Status    7/28/2020 09:00  Completed        Criteria Review       Care Day: 4 Care Date: 7/24/2020 Level of Care: Telemetry    Guideline Day 2    Level Of Care    (X) Floor    7/28/2020 09:00:03 EDT by Melody Zhou      tele    Clinical Status    (X) * No ICU or intermediate care needs    7/28/2020 09:00:03 EDT by Trino Harris Pt able to transfer to tele today. ion flor 0.86 at 0545, 1.09 at 2143. hgb 9.0, hct 29.4. Bun 46, creat 15.70, flor 6.3, gfr 4, alb 2.5, alt 7, ast 9. Tunneled cath placed 7/21 for HD is intact. Interventions    (X) Inpatient interventions continue    7/28/2020 09:00:03 EDT by Melody Zhou      Pt refusing most meds as documented; OT and PT evals done and dc; Hemodialysis done today    ( ) Transition to oral routes    7/28/2020 09:00:03 EDT by Melody Zhou      Refused these meds; norvasc 10mg po qd, am dose of rocaltrol 0.05mcg po bid, 3 doses Os flor 1000mg po qid, catapres 0.1mg q12H, am dose coreg 25mg po bid, mvi po qd, cozaar 100mg po qd, 2/3 doses heparin 5000un sq q8H, ergocalciferol 50107 po qwk    7/28/2020 09:00:03 EDT by Melody Zhou    Subject: Additional Clinical Information    * Pt refusing most meds as documented    * Pt did receive HD today    * Plan: as per MD          * Milestone    Additional Notes    Physical exam 7/24:    General:  AA male laying in bed, Alert, NAD    Cardiovascular:  RRR    Pulmonary: on room air, [address] throughout; respiratory effort WNL    GI:  +BS in all four quadrants, soft, non-tender    Extremities:  No edema; 2+ dorsalis pedis pulses bilaterally    Neuro: awake, alert, ox3, moving all extremities, follows commands       MD assessment/plan:    1. Severe symptomatic hypocalcemia secondary to subtotal parathyroidectomy and non compliance with calcium, calcitriol. Hungry bone syndrome. 2. Hyperphosphatemia    3. Secondary hyperparathyroidism secondary to ESRD; s/p subtotal parathyroidectomy on 7/6/2020 with Dr. Rod Perea    4. Hyperkalemia, mild    5. Prolonged QT interval- suspect related to electrolyte derangements    6. ESRD on PD 7 days/week. 7. Pleural effusions- suspect related to CHF and ESRD    8. Anemia of chronic disease    9. HTN    10. HFrEF- 46% on ECHO 4/16/2020    11. STEFANIE    12. Gout    13. BPH    14. Anxiety/depression    15. Medical non compliance         Nephrology following    Resume IV calcium gluconate, oral calcium carbonate and calcitriol. Daily EKG; avoid Qtc prolonging agents - no zofran, no phenergan    Cont amlodipine, carvedilol, clonidine, losartan. Cont prn hydralazine prn labetalol    Psychiatry was consulted- Dr Yoly Falcon on 7/21 Yordan Dc has capacity to make medical decisions however, given his medical illness, delirium can be anticipated and he may quickly lose capacity. I do not recommend withholding urgent care in this case. \"    Nutrition following    PT, OT        Systemic or Infectious Condition [facility] - Care Day 3 (7/23/2020) by Oswaldo File         Review Entered  Review Status    7/28/2020 08:41  Completed        Criteria Review       Care Day: 3 Care Date: 7/23/2020 Level of Care: Step Down    Guideline Day 2    Clinical Status    ( ) * No ICU or intermediate care needs    7/28/2020 08:41:12 EDT by Trino Harris Pt transferred to stepdown today. Ion flor 0.99 at 0430, 0.91 at 0940, 1.05 at 1724. BUN 35, creat 14.00, flor 7.4, phos 5.3, gfr 5, alb 2.8, alt 7, ast 10, alk phos 120. hgb 10.6, hct 34. 3. bp still high 186/134, 162/115. 98.2, 101, 20, ra sat 100    Interventions    (X) Inpatient interventions continue    7/28/2020 08:41:12 EDT by Melody Zhou      flor gluconate IV 4gram @ 0700, 2gram @ 0900, 3gram @ 1400. apresoline 20mg IV Q6H PRN x1. heparin 5000un sq q8H; mvi po qd, melatonin 6mg po qhs, cozaar 100mg po qd, catapres 0.2mg po bid, coreg 12.5mg po q12H, norvasc 10mg po qd, tylenol 500mg po x1    ( ) Transition to oral routes    7/28/2020 08:41:12 EDT by Donis Panchal      While IV calcium gluconate still given as documented, pt also receiving Os flor 1000mg po QID and rocaltrol 0.5mcg po bid specifically for calcium replacement    7/28/2020 08:41:12 EDT by Donis Panchal    Subject: Additional Clinical Information    * Pt remains with severe hypocalcemia and repletion    * Pt not always compliant with po meds ordered/provided while in ICU    * Transferred to stepdown unit today    * Arrangements made for OP hemodialysis on Mon/wed/Fri    * plan: as per MD          * Milestone    Additional Notes    7/23-    Physical Exam:                    General:  Alert and oriented x 3  Resting in ICU bed    Head: Normocephalic, without obvious abnormality, atraumatic. Eyes: Conjunctivae/corneas clear. PERRL. Nose: Nares normal. Septum midline. Mucosa normal.    Throat: Lips, mucosa, and tongue normal. Teeth and gums normal.    Neck: Supple, symmetrical, trachea midline, healing incision to anterior lower neck, no surrounding erythema, swelling or induration    Lungs:   Symmetrical chest rise; good AE bilat; CTA, Tunnel HH cath and CVL in right upper Chest Wall    Heart: Borderline bradycardic, regular rhythm, S1, S2 normal    Abdomen:   Soft, non-tender. Bowel sounds normal. No distention    Extremities: Extremities normal, atraumatic, no cyanosis or edema. Pulses: 2+ and symmetric all extremities. Skin: Skin color, texture, turgor normal. No rashes or lesions.     Neurologic: Grossly non-focal,  follows commands, A&Ox4       MD Impression:    · Hypocalcemia - 2/2 above; and patient leaving AMA    · Hyperphosphatemia- patient refusing PhosLo - states it causes nausea and patient already nauseated    · Secondary hyperparathyroidism - 2/2 ESRD; s/p subtotal parathyroidectomy 7/6/20 with Dr. Paty Hurd (Pathology notable for parathyroid hyperplasia)    · Hyperkalemia- mild    · Prolonged QT interval:  Slightly improved today. suspect related to hypocalcemia and electrolyte derangements. · ESRD - on PD 7 days/week, Nephrology: Dr. Cristi Barrett following, IR/ Vascular consult placed for HD cath - anticipate need for HD cath placement    · Pleural Effusions: most likely related to CHF and ESRD- last CXR prior to patient pulling out tunneled catheter last admission    · Hypertension    · HFrEF 46% (4/16/2019) with global hypokinesis and estimated RVSP of 45-50mmHg based on TR jet. · H/O Anxiety/ Depression    · Patient refusing certain therapies- has capacity based on Psych exam 7/21/2020 but we will need to continue to reassess       MD plan:    Neuro    · Serial exams    · No acute pain or withdrawal issues at this time    Resp:     · Supp O2 PRN for SpO2 >90%- currently 100%      · Pulmonary Hygiene, IS.      · Keep HOB elevated > 30 degrees, aspiration precautions. I/D:    · Trend WBCs and temp curve. · Monitor surgical site and drain output. Hem/Onc:    · Daily CBC; H/H, and plts are stable.     · SQ heparin for DVT ppx    CVS:     · Telemetry: monitor QTC and avoid QTC prolonging agents    · Monitor hemodynamics. Metabolic/Yvonne;:    · Trend BMP, Mag, Phos. · Continue ionized calcium replacement     · Trend renal indices    · PD/HD per nephrology    · Replace lytes per protocol.         Endocrine:     · POC Glucose AC&HS.  Avoid hypoglycemia.     GI:    · NO ZOFRAN or PHENERGAN for nausea    · PO as tolerated by paitent    Musc/Skin:     · Per protocol    · Surgical incision care- site is healing well         · Code Status: FULL CODE

## 2020-09-24 ENCOUNTER — DOCUMENTATION ONLY (OUTPATIENT)
Dept: VASCULAR SURGERY | Age: 50
End: 2020-09-24

## 2020-09-28 ENCOUNTER — TELEPHONE (OUTPATIENT)
Dept: VASCULAR SURGERY | Age: 50
End: 2020-09-28

## 2020-09-28 NOTE — TELEPHONE ENCOUNTER
Called dialysis and spoke with Meaghan Middleton ,let her know that we had received another request for the pt new pt appt advised we had tried 2 times to call the pt to schedule appt for new access and had to leave message and pt had not returned called, per Meaghan Middleton , pt had just left the clinic and she will call pt as well and direct him to call us at [phone].

## 2020-10-13 ENCOUNTER — TELEPHONE (OUTPATIENT)
Dept: VASCULAR SURGERY | Age: 50
End: 2020-10-13

## 2021-09-11 NOTE — ADDENDUM NOTE
Addended by: Servando Madrid on: 1/2/2020 08:35 AM     Modules accepted: Orders awaiting bed, no change

## 2022-03-18 PROBLEM — E21.0 PARATHYROID HYPERPLASIA (HCC): Status: ACTIVE | Noted: 2020-07-09

## 2022-03-18 PROBLEM — Z99.2 ESRD ON PERITONEAL DIALYSIS (HCC): Status: ACTIVE | Noted: 2020-07-09

## 2022-03-18 PROBLEM — D64.9 ANEMIA: Status: ACTIVE | Noted: 2019-11-06

## 2022-03-18 PROBLEM — N39.0 UTI (URINARY TRACT INFECTION): Status: ACTIVE | Noted: 2018-04-21

## 2022-03-18 PROBLEM — N18.6 ESRD ON PERITONEAL DIALYSIS (HCC): Status: ACTIVE | Noted: 2020-07-09

## 2022-03-19 PROBLEM — I10 HTN, GOAL BELOW 130/80: Status: ACTIVE | Noted: 2017-02-10

## 2022-03-19 PROBLEM — E21.3 HYPERPARATHYROIDISM (HCC): Status: ACTIVE | Noted: 2020-07-06

## 2022-03-19 PROBLEM — N17.9 ACUTE KIDNEY INJURY (HCC): Status: ACTIVE | Noted: 2018-04-21

## 2022-03-19 PROBLEM — I50.22 SYSTOLIC CHF, CHRONIC (HCC): Status: ACTIVE | Noted: 2020-07-09

## 2022-03-19 PROBLEM — E83.51 HYPOCALCEMIA: Status: ACTIVE | Noted: 2020-07-09

## 2022-03-19 PROBLEM — N17.9 ACUTE RENAL FAILURE SUPERIMPOSED ON CHRONIC KIDNEY DISEASE (HCC): Status: ACTIVE | Noted: 2020-07-20

## 2022-03-19 PROBLEM — N18.9 ACUTE RENAL FAILURE SUPERIMPOSED ON CHRONIC KIDNEY DISEASE (HCC): Status: ACTIVE | Noted: 2020-07-20

## 2022-03-20 PROBLEM — R31.9 HEMATURIA: Status: ACTIVE | Noted: 2018-04-21

## 2022-03-20 PROBLEM — R33.9 URINARY RETENTION: Status: ACTIVE | Noted: 2018-04-21

## 2023-08-15 NOTE — CONSULTS
Consult Note  Consult requested by: Dr. Noni Ventura is a 50 y.o. male [address] who is being seen on consult for BLAINE/CKD stage 5  Chief Complaint   Patient presents with    Blood in Urine    Urinary Frequency    Urinary Pain     Admission diagnosis: <principal problem not specified>     HPI: 51 yo AA male admitted for not being able to urinate post prostate biopsy 24 H ago. Pt currently has a briones cath with about 1000 cc of bloody urine in the bag. He is continuing to have UB spasm. Denies any CP or SOB,N/V/fever or chills. He follows with my partner Dr. Sadia Mcnally, last seen in Dec of 2017 with crea of 8.5 and Hgb was 10. He is CKD stage 5 from HTN poor control and long term NSAID use. He has refused to consider dialysis (\"One of my friends  after 2 months of dialysis \"). He is considering Tx but that process has not been started since he has not shown up for follow up. He also has hx of gout and was seen at his PCPs office a few weeks ago for gout of his right foot and reported IA steroid and also receive po steroids for 2 weeks. He denies any NSAID use. He was seen and evaluated by Dr Mary Jacobsen for elevated PSA and underwent prostate biopsy yesterday. It looks like he was given po Cipro pre bx    Past Medical History:   Diagnosis Date    Hypertension     Joint pain     Kidney disease     Sleep apnea     on cpap      Past Surgical History:   Procedure Laterality Date    HX ORTHOPAEDIC Left     left knee sx     HX WISDOM TEETH EXTRACTION         Social History     Social History    Marital status:      Spouse name: N/A    Number of children: N/A    Years of education: N/A     Occupational History    Not on file.      Social History Main Topics    Smoking status: Never Smoker    Smokeless tobacco: Never Used    Alcohol use No    Drug use: No    Sexual activity: Yes     Partners: Female     Other Topics Concern    Not on file     Social History Narrative Pt is wanting to have a refill of the Cephalexin if possible. It is working. He travels and he would like to have a refill. He would like it sent in on Friday if possible. He wants it just in case the folliculitis starts again.   [address]  Phone [phone]  Fax [phone]        Phone [phone] Family History   Problem Relation Age of Onset    Hypertension Mother     Heart Disease Father     Hypertension Father     Hypertension Child      No Known Allergies     Home Medications:     Prior to Admission Medications   Prescriptions Last Dose Informant Patient Reported? Taking? HYDROcodone-acetaminophen (XODOL) 5-300 mg tablet   No No   Sig: Take 1 Tab by mouth every six (6) hours as needed. Max Daily Amount: 4 Tabs. allopurinol (ZYLOPRIM) 100 mg tablet   No No   Sig: Take 2 Tabs by mouth daily. amLODIPine (NORVASC) 10 mg tablet   No No   Sig: Take 1 Tab by mouth daily. atorvastatin (LIPITOR) 20 mg tablet   Yes No   Sig: Take  by mouth daily. ciprofloxacin HCl (CIPRO) 500 mg tablet   Yes No   Sig: Take 500 mg by mouth two (2) times a day. Start taking 1 hour prior to procedure   ciprofloxacin HCl (CIPRO) 500 mg tablet   No No   Sig: Take 1 Tab by mouth two (2) times a day for 5 days. cloNIDine HCl (CATAPRES) 0.1 mg tablet   Yes No   Sig: Take  by mouth. docusate sodium (COLACE) 100 mg capsule   No No   Sig: Take 1 Cap by mouth two (2) times a day for 5 days. hydrALAZINE (APRESOLINE) 100 mg tablet   Yes No   Sig: Take  by mouth.   predniSONE (DELTASONE) 5 mg tablet   No No   Sig: Take 1 Tab by mouth as needed. sildenafil citrate (VIAGRA) 50 mg tablet   No No   Sig: Take 1 Tab by mouth as needed. Facility-Administered Medications: None       Current Facility-Administered Medications   Medication Dose Route Frequency    sodium chloride 0.9 % bolus infusion 250 mL  250 mL IntraVENous ONCE    0.9% sodium chloride infusion  150 mL/hr IntraVENous CONTINUOUS     Current Outpatient Prescriptions   Medication Sig    docusate sodium (COLACE) 100 mg capsule Take 1 Cap by mouth two (2) times a day for 5 days.  ciprofloxacin HCl (CIPRO) 500 mg tablet Take 1 Tab by mouth two (2) times a day for 5 days.     HYDROcodone-acetaminophen (XODOL) 5-300 mg tablet Take 1 Tab by mouth every six (6) hours as needed. Max Daily Amount: 4 Tabs.  ciprofloxacin HCl (CIPRO) 500 mg tablet Take 500 mg by mouth two (2) times a day. Start taking 1 hour prior to procedure    predniSONE (DELTASONE) 5 mg tablet Take 1 Tab by mouth as needed.  atorvastatin (LIPITOR) 20 mg tablet Take  by mouth daily.  cloNIDine HCl (CATAPRES) 0.1 mg tablet Take  by mouth.  hydrALAZINE (APRESOLINE) 100 mg tablet Take  by mouth.  sildenafil citrate (VIAGRA) 50 mg tablet Take 1 Tab by mouth as needed.  amLODIPine (NORVASC) 10 mg tablet Take 1 Tab by mouth daily.  allopurinol (ZYLOPRIM) 100 mg tablet Take 2 Tabs by mouth daily. Review of Systems:   Pertinent items are noted in HPI. Data Review:    Labs: Results:       Chemistry Recent Labs      04/21/18   1100   GLU  115*   NA  139   K  4.4   CL  105   CO2  19*   BUN  103*   CREA  14.00*   CA  8.1*   AGAP  15   BUCR  7*      CBC w/Diff Recent Labs      04/21/18   1100   WBC  11.4   RBC  3.26*   HGB  8.4*   HCT  26.8*   PLT  202   GRANS  86*   LYMPH  4*   EOS  0      Coagulation No results for input(s): PTP, INR, APTT in the last 72 hours. No lab exists for component: INREXT    Iron/Ferritin No results for input(s): IRON in the last 72 hours. No lab exists for component: TIBCCALC   BNP No results for input(s): BNPP in the last 72 hours. Cardiac Enzymes No results for input(s): CPK, CKND1, RACHEAL in the last 72 hours. No lab exists for component: CKRMB, TROIP   Liver Enzymes No results for input(s): TP, ALB, TBIL, AP, SGOT, GPT in the last 72 hours.     No lab exists for component: DBIL   Thyroid Studies No results found for: T4, T3U, TSH, TSHEXT     Urinalysis noted marked hematuria  Urine C/S      Physical Assessment:     Visit Vitals    /90    Pulse (!) 101    Temp 98.2 °F (36.8 °C)    Resp 20    Ht 6' 2\" (1.88 m)    Wt 104.3 kg (230 lb)    SpO2 95%    BMI 29.53 kg/m2     Last 3 Recorded Weights in this Encounter    04/21/18 1001   Weight: 104.3 kg (230 lb)     No intake or output data in the 24 hours ending 04/21/18 1449    Physial Exam:  General appearance: alert, cooperative, no distress, appears stated age  Skin: no rashes  HEENT: non icteric,pinkish conj  Neck: no JVD  Lungs: clear to auscultation bilaterally  Heart: regular rate and rhythm, S1, S2 normal, no murmur, click, rub or gallop  Abdomen: soft, non-tender. Bowel sounds normal. No masses,  no organomegaly  : briones cath in place with bloody urine and blood clots  Extremities: No LE edema    IMPRESSION AND PLAN:   BLAINE most likely from acute urinary bladder retention from outlet obstruction with blood clots post prostate bx. Monitor renal function post decompression, cont with IVF to replace urine volume. Avoid nephrotoxic drugs, and adjust all meds to renal function. There may also be some worsening from recent acute gout and steroid use but no labs are available prior to prostate bx. CKD stage 5 most likely from HTN has refused dialysis in the past but I did talk to him again about him possibly needing dialysis on this admission. Also spoke at length to his wife. He will think about it. Anemia, from CKD, blood loss, ? fe def check fe stores and trend H/H. BT as needed  HTN poor control cont same meds, will benefit from starting dialysis. Elevated PSA post prostate Bx, defer to Urology  Gout recent po steroid use, check UA level  2nd HPT from CKD and/or Vit D def check levels     Thank you will follow with you.   Discuss with Dr Rodney Grady and Dr. Virlinda Primrose, MD  April 21, 2018

## 2023-09-14 ENCOUNTER — HOSPITAL ENCOUNTER (OUTPATIENT)
Facility: HOSPITAL | Age: 53
Discharge: HOME | End: 2023-09-14
Payer: COMMERCIAL

## 2023-09-14 DIAGNOSIS — Z94.0 KIDNEY REPLACED BY TRANSPLANT: ICD-10-CM

## 2023-09-14 LAB
ALBUMIN SERPL-MCNC: 3.7 G/DL (ref 3.4–5)
ANION GAP SERPL CALC-SCNC: 5 MMOL/L (ref 3–18)
APPEARANCE UR: ABNORMAL
BILIRUB UR QL: NEGATIVE
BUN SERPL-MCNC: 16 MG/DL (ref 7–18)
BUN/CREAT SERPL: 10 (ref 12–20)
CALCIUM SERPL-MCNC: 9.1 MG/DL (ref 8.5–10.1)
CALCIUM SERPL-MCNC: 9.2 MG/DL (ref 8.5–10.1)
CHLORIDE SERPL-SCNC: 106 MMOL/L (ref 100–111)
CO2 SERPL-SCNC: 28 MMOL/L (ref 21–32)
COLOR UR: YELLOW
CREAT SERPL-MCNC: 1.55 MG/DL (ref 0.6–1.3)
CREAT UR-MCNC: 104 MG/DL (ref 30–125)
CREAT UR-MCNC: 105 MG/DL (ref 30–125)
ERYTHROCYTE [DISTWIDTH] IN BLOOD BY AUTOMATED COUNT: 14.7 % (ref 11.6–14.5)
GLUCOSE SERPL-MCNC: 124 MG/DL (ref 74–99)
GLUCOSE UR STRIP.AUTO-MCNC: NEGATIVE MG/DL
HCT VFR BLD AUTO: 48.5 % (ref 36–48)
HGB BLD-MCNC: 14.8 G/DL (ref 13–16)
HGB UR QL STRIP: NEGATIVE
KETONES UR QL STRIP.AUTO: NEGATIVE MG/DL
LEUKOCYTE ESTERASE UR QL STRIP.AUTO: NEGATIVE
MCH RBC QN AUTO: 26.7 PG (ref 24–34)
MCHC RBC AUTO-ENTMCNC: 30.5 G/DL (ref 31–37)
MCV RBC AUTO: 87.5 FL (ref 78–100)
MICROALBUMIN UR-MCNC: 1.41 MG/DL (ref 0–3)
MICROALBUMIN/CREAT UR-RTO: 13 MG/G (ref 0–30)
NITRITE UR QL STRIP.AUTO: NEGATIVE
NRBC # BLD: 0 K/UL (ref 0–0.01)
NRBC BLD-RTO: 0 PER 100 WBC
PH UR STRIP: 8.5 (ref 5–8)
PHOSPHATE SERPL-MCNC: 3.8 MG/DL (ref 2.5–4.9)
PLATELET # BLD AUTO: 134 K/UL (ref 135–420)
PMV BLD AUTO: 11.6 FL (ref 9.2–11.8)
POTASSIUM SERPL-SCNC: 4.3 MMOL/L (ref 3.5–5.5)
PROT UR STRIP-MCNC: NEGATIVE MG/DL
PROT UR-MCNC: 11 MG/DL
PROT/CREAT UR-RTO: 0.1
PTH-INTACT SERPL-MCNC: <6.3 PG/ML (ref 18.4–88)
RBC # BLD AUTO: 5.54 M/UL (ref 4.35–5.65)
SODIUM SERPL-SCNC: 139 MMOL/L (ref 136–145)
SP GR UR REFRACTOMETRY: 1.01 (ref 1–1.03)
UROBILINOGEN UR QL STRIP.AUTO: 0.2 EU/DL (ref 0.2–1)
WBC # BLD AUTO: 5.8 K/UL (ref 4.6–13.2)

## 2023-09-14 PROCEDURE — 80069 RENAL FUNCTION PANEL: CPT

## 2023-09-14 PROCEDURE — 85027 COMPLETE CBC AUTOMATED: CPT

## 2023-09-14 PROCEDURE — 82043 UR ALBUMIN QUANTITATIVE: CPT

## 2023-09-14 PROCEDURE — 81003 URINALYSIS AUTO W/O SCOPE: CPT

## 2023-09-14 PROCEDURE — 83970 ASSAY OF PARATHORMONE: CPT

## 2023-09-14 PROCEDURE — 80197 ASSAY OF TACROLIMUS: CPT

## 2023-09-14 PROCEDURE — 84156 ASSAY OF PROTEIN URINE: CPT

## 2023-09-14 PROCEDURE — 36415 COLL VENOUS BLD VENIPUNCTURE: CPT

## 2023-09-14 PROCEDURE — 82570 ASSAY OF URINE CREATININE: CPT

## 2023-09-17 LAB — TACROLIMUS BLD-MCNC: 8.2 NG/ML (ref 2–20)

## (undated) DEVICE — GOWN,PREVENTION PLUS,XLN/XL,ST,24/CS: Brand: MEDLINE

## (undated) DEVICE — CATH URETH FOL 2W MED 20FRX5 --

## (undated) DEVICE — MAX-CORE® DISPOSABLE CORE BIOPSY INSTRUMENT, 18G X 20CM: Brand: MAX-CORE

## (undated) DEVICE — 3M™ BAIR PAWS FLEX™ WARMING GOWN, STANDARD, 20 PER CASE 81003: Brand: BAIR PAWS™

## (undated) DEVICE — SOLUTION IRRIG 1000ML H2O STRL BLT

## (undated) DEVICE — MEDI-VAC SUCTION HIGH CAPACITY: Brand: CARDINAL HEALTH

## (undated) DEVICE — (D)GLOVE EXAM LG NITRL NS -- DISC BY MFR NO SUB

## (undated) DEVICE — CATHETER SUCT TR FL TIP 14FR W/ O CTRL

## (undated) DEVICE — AIRLIFE™ ADULT CUSHION NASAL CANNULA 14 FOOT (4.3) CRUSH-RESISTANT OXYGEN TUBING, AND U/CONNECT-IT ADAPTER: Brand: AIRLIFE™

## (undated) DEVICE — SYRINGE MED 20ML STD CLR PLAS LUERLOCK TIP N CTRL DISP

## (undated) DEVICE — DRAIN SURG 10FR L1/8IN DIA3.2MM SIL CHN RND FULL FLUT TRCR

## (undated) DEVICE — MEDI-VAC NON-CONDUCTIVE SUCTION TUBING: Brand: CARDINAL HEALTH

## (undated) DEVICE — Z DISCONTINUED BY MEDLINE USE 2711682 TRAY SKIN PREP DRY W/ PREM GLV

## (undated) DEVICE — GOWN ISOL IMPERV UNIV, DISP, OPEN BACK, BLUE --

## (undated) DEVICE — STERILE POLYISOPRENE POWDER-FREE SURGICAL GLOVES: Brand: PROTEXIS

## (undated) DEVICE — BLANKET WRM W40.2XL55.9IN IORT LO BODY + MISTRAL AIR

## (undated) DEVICE — FLEX ADVANTAGE 3000CC: Brand: FLEX ADVANTAGE

## (undated) DEVICE — SUTURE NONABSORBABLE MONOFILAMENT 5-0 PS-2 18 IN BLK ETHILON 1666H

## (undated) DEVICE — SYRINGE MED 25GA 3ML L5/8IN SUBQ PLAS W/ DETACH NDL SFTY

## (undated) DEVICE — APPLIER CLP L9.38IN M LIG TI DISP STR RNG HNDL LIGACLP

## (undated) DEVICE — (D)GLOVE SURG TRIFLX 8 PWD LTX -- DISC BY MFR USE ITEM 302994

## (undated) DEVICE — SYR 50ML SLIP TIP NSAF LF STRL --

## (undated) DEVICE — FLUFF AND POLYMER UNDERPAD,EXTRA HEAVY: Brand: WINGS

## (undated) DEVICE — ENDOSCOPY PUMP TUBING/ CAP SET: Brand: ERBE

## (undated) DEVICE — Device

## (undated) DEVICE — THREE-QUARTER SHEET: Brand: CONVERTORS

## (undated) DEVICE — BIPOLAR FORCEPS CORD: Brand: VALLEYLAB

## (undated) DEVICE — LUB SURG MEDC STRL 2OZ TUBE MC -- MEDICHOICE

## (undated) DEVICE — SOLUTION IV 1000ML 0.9% SOD CHL

## (undated) DEVICE — HEX-LOCKING BLADE ELECTRODE: Brand: EDGE

## (undated) DEVICE — INSULATED BLADE ELECTRODE: Brand: EDGE

## (undated) DEVICE — CANNULA ORIG TL CLR W FOAM CUSHIONS AND 14FT SUPL TB 3 CHN

## (undated) DEVICE — BLANKET WRM AD W50XL85.8IN PACU FULL BODY FORC AIR

## (undated) DEVICE — TRAY PREP DRY W/ PREM GLV 2 APPL 6 SPNG 2 UNDPD 1 OVERWRAP

## (undated) DEVICE — SUTURE VCRL SZ 3-0 L27IN ABSRB UD L26MM SH 1/2 CIR J416H

## (undated) DEVICE — FORCEPS BX L240CM JAW DIA2.8MM L CAP W/ NDL MIC MESH TOOTH

## (undated) DEVICE — GARMENT,MEDLINE,DVT,INT,CALF,MED, GEN2: Brand: MEDLINE

## (undated) DEVICE — SYR 10ML LUER LOK 1/5ML GRAD --

## (undated) DEVICE — Z DISCONTINUED NO SUB IDED ELECTRODE ES L2.8IN S STL INSUL NDL DISP EDGE

## (undated) DEVICE — SKIN MARKER,REGULAR TIP WITH RULER AND LABELS: Brand: DEVON

## (undated) DEVICE — SNARE POLYP M W27MMXL240CM OVL STIFF DISP CAPTIVATOR

## (undated) DEVICE — SUT SLK 3-0 30IN SH BLK --

## (undated) DEVICE — GAUZE SPONGES,16 PLY: Brand: CURITY

## (undated) DEVICE — KIT CLN UP BON SECOURS MARYV

## (undated) DEVICE — SOLUTION IRRIG 3000ML 0.9% SOD CHL FLX CONT 0797208] ICU MEDICAL INC]

## (undated) DEVICE — ROUND DISSECTORS: Brand: DEROYAL

## (undated) DEVICE — STERILE (2.0 X 30CM) COVER: Brand: NEOGUARD™ TRANSDUCER COVER

## (undated) DEVICE — INTENDED FOR TISSUE SEPARATION, AND OTHER PROCEDURES THAT REQUIRE A SHARP SURGICAL BLADE TO PUNCTURE OR CUT.: Brand: BARD-PARKER SAFETY BLADES SIZE 10, STERILE

## (undated) DEVICE — Z DUP USE 2565107 PACK SURG PROC LEG CYSTO T-DRAPE REINF TBL CVR HND TWL

## (undated) DEVICE — AIRLIFE™ NASAL OXYGEN CANNULA CURVED, NONFLARED TIP WITH 14 FOOT (4.3 M) CRUSH-RESISTANT TUBING, OVER-THE-EAR STYLE: Brand: AIRLIFE™

## (undated) DEVICE — STER SINGLE BASIN SET W/BOWLS: Brand: CARDINAL HEALTH

## (undated) DEVICE — PACK PROCEDURE SURG MAJ W/ BASIN LF

## (undated) DEVICE — TUBING IRRIG L77IN DIA0.241IN L BOR FOR CYSTO W/ NVENT

## (undated) DEVICE — SUTURE PERMA-HAND SZ 2-0 L24IN NONABSORBABLE BLK W/O NDL SA75H

## (undated) DEVICE — PAD,NON-ADHERENT,3X8,STERILE,LF,1/PK: Brand: MEDLINE

## (undated) DEVICE — DRAPE: MAGNETIC 12X16 30/CS: Brand: MEDICAL ACTION INDUSTRIES

## (undated) DEVICE — 8FR FRAZIER SUCTION HANDLE: Brand: CARDINAL HEALTH

## (undated) DEVICE — SOFT SILICONE HYDROCELLULAR SACRUM DRESSING WITH LOCK AWAY LAYER: Brand: ALLEVYN LIFE SACRUM (LARGE) PACK OF 10

## (undated) DEVICE — BAG DRAIN URIN 2000ML LF STRL -- CONVERT TO ITEM 363123

## (undated) DEVICE — BAG DRAINAGE CUST DISP

## (undated) DEVICE — SUTURE PERMA-HAND SZ 3-0 L24IN NONABSORBABLE BLK W/O NDL SA74H

## (undated) DEVICE — SHEAR HARMONIC FOCUS OEM 9CM --

## (undated) DEVICE — KENDALL SCD EXPRESS SLEEVES, KNEE LENGTH, MEDIUM: Brand: KENDALL SCD

## (undated) DEVICE — TABLE COVER: Brand: CONVERTORS

## (undated) DEVICE — APPLIER LIG CLP M L11IN TI STR RNG HNDL FOR 20 CLP DISP

## (undated) DEVICE — INTENDED FOR TISSUE SEPARATION, AND OTHER PROCEDURES THAT REQUIRE A SHARP SURGICAL BLADE TO PUNCTURE OR CUT.: Brand: BARD-PARKER SAFETY BLADES SIZE 15, STERILE